# Patient Record
Sex: MALE | Race: WHITE | NOT HISPANIC OR LATINO
[De-identification: names, ages, dates, MRNs, and addresses within clinical notes are randomized per-mention and may not be internally consistent; named-entity substitution may affect disease eponyms.]

---

## 2017-01-19 ENCOUNTER — APPOINTMENT (OUTPATIENT)
Dept: UROLOGY | Facility: CLINIC | Age: 77
End: 2017-01-19

## 2017-03-30 ENCOUNTER — APPOINTMENT (OUTPATIENT)
Dept: UROLOGY | Facility: CLINIC | Age: 77
End: 2017-03-30

## 2017-03-30 LAB
ANION GAP SERPL CALC-SCNC: 19 MMOL/L
APPEARANCE: CLEAR
BACTERIA: NEGATIVE
BILIRUBIN URINE: NEGATIVE
BLOOD URINE: ABNORMAL
BUN SERPL-MCNC: 21 MG/DL
CALCIUM SERPL-MCNC: 9.3 MG/DL
CHLORIDE SERPL-SCNC: 99 MMOL/L
CO2 SERPL-SCNC: 27 MMOL/L
COLOR: YELLOW
CREAT SERPL-MCNC: 1.25 MG/DL
GLUCOSE QUALITATIVE U: NORMAL MG/DL
GLUCOSE SERPL-MCNC: 88 MG/DL
HYALINE CASTS: 1 /LPF
KETONES URINE: NEGATIVE
LEUKOCYTE ESTERASE URINE: NEGATIVE
MICROSCOPIC-UA: NORMAL
NITRITE URINE: NEGATIVE
PH URINE: 7.5
POTASSIUM SERPL-SCNC: 4.5 MMOL/L
PROTEIN URINE: NEGATIVE MG/DL
RED BLOOD CELLS URINE: 11 /HPF
SODIUM SERPL-SCNC: 145 MMOL/L
SPECIFIC GRAVITY URINE: 1.01
SQUAMOUS EPITHELIAL CELLS: 1 /HPF
UROBILINOGEN URINE: 1 MG/DL
WHITE BLOOD CELLS URINE: 0 /HPF

## 2017-03-31 LAB
PSA FREE FLD-MCNC: 10.9 %
PSA FREE SERPL-MCNC: 1.13 NG/ML
PSA SERPL-MCNC: 10.41 NG/ML

## 2017-04-01 LAB — CORE LAB FLUID CYTOLOGY: NORMAL

## 2017-09-28 ENCOUNTER — APPOINTMENT (OUTPATIENT)
Dept: UROLOGY | Facility: CLINIC | Age: 77
End: 2017-09-28
Payer: MEDICARE

## 2017-09-28 PROCEDURE — 99214 OFFICE O/P EST MOD 30 MIN: CPT

## 2017-09-29 LAB
PSA FREE FLD-MCNC: 9.5
PSA FREE SERPL-MCNC: 1.3 NG/ML
PSA SERPL-MCNC: 13.67 NG/ML

## 2018-03-28 ENCOUNTER — APPOINTMENT (OUTPATIENT)
Dept: UROLOGY | Facility: CLINIC | Age: 78
End: 2018-03-28
Payer: MEDICARE

## 2018-03-28 DIAGNOSIS — R97.20 ELEVATED PROSTATE, SPECIFIC ANTIGEN [PSA]: ICD-10-CM

## 2018-03-28 PROCEDURE — 99214 OFFICE O/P EST MOD 30 MIN: CPT

## 2018-03-29 LAB
PSA FREE FLD-MCNC: 7
PSA FREE SERPL-MCNC: 0.22 NG/ML
PSA SERPL-MCNC: 3.14 NG/ML

## 2018-04-13 LAB
APPEARANCE: ABNORMAL
BACTERIA: ABNORMAL
BILIRUBIN URINE: ABNORMAL
BLOOD URINE: ABNORMAL
COLOR: ABNORMAL
GLUCOSE QUALITATIVE U: NEGATIVE
HYALINE CASTS: 0 /LPF
KETONES URINE: NEGATIVE
LEUKOCYTE ESTERASE URINE: ABNORMAL
MICROSCOPIC-UA: NORMAL
NITRITE URINE: POSITIVE
PH URINE: 5
PROTEIN URINE: 100
RED BLOOD CELLS URINE: >720 /HPF
SPECIFIC GRAVITY URINE: 1.03
SQUAMOUS EPITHELIAL CELLS: 3 /HPF
UROBILINOGEN URINE: NEGATIVE
WHITE BLOOD CELLS URINE: 52 /HPF

## 2018-04-15 LAB
BACTERIA UR CULT: ABNORMAL
CORE LAB FLUID CYTOLOGY: NORMAL

## 2018-05-11 ENCOUNTER — APPOINTMENT (OUTPATIENT)
Dept: RADIOLOGY | Facility: HOSPITAL | Age: 78
End: 2018-05-11
Payer: MEDICARE

## 2018-05-11 ENCOUNTER — OUTPATIENT (OUTPATIENT)
Dept: OUTPATIENT SERVICES | Facility: HOSPITAL | Age: 78
LOS: 1 days | End: 2018-05-11
Payer: MEDICARE

## 2018-05-11 DIAGNOSIS — Z00.8 ENCOUNTER FOR OTHER GENERAL EXAMINATION: ICD-10-CM

## 2018-05-11 DIAGNOSIS — R05 COUGH: ICD-10-CM

## 2018-05-11 PROCEDURE — 71046 X-RAY EXAM CHEST 2 VIEWS: CPT

## 2018-05-11 PROCEDURE — 71046 X-RAY EXAM CHEST 2 VIEWS: CPT | Mod: 26

## 2018-05-18 ENCOUNTER — APPOINTMENT (OUTPATIENT)
Dept: ULTRASOUND IMAGING | Facility: CLINIC | Age: 78
End: 2018-05-18
Payer: MEDICARE

## 2018-05-18 ENCOUNTER — OUTPATIENT (OUTPATIENT)
Dept: OUTPATIENT SERVICES | Facility: HOSPITAL | Age: 78
LOS: 1 days | End: 2018-05-18
Payer: MEDICARE

## 2018-05-18 DIAGNOSIS — Z00.8 ENCOUNTER FOR OTHER GENERAL EXAMINATION: ICD-10-CM

## 2018-05-18 PROCEDURE — 76770 US EXAM ABDO BACK WALL COMP: CPT | Mod: 26

## 2018-05-18 PROCEDURE — 76770 US EXAM ABDO BACK WALL COMP: CPT

## 2018-06-07 ENCOUNTER — APPOINTMENT (OUTPATIENT)
Dept: UROLOGY | Facility: CLINIC | Age: 78
End: 2018-06-07
Payer: MEDICARE

## 2018-06-07 DIAGNOSIS — Z00.00 ENCOUNTER FOR GENERAL ADULT MEDICAL EXAMINATION W/OUT ABNORMAL FINDINGS: ICD-10-CM

## 2018-06-07 PROCEDURE — 99214 OFFICE O/P EST MOD 30 MIN: CPT

## 2018-06-08 LAB
APPEARANCE: CLEAR
BACTERIA: NEGATIVE
BILIRUBIN URINE: NEGATIVE
BLOOD URINE: NEGATIVE
COLOR: ABNORMAL
GLUCOSE QUALITATIVE U: NEGATIVE MG/DL
HYALINE CASTS: 2 /LPF
KETONES URINE: NEGATIVE
LEUKOCYTE ESTERASE URINE: NEGATIVE
MICROSCOPIC-UA: NORMAL
NITRITE URINE: NEGATIVE
PH URINE: 5
PROTEIN URINE: NEGATIVE MG/DL
RED BLOOD CELLS URINE: 4 /HPF
SPECIFIC GRAVITY URINE: 1.02
SQUAMOUS EPITHELIAL CELLS: 1 /HPF
UROBILINOGEN URINE: 1 MG/DL
WHITE BLOOD CELLS URINE: 1 /HPF

## 2018-06-11 LAB
CORE LAB FLUID CYTOLOGY: NORMAL
PSA FREE FLD-MCNC: 5.4
PSA FREE SERPL-MCNC: 0.11 NG/ML
PSA SERPL-MCNC: 2.02 NG/ML

## 2018-08-30 ENCOUNTER — APPOINTMENT (OUTPATIENT)
Dept: UROLOGY | Facility: CLINIC | Age: 78
End: 2018-08-30
Payer: MEDICARE

## 2018-08-30 DIAGNOSIS — R31.0 GROSS HEMATURIA: ICD-10-CM

## 2018-08-30 PROCEDURE — 99214 OFFICE O/P EST MOD 30 MIN: CPT

## 2018-08-31 LAB
PSA FREE FLD-MCNC: 9.3
PSA FREE SERPL-MCNC: 0.3 NG/ML
PSA SERPL-MCNC: 3.21 NG/ML

## 2018-12-05 ENCOUNTER — APPOINTMENT (OUTPATIENT)
Dept: UROLOGY | Facility: CLINIC | Age: 78
End: 2018-12-05

## 2019-02-07 ENCOUNTER — APPOINTMENT (OUTPATIENT)
Dept: UROLOGY | Facility: CLINIC | Age: 79
End: 2019-02-07

## 2019-02-14 ENCOUNTER — APPOINTMENT (OUTPATIENT)
Dept: UROLOGY | Facility: CLINIC | Age: 79
End: 2019-02-14
Payer: MEDICARE

## 2019-02-14 DIAGNOSIS — C61 MALIGNANT NEOPLASM OF PROSTATE: ICD-10-CM

## 2019-02-14 DIAGNOSIS — N13.8 BENIGN PROSTATIC HYPERPLASIA WITH LOWER URINARY TRACT SYMPMS: ICD-10-CM

## 2019-02-14 DIAGNOSIS — R35.0 FREQUENCY OF MICTURITION: ICD-10-CM

## 2019-02-14 DIAGNOSIS — N40.1 BENIGN PROSTATIC HYPERPLASIA WITH LOWER URINARY TRACT SYMPMS: ICD-10-CM

## 2019-02-14 PROCEDURE — 88112 CYTOPATH CELL ENHANCE TECH: CPT | Mod: 26

## 2019-02-14 PROCEDURE — 99214 OFFICE O/P EST MOD 30 MIN: CPT

## 2019-02-24 LAB
APPEARANCE: CLEAR
BACTERIA UR CULT: NORMAL
BACTERIA: NEGATIVE
BILIRUBIN URINE: NEGATIVE
BLOOD URINE: NEGATIVE
COLOR: YELLOW
GLUCOSE QUALITATIVE U: NEGATIVE
HYALINE CASTS: 1 /LPF
KETONES URINE: NEGATIVE
LEUKOCYTE ESTERASE URINE: NEGATIVE
MICROSCOPIC-UA: NORMAL
NITRITE URINE: NEGATIVE
PH URINE: 6
PROTEIN URINE: NORMAL
RED BLOOD CELLS URINE: 3 /HPF
SPECIFIC GRAVITY URINE: 1.02
SQUAMOUS EPITHELIAL CELLS: 1 /HPF
UROBILINOGEN URINE: NORMAL
WHITE BLOOD CELLS URINE: 1 /HPF

## 2019-09-23 ENCOUNTER — RX RENEWAL (OUTPATIENT)
Age: 79
End: 2019-09-23

## 2020-11-03 ENCOUNTER — RX RENEWAL (OUTPATIENT)
Age: 80
End: 2020-11-03

## 2022-04-28 ENCOUNTER — INPATIENT (INPATIENT)
Facility: HOSPITAL | Age: 82
LOS: 3 days | Discharge: HOME | DRG: 690 | End: 2022-05-02
Attending: FAMILY MEDICINE | Admitting: INTERNAL MEDICINE
Payer: MEDICARE

## 2022-04-28 ENCOUNTER — EMERGENCY (EMERGENCY)
Facility: HOSPITAL | Age: 82
LOS: 1 days | Discharge: ANOTHER HOSPITAL | End: 2022-04-28
Attending: EMERGENCY MEDICINE | Admitting: EMERGENCY MEDICINE
Payer: MEDICARE

## 2022-04-28 VITALS
OXYGEN SATURATION: 98 % | DIASTOLIC BLOOD PRESSURE: 85 MMHG | RESPIRATION RATE: 16 BRPM | SYSTOLIC BLOOD PRESSURE: 132 MMHG | TEMPERATURE: 98 F | HEART RATE: 81 BPM

## 2022-04-28 VITALS
HEIGHT: 72 IN | SYSTOLIC BLOOD PRESSURE: 125 MMHG | HEART RATE: 60 BPM | RESPIRATION RATE: 16 BRPM | DIASTOLIC BLOOD PRESSURE: 79 MMHG | WEIGHT: 220.02 LBS | OXYGEN SATURATION: 97 % | TEMPERATURE: 98 F

## 2022-04-28 DIAGNOSIS — R33.9 RETENTION OF URINE, UNSPECIFIED: ICD-10-CM

## 2022-04-28 LAB
ALBUMIN SERPL ELPH-MCNC: 2.7 G/DL — LOW (ref 3.3–5)
ALP SERPL-CCNC: 131 U/L — HIGH (ref 30–120)
ALT FLD-CCNC: 24 U/L DA — SIGNIFICANT CHANGE UP (ref 10–60)
ANION GAP SERPL CALC-SCNC: 7 MMOL/L — SIGNIFICANT CHANGE UP (ref 5–17)
APPEARANCE UR: CLEAR — SIGNIFICANT CHANGE UP
APTT BLD: 53.3 SEC — HIGH (ref 27.5–35.5)
AST SERPL-CCNC: 26 U/L — SIGNIFICANT CHANGE UP (ref 10–40)
BACTERIA # UR AUTO: ABNORMAL
BASOPHILS # BLD AUTO: 0 K/UL — SIGNIFICANT CHANGE UP (ref 0–0.2)
BASOPHILS NFR BLD AUTO: 0 % — SIGNIFICANT CHANGE UP (ref 0–2)
BILIRUB SERPL-MCNC: 1.8 MG/DL — HIGH (ref 0.2–1.2)
BILIRUB UR-MCNC: NEGATIVE — SIGNIFICANT CHANGE UP
BUN SERPL-MCNC: 14 MG/DL — SIGNIFICANT CHANGE UP (ref 7–23)
CALCIUM SERPL-MCNC: 8.5 MG/DL — SIGNIFICANT CHANGE UP (ref 8.4–10.5)
CHLORIDE SERPL-SCNC: 97 MMOL/L — SIGNIFICANT CHANGE UP (ref 96–108)
CO2 SERPL-SCNC: 30 MMOL/L — SIGNIFICANT CHANGE UP (ref 22–31)
COLOR SPEC: YELLOW — SIGNIFICANT CHANGE UP
CREAT SERPL-MCNC: 1.37 MG/DL — HIGH (ref 0.5–1.3)
DIFF PNL FLD: ABNORMAL
EGFR: 52 ML/MIN/1.73M2 — LOW
EOSINOPHIL # BLD AUTO: 0 K/UL — SIGNIFICANT CHANGE UP (ref 0–0.5)
EOSINOPHIL NFR BLD AUTO: 0 % — SIGNIFICANT CHANGE UP (ref 0–6)
EPI CELLS # UR: SIGNIFICANT CHANGE UP
GLUCOSE SERPL-MCNC: 111 MG/DL — HIGH (ref 70–99)
GLUCOSE UR QL: NEGATIVE MG/DL — SIGNIFICANT CHANGE UP
HCT VFR BLD CALC: 38.1 % — LOW (ref 39–50)
HGB BLD-MCNC: 12.3 G/DL — LOW (ref 13–17)
INR BLD: 4.43 RATIO — HIGH (ref 0.88–1.16)
KETONES UR-MCNC: NEGATIVE — SIGNIFICANT CHANGE UP
LACTATE SERPL-SCNC: 1 MMOL/L — SIGNIFICANT CHANGE UP (ref 0.7–2)
LACTATE SERPL-SCNC: 2.1 MMOL/L — HIGH (ref 0.7–2)
LEUKOCYTE ESTERASE UR-ACNC: ABNORMAL
LYMPHOCYTES # BLD AUTO: 1.32 K/UL — SIGNIFICANT CHANGE UP (ref 1–3.3)
LYMPHOCYTES # BLD AUTO: 21 % — SIGNIFICANT CHANGE UP (ref 13–44)
MANUAL SMEAR VERIFICATION: SIGNIFICANT CHANGE UP
MCHC RBC-ENTMCNC: 32.2 PG — SIGNIFICANT CHANGE UP (ref 27–34)
MCHC RBC-ENTMCNC: 32.3 GM/DL — SIGNIFICANT CHANGE UP (ref 32–36)
MCV RBC AUTO: 99.7 FL — SIGNIFICANT CHANGE UP (ref 80–100)
MONOCYTES # BLD AUTO: 0.5 K/UL — SIGNIFICANT CHANGE UP (ref 0–0.9)
MONOCYTES NFR BLD AUTO: 8 % — SIGNIFICANT CHANGE UP (ref 2–14)
NEUTROPHILS # BLD AUTO: 4.46 K/UL — SIGNIFICANT CHANGE UP (ref 1.8–7.4)
NEUTROPHILS NFR BLD AUTO: 69 % — SIGNIFICANT CHANGE UP (ref 43–77)
NEUTS BAND # BLD: 2 % — SIGNIFICANT CHANGE UP (ref 0–8)
NITRITE UR-MCNC: NEGATIVE — SIGNIFICANT CHANGE UP
NRBC # BLD: 0 — SIGNIFICANT CHANGE UP
NRBC # BLD: SIGNIFICANT CHANGE UP /100 WBCS (ref 0–0)
PH UR: 5 — SIGNIFICANT CHANGE UP (ref 5–8)
PLAT MORPH BLD: NORMAL — SIGNIFICANT CHANGE UP
PLATELET # BLD AUTO: 201 K/UL — SIGNIFICANT CHANGE UP (ref 150–400)
POTASSIUM SERPL-MCNC: 4 MMOL/L — SIGNIFICANT CHANGE UP (ref 3.5–5.3)
POTASSIUM SERPL-SCNC: 4 MMOL/L — SIGNIFICANT CHANGE UP (ref 3.5–5.3)
PROT SERPL-MCNC: 6.2 G/DL — SIGNIFICANT CHANGE UP (ref 6–8.3)
PROT UR-MCNC: 30 MG/DL
PROTHROM AB SERPL-ACNC: 53.1 SEC — HIGH (ref 10.5–13.4)
RBC # BLD: 3.82 M/UL — LOW (ref 4.2–5.8)
RBC # FLD: 16.6 % — HIGH (ref 10.3–14.5)
RBC BLD AUTO: NORMAL — SIGNIFICANT CHANGE UP
RBC CASTS # UR COMP ASSIST: ABNORMAL /HPF (ref 0–4)
SARS-COV-2 RNA SPEC QL NAA+PROBE: SIGNIFICANT CHANGE UP
SODIUM SERPL-SCNC: 134 MMOL/L — LOW (ref 135–145)
SP GR SPEC: 1.01 — SIGNIFICANT CHANGE UP (ref 1.01–1.02)
UROBILINOGEN FLD QL: NEGATIVE MG/DL — SIGNIFICANT CHANGE UP
WBC # BLD: 6.28 K/UL — SIGNIFICANT CHANGE UP (ref 3.8–10.5)
WBC # FLD AUTO: 6.28 K/UL — SIGNIFICANT CHANGE UP (ref 3.8–10.5)
WBC UR QL: >50

## 2022-04-28 PROCEDURE — 87086 URINE CULTURE/COLONY COUNT: CPT

## 2022-04-28 PROCEDURE — 85610 PROTHROMBIN TIME: CPT

## 2022-04-28 PROCEDURE — 85730 THROMBOPLASTIN TIME PARTIAL: CPT

## 2022-04-28 PROCEDURE — 71045 X-RAY EXAM CHEST 1 VIEW: CPT

## 2022-04-28 PROCEDURE — 81001 URINALYSIS AUTO W/SCOPE: CPT

## 2022-04-28 PROCEDURE — 99223 1ST HOSP IP/OBS HIGH 75: CPT | Mod: GC

## 2022-04-28 PROCEDURE — 36415 COLL VENOUS BLD VENIPUNCTURE: CPT

## 2022-04-28 PROCEDURE — 96361 HYDRATE IV INFUSION ADD-ON: CPT

## 2022-04-28 PROCEDURE — 87635 SARS-COV-2 COVID-19 AMP PRB: CPT

## 2022-04-28 PROCEDURE — 80053 COMPREHEN METABOLIC PANEL: CPT

## 2022-04-28 PROCEDURE — 96365 THER/PROPH/DIAG IV INF INIT: CPT

## 2022-04-28 PROCEDURE — 71045 X-RAY EXAM CHEST 1 VIEW: CPT | Mod: 26

## 2022-04-28 PROCEDURE — 83605 ASSAY OF LACTIC ACID: CPT

## 2022-04-28 PROCEDURE — 93005 ELECTROCARDIOGRAM TRACING: CPT

## 2022-04-28 PROCEDURE — 93010 ELECTROCARDIOGRAM REPORT: CPT

## 2022-04-28 PROCEDURE — 85025 COMPLETE CBC W/AUTO DIFF WBC: CPT

## 2022-04-28 PROCEDURE — 99285 EMERGENCY DEPT VISIT HI MDM: CPT | Mod: CS

## 2022-04-28 PROCEDURE — 87040 BLOOD CULTURE FOR BACTERIA: CPT

## 2022-04-28 PROCEDURE — 99285 EMERGENCY DEPT VISIT HI MDM: CPT | Mod: 25

## 2022-04-28 RX ORDER — LANOLIN ALCOHOL/MO/W.PET/CERES
3 CREAM (GRAM) TOPICAL AT BEDTIME
Refills: 0 | Status: DISCONTINUED | OUTPATIENT
Start: 2022-04-28 | End: 2022-05-02

## 2022-04-28 RX ORDER — SODIUM CHLORIDE 9 MG/ML
2100 INJECTION INTRAMUSCULAR; INTRAVENOUS; SUBCUTANEOUS ONCE
Refills: 0 | Status: COMPLETED | OUTPATIENT
Start: 2022-04-28 | End: 2022-04-28

## 2022-04-28 RX ORDER — SODIUM CHLORIDE 9 MG/ML
1000 INJECTION INTRAMUSCULAR; INTRAVENOUS; SUBCUTANEOUS ONCE
Refills: 0 | Status: COMPLETED | OUTPATIENT
Start: 2022-04-28 | End: 2022-04-28

## 2022-04-28 RX ORDER — CEFTRIAXONE 500 MG/1
1000 INJECTION, POWDER, FOR SOLUTION INTRAMUSCULAR; INTRAVENOUS ONCE
Refills: 0 | Status: COMPLETED | OUTPATIENT
Start: 2022-04-28 | End: 2022-04-28

## 2022-04-28 RX ADMIN — SODIUM CHLORIDE 2100 MILLILITER(S): 9 INJECTION INTRAMUSCULAR; INTRAVENOUS; SUBCUTANEOUS at 18:00

## 2022-04-28 RX ADMIN — SODIUM CHLORIDE 1000 MILLILITER(S): 9 INJECTION INTRAMUSCULAR; INTRAVENOUS; SUBCUTANEOUS at 16:39

## 2022-04-28 RX ADMIN — SODIUM CHLORIDE 2100 MILLILITER(S): 9 INJECTION INTRAMUSCULAR; INTRAVENOUS; SUBCUTANEOUS at 16:37

## 2022-04-28 RX ADMIN — CEFTRIAXONE 1000 MILLIGRAM(S): 500 INJECTION, POWDER, FOR SOLUTION INTRAMUSCULAR; INTRAVENOUS at 17:05

## 2022-04-28 RX ADMIN — SODIUM CHLORIDE 1000 MILLILITER(S): 9 INJECTION INTRAMUSCULAR; INTRAVENOUS; SUBCUTANEOUS at 15:34

## 2022-04-28 RX ADMIN — CEFTRIAXONE 100 MILLIGRAM(S): 500 INJECTION, POWDER, FOR SOLUTION INTRAMUSCULAR; INTRAVENOUS at 16:38

## 2022-04-28 NOTE — H&P ADULT - NSHPSOCIALHISTORY_GEN_ALL_CORE
Lives at  home with wife  Has private aids and nurses that come to [facility] to help with care  Patient is bed bound   Denies tobacco, alcohol, or recreation drug use   Vaccinated for COVID x3 pfizer   Lives at  home with wife  Has private aids and nurses that come to the house to help with care  Patient is bed bound   Denies tobacco, alcohol, or recreation drug use   Vaccinated for COVID x3 pfizer

## 2022-04-28 NOTE — ED ADULT NURSE REASSESSMENT NOTE - NS ED NURSE REASSESS COMMENT FT1
pt awaiting bed placement at [place], ED supervisor aware, pt resting comfortably in bed, turned and repositioned, no distress or sob.

## 2022-04-28 NOTE — H&P ADULT - PROBLEM/PLAN-8
Never replied, please call  Also, she was supposed to call Friday to review home BP readings, so please see if we can get those as well. Thank you very much.     DISPLAY PLAN FREE TEXT

## 2022-04-28 NOTE — ED ADULT NURSE NOTE - OBJECTIVE STATEMENT
pt from home, bedbound, has home health care, sent to ED for decreased urine output. Left medial shin healing old abrasion noted, boot to right foot s/p hx of foot drop. currently being treated for uti on Augmentin, dark and painful urine, unable to empty plenty completely. + incontinence, pt turned and cleaned, repositioned. no cp or sob no other symptoms at this time

## 2022-04-28 NOTE — ED ADULT NURSE REASSESSMENT NOTE - COMFORT CARE
darkened lights/meal provided/plan of care explained/repositioned/side rails up/wait time explained/warm blanket provided

## 2022-04-28 NOTE — H&P ADULT - ASSESSMENT
81 y/o bedbound male with a PMHx of Afib on Coumadin, heart failure, and h/o prostate CA, recurrent UTIs admitted for UTI. 83 y/o bedbound male with a PMHx of Afib on Coumadin, heart failure, and h/o prostate CA, recurrent UTIs admitted for UTI and urinary retention.

## 2022-04-28 NOTE — H&P ADULT - PROBLEM SELECTOR PLAN 2
elevated INR of 4.43  - Hold Warfarin   - F/u daily INR check Agrawal placed in [facility] ED - continue  - follow up urology recs

## 2022-04-28 NOTE — ED PROVIDER NOTE - MUSCULOSKELETAL, MLM
Pt lives alone, denies stairs. Prior to admission, pt ambulated independently with cane. Pt also has a rolling walker at home. Right lower leg in brace.

## 2022-04-28 NOTE — H&P ADULT - HISTORY OF PRESENT ILLNESS
82M with PMHx of.... **Charting in progress***  81 y/o bedbound male with a PMHx of Afib on Coumadin, heart failure, and h/o prostate CA from home brought in by EMS for evaluation of difficulty urinating, painful urination, and dark urine. Per wife, pt has had frequent UTIs over the past few months and was recently diagnosed with another UTI and put on Amoxicillin a few days ago by the visiting doctor's PA. Wife states he was admitted at [facility] a few months ago for Afib and was discharged to rehab where he started developing UTIs & the right foot drop. She also states pt was dropped during transport from rehab to [facility] when he had a UTI approx. 1.5 months ago and "broke something in his back" causing him to be bedbound since. Per wife, pt is incontinent & urinates in diapers. Denies other symptoms. No other complaints at this time.    ED course  Vitals:  Significant labs: INR 4.43, lactate 2.1 -> 1, Na 134, Cr 1.37, alk phos 131, tbili 1.8   UA: Moderate LE, Large blood, few bacteria, >50 WBC, 3-5 RBC  CXR: IMPRESSION: Cardiomegaly. No radiographic evidence of active chest disease. Lateral or bilateral decubitus radiographs recommended to exclude posterior lung base pathology. 83 y/o bedbound male with a PMHx of Afib on Coumadin, heart failure, and h/o prostate CA from home brought in by EMS for evaluation of difficulty urinating, painful urination, and dark urine. Per wife, pt has had frequent UTIs over the past few months and was recently diagnosed with another UTI and put on Amoxicillin a few days ago by the visiting doctor's PA. Wife states he was admitted at [facility] a few months ago for Afib and was discharged to rehab where he started developing UTIs & the right foot drop. She also states pt was dropped during transport from rehab to [facility] when he had a UTI approx. 1.5 months ago and "broke something in his back" causing him to be bedbound since. Per wife, pt is incontinent & urinates in diapers. Denies other symptoms. No other complaints at this time.    ED course  Vitals:  Significant labs: INR 4.43, lactate 2.1 -> 1, Na 134, Cr 1.37, alk phos 131, tbili 1.8   UA: Moderate LE, Large blood, few bacteria, >50 WBC, 3-5 RBC  CXR: IMPRESSION: Cardiomegaly. No radiographic evidence of active chest disease. Lateral or bilateral decubitus radiographs recommended to exclude posterior lung base pathology. 81 y/o bedbound male with a PMHx of Afib on Coumadin, heart failure, and h/o prostate CA from home brought in by EMS for evaluation of difficulty urinating, painful urination, and dark urine. Per wife, pt has had frequent UTIs over the past few months and was recently diagnosed with another UTI and put on Amoxicillin a few days ago by the visiting doctor's PA. Wife states he was admitted at [facility] a few months ago for Afib and was discharged to rehab where he started developing UTIs & the right foot drop. She also states pt was dropped during transport from rehab to [facility] when he had a UTI approx. 1.5 months ago and "broke something in his back" causing him to be bedbound since. Per wife, pt is incontinent & urinates in diapers. Denies other symptoms. No other complaints at this time.    ED course  Vitals: T97.8F, HR 60, /79, RR 16, SpO2 97% on RA  Significant labs: INR 4.43, lactate 2.1 -> 1, Na 134, Cr 1.37, alk phos 131, tbili 1.8   UA: Moderate LE, Large blood, few bacteria, >50 WBC, 3-5 RBC  CXR: Cardiomegaly. No radiographic evidence of active chest disease. Lateral or bilateral decubitus radiographs recommended to exclude posterior lung base pathology. 81 y/o bedbound male with a PMHx of Afib on Coumadin, heart failure, and h/o prostate CA from home brought in by EMS for evaluation of difficulty urinating, painful urination, and dark urine. Per wife, pt has had frequent UTIs over the past few months and was recently diagnosed with another UTI and put on Amoxicillin a few days ago by the visiting doctor's PA. Wife states he was admitted at [facility] a few months ago for Afib and was discharged to rehab where he started developing UTIs & the right foot drop. She also states pt was dropped during transport from rehab to [facility] when he had a UTI approx. 1.5 months ago and "broke something in his back" causing him to be bedbound since. Per wife, pt is incontinent & urinates in diapers. Denies other symptoms. No other complaints at this time.    ED course  Vitals: T97.8F, HR 60, /79, RR 16, SpO2 97% on RA  Significant labs: INR 4.43, lactate 2.1 -> 1, Na 134, Cr 1.37, alk phos 131, tbili 1.8   UA: Moderate LE, Large blood, few bacteria, >50 WBC, 3-5 RBC  CXR: Cardiomegaly. No radiographic evidence of active chest disease. Lateral or bilateral decubitus radiographs recommended to exclude posterior lung base pathology.  EKG SB with 1st deg AV block, LBBB

## 2022-04-28 NOTE — ED PROVIDER NOTE - OBJECTIVE STATEMENT
81 y/o bedbound male with a PMHx of Afib on Coumadin, heart failure, and h/o prostate CA from home brought in by EMS for evaluation of difficulty urinating, painful urination, and dark urine. Per wife, pt has had frequent UTIs over the past few months and was recently diagnosed with another UTI and put on Amoxicillin a few days ago by the visiting doctor's PA. Wife states he was admitted at [facility] a few months ago for Afib and was discharged to rehab where he started developing UTIs & the right foot drop. She also states pt was dropped during transport from rehab to [facility] when he had a UTI approx. 1.5 months ago and "broke something in his back" causing him to be bedbound since. Per wife, pt is incontinent & urinates in diapers. Denies other symptoms. No other complaints at this time.

## 2022-04-28 NOTE — H&P ADULT - PROBLEM SELECTOR PLAN 4
Continue pantoprazole On warfarin, metoprolol, digoxin and amiodarone   - continue metoprolol, digoxin and amiodarone with hold parameters  - hold warfarin in the setting of supratherapeutic INR  - F/u daily INR  - EKG with LBBB - No CP. Will check trop.  - Monitor on remote tele, unknown if new or not - f/u outpatient records

## 2022-04-28 NOTE — H&P ADULT - NSICDXPASTSURGICALHX_GEN_ALL_CORE_FT
PAST SURGICAL HISTORY:  No significant past surgical history      PAST SURGICAL HISTORY:  S/P anal fissurectomy

## 2022-04-28 NOTE — ED ADULT TRIAGE NOTE - CHIEF COMPLAINT QUOTE
" I cant urinate " Pt BIBA from home for urinary incontinence pt recently dx with UTI - on oral antibiotic Augmentin No vomiting No diarrhea No fever

## 2022-04-28 NOTE — H&P ADULT - NSHPPHYSICALEXAM_GEN_ALL_CORE
T(C): 36.7 (04-28-22 @ 22:45), Max: 37.2 (04-28-22 @ 22:26)  HR: 76 (04-28-22 @ 22:45) (60 - 79)  BP: 128/85 (04-28-22 @ 22:45) (125/79 - 131/81)  RR: 16 (04-28-22 @ 22:45) (16 - 16)  SpO2: 98% (04-28-22 @ 22:45) (97% - 98%)    General: No apparent distress  Head: normocephalic, atraumatic  Eyes: EOMI, anicteric  ENT: moist mucous membranes, no pharyngeal exudates  Heart: rrr, S1, S2, no murmurs  Chest: CTA b/l, no rales, rhonchi, or wheezes  Abd: BS+, soft, NT, ND  Back: no CVA tenderness  Extr: no edema or cyanosis  Neuro: AA&Ox3, no focal weakness, sensation to light touch intact  Psych: normal affect T(C): 36.7 (04-28-22 @ 22:45), Max: 37.2 (04-28-22 @ 22:26)  HR: 76 (04-28-22 @ 22:45) (60 - 79)  BP: 128/85 (04-28-22 @ 22:45) (125/79 - 131/81)  RR: 16 (04-28-22 @ 22:45) (16 - 16)  SpO2: 98% (04-28-22 @ 22:45) (97% - 98%)    General: No apparent distress  Head: normocephalic, atraumatic  Eyes: EOMI, anicteric  ENT: moist mucous membranes, no pharyngeal exudates  Heart: RRR, S1, S2, no murmurs  Chest: CTA b/l, no rales, rhonchi, or wheezes  Abd: BS+, soft, NT, ND  Back: no CVA tenderness  Extr: Brace on RLE, no edema or cyanosis  Neuro: AA&Ox2 (self and date), no focal weakness, sensation to light touch intact  Psych: normal affect T(C): 36.7 (04-28-22 @ 22:45), Max: 37.2 (04-28-22 @ 22:26)  HR: 76 (04-28-22 @ 22:45) (60 - 79)  BP: 128/85 (04-28-22 @ 22:45) (125/79 - 131/81)  RR: 16 (04-28-22 @ 22:45) (16 - 16)  SpO2: 98% (04-28-22 @ 22:45) (97% - 98%)    General: No apparent distress, resting comfortably  Head: normocephalic, atraumatic  Eyes: EOMI, anicteric  ENT: moist mucous membranes, no pharyngeal exudates  Heart: RRR, S1, S2, no murmurs  Chest: CTA b/l, no rales, rhonchi, or wheezes  Abd: BS+, soft, NT, ND  Back: no CVA tenderness  Extr: Brace on RLE, no edema or cyanosis  Neuro: AA&Ox2 (self and date), no focal weakness, sensation to light touch intact  Psych: normal affect

## 2022-04-28 NOTE — ED ADULT NURSE REASSESSMENT NOTE - NS ED NURSE REASSESS COMMENT FT1
pt resting comfortably, improvement noted, in no acute distress. Respirations are even and unlabored. pt voices no complaints at this time. pt and family updated and aware of plan of care. pt repositioned in bed, pt hemodynamically stable. awaiting transfer p.u to [place] will cont to monitor.

## 2022-04-28 NOTE — ED PROVIDER NOTE - CLINICAL SUMMARY MEDICAL DECISION MAKING FREE TEXT BOX
Bedbound pt with a history of prostate CA now with difficulty urinating & is being treated for a UTI. Plan: rule out urinary retention, Agrawal Catheter, UA C & S, and will need urology follow up.

## 2022-04-28 NOTE — H&P ADULT - PROBLEM SELECTOR PLAN 3
On warfarin, metoprolol, digoxin and amiodarone   - continue metoprolol, digoxin and amiodarone with hold parameters  - hold warfarin in the setting of supratherapeutic INR  - F/u daily INR  - Monitor on remote tele elevated INR of 4.43  - Hold Warfarin   - F/u daily INR check

## 2022-04-28 NOTE — ED PROVIDER NOTE - PROGRESS NOTE DETAILS
Agrawal drained 350cc dark yellow urine. Lactate mildly elevated. Plan - admit to [place] for IV fluids, antibiotics. May need urology eval.  Dr LEON Gutiérrez accepting transfer.

## 2022-04-28 NOTE — H&P ADULT - PROBLEM SELECTOR PLAN 1
Patient with dysuria, frequency and purulent discharge with recurrent UTI's this year. Started on Amoxicillin-Clav on 4/23 without improvement.   - UA: Moderate LE, Large blood, few bacteria, >50 WBC, 3-5 RBC  - Rocephin 1g IVPB q24h   - tylenol 650 mg PO q6h PRN pain and/or fever  - F/u  blood & urine cultures  - Urology consulted, f/u recs  - Will need outpatient urology to evaluated for structural abnormalities causing recurrent UTIs and pt has no follow up with urology in about 3 years Patient with dysuria, frequency and purulent discharge with recurrent UTI's this year. Started on Amoxicillin-Clav on 4/23 without improvement.   - UA: Moderate LE, Large blood, few bacteria, >50 WBC, 3-5 RBC  - Rocephin 1g IVPB q24h   - tylenol 650 mg PO q6h PRN pain and/or fever  - F/u  blood & urine cultures  - Urology consulted, f/u recs  - ID, Dr. Garcia, consulted f/u recs  - Will need outpatient urology to evaluated for structural abnormalities causing recurrent UTIs and pt has no follow up with urology in about 3 years

## 2022-04-28 NOTE — ED ADULT NURSE REASSESSMENT NOTE - NS ED NURSE REASSESS COMMENT FT1
(3) adequate AGUSTINA Romero Spoke with patients daughter Danielle. Patients daughter reports patient was admitted t o [place] 2/28 with shortness of breath, dx with A-lorraine, then transferred to acute rehab. Patient was then admitted back to [place] dx and treated for UTI with IV abx. Suffered "drop" from hospital staff resulting in right drop foot. Patient now requires right foot brace for drop foot (Brace is patients property). Patients family requesting neuro consultation to discuss status of right foot and to determine appropriate interventions. Patient also suffered sacral pressure ulcer due to bed bound status after second [facility] admission. Patients family requesting Q2hr turning and positioning to avoid pressure ulcer. Patients daughter reports enlarged scrotum on 4/11 received at home ultrasound, results determined large right hydrocele, moderate left hydrocele, left scrotal lith, and hypercytotic cyst. Patients family requesting Scrotal and bladder ultrasound to "rule out possible hernia". Hx of prostate CA in 2007 treated with radiation and oral chemo, recent elevation in PSA levels. Incontinent of bladder and bowels.

## 2022-04-28 NOTE — H&P ADULT - ATTENDING COMMENTS
81 y/o bedbound male with a PMHx of Afib on Coumadin, heart failure, and h/o prostate CA, recurrent UTIs admitted for UTI and urinary retention. Continue rodas catheter. Continue IV antibiotics. Follow up cultures. ID consult. Urology consult as patient was transferred to [facility] for urology evaluation. Hold coumadin and monitor INR. EKG with LBBB - unclear of chronicity as no prior for comparison, but no chest pain. F/U Hs[facility].    Agree with H&P as outlined above, edited where appropriate.

## 2022-04-28 NOTE — H&P ADULT - NSHPREVIEWOFSYSTEMS_GEN_ALL_CORE
CONSTITUTIONAL: +chills. denies fever, fatigue, weakness  HEENT: denies blurred vision, sore throat  SKIN: +healing sacral wound, denies new lesions, rash  CARDIOVASCULAR: denies chest pain, chest pressure, palpitations  RESPIRATORY: denies shortness of breath, sputum production  GASTROINTESTINAL: denies nausea, vomiting, diarrhea, abdominal pain  GENITOURINARY: + frequency, +dysuria, +purulent discharge  NEUROLOGICAL: denies numbness, headache, focal weakness  MUSCULOSKELETAL: +chronic back pain, +right foot drop. Denies new joint pain, muscle aches  HEMATOLOGIC: denies gross bleeding, bruising  LYMPHATICS: denies enlarged lymph nodes, extremity swelling  PSYCHIATRIC: denies recent changes in anxiety, depression  ENDOCRINOLOGIC: denies sweating, cold or heat intolerance

## 2022-04-28 NOTE — ED ADULT NURSE NOTE - NSIMPLEMENTINTERV_GEN_ALL_ED
Implemented All Fall with Harm Risk Interventions:  [place] to call system. Call bell, personal items and telephone within reach. Instruct patient to call for assistance. Room bathroom lighting operational. Non-slip footwear when patient is off stretcher. Physically safe environment: no spills, clutter or unnecessary equipment. Stretcher in lowest position, wheels locked, appropriate side rails in place. Provide visual cue, wrist band, yellow gown, etc. Monitor gait and stability. Monitor for mental status changes and reorient to person, place, and time. Review medications for side effects contributing to fall risk. Reinforce activity limits and safety measures with patient and family. Provide visual clues: red socks.

## 2022-04-28 NOTE — ED ADULT NURSE REASSESSMENT NOTE - GENERAL PATIENT STATE
comfortable appearance/cooperative/improvement verbalized/family/SO at bedside/no change observed/resting/sleeping

## 2022-04-28 NOTE — ED ADULT NURSE REASSESSMENT NOTE - NS ED NURSE REASSESS COMMENT FT1
Patient repositioned onto right side for comfort. Family at bedside, awaiting transfer to [place] for admission.

## 2022-04-29 VITALS
SYSTOLIC BLOOD PRESSURE: 130 MMHG | WEIGHT: 199.52 LBS | TEMPERATURE: 98 F | RESPIRATION RATE: 17 BRPM | DIASTOLIC BLOOD PRESSURE: 75 MMHG | HEART RATE: 71 BPM | OXYGEN SATURATION: 95 %

## 2022-04-29 DIAGNOSIS — M10.9 GOUT, UNSPECIFIED: ICD-10-CM

## 2022-04-29 DIAGNOSIS — I48.91 UNSPECIFIED ATRIAL FIBRILLATION: ICD-10-CM

## 2022-04-29 DIAGNOSIS — I50.9 HEART FAILURE, UNSPECIFIED: ICD-10-CM

## 2022-04-29 DIAGNOSIS — Z29.9 ENCOUNTER FOR PROPHYLACTIC MEASURES, UNSPECIFIED: ICD-10-CM

## 2022-04-29 DIAGNOSIS — R33.9 RETENTION OF URINE, UNSPECIFIED: ICD-10-CM

## 2022-04-29 DIAGNOSIS — H40.9 UNSPECIFIED GLAUCOMA: ICD-10-CM

## 2022-04-29 DIAGNOSIS — Z98.890 OTHER SPECIFIED POSTPROCEDURAL STATES: Chronic | ICD-10-CM

## 2022-04-29 DIAGNOSIS — K21.9 GASTRO-ESOPHAGEAL REFLUX DISEASE WITHOUT ESOPHAGITIS: ICD-10-CM

## 2022-04-29 DIAGNOSIS — N39.0 URINARY TRACT INFECTION, SITE NOT SPECIFIED: ICD-10-CM

## 2022-04-29 DIAGNOSIS — R79.1 ABNORMAL COAGULATION PROFILE: ICD-10-CM

## 2022-04-29 LAB
ALBUMIN SERPL ELPH-MCNC: 2.6 G/DL — LOW (ref 3.3–5)
ALP SERPL-CCNC: 121 U/L — HIGH (ref 40–120)
ALT FLD-CCNC: 22 U/L — SIGNIFICANT CHANGE UP (ref 12–78)
ANION GAP SERPL CALC-SCNC: 5 MMOL/L — SIGNIFICANT CHANGE UP (ref 5–17)
AST SERPL-CCNC: 20 U/L — SIGNIFICANT CHANGE UP (ref 15–37)
BASOPHILS # BLD AUTO: 0.05 K/UL — SIGNIFICANT CHANGE UP (ref 0–0.2)
BASOPHILS NFR BLD AUTO: 0.8 % — SIGNIFICANT CHANGE UP (ref 0–2)
BILIRUB SERPL-MCNC: 1.4 MG/DL — HIGH (ref 0.2–1.2)
BUN SERPL-MCNC: 11 MG/DL — SIGNIFICANT CHANGE UP (ref 7–23)
CALCIUM SERPL-MCNC: 8.5 MG/DL — SIGNIFICANT CHANGE UP (ref 8.5–10.1)
CHLORIDE SERPL-SCNC: 104 MMOL/L — SIGNIFICANT CHANGE UP (ref 96–108)
CO2 SERPL-SCNC: 30 MMOL/L — SIGNIFICANT CHANGE UP (ref 22–31)
CREAT SERPL-MCNC: 1.1 MG/DL — SIGNIFICANT CHANGE UP (ref 0.5–1.3)
CULTURE RESULTS: NO GROWTH — SIGNIFICANT CHANGE UP
EGFR: 67 ML/MIN/1.73M2 — SIGNIFICANT CHANGE UP
EOSINOPHIL # BLD AUTO: 0.07 K/UL — SIGNIFICANT CHANGE UP (ref 0–0.5)
EOSINOPHIL NFR BLD AUTO: 1.1 % — SIGNIFICANT CHANGE UP (ref 0–6)
GLUCOSE SERPL-MCNC: 89 MG/DL — SIGNIFICANT CHANGE UP (ref 70–99)
HCT VFR BLD CALC: 36.4 % — LOW (ref 39–50)
HGB BLD-MCNC: 11.8 G/DL — LOW (ref 13–17)
IMM GRANULOCYTES NFR BLD AUTO: 1.8 % — HIGH (ref 0–1.5)
INR BLD: 3.72 RATIO — HIGH (ref 0.88–1.16)
LYMPHOCYTES # BLD AUTO: 1.13 K/UL — SIGNIFICANT CHANGE UP (ref 1–3.3)
LYMPHOCYTES # BLD AUTO: 17.3 % — SIGNIFICANT CHANGE UP (ref 13–44)
MCHC RBC-ENTMCNC: 32.4 GM/DL — SIGNIFICANT CHANGE UP (ref 32–36)
MCHC RBC-ENTMCNC: 32.8 PG — SIGNIFICANT CHANGE UP (ref 27–34)
MCV RBC AUTO: 101.1 FL — HIGH (ref 80–100)
MONOCYTES # BLD AUTO: 0.72 K/UL — SIGNIFICANT CHANGE UP (ref 0–0.9)
MONOCYTES NFR BLD AUTO: 11 % — SIGNIFICANT CHANGE UP (ref 2–14)
NEUTROPHILS # BLD AUTO: 4.45 K/UL — SIGNIFICANT CHANGE UP (ref 1.8–7.4)
NEUTROPHILS NFR BLD AUTO: 68 % — SIGNIFICANT CHANGE UP (ref 43–77)
NRBC # BLD: 0 /100 WBCS — SIGNIFICANT CHANGE UP (ref 0–0)
PLATELET # BLD AUTO: 165 K/UL — SIGNIFICANT CHANGE UP (ref 150–400)
POTASSIUM SERPL-MCNC: 3.7 MMOL/L — SIGNIFICANT CHANGE UP (ref 3.5–5.3)
POTASSIUM SERPL-SCNC: 3.7 MMOL/L — SIGNIFICANT CHANGE UP (ref 3.5–5.3)
PROT SERPL-MCNC: 5.8 G/DL — LOW (ref 6–8.3)
PROTHROM AB SERPL-ACNC: 44.1 SEC — HIGH (ref 10.5–13.4)
PSA FLD-MCNC: 13.1 NG/ML — HIGH (ref 0–4)
RBC # BLD: 3.6 M/UL — LOW (ref 4.2–5.8)
RBC # FLD: 16.8 % — HIGH (ref 10.3–14.5)
SODIUM SERPL-SCNC: 139 MMOL/L — SIGNIFICANT CHANGE UP (ref 135–145)
SPECIMEN SOURCE: SIGNIFICANT CHANGE UP
TROPONIN I, HIGH SENSITIVITY RESULT: 41.6 NG/L — SIGNIFICANT CHANGE UP
WBC # BLD: 6.54 K/UL — SIGNIFICANT CHANGE UP (ref 3.8–10.5)
WBC # FLD AUTO: 6.54 K/UL — SIGNIFICANT CHANGE UP (ref 3.8–10.5)

## 2022-04-29 PROCEDURE — 99222 1ST HOSP IP/OBS MODERATE 55: CPT

## 2022-04-29 PROCEDURE — 74177 CT ABD & PELVIS W/CONTRAST: CPT | Mod: 26

## 2022-04-29 PROCEDURE — 99233 SBSQ HOSP IP/OBS HIGH 50: CPT | Mod: GC

## 2022-04-29 RX ORDER — BRIMONIDINE TARTRATE 2 MG/MG
1 SOLUTION/ DROPS OPHTHALMIC
Refills: 0 | Status: DISCONTINUED | OUTPATIENT
Start: 2022-04-29 | End: 2022-05-02

## 2022-04-29 RX ORDER — TAMSULOSIN HYDROCHLORIDE 0.4 MG/1
0.4 CAPSULE ORAL AT BEDTIME
Refills: 0 | Status: DISCONTINUED | OUTPATIENT
Start: 2022-04-29 | End: 2022-05-02

## 2022-04-29 RX ORDER — METOPROLOL TARTRATE 50 MG
25 TABLET ORAL DAILY
Refills: 0 | Status: DISCONTINUED | OUTPATIENT
Start: 2022-04-29 | End: 2022-05-02

## 2022-04-29 RX ORDER — IOHEXOL 300 MG/ML
15 INJECTION, SOLUTION INTRAVENOUS
Refills: 0 | Status: COMPLETED | OUTPATIENT
Start: 2022-04-29 | End: 2022-04-29

## 2022-04-29 RX ORDER — BICALUTAMIDE 50 MG/1
1 TABLET, FILM COATED ORAL
Qty: 0 | Refills: 0 | DISCHARGE

## 2022-04-29 RX ORDER — CEFTRIAXONE 500 MG/1
1000 INJECTION, POWDER, FOR SOLUTION INTRAMUSCULAR; INTRAVENOUS EVERY 24 HOURS
Refills: 0 | Status: DISCONTINUED | OUTPATIENT
Start: 2022-04-29 | End: 2022-04-30

## 2022-04-29 RX ORDER — AMIODARONE HYDROCHLORIDE 400 MG/1
200 TABLET ORAL
Refills: 0 | Status: DISCONTINUED | OUTPATIENT
Start: 2022-04-29 | End: 2022-05-02

## 2022-04-29 RX ORDER — DIGOXIN 250 MCG
125 TABLET ORAL DAILY
Refills: 0 | Status: DISCONTINUED | OUTPATIENT
Start: 2022-04-29 | End: 2022-05-02

## 2022-04-29 RX ORDER — PANTOPRAZOLE SODIUM 20 MG/1
40 TABLET, DELAYED RELEASE ORAL
Refills: 0 | Status: DISCONTINUED | OUTPATIENT
Start: 2022-04-29 | End: 2022-05-02

## 2022-04-29 RX ORDER — ALLOPURINOL 300 MG
300 TABLET ORAL DAILY
Refills: 0 | Status: DISCONTINUED | OUTPATIENT
Start: 2022-04-29 | End: 2022-05-02

## 2022-04-29 RX ORDER — LATANOPROST 0.05 MG/ML
1 SOLUTION/ DROPS OPHTHALMIC; TOPICAL AT BEDTIME
Refills: 0 | Status: DISCONTINUED | OUTPATIENT
Start: 2022-04-29 | End: 2022-05-02

## 2022-04-29 RX ORDER — ENOXAPARIN SODIUM 100 MG/ML
40 INJECTION SUBCUTANEOUS EVERY 24 HOURS
Refills: 0 | Status: DISCONTINUED | OUTPATIENT
Start: 2022-04-29 | End: 2022-04-29

## 2022-04-29 RX ADMIN — CEFTRIAXONE 100 MILLIGRAM(S): 500 INJECTION, POWDER, FOR SOLUTION INTRAMUSCULAR; INTRAVENOUS at 06:10

## 2022-04-29 RX ADMIN — IOHEXOL 15 MILLILITER(S): 300 INJECTION, SOLUTION INTRAVENOUS at 14:14

## 2022-04-29 RX ADMIN — PANTOPRAZOLE SODIUM 40 MILLIGRAM(S): 20 TABLET, DELAYED RELEASE ORAL at 06:09

## 2022-04-29 RX ADMIN — Medication 125 MICROGRAM(S): at 06:09

## 2022-04-29 RX ADMIN — Medication 3 MILLIGRAM(S): at 21:18

## 2022-04-29 RX ADMIN — LATANOPROST 1 DROP(S): 0.05 SOLUTION/ DROPS OPHTHALMIC; TOPICAL at 21:18

## 2022-04-29 RX ADMIN — Medication 300 MILLIGRAM(S): at 12:04

## 2022-04-29 RX ADMIN — Medication 25 MILLIGRAM(S): at 06:09

## 2022-04-29 RX ADMIN — BRIMONIDINE TARTRATE 1 DROP(S): 2 SOLUTION/ DROPS OPHTHALMIC at 06:10

## 2022-04-29 RX ADMIN — AMIODARONE HYDROCHLORIDE 200 MILLIGRAM(S): 400 TABLET ORAL at 06:09

## 2022-04-29 RX ADMIN — TAMSULOSIN HYDROCHLORIDE 0.4 MILLIGRAM(S): 0.4 CAPSULE ORAL at 21:18

## 2022-04-29 RX ADMIN — BRIMONIDINE TARTRATE 1 DROP(S): 2 SOLUTION/ DROPS OPHTHALMIC at 17:00

## 2022-04-29 RX ADMIN — IOHEXOL 15 MILLILITER(S): 300 INJECTION, SOLUTION INTRAVENOUS at 15:27

## 2022-04-29 NOTE — ADVANCED PRACTICE NURSE CONSULT - ASSESSMENT
RN assessed patient and Identified stage 1 stage 2 pressure injury at sacral region: Rn's are independent in the identification and management of stage 1 and stage 2 pressure injury

## 2022-04-29 NOTE — DIETITIAN INITIAL EVALUATION ADULT - OTHER INFO
Pt reports he lives with his wife who food shops and cooks, admits he could be more compliant with a reduced sodium diet

## 2022-04-29 NOTE — DIETITIAN INITIAL EVALUATION ADULT - PERTINENT MEDS FT
MEDICATIONS  (STANDING):  allopurinol 300 milliGRAM(s) Oral daily  aMIOdarone    Tablet 200 milliGRAM(s) Oral two times a day  brimonidine 0.2% Ophthalmic Solution 1 Drop(s) Both EYES two times a day  cefTRIAXone   IVPB 1000 milliGRAM(s) IV Intermittent every 24 hours  digoxin     Tablet 125 MICROGram(s) Oral daily  latanoprost 0.005% Ophthalmic Solution 1 Drop(s) Both EYES at bedtime  metoprolol succinate ER 25 milliGRAM(s) Oral daily  pantoprazole    Tablet 40 milliGRAM(s) Oral before breakfast    MEDICATIONS  (PRN):  melatonin 3 milliGRAM(s) Oral at bedtime PRN Insomnia

## 2022-04-29 NOTE — DIETITIAN INITIAL EVALUATION ADULT - SIGNS/SYMPTOMS
see skin section  see physical findings - mild depl of muscle mass in temp region and mild depl of fat overlying ribs

## 2022-04-29 NOTE — PATIENT PROFILE ADULT - FALL HARM RISK - HARM RISK INTERVENTIONS
Assistance with ambulation/Assistance OOB with selected safe patient handling equipment/Communicate Risk of Fall with Harm to all staff/Discuss with provider need for PT consult/Monitor gait and stability/Reinforce activity limits and safety measures with patient and family/Tailored Fall Risk Interventions/Visual Cue: Yellow wristband and red socks/Bed in lowest position, wheels locked, appropriate side rails in place/Call bell, personal items and telephone in reach/Instruct patient to call for assistance before getting out of bed or chair/Non-slip footwear when patient is out of bed/[place] to call system/Physically safe environment - no spills, clutter or unnecessary equipment/Purposeful Proactive Rounding/Room/bathroom lighting operational, light cord in reach

## 2022-04-29 NOTE — DIETITIAN INITIAL EVALUATION ADULT - PERTINENT LABORATORY DATA
04-28    134<L>  |  97  |  14  ----------------------------<  111<H>  4.0   |  30  |  1.37<H>    Ca    8.5      28 Apr 2022 15:30    TPro  6.2  /  Alb  2.7<L>  /  TBili  1.8<H>  /  DBili  x   /  AST  26  /  ALT  24  /  AlkPhos  131<H>  04-28

## 2022-04-29 NOTE — PROGRESS NOTE ADULT - ATTENDING COMMENTS
pt seen and examine today  -  83 y/o bedbound male with a PMHx of Afib on Coumadin,  chr heart failure unknown ef , and h/o prostate CA 2015 had radiation , recurrent UTIs admitted for UTI and urinary retention- new rodas placed , iv abx Rocephin 1 gm daily until cult back  , uro dr gonzalez want ct abd/pelvis iv contrast as hx prostate ca in past , fu lab in am , pt evaluation .

## 2022-04-29 NOTE — DIETITIAN NUTRITION RISK NOTIFICATION - TREATMENT: THE FOLLOWING DIET HAS BEEN RECOMMENDED
Diet, DASH/TLC:   Sodium & Cholesterol Restricted  No Carb Prosource (1pkg = 15gms Protein)     Qty per Day:  3 (04-29-22 @ 13:18) [Pending Verification By Attending]  Diet, DASH/TLC:   Sodium & Cholesterol Restricted (04-29-22 @ 03:25) [Active]

## 2022-04-29 NOTE — DIETITIAN INITIAL EVALUATION ADULT - NSFNSPHYEXAMSKINFT_GEN_A_CORE
Pressure Injury 1: left heel , Stage I  Pressure Injury 2: coccyx , Stage II  Pressure Injury 3: right heel , Stage I  Pressure Injury 4: none, none  Pressure Injury 5: none, none  Pressure Injury 6: none, none  Pressure Injury 7: none, none  Pressure Injury 8: none, none  Pressure Injury 9: none, none  Pressure Injury 10: none, none  Pressure Injury 11: none, none Pressure Injury 1: left heel , Stage I  Pressure Injury 2: coccyx , Stage II  Pressure Injury 3: right heel , Stage I

## 2022-04-29 NOTE — CONSULT NOTE ADULT - SUBJECTIVE AND OBJECTIVE BOX
[facility]  INFECTIOUS DISEASES   [address] [place] [address]  Tel: [phone]     Fax: [phone]  ======================================================  MD Brock Gomes Kaushal, MD Cho, Michelle, MD   ======================================================    [id number]  LEBRON TERRY     Reason for consult: UTI    HPI:  83 y/o bedbound man with PMH of Afib, CHF and h/o prostate CA was admitted with difficulty urinating, painful urination, and dark urine.   He has history of recurrent UTIs and recently on  had klebsiella UTI. He was on Amoxicillin a few days ago by the visiting doctor's PA. He was admitted at [place] a few months ago for Afib and was discharged to rehab where he started developing UTIs & the right foot drop after a trauma. Also he has incontinence.   Today he is not complaining of any pain or urinary symptoms but he has a rodas catheter since yesterday.   He has been started on ceftriaxone and ID was called for further recommendations.     PAST MEDICAL & SURGICAL HISTORY:  Afib  Prostate cancer  Heart failure  S/P anal fissurectomy    Social Hx: no smoking, EtOH or drugs     FAMILY HISTORY:  No pertinent family history in first degree relatives    Allergies  No Known Allergies    Antibiotics:  MEDICATIONS  (STANDING):  allopurinol 300 milliGRAM(s) Oral daily  aMIOdarone    Tablet 200 milliGRAM(s) Oral two times a day  brimonidine 0.2% Ophthalmic Solution 1 Drop(s) Both EYES two times a day  cefTRIAXone   IVPB 1000 milliGRAM(s) IV Intermittent every 24 hours  digoxin     Tablet 125 MICROGram(s) Oral daily  latanoprost 0.005% Ophthalmic Solution 1 Drop(s) Both EYES at bedtime  metoprolol succinate ER 25 milliGRAM(s) Oral daily  pantoprazole    Tablet 40 milliGRAM(s) Oral before breakfast     REVIEW OF SYSTEMS:  CONSTITUTIONAL:  No Fever or chills  HEENT:  No diplopia or blurred vision.  No sore throat or runny nose.  CARDIOVASCULAR:  No chest pain or SOB.  RESPIRATORY:  No cough, shortness of breath, PND or orthopnea.  GASTROINTESTINAL:  No nausea, vomiting or diarrhea.  GENITOURINARY:  No dysuria, frequency or urgency. No Blood in urine  MUSCULOSKELETAL:  no joint aches, no muscle pain  SKIN:  No change in skin, hair or nails.  NEUROLOGIC:  No paresthesias, fasciculations, seizures or weakness.  PSYCHIATRIC:  No disorder of thought or mood.  ENDOCRINE:  No heat or cold intolerance, polyuria or polydipsia.  HEMATOLOGICAL:  No easy bruising or bleeding.     Physical Exam:  Vital Signs Last 24 Hrs  T(C): 36.6 (2022 11:30), Max: 37.2 (2022 22:26)  T(F): 97.8 (2022 11:30), Max: 98.9 (2022 22:26)  HR: 65 (2022 11:30) (60 - 79)  BP: 115/52 (2022 11:30) (115/52 - 133/78)  RR: 17 (2022 11:30) (16 - 17)  SpO2: 97% (2022 11:30) (95% - 98%)  Height (cm): 182.9 ( @ 14:27)  Weight (kg): 90.5 ( @ 00:17), 99.8 ( @ :27)  BMI (kg/m2): 27.1 ( @ 00:17), 29.8 ( @ :27)  BSA (m2): 2.13 ( @ 00:17), 2.22 ( @ 14:27)  GEN: NAD  HEENT: normocephalic and atraumatic. EOMI. PERRL.    NECK: Supple.  No lymphadenopathy   LUNGS: Clear to auscultation.  HEART: Irregular rate and rhythm   ABDOMEN: Soft, nontender, and nondistended.  Positive bowel sounds.    : No CVA tenderness, rodas in place   EXTREMITIES: Without edema.  NEUROLOGIC: grossly intact, unable to check gait   PSYCHIATRIC: Appropriate affect .  SKIN: No ulceration or rash     Labs:      139  |  104  |  11  ----------------------------<  89  3.7   |  30  |  1.10    Ca    8.5      2022 08:31    TPro  5.8<L>  /  Alb  2.6<L>  /  TBili  1.4<H>  /  DBili  x   /  AST  20  /  ALT  22  /  AlkPhos  121<H>                          11.8   6.54  )-----------( 165      ( 2022 08:31 )             36.4     PT/INR - ( 2022 09:32 )   PT: 44.1 sec;   INR: 3.72 ratio    PTT - ( 2022 15:30 )  PTT:53.3 sec  Urinalysis Basic - ( 2022 15:58 )    Color: Yellow / Appearance: Clear / S.015 / pH: x  Gluc: x / Ketone: Negative  / Bili: Negative / Urobili: Negative mg/dL   Blood: x / Protein: 30 mg/dL / Nitrite: Negative   Leuk Esterase: Moderate / RBC: 3-5 /HPF / WBC >50   Sq Epi: x / Non Sq Epi: Few / Bacteria: Few    LIVER FUNCTIONS - ( 2022 08:31 )  Alb: 2.6 g/dL / Pro: 5.8 g/dL / ALK PHOS: 121 U/L / ALT: 22 U/L / AST: 20 U/L / GGT: x           COVID-19 PCR: NotDetec (22 @ 15:36)    All imaging and other data have been reviewed.  < from: Xray Chest 1 View- PORTABLE-Urgent (Xray Chest 1 View- PORTABLE-Urgent .) (22 @ 15:05) >  IMPRESSION: Cardiomegaly.  No radiographic evidence of active chest disease.  Lateral or bilateral decubitus radiographs recommended to exclude   posterior lung base pathology.    Assessment and Plan:   83 y/o bedbound man with PMH of Afib, CHF and h/o prostate CA was admitted with difficulty urinating, painful urination, and dark urine.   He has history of recurrent UTIs and recently on  had klebsiella UTI. Never had MDROs in out system but few of his admissions were is [place].     UTI  - UA with high WBC and negative nitrate, follow UC    -  follow up for urinary retention and history of prostate ca  - Rodas management as per   - Agree with Ceftriaxone 1gm daily    Thank you for courtesy of this consult.     Will follow.  Discussed with the primary team.     Sim Garcia MD  Division of Infectious Diseases   Please call ID service at [phone] with any question.      55 minutes spent on total encounter assessing patient, examination, chart reivew, counseling and coordinating care by the attending physician/nurse/care manager.  
Patient is a 82y old  Male who presents with a chief complaint of UTI/urinary retention (2022 11:53)      HISTORY OF PRESENT ILLNESS:  Extremely poor historian.  81 y/o male with h/o Ca Prostate, treated with radiation many years ago ( ?greater than 10 yrs), had negative metastatic w/u in 2015 (see Bones Scan and CT scan reports below), has not seen urologist in a long time.  Pt states that he has been bedbound for past several weeks because of dizziness, not LE weakness.  He was urinating fine, with good stream, until recently, when he dropped from his bed during transport in a hospital.  Pt presented to [facility] yesterday and was transferred to this Hospital for "urology workup of UTI and retention".  Rodas was placed at [place], draining 350cc urine.    PAST MEDICAL & SURGICAL HISTORY:  Afib    Prostate cancer    Heart failure    S/P anal fissurectomy        REVIEW OF SYSTEMS:    CONSTITUTIONAL: No weakness, fevers or chills  SKIN: No itching, burning, rashes, or lesions   EYES/ENT: No visual changes;  No vertigo or throat pain   NECK: No pain or stiffness  RESPIRATORY: No cough, wheezing, hemoptysis; No shortness of breath  CARDIOVASCULAR: No chest pain or palpitations  GASTROINTESTINAL: No abdominal or epigastric pain. No nausea, vomiting, diarrhea  NEUROLOGICAL: No numbness or weakness  GENITOURINARY:     No hematuria, dysuria, incontinence    All other review of systems is negative unless indicated above.    MEDICATIONS  (STANDING):  allopurinol 300 milliGRAM(s) Oral daily  aMIOdarone    Tablet 200 milliGRAM(s) Oral two times a day  brimonidine 0.2% Ophthalmic Solution 1 Drop(s) Both EYES two times a day  cefTRIAXone   IVPB 1000 milliGRAM(s) IV Intermittent every 24 hours  digoxin     Tablet 125 MICROGram(s) Oral daily  latanoprost 0.005% Ophthalmic Solution 1 Drop(s) Both EYES at bedtime  metoprolol succinate ER 25 milliGRAM(s) Oral daily  pantoprazole    Tablet 40 milliGRAM(s) Oral before breakfast    MEDICATIONS  (PRN):  melatonin 3 milliGRAM(s) Oral at bedtime PRN Insomnia      Allergies    No Known Allergies      SOCIAL HISTORY:   Smoking: never   Work: former [employer] Teacher, former printer for [employer]      FAMILY HISTORY:  No pertinent family history in first degree relatives        Vital Signs Last 24 Hrs  T(C): 36.6 (2022 11:30), Max: 37.2 (2022 22:26)  T(F): 97.8 (2022 11:30), Max: 98.9 (2022 22:26)  HR: 65 (2022 11:30) (60 - 79)  BP: 115/52 (2022 11:30) (115/52 - 133/78)  BP(mean): --  RR: 17 (2022 11:30) (16 - 17)  SpO2: 97% (2022 11:30) (95% - 98%)    PHYSICAL EXAM:    Constitutional: NAD, well-developed  HEENT: PERRLA, EOMI  Neck: Supple, full ROM  Back: No CVA tenderness  Respiratory: Normal repiratory effort  Cardiovascular: RRR  Abd: Soft, NT/ND  : Normal phallus, 14 Fr rodas draining clear, yellow urine  EVONNE: prostate smooth, non-tender, 3+.  Large amount of soft stool in rectum      LABS:                        11.8   6.54  )-----------( 165      ( 2022 08:31 )             36.4     04-29    139  |  104  |  11  ----------------------------<  89  3.7   |  30  |  1.10    Ca    8.5      2022 08:31    TPro  5.8<L>  /  Alb  2.6<L>  /  TBili  1.4<H>  /  DBili  x   /  AST  20  /  ALT  22  /  AlkPhos  121<H>  04-29    PT/INR - ( 2022 09:32 )   PT: 44.1 sec;   INR: 3.72 ratio         PTT - ( 2022 15:30 )  PTT:53.3 sec  Urinalysis Basic - ( 2022 15:58 )    Color: Yellow / Appearance: Clear / S.015 / pH: x  Gluc: x / Ketone: Negative  / Bili: Negative / Urobili: Negative mg/dL   Blood: x / Protein: 30 mg/dL / Nitrite: Negative   Leuk Esterase: Moderate / RBC: 3-5 /HPF / WBC >50   Sq Epi: x / Non Sq Epi: Few / Bacteria: Few      Urine Culture:       RADIOLOGY & ADDITIONAL STUDIES:  < from: US Kidney and Bladder (18 @ 12:04) >  EXAM:  US KIDNEYS AND BLADDER        PROCEDURE DATE:  2018           INTERPRETATION:  CLINICAL INFORMATION: Renal cysts. Prostate carcinoma.    COMPARISON: CT scan of the abdomen pelvis dated 3/23/2015.    TECHNIQUE: Sonography of the kidneysand bladder.     FINDINGS:    Right kidney:  10.7 cm. No solid renal mass, hydronephrosis or calculi.   Lower pole simple cortical cyst measuring 2.2 cm.    Left kidney:  10.2 cm. No solid renal mass, hydronephrosis or calculi.   Lower pole parapelvic cyst measuring 2.6 cm. Mid pole cortical cyst   measuring 2 cm.    Urinary bladder: No pan calculi. The bladder is incompletely distended   limiting its evaluation. The prevoid volume was 126 cc with no   significant post void residual.  Prostate gland: The prostate gland is mildly enlarged corresponding to   volume of 32 cc.    IMPRESSION:     Mild prostatic enlargement.  No post void residual of bladder.  No evidence of renal calculus nor hydronephrosis.  Bilateral renal cysts.    < from: NM SPECT/CT Bone/Joint (03.23.15 @ 15:50) >  EXAM:  NUCLEAR WHOLE BODY BONE SCAN        PROCEDURE DATE:  Mar 23 2015       INTERPRETATION:  CLINICAL INFORMATION: 75-year-old male with prostate   carcinoma and rising prostate-specific antigen, referred to evaluate for   osseous metastasis    RADIOPHARMACEUTICAL: 27.5 mCi Tc-99m HDP, I.V.     TECHNIQUE:  Whole-body planar images were obtained in the anterior and   posterior projections approximately 2-3 hours after the administration of   radiotracer. SPECT/CT scan of the pelvis and proximal femurs was also   performed.  The CT component of the study was used for attenuation   correction and anatomic localization.    COMPARISON: No previous bone scan for comparison    FINDINGS: There is degenerative disease in the spine and major joints.   There is physiologic distribution of radiotracer in the remainder of the   visualized osseous structures. No foci of abnormally increased or   decreased activity are identified to suggest the presence of osseous   metastasis.    Both kidneys are visualized and appear symmetric.    IMPRESSION: Essentially normal bone scan.    No scan evidence of osseous metastasis.    Degenerative disease in the spine and major joints.      < from: CT Abdomen and Pelvis w/Cont (03.23.15 @ 13:25) >  EXAM:  CT ABD AND PELV W CON        PROCEDURE DATE:  Mar 23 2015       INTERPRETATION:  CLINICAL INFORMATION: Prostate cancer with rising PSA    COMPARISON: CT 2008    PROCEDURE:   CT of the Abdomen and Pelvis was performed with intravenous contrast.   Intravenous contrast: 90 ml Omnipaque 350. 10 ml discarded.  Oral contrast: positive contrast was administered.  Sagittal and coronal reformats were performed.    FINDINGS:    LOWER CHEST: Small right pleural effusion. Status post post mitral valve   replacement.    LIVER: Within normal limits.  BILE DUCTS: Normal caliber.  GALLBLADDER: Cholelithiasis.  SPLEEN: Within normal limits.  PANCREAS: Within normal limits.  ADRENALS: Within normal limits.  KIDNEYS/URETERS: Bilateral renal cysts.    BLADDER: Within normal limits.  REPRODUCTIVE ORGANS: The prostate is normal in size. Seminal vesicles are   symmetric.    BOWEL: No bowel obstruction. Appendix is normal.  PERITONEUM: No ascites.  VESSELS:  Mild atherosclerotic change.  RETROPERITONEUM: A subcentimeter aortocaval node is stable from prior   imaging in . No enlarged retroperitoneal or pelvic nodes.    ABDOMINAL WALL: Fat-containing umbilical and bilateral inguinal hernias.  BONES: Degenerative changes of the spine.    IMPRESSION: No evidence of metastatic disease.

## 2022-04-30 LAB
ANION GAP SERPL CALC-SCNC: 8 MMOL/L — SIGNIFICANT CHANGE UP (ref 5–17)
BASOPHILS # BLD AUTO: 0.02 K/UL — SIGNIFICANT CHANGE UP (ref 0–0.2)
BASOPHILS NFR BLD AUTO: 0.5 % — SIGNIFICANT CHANGE UP (ref 0–2)
BUN SERPL-MCNC: 11 MG/DL — SIGNIFICANT CHANGE UP (ref 7–23)
CALCIUM SERPL-MCNC: 8.5 MG/DL — SIGNIFICANT CHANGE UP (ref 8.5–10.1)
CHLORIDE SERPL-SCNC: 103 MMOL/L — SIGNIFICANT CHANGE UP (ref 96–108)
CO2 SERPL-SCNC: 28 MMOL/L — SIGNIFICANT CHANGE UP (ref 22–31)
CREAT SERPL-MCNC: 1 MG/DL — SIGNIFICANT CHANGE UP (ref 0.5–1.3)
EGFR: 76 ML/MIN/1.73M2 — SIGNIFICANT CHANGE UP
EOSINOPHIL # BLD AUTO: 0.06 K/UL — SIGNIFICANT CHANGE UP (ref 0–0.5)
EOSINOPHIL NFR BLD AUTO: 1.4 % — SIGNIFICANT CHANGE UP (ref 0–6)
GLUCOSE SERPL-MCNC: 91 MG/DL — SIGNIFICANT CHANGE UP (ref 70–99)
HCT VFR BLD CALC: 33.4 % — LOW (ref 39–50)
HGB BLD-MCNC: 10.8 G/DL — LOW (ref 13–17)
IMM GRANULOCYTES NFR BLD AUTO: 1.8 % — HIGH (ref 0–1.5)
INR BLD: 3.55 RATIO — HIGH (ref 0.88–1.16)
LYMPHOCYTES # BLD AUTO: 0.86 K/UL — LOW (ref 1–3.3)
LYMPHOCYTES # BLD AUTO: 19.6 % — SIGNIFICANT CHANGE UP (ref 13–44)
MCHC RBC-ENTMCNC: 32.2 PG — SIGNIFICANT CHANGE UP (ref 27–34)
MCHC RBC-ENTMCNC: 32.3 GM/DL — SIGNIFICANT CHANGE UP (ref 32–36)
MCV RBC AUTO: 99.7 FL — SIGNIFICANT CHANGE UP (ref 80–100)
MONOCYTES # BLD AUTO: 0.57 K/UL — SIGNIFICANT CHANGE UP (ref 0–0.9)
MONOCYTES NFR BLD AUTO: 13 % — SIGNIFICANT CHANGE UP (ref 2–14)
NEUTROPHILS # BLD AUTO: 2.8 K/UL — SIGNIFICANT CHANGE UP (ref 1.8–7.4)
NEUTROPHILS NFR BLD AUTO: 63.7 % — SIGNIFICANT CHANGE UP (ref 43–77)
NRBC # BLD: 0 /100 WBCS — SIGNIFICANT CHANGE UP (ref 0–0)
PLATELET # BLD AUTO: 143 K/UL — LOW (ref 150–400)
POTASSIUM SERPL-MCNC: 3.5 MMOL/L — SIGNIFICANT CHANGE UP (ref 3.5–5.3)
POTASSIUM SERPL-SCNC: 3.5 MMOL/L — SIGNIFICANT CHANGE UP (ref 3.5–5.3)
PROTHROM AB SERPL-ACNC: 42.1 SEC — HIGH (ref 10.5–13.4)
RBC # BLD: 3.35 M/UL — LOW (ref 4.2–5.8)
RBC # FLD: 16.9 % — HIGH (ref 10.3–14.5)
SODIUM SERPL-SCNC: 139 MMOL/L — SIGNIFICANT CHANGE UP (ref 135–145)
WBC # BLD: 4.39 K/UL — SIGNIFICANT CHANGE UP (ref 3.8–10.5)
WBC # FLD AUTO: 4.39 K/UL — SIGNIFICANT CHANGE UP (ref 3.8–10.5)

## 2022-04-30 PROCEDURE — 99232 SBSQ HOSP IP/OBS MODERATE 35: CPT

## 2022-04-30 PROCEDURE — 99233 SBSQ HOSP IP/OBS HIGH 50: CPT | Mod: GC

## 2022-04-30 RX ORDER — CEFUROXIME AXETIL 250 MG
250 TABLET ORAL EVERY 12 HOURS
Refills: 0 | Status: DISCONTINUED | OUTPATIENT
Start: 2022-04-30 | End: 2022-05-02

## 2022-04-30 RX ADMIN — Medication 125 MICROGRAM(S): at 05:41

## 2022-04-30 RX ADMIN — BRIMONIDINE TARTRATE 1 DROP(S): 2 SOLUTION/ DROPS OPHTHALMIC at 18:19

## 2022-04-30 RX ADMIN — Medication 25 MILLIGRAM(S): at 05:41

## 2022-04-30 RX ADMIN — BRIMONIDINE TARTRATE 1 DROP(S): 2 SOLUTION/ DROPS OPHTHALMIC at 05:42

## 2022-04-30 RX ADMIN — PANTOPRAZOLE SODIUM 40 MILLIGRAM(S): 20 TABLET, DELAYED RELEASE ORAL at 05:41

## 2022-04-30 RX ADMIN — LATANOPROST 1 DROP(S): 0.05 SOLUTION/ DROPS OPHTHALMIC; TOPICAL at 22:17

## 2022-04-30 RX ADMIN — Medication 250 MILLIGRAM(S): at 18:19

## 2022-04-30 RX ADMIN — TAMSULOSIN HYDROCHLORIDE 0.4 MILLIGRAM(S): 0.4 CAPSULE ORAL at 22:17

## 2022-04-30 RX ADMIN — Medication 300 MILLIGRAM(S): at 11:50

## 2022-04-30 RX ADMIN — AMIODARONE HYDROCHLORIDE 200 MILLIGRAM(S): 400 TABLET ORAL at 05:41

## 2022-04-30 RX ADMIN — AMIODARONE HYDROCHLORIDE 200 MILLIGRAM(S): 400 TABLET ORAL at 18:19

## 2022-04-30 RX ADMIN — CEFTRIAXONE 100 MILLIGRAM(S): 500 INJECTION, POWDER, FOR SOLUTION INTRAMUSCULAR; INTRAVENOUS at 05:41

## 2022-04-30 NOTE — PHYSICAL THERAPY INITIAL EVALUATION ADULT - GENERAL OBSERVATIONS, REHAB EVAL
Patient was received and left supine with right Lower Extremity brace & rodas catheter in place and call bell in reach.

## 2022-04-30 NOTE — PHYSICAL THERAPY INITIAL EVALUATION ADULT - ADDITIONAL COMMENTS
As per H&P, wife states patient was dropped during transport from rehab to [facility] approximately 1.5 months ago and "broke something in his back" causing him to be bedbound since.

## 2022-04-30 NOTE — PHYSICAL THERAPY INITIAL EVALUATION ADULT - MANUAL MUSCLE TESTING RESULTS, REHAB EVAL
Except bilateral Lower Extremity grossly less than or equal to 2-/5/no strength deficits were identified

## 2022-04-30 NOTE — PHYSICAL THERAPY INITIAL EVALUATION ADULT - PLANNED THERAPY INTERVENTIONS, PT EVAL
No functional goals apparent; However patient unable to respond to all questions appropriately. Will keep on for 1 more trial session.

## 2022-04-30 NOTE — PROGRESS NOTE ADULT - ATTENDING COMMENTS
pt seen and examine today  -  83 y/o bedbound male with a PMHx of Afib on Coumadin,  chr heart failure unknown ef , and h/o prostate CA 2015 had radiation , recurrent UTIs admitted for UTI and urinary retention- new rodas placed , s/p abx Rocephin 1 gm daily  switch po abx  Ceftin   urine cult neg  and blood cult neg   , uro dr gonzalez want ct abd/pelvis iv contrast as hx prostate ca in past   high psa  family aware to fu up out pt urologist , dc rodas  voiding trial  once stool softner bm +     pt also known hx l1 compression fracture as per family  , fu lab in am , pt evaluation / keith evaluation  . pt daughter on phn aware with plan . pt seen and examine today  -  83 y/o bedbound male with a PMHx of Afib on Coumadin,  chr heart failure unknown ef , and h/o prostate CA 2015 had radiation , recurrent UTIs admitted for UTI and urinary retention- new rodas placed , s/p abx Rocephin 1 gm daily  switch po abx  Ceftin   urine cult neg  and blood cult neg   , uro dr gonzalez want ct abd/pelvis iv contrast as hx prostate ca in past   high psa  family aware to fu up out pt urologist , dc rodas  voiding trial  once stool softner bm +     pt also known hx l1 compression fracture as per family  can have out pt mri spine , fu lab in am , pt evaluation / keith evaluation  . pt daughter on phn aware with  tt  plan .

## 2022-05-01 LAB
ANION GAP SERPL CALC-SCNC: 6 MMOL/L — SIGNIFICANT CHANGE UP (ref 5–17)
BASOPHILS # BLD AUTO: 0.02 K/UL — SIGNIFICANT CHANGE UP (ref 0–0.2)
BASOPHILS NFR BLD AUTO: 0.4 % — SIGNIFICANT CHANGE UP (ref 0–2)
BUN SERPL-MCNC: 10 MG/DL — SIGNIFICANT CHANGE UP (ref 7–23)
CALCIUM SERPL-MCNC: 8.6 MG/DL — SIGNIFICANT CHANGE UP (ref 8.5–10.1)
CHLORIDE SERPL-SCNC: 105 MMOL/L — SIGNIFICANT CHANGE UP (ref 96–108)
CO2 SERPL-SCNC: 30 MMOL/L — SIGNIFICANT CHANGE UP (ref 22–31)
CREAT SERPL-MCNC: 1 MG/DL — SIGNIFICANT CHANGE UP (ref 0.5–1.3)
EGFR: 76 ML/MIN/1.73M2 — SIGNIFICANT CHANGE UP
EOSINOPHIL # BLD AUTO: 0.09 K/UL — SIGNIFICANT CHANGE UP (ref 0–0.5)
EOSINOPHIL NFR BLD AUTO: 1.9 % — SIGNIFICANT CHANGE UP (ref 0–6)
GLUCOSE SERPL-MCNC: 92 MG/DL — SIGNIFICANT CHANGE UP (ref 70–99)
HCT VFR BLD CALC: 33.9 % — LOW (ref 39–50)
HGB BLD-MCNC: 11.1 G/DL — LOW (ref 13–17)
IMM GRANULOCYTES NFR BLD AUTO: 1.7 % — HIGH (ref 0–1.5)
INR BLD: 3.02 RATIO — HIGH (ref 0.88–1.16)
LYMPHOCYTES # BLD AUTO: 0.67 K/UL — LOW (ref 1–3.3)
LYMPHOCYTES # BLD AUTO: 14.4 % — SIGNIFICANT CHANGE UP (ref 13–44)
MCHC RBC-ENTMCNC: 32.6 PG — SIGNIFICANT CHANGE UP (ref 27–34)
MCHC RBC-ENTMCNC: 32.7 GM/DL — SIGNIFICANT CHANGE UP (ref 32–36)
MCV RBC AUTO: 99.7 FL — SIGNIFICANT CHANGE UP (ref 80–100)
MONOCYTES # BLD AUTO: 0.49 K/UL — SIGNIFICANT CHANGE UP (ref 0–0.9)
MONOCYTES NFR BLD AUTO: 10.5 % — SIGNIFICANT CHANGE UP (ref 2–14)
NEUTROPHILS # BLD AUTO: 3.31 K/UL — SIGNIFICANT CHANGE UP (ref 1.8–7.4)
NEUTROPHILS NFR BLD AUTO: 71.1 % — SIGNIFICANT CHANGE UP (ref 43–77)
NRBC # BLD: 0 /100 WBCS — SIGNIFICANT CHANGE UP (ref 0–0)
PLATELET # BLD AUTO: 149 K/UL — LOW (ref 150–400)
POTASSIUM SERPL-MCNC: 3.7 MMOL/L — SIGNIFICANT CHANGE UP (ref 3.5–5.3)
POTASSIUM SERPL-SCNC: 3.7 MMOL/L — SIGNIFICANT CHANGE UP (ref 3.5–5.3)
PROTHROM AB SERPL-ACNC: 35.7 SEC — HIGH (ref 10.5–13.4)
RBC # BLD: 3.4 M/UL — LOW (ref 4.2–5.8)
RBC # FLD: 16.9 % — HIGH (ref 10.3–14.5)
SODIUM SERPL-SCNC: 141 MMOL/L — SIGNIFICANT CHANGE UP (ref 135–145)
WBC # BLD: 4.66 K/UL — SIGNIFICANT CHANGE UP (ref 3.8–10.5)
WBC # FLD AUTO: 4.66 K/UL — SIGNIFICANT CHANGE UP (ref 3.8–10.5)

## 2022-05-01 PROCEDURE — 99233 SBSQ HOSP IP/OBS HIGH 50: CPT | Mod: GC

## 2022-05-01 RX ADMIN — Medication 300 MILLIGRAM(S): at 11:06

## 2022-05-01 RX ADMIN — AMIODARONE HYDROCHLORIDE 200 MILLIGRAM(S): 400 TABLET ORAL at 17:04

## 2022-05-01 RX ADMIN — BRIMONIDINE TARTRATE 1 DROP(S): 2 SOLUTION/ DROPS OPHTHALMIC at 17:05

## 2022-05-01 RX ADMIN — Medication 250 MILLIGRAM(S): at 05:48

## 2022-05-01 RX ADMIN — LATANOPROST 1 DROP(S): 0.05 SOLUTION/ DROPS OPHTHALMIC; TOPICAL at 22:23

## 2022-05-01 RX ADMIN — AMIODARONE HYDROCHLORIDE 200 MILLIGRAM(S): 400 TABLET ORAL at 05:48

## 2022-05-01 RX ADMIN — Medication 125 MICROGRAM(S): at 05:48

## 2022-05-01 RX ADMIN — PANTOPRAZOLE SODIUM 40 MILLIGRAM(S): 20 TABLET, DELAYED RELEASE ORAL at 05:48

## 2022-05-01 RX ADMIN — BRIMONIDINE TARTRATE 1 DROP(S): 2 SOLUTION/ DROPS OPHTHALMIC at 05:49

## 2022-05-01 RX ADMIN — Medication 250 MILLIGRAM(S): at 17:04

## 2022-05-01 RX ADMIN — TAMSULOSIN HYDROCHLORIDE 0.4 MILLIGRAM(S): 0.4 CAPSULE ORAL at 22:23

## 2022-05-01 NOTE — PROGRESS NOTE ADULT - PROBLEM SELECTOR PLAN 8
SCDs in setting of supratherapeutic INR- inr today 3.72
SCDs in setting of supratherapeutic INR- inr  3
SCDs in setting of supratherapeutic INR- inr today 3.55

## 2022-05-01 NOTE — PROGRESS NOTE ADULT - PROBLEM SELECTOR PLAN 3
elevated INR of 4.43  - Hold Warfarin   - F/u daily INR check once below 3 inr will resume full ac- goal 2 to3 inr
elevated INR of 4.43  - Hold Warfarin  ,  inr 3 .02   - goal 2 to3 inr
elevated INR of 4.43  - Hold Warfarin   - F/u daily INR check once below 3 inr will resume full ac- goal 2 to3 inr

## 2022-05-01 NOTE — PROGRESS NOTE ADULT - PROBLEM SELECTOR PLAN 1
Patient with dysuria, frequency and purulent discharge with recurrent UTI's this year. Started on Amoxicillin-Clav on 4/23 without improvement  and UA: Moderate LE, Large blood, few bacteria, >50 WBC, 3-5 RBC  - Rocephin 1g IVPB q24h   - tylenol 650 mg PO q6h PRN pain and/or fever  - F/u  blood & urine cultures  - Urology consulted, dr gonzalez   - ID, Dr. Garcia, consulted  - Will need outpatient urology to evaluated for structural abnormalities causing recurrent UTIs and pt has no follow up with urology in about 3 years
Patient with dysuria, frequency and purulent discharge with recurrent UTI's this year. Started on Amoxicillin-Clav on 4/23 without improvement  and UA: Moderate LE, Large blood, few bacteria, >50 WBC, 3-5 RBC  - Rocephin 1g IVPB q24h   - tylenol 650 mg PO q6h PRN pain and/or fever  -   blood  neg todate & urine culture neg to date   , Urology consulted, dr gonzalez   - ID, Dr. Garcia, consulted  - Will need outpatient urology to evaluated for structural abnormalities causing recurrent UTIs and pt has no follow up with urology in about 3 years
Patient with dysuria, frequency and purulent discharge with recurrent UTI's this year. Started on Amoxicillin-Clav on 4/23 without improvement  and UA: Moderate LE, Large blood, few bacteria, >50 WBC, 3-5 RBC  - Rocephin 1g IVPB q24h  switch to  ceftin po abx   - tylenol 650 mg PO q6h PRN pain and/or fever  -   blood  neg todate & urine culture neg to date   , Urology consulted, dr gonzalez   - ID, Dr. Garcia, consulted  - Will need outpatient urology to evaluated for structural abnormalities causing recurrent UTIs and pt has no follow up with urology in about 3 years

## 2022-05-01 NOTE — PROGRESS NOTE ADULT - ATTENDING COMMENTS
pt seen and examine today  -  81 y/o bedbound male with a PMHx of Afib on Coumadin,  chr heart failure unknown ef , and h/o prostate CA 2015 had radiation , recurrent UTIs admitted for UTI and urinary retention-  on  rodas   , s/p abx Rocephin 1 gm daily  switch po abx  Ceftin   urine cult neg  and blood cult neg   , uro dr gonzalez want ct abd/pelvis iv contrast as hx prostate ca in past   high psa  family aware to fu up out pt urologist ,   dc rodas  voiding trial  today   bm +     , pt evaluation    family want out  home physical therapy , sacral decubitus satge2 small poa - local wound care. pt also known hx l1 compression fracture as per family  can have out pt mri spine ,

## 2022-05-01 NOTE — PROGRESS NOTE ADULT - ASSESSMENT
CaP treated with XRT in distant past    Elevated PSA could be from cystitis and enlarged prostate vs CaP recurrence    Will have Dr. Dial order MRI to further evaluate sclerosis/ fx of L1    Urine retention    Bowel management and Flomax 
h/o CaP  PSA is elevated.  will need outpatient f/u with his urologist    AUR  undergoing TOV
83 y/o bedbound male with a PMHx of Afib on Coumadin, heart failure, and h/o prostate CA, recurrent UTIs admitted for UTI and urinary retention.
81 y/o bedbound male with a PMHx of Afib on Coumadin, heart failure, and h/o prostate CA, recurrent UTIs admitted for UTI and urinary retention.
81 y/o bedbound male with a PMHx of Afib on Coumadin, heart failure, and h/o prostate CA, recurrent UTIs admitted for UTI and urinary retention.

## 2022-05-01 NOTE — PROGRESS NOTE ADULT - PROBLEM SELECTOR PLAN 4
chronic   - On warfarin, metoprolol, digoxin and amiodarone   - continue metoprolol, digoxin and amiodarone with hold parameters- rate controlled  - hold warfarin in the setting of supratherapeutic INR  -  daily INR -  inr 3. will resume coumadin obnce inr less than 3   - EKG with LBBB old  - No CP ,  check trop neg .
chronic   - On warfarin, metoprolol, digoxin and amiodarone   - continue metoprolol, digoxin and amiodarone with hold parameters- rate controlled  - hold warfarin in the setting of supratherapeutic INR  -  daily INR -  today inr 3.72   - EKG with LBBB old  - No CP ,  check trop neg .
chronic   - On warfarin, metoprolol, digoxin and amiodarone   - continue metoprolol, digoxin and amiodarone with hold parameters- rate controlled  - hold warfarin in the setting of supratherapeutic INR  -  daily INR -  inr 3.55   - EKG with LBBB old  - No CP ,  check trop neg .

## 2022-05-01 NOTE — PROGRESS NOTE ADULT - PROBLEM SELECTOR PLAN 2
new Agrawal placed in [facility] - continue  - lisa urologist pt have hx prostate ca / 2015 had radiation -
new Agrawal placed in [facility] - continue  - lisa urologist pt have hx prostate ca / 2015 had radiation - will do ct abd/pelvis iv contrast
new Agrawal placed in [facility] - continue  - lisa urologist pt have hx prostate ca / 2015 had radiation - will do ct abd/pelvis iv contrast- psa 13

## 2022-05-01 NOTE — PROGRESS NOTE ADULT - NUTRITIONAL ASSESSMENT
This patient has been assessed with a concern for Malnutrition and has been determined to have a diagnosis/diagnoses of Moderate protein-calorie malnutrition.    This patient is being managed with:   Diet DASH/TLC-  Sodium & Cholesterol Restricted  High Fiber  Entered: Apr 30 2022 11:56AM    
This patient has been assessed with a concern for Malnutrition and has been determined to have a diagnosis/diagnoses of Moderate protein-calorie malnutrition.    This patient is being managed with:   Diet DASH/TLC-  Sodium & Cholesterol Restricted  High Fiber  Entered: Apr 30 2022 11:56AM    
This patient has been assessed with a concern for Malnutrition and has been determined to have a diagnosis/diagnoses of Moderate protein-calorie malnutrition.    This patient is being managed with:   Diet DASH/TLC-  Sodium & Cholesterol Restricted  No Carb Prosource (1pkg = 15gms Protein)     Qty per Day:  3  Entered: Apr 29 2022  1:18PM

## 2022-05-02 ENCOUNTER — TRANSCRIPTION ENCOUNTER (OUTPATIENT)
Age: 82
End: 2022-05-02

## 2022-05-02 VITALS
DIASTOLIC BLOOD PRESSURE: 67 MMHG | HEART RATE: 64 BPM | OXYGEN SATURATION: 94 % | TEMPERATURE: 98 F | SYSTOLIC BLOOD PRESSURE: 111 MMHG | RESPIRATION RATE: 18 BRPM

## 2022-05-02 LAB
ANION GAP SERPL CALC-SCNC: 5 MMOL/L — SIGNIFICANT CHANGE UP (ref 5–17)
BUN SERPL-MCNC: 9 MG/DL — SIGNIFICANT CHANGE UP (ref 7–23)
CALCIUM SERPL-MCNC: 8.5 MG/DL — SIGNIFICANT CHANGE UP (ref 8.5–10.1)
CHLORIDE SERPL-SCNC: 106 MMOL/L — SIGNIFICANT CHANGE UP (ref 96–108)
CO2 SERPL-SCNC: 29 MMOL/L — SIGNIFICANT CHANGE UP (ref 22–31)
CREAT SERPL-MCNC: 0.98 MG/DL — SIGNIFICANT CHANGE UP (ref 0.5–1.3)
EGFR: 77 ML/MIN/1.73M2 — SIGNIFICANT CHANGE UP
GLUCOSE SERPL-MCNC: 88 MG/DL — SIGNIFICANT CHANGE UP (ref 70–99)
INR BLD: 2.51 RATIO — HIGH (ref 0.88–1.16)
POTASSIUM SERPL-MCNC: 3.7 MMOL/L — SIGNIFICANT CHANGE UP (ref 3.5–5.3)
POTASSIUM SERPL-SCNC: 3.7 MMOL/L — SIGNIFICANT CHANGE UP (ref 3.5–5.3)
PROTHROM AB SERPL-ACNC: 29.6 SEC — HIGH (ref 10.5–13.4)
SODIUM SERPL-SCNC: 140 MMOL/L — SIGNIFICANT CHANGE UP (ref 135–145)

## 2022-05-02 PROCEDURE — 97161 PT EVAL LOW COMPLEX 20 MIN: CPT

## 2022-05-02 PROCEDURE — 99232 SBSQ HOSP IP/OBS MODERATE 35: CPT

## 2022-05-02 PROCEDURE — 99239 HOSP IP/OBS DSCHRG MGMT >30: CPT

## 2022-05-02 PROCEDURE — 85610 PROTHROMBIN TIME: CPT

## 2022-05-02 PROCEDURE — 84484 ASSAY OF TROPONIN QUANT: CPT

## 2022-05-02 PROCEDURE — 80053 COMPREHEN METABOLIC PANEL: CPT

## 2022-05-02 PROCEDURE — G0103: CPT

## 2022-05-02 PROCEDURE — 85025 COMPLETE CBC W/AUTO DIFF WBC: CPT

## 2022-05-02 PROCEDURE — 36415 COLL VENOUS BLD VENIPUNCTURE: CPT

## 2022-05-02 PROCEDURE — 97530 THERAPEUTIC ACTIVITIES: CPT

## 2022-05-02 PROCEDURE — 80048 BASIC METABOLIC PNL TOTAL CA: CPT

## 2022-05-02 PROCEDURE — 74177 CT ABD & PELVIS W/CONTRAST: CPT

## 2022-05-02 RX ORDER — ASCORBIC ACID 60 MG
1 TABLET,CHEWABLE ORAL
Qty: 0 | Refills: 0 | DISCHARGE
Start: 2022-05-02

## 2022-05-02 RX ORDER — CEFUROXIME AXETIL 250 MG
1 TABLET ORAL
Qty: 10 | Refills: 0
Start: 2022-05-02 | End: 2022-05-06

## 2022-05-02 RX ORDER — WARFARIN SODIUM 2.5 MG/1
0 TABLET ORAL
Qty: 0 | Refills: 0 | DISCHARGE

## 2022-05-02 RX ORDER — DOCUSATE SODIUM 100 MG
1 CAPSULE ORAL
Qty: 30 | Refills: 0
Start: 2022-05-02 | End: 2022-05-31

## 2022-05-02 RX ORDER — ASCORBIC ACID 60 MG
500 TABLET,CHEWABLE ORAL DAILY
Refills: 0 | Status: CANCELLED | OUTPATIENT
Start: 2022-05-02 | End: 2022-05-02

## 2022-05-02 RX ORDER — BRIMONIDINE TARTRATE 2 MG/MG
1 SOLUTION/ DROPS OPHTHALMIC
Qty: 0 | Refills: 0 | DISCHARGE
Start: 2022-05-02

## 2022-05-02 RX ORDER — TAMSULOSIN HYDROCHLORIDE 0.4 MG/1
1 CAPSULE ORAL
Qty: 30 | Refills: 0
Start: 2022-05-02 | End: 2022-05-31

## 2022-05-02 RX ORDER — MULTIVIT-MIN/FERROUS GLUCONATE 9 MG/15 ML
1 LIQUID (ML) ORAL DAILY
Refills: 0 | Status: CANCELLED | OUTPATIENT
Start: 2022-05-02 | End: 2022-05-02

## 2022-05-02 RX ORDER — BRIMONIDINE TARTRATE 2 MG/MG
0 SOLUTION/ DROPS OPHTHALMIC
Qty: 0 | Refills: 0 | DISCHARGE

## 2022-05-02 RX ORDER — TAMSULOSIN HYDROCHLORIDE 0.4 MG/1
1 CAPSULE ORAL
Qty: 0 | Refills: 0 | DISCHARGE
Start: 2022-05-02

## 2022-05-02 RX ORDER — SENNA PLUS 8.6 MG/1
1 TABLET ORAL
Qty: 30 | Refills: 0
Start: 2022-05-02 | End: 2022-05-31

## 2022-05-02 RX ADMIN — BRIMONIDINE TARTRATE 1 DROP(S): 2 SOLUTION/ DROPS OPHTHALMIC at 05:41

## 2022-05-02 RX ADMIN — Medication 125 MICROGRAM(S): at 05:40

## 2022-05-02 RX ADMIN — Medication 300 MILLIGRAM(S): at 11:54

## 2022-05-02 RX ADMIN — Medication 250 MILLIGRAM(S): at 05:41

## 2022-05-02 RX ADMIN — PANTOPRAZOLE SODIUM 40 MILLIGRAM(S): 20 TABLET, DELAYED RELEASE ORAL at 05:42

## 2022-05-02 RX ADMIN — AMIODARONE HYDROCHLORIDE 200 MILLIGRAM(S): 400 TABLET ORAL at 05:40

## 2022-05-02 RX ADMIN — Medication 25 MILLIGRAM(S): at 05:39

## 2022-05-02 NOTE — PROVIDER CONTACT NOTE (OTHER) - SITUATION
Pt's rodas was discontinued approx 1430 on 5/1/22 for TOV. Bladder scan done approx 2230 and showed 127mL's of urine. Pt has condom cath on and has only output 5cc's of urine. Fluids encouraged. Pt's rodas was discontinued approx 1430 on 5/1/22 for TOV. Bladder scan done approx 2230 and showed 127mL's of urine. Pt has condom cath on and has only 5cc's output of urine. Fluids encouraged.

## 2022-05-02 NOTE — CHART NOTE - NSCHARTNOTEFT_GEN_A_CORE
Nutr.re-assessment:     Factors impacting intake: [ ] none [ ] nausea  [ ] vomiting [ ] diarrhea [ ] constipation  [ ]chewing problems [ ] swallowing issues  [ ] other:     Diet Presciption: Diet, DASH/TLC:   Sodium & Cholesterol Restricted  High Fiber (04-30-22 @ 11:56)    Intake:     Current Weight: Weight (kg): 90.5 (04-29 @ 00:17), 99.8 (04-28 @ 14:27)  % Weight Change    Pertinent Medications: MEDICATIONS  (STANDING):  allopurinol 300 milliGRAM(s) Oral daily  aMIOdarone    Tablet 200 milliGRAM(s) Oral two times a day  brimonidine 0.2% Ophthalmic Solution 1 Drop(s) Both EYES two times a day  cefuroxime   Tablet 250 milliGRAM(s) Oral every 12 hours  digoxin     Tablet 125 MICROGram(s) Oral daily  latanoprost 0.005% Ophthalmic Solution 1 Drop(s) Both EYES at bedtime  metoprolol succinate ER 25 milliGRAM(s) Oral daily  pantoprazole    Tablet 40 milliGRAM(s) Oral before breakfast  tamsulosin 0.4 milliGRAM(s) Oral at bedtime    MEDICATIONS  (PRN):  melatonin 3 milliGRAM(s) Oral at bedtime PRN Insomnia    Pertinent Labs: 05-01 Na141 mmol/L Glu 92 mg/dL K+ 3.7 mmol/L Cr  1.00 mg/dL BUN 10 mg/dL 04-29 Alb 2.6 g/dL<L>     CAPILLARY BLOOD GLUCOSE        Skin:     Estimated Needs:   [ ] no change since previous assessment  [ ] recalculated:     Previous Nutrition Diagnosis:   [ ] Inadequate Energy Intake [ ]Inadequate Oral Intake [ ] Excessive Energy Intake   [ ] Underweight [ ] Increased Nutrient Needs [ ] Overweight/Obesity   [ ] Altered GI Function [ ] Unintended Weight Loss [ ] Food & Nutrition Related Knowledge Deficit [ ] Malnutrition     Nutrition Diagnosis is [ ] ongoing  [ ] resolved [ ] not applicable     New Nutrition Diagnosis: [ ] not applicable       Interventions:   Recommend  [ ] Change Diet To:  [ ] Nutrition Supplement  [ ] Nutrition Support  [ ] Other:     Monitoring and Evaluation:   [ ] PO intake [ x ] Tolerance to diet prescription [ x ] weights [ x ] labs[ x ] follow up per protocol  [ ] other: Nutr.re-assessment: 83 y/o bedbound male with a PMHx of Afib on Coumadin, heart failure, and h/o prostate CA, recurrent UTIs admitted for UTI and urinary retention.    Factors impacting intake: [ ] none [ ] nausea  [ ] vomiting [ ] diarrhea [ ] constipation  [ ]chewing problems [ ] swallowing issues  [ ] other:     Diet Prescription: Diet, DASH/TLC:   Sodium & Cholesterol Restricted  High Fiber (04-30-22 @ 11:56)    Intake:     Current Weight: Weight (kg): 90.5 (04-29 @ 00:17), 99.8 (04-28 @ 14:27)  % Weight Change    Pertinent Medications: MEDICATIONS  (STANDING):  allopurinol 300 milliGRAM(s) Oral daily  aMIOdarone    Tablet 200 milliGRAM(s) Oral two times a day  brimonidine 0.2% Ophthalmic Solution 1 Drop(s) Both EYES two times a day  cefuroxime   Tablet 250 milliGRAM(s) Oral every 12 hours  digoxin     Tablet 125 MICROGram(s) Oral daily  latanoprost 0.005% Ophthalmic Solution 1 Drop(s) Both EYES at bedtime  metoprolol succinate ER 25 milliGRAM(s) Oral daily  pantoprazole    Tablet 40 milliGRAM(s) Oral before breakfast  tamsulosin 0.4 milliGRAM(s) Oral at bedtime    MEDICATIONS  (PRN):  melatonin 3 milliGRAM(s) Oral at bedtime PRN Insomnia    Pertinent Labs: 05-01 Na141 mmol/L Glu 92 mg/dL K+ 3.7 mmol/L Cr  1.00 mg/dL BUN 10 mg/dL 04-29 Alb 2.6 g/dL<L>     CAPILLARY BLOOD GLUCOSE        Skin:     Estimated Needs:   [ ] no change since previous assessment  [ ] recalculated:     Previous Nutrition Diagnosis:   [ ] Inadequate Energy Intake [ ]Inadequate Oral Intake [ ] Excessive Energy Intake   [ ] Underweight [ ] Increased Nutrient Needs [ ] Overweight/Obesity   [ ] Altered GI Function [ ] Unintended Weight Loss [ ] Food & Nutrition Related Knowledge Deficit [ ] Malnutrition     Nutrition Diagnosis is [ ] ongoing  [ ] resolved [ ] not applicable     New Nutrition Diagnosis: [ ] not applicable       Interventions:   Recommend  [ ] Change Diet To:  [ ] Nutrition Supplement  [ ] Nutrition Support  [ ] Other:     Monitoring and Evaluation:   [ ] PO intake [ x ] Tolerance to diet prescription [ x ] weights [ x ] labs[ x ] follow up per protocol  [ ] other: Nutr.re-assessment: 83 y/o bedbound male with a PMHx of Afib on Coumadin, heart failure, and h/o prostate CA, recurrent UTIs admitted for UTI and urinary retention. Pt reports his appetite is fair to good; improved from previous visit .  He denies constipation ( RD noted diet was changed to high fiber and [place] Prosource was discontinued)     Factors impacting intake: [ X] none [ ] nausea  [ ] vomiting [ ] diarrhea [ ] constipation  [ ]chewing problems [ ] swallowing issues  [ ] other:     Diet Prescription: Diet, DASH/TLC:   Sodium & Cholesterol Restricted  High Fiber (04-30-22 @ 11:56)    Intake: 51- > 75%    Current Weight: Weight (kg): 90.5 (04-29 @ 00:17), 99.8 (04-28 @ 14:27)  % Weight Change : wt fluctuations likely r/t to fluids shifts and/or accuracy in weighing     Pertinent Medications: MEDICATIONS  (STANDING):  allopurinol 300 milliGRAM(s) Oral daily  aMIOdarone    Tablet 200 milliGRAM(s) Oral two times a day  brimonidine 0.2% Ophthalmic Solution 1 Drop(s) Both EYES two times a day  cefuroxime   Tablet 250 milliGRAM(s) Oral every 12 hours  digoxin     Tablet 125 MICROGram(s) Oral daily  latanoprost 0.005% Ophthalmic Solution 1 Drop(s) Both EYES at bedtime  metoprolol succinate ER 25 milliGRAM(s) Oral daily  pantoprazole    Tablet 40 milliGRAM(s) Oral before breakfast  tamsulosin 0.4 milliGRAM(s) Oral at bedtime    MEDICATIONS  (PRN):  melatonin 3 milliGRAM(s) Oral at bedtime PRN Insomnia    Pertinent Labs: 05-01 Na141 mmol/L Glu 92 mg/dL K+ 3.7 mmol/L Cr  1.00 mg/dL BUN 10 mg/dL 04-29 Alb 2.6 g/dL<L>     CAPILLARY BLOOD GLUCOSE    Skin: stg II PI to coccyx , stg I PI to bilat heels    Estimated Needs:   [X ] no change since previous assessment  [ ] recalculated:     Previous Nutrition Diagnosis:   [ ] Inadequate Energy Intake [ ]Inadequate Oral Intake [ ] Excessive Energy Intake   [ ] Underweight [X ] Increased Nutrient Needs [ ] Overweight/Obesity   [ ] Altered GI Function [ ] Unintended Weight Loss [ ] Food & Nutrition Related Knowledge Deficit [X ] Malnutrition     Nutrition Diagnosis is [ X] ongoing  [ ] resolved [ ] not applicable     New Nutrition Diagnosis: [ ] not applicable       Interventions:   Recommend  [ ] Change Diet To:  [X ] Nutrition Supplement; Multivitamin with minerals 1 tab po daily, vit C 500 mg po daily   [ ] Nutrition Support  [X ] Other: cont DASH/ TLC diet     Monitoring and Evaluation:   [ ] PO intake [ x ] Tolerance to diet prescription [ x ] weights [ x ] labs[ x ] follow up per protocol  [ ] other:

## 2022-05-02 NOTE — DISCHARGE NOTE PROVIDER - NSDCFUADDAPPT_GEN_ALL_CORE_FT
fu up out pt your pcp in 1wk . your psa level high 13  with hx prostate ca in past fu up urologist / oncologists in 1 to 2 wk  for further metastatic work up .  your lumber spine  showed - as per ct abd/ pelvis  -  Ill-defined sclerosis of L1 vertebral body with transversely oriented   fracture, indeterminate age. Pathologic etiology cannot be excluded.   Correlate with clinical symptoms and dedicated lumbar spine spinal MRI   for further characterization    out patient .

## 2022-05-02 NOTE — DISCHARGE NOTE NURSING/CASE MANAGEMENT/SOCIAL WORK - NSDCPEFALRISK_GEN_ALL_CORE
For information on Fall & Injury Prevention, visit: [email or web address] OR  [email or web address] OR  https://www.cdc.gov/steadi/patient.html

## 2022-05-02 NOTE — PROGRESS NOTE ADULT - SUBJECTIVE AND OBJECTIVE BOX
INTERVAL Hx:  Pt had BM's yesterday.  Agrawal was removed earlier today for TOV.  According to family, pt known to have h/o compression Fx.  Urine culture neg.    MEDICATIONS  (STANDING):  allopurinol 300 milliGRAM(s) Oral daily  aMIOdarone    Tablet 200 milliGRAM(s) Oral two times a day  brimonidine 0.2% Ophthalmic Solution 1 Drop(s) Both EYES two times a day  cefuroxime   Tablet 250 milliGRAM(s) Oral every 12 hours  digoxin     Tablet 125 MICROGram(s) Oral daily  latanoprost 0.005% Ophthalmic Solution 1 Drop(s) Both EYES at bedtime  metoprolol succinate ER 25 milliGRAM(s) Oral daily  pantoprazole    Tablet 40 milliGRAM(s) Oral before breakfast  tamsulosin 0.4 milliGRAM(s) Oral at bedtime    MEDICATIONS  (PRN):  melatonin 3 milliGRAM(s) Oral at bedtime PRN Insomnia        Vital Signs Last 24 Hrs  T(C): 36.6 (01 May 2022 12:45), Max: 36.6 (01 May 2022 12:45)  T(F): 97.9 (01 May 2022 12:45), Max: 97.9 (01 May 2022 12:45)  HR: 62 (01 May 2022 12:45) (59 - 76)  BP: 117/58 (01 May 2022 12:45) (100/61 - 117/58)  BP(mean): --  RR: 18 (01 May 2022 12:45) (18 - 18)  SpO2: 96% (01 May 2022 12:45) (93% - 96%)    PHYSICAL EXAM:    ABDOMEN: soft, NT    Agrawal: removed an hour ago    LABS:                        11.1   4.66  )-----------( 149      ( 01 May 2022 07:31 )             33.9     05-01    141  |  105  |  10  ----------------------------<  92  3.7   |  30  |  1.00    Ca    8.6      01 May 2022 07:31      Urine culture:  04-28 @ 22:12 --   No growth  Urine culture:  04-28 @ 22:00 --   No growth to date.    
[facility]  INFECTIOUS DISEASES   [address] [place] [address]  Tel: [phone]     Fax: [phone]  ======================================================  MD Brock Gomes Kaushal, MD Cho, Michelle, MD   ======================================================    [id number]  LEBRON TERRY     Follow up: UTI ?    No symptoms but has rodas, no fever. No new overnight event.     PAST MEDICAL & SURGICAL HISTORY:  Afib  Prostate cancer  Heart failure  S/P anal fissurectomy    Social Hx: no smoking, EtOH or drugs     FAMILY HISTORY:  No pertinent family history in first degree relatives    Allergies  No Known Allergies    Antibiotics:  MEDICATIONS  (STANDING):  allopurinol 300 milliGRAM(s) Oral daily  aMIOdarone    Tablet 200 milliGRAM(s) Oral two times a day  brimonidine 0.2% Ophthalmic Solution 1 Drop(s) Both EYES two times a day  cefTRIAXone   IVPB 1000 milliGRAM(s) IV Intermittent every 24 hours  digoxin     Tablet 125 MICROGram(s) Oral daily  latanoprost 0.005% Ophthalmic Solution 1 Drop(s) Both EYES at bedtime  metoprolol succinate ER 25 milliGRAM(s) Oral daily  pantoprazole    Tablet 40 milliGRAM(s) Oral before breakfast     REVIEW OF SYSTEMS:  CONSTITUTIONAL:  No Fever or chills  HEENT:  No diplopia or blurred vision.  No sore throat or runny nose.  CARDIOVASCULAR:  No chest pain or SOB.  RESPIRATORY:  No cough, shortness of breath, PND or orthopnea.  GASTROINTESTINAL:  No nausea, vomiting or diarrhea.  GENITOURINARY:  No dysuria, frequency or urgency. No Blood in urine  MUSCULOSKELETAL:  no joint aches, no muscle pain  SKIN:  No change in skin, hair or nails.  NEUROLOGIC:  No paresthesias, fasciculations, seizures or weakness.  PSYCHIATRIC:  No disorder of thought or mood.  ENDOCRINE:  No heat or cold intolerance, polyuria or polydipsia.  HEMATOLOGICAL:  No easy bruising or bleeding.     Physical Exam:  Vital Signs Last 24 Hrs  T(C): 36.6 (30 Apr 2022 05:12), Max: 36.6 (29 Apr 2022 20:29)  T(F): 97.9 (30 Apr 2022 05:12), Max: 97.9 (30 Apr 2022 05:12)  HR: 70 (30 Apr 2022 05:12) (57 - 70)  BP: 123/70 (30 Apr 2022 05:12) (117/63 - 123/70)  BP(mean): --  RR: 17 (30 Apr 2022 05:12) (17 - 17)  SpO2: 95% (30 Apr 2022 05:12) (95% - 96%)  GEN: NAD  HEENT: normocephalic and atraumatic. EOMI. PERRL.    NECK: Supple.  No lymphadenopathy   LUNGS: Clear to auscultation.  HEART: Irregular rate and rhythm   ABDOMEN: Soft, nontender, and nondistended.  Positive bowel sounds.    : No CVA tenderness, rodas in place   EXTREMITIES: Without edema.  NEUROLOGIC: grossly intact, unable to check gait   PSYCHIATRIC: Appropriate affect .  SKIN: No ulceration or rash     Labs:                        10.8   4.39  )-----------( 143      ( 30 Apr 2022 08:19 )             33.4     04-30    139  |  103  |  11  ----------------------------<  91  3.5   |  28  |  1.00    Ca    8.5      30 Apr 2022 08:19    TPro  5.8<L>  /  Alb  2.6<L>  /  TBili  1.4<H>  /  DBili  x   /  AST  20  /  ALT  22  /  AlkPhos  121<H>  04-29    Culture - Urine (collected 04-28-22 @ 22:12)  Source: Catheterized Catheterized  Final Report (04-29-22 @ 18:22):    No growth    Culture - Blood (collected 04-28-22 @ 22:00)  Source: .Blood Blood    Culture - Blood (collected 04-28-22 @ 22:00)  Source: .Blood Blood    WBC Count: 4.39 K/uL (04-30-22 @ 08:19)  WBC Count: 6.54 K/uL (04-29-22 @ 08:31)  WBC Count: 6.28 K/uL (04-28-22 @ 15:30)    Creatinine, Serum: 1.00 mg/dL (04-30-22 @ 08:19)  Creatinine, Serum: 1.10 mg/dL (04-29-22 @ 08:31)  Creatinine, Serum: 1.37 mg/dL (04-28-22 @ 15:30)     COVID-19 PCR: NotDetec (04-28-22 @ 15:36)    All imaging and other data have been reviewed.  < from: Xray Chest 1 View- PORTABLE-Urgent (Xray Chest 1 View- PORTABLE-Urgent .) (04.28.22 @ 15:05) >  IMPRESSION: Cardiomegaly.  No radiographic evidence of active chest disease.  Lateral or bilateral decubitus radiographs recommended to exclude   posterior lung base pathology.    Assessment and Plan:   83 y/o bedbound man with PMH of Afib, CHF and h/o prostate CA was admitted with difficulty urinating, painful urination, and dark urine.   He has history of recurrent UTIs and recently on 4/20 had klebsiella UTI. Never had MDROs in out system but few of his admissions were is [place].     UTI  - UA with high WBC and negative nitrate  - UC negative but had ABx use recently   -  follow up noted, L spine MRI to evaluate for possible mets due to history of prostate ca  - Rodas for urinary retention, management as per   - Can switch ceftriaxone to oral cefetin to complete 7days total    Will follow PRN.    Sim Garcia MD  Division of Infectious Diseases   Please call ID service at [phone] with any question.      35 minutes spent on total encounter assessing patient, examination, chart reivew, counseling and coordinating care by the attending physician/nurse/care manager.    
[facility]  INFECTIOUS DISEASES   [address] [place] [address]  Tel: [phone]     Fax: [phone]  ======================================================  MD Brock Gomes Kaushal, MD Cho, Michelle, MD   ======================================================    [id number]  LEBRON TERRY     Follow up: UTI ?    No symptoms but has rodas, no fever. No new overnight event.     PAST MEDICAL & SURGICAL HISTORY:  Afib  Prostate cancer  Heart failure  S/P anal fissurectomy    Social Hx: no smoking, EtOH or drugs     FAMILY HISTORY:  No pertinent family history in first degree relatives    Allergies  No Known Allergies    Antibiotics:  MEDICATIONS  (STANDING):  allopurinol 300 milliGRAM(s) Oral daily  aMIOdarone    Tablet 200 milliGRAM(s) Oral two times a day  brimonidine 0.2% Ophthalmic Solution 1 Drop(s) Both EYES two times a day  cefTRIAXone   IVPB 1000 milliGRAM(s) IV Intermittent every 24 hours  digoxin     Tablet 125 MICROGram(s) Oral daily  latanoprost 0.005% Ophthalmic Solution 1 Drop(s) Both EYES at bedtime  metoprolol succinate ER 25 milliGRAM(s) Oral daily  pantoprazole    Tablet 40 milliGRAM(s) Oral before breakfast     REVIEW OF SYSTEMS:  CONSTITUTIONAL:  No Fever or chills  HEENT:  No diplopia or blurred vision.  No sore throat or runny nose.  CARDIOVASCULAR:  No chest pain or SOB.  RESPIRATORY:  No cough, shortness of breath, PND or orthopnea.  GASTROINTESTINAL:  No nausea, vomiting or diarrhea.  GENITOURINARY:  No dysuria, frequency or urgency. No Blood in urine  MUSCULOSKELETAL:  no joint aches, no muscle pain  SKIN:  No change in skin, hair or nails.  NEUROLOGIC:  No paresthesias, fasciculations, seizures or weakness.  PSYCHIATRIC:  No disorder of thought or mood.  ENDOCRINE:  No heat or cold intolerance, polyuria or polydipsia.  HEMATOLOGICAL:  No easy bruising or bleeding.     Physical Exam:  Vital Signs Last 24 Hrs  T(C): 36.4 (02 May 2022 05:32), Max: 36.8 (01 May 2022 20:59)  T(F): 97.6 (02 May 2022 05:32), Max: 98.3 (01 May 2022 20:59)  HR: 66 (02 May 2022 07:45) (62 - 81)  BP: 164/94 (02 May 2022 05:32) (101/61 - 164/94)  BP(mean): --  RR: 18 (02 May 2022 05:32) (18 - 18)  SpO2: 95% (02 May 2022 05:32) (94% - 96%)  GEN: NAD  HEENT: normocephalic and atraumatic. EOMI. PERRL.    NECK: Supple.  No lymphadenopathy   LUNGS: Clear to auscultation.  HEART: Irregular rate and rhythm   ABDOMEN: Soft, nontender, and nondistended.  Positive bowel sounds.    : No CVA tenderness, rodas in place   EXTREMITIES: Without edema.  NEUROLOGIC: grossly intact, unable to check gait   PSYCHIATRIC: Appropriate affect .  SKIN: No ulceration or rash     Labs:                        11.1   4.66  )-----------( 149      ( 01 May 2022 07:31 )             33.9     05-02    140  |  106  |  9   ----------------------------<  88  3.7   |  29  |  0.98    Ca    8.5      02 May 2022 07:43    Culture - Urine (collected 04-28-22 @ 22:12)  Source: Catheterized Catheterized  Final Report (04-29-22 @ 18:22):    No growth    Culture - Blood (collected 04-28-22 @ 22:00)  Source: .Blood Blood    Culture - Blood (collected 04-28-22 @ 22:00)  Source: .Blood Blood    WBC Count: 4.66 K/uL (05-01-22 @ 07:31)  WBC Count: 4.39 K/uL (04-30-22 @ 08:19)  WBC Count: 6.54 K/uL (04-29-22 @ 08:31)  WBC Count: 6.28 K/uL (04-28-22 @ 15:30)    Creatinine, Serum: 0.98 mg/dL (05-02-22 @ 07:43)  Creatinine, Serum: 1.00 mg/dL (05-01-22 @ 07:31)  Creatinine, Serum: 1.00 mg/dL (04-30-22 @ 08:19)  Creatinine, Serum: 1.10 mg/dL (04-29-22 @ 08:31)  Creatinine, Serum: 1.37 mg/dL (04-28-22 @ 15:30)     COVID-19 PCR: NotDetec (04-28-22 @ 15:36)    All imaging and other data have been reviewed.  < from: Xray Chest 1 View- PORTABLE-Urgent (Xray Chest 1 View- PORTABLE-Urgent .) (04.28.22 @ 15:05) >  IMPRESSION: Cardiomegaly.  No radiographic evidence of active chest disease.  Lateral or bilateral decubitus radiographs recommended to exclude   posterior lung base pathology.    Assessment and Plan:   83 y/o bedbound man with PMH of Afib, CHF and h/o prostate CA was admitted with difficulty urinating, painful urination, and dark urine.   He has history of recurrent UTIs and recently on 4/20 had klebsiella UTI. Never had MDROs in out system but few of his admissions were is [place].     UTI  - UA with high WBC and negative nitrate  - UC negative but had ABx use recently   -  follow up noted, L spine MRI to evaluate for possible mets due to history of prostate ca  - Rodas for urinary retention, management as per   - Continue oral cefetin to complete 7days total (5/5 would be the last day)     Will sign off please call with any question.     Sim Garcia MD  Division of Infectious Diseases   Please call ID service at [phone] with any question.      35 minutes spent on total encounter assessing patient, examination, chart reivew, counseling and coordinating care by the attending physician/nurse/care manager.      
INTERVAL Hx:  PSA=13.1.  CT (see below): shows no obvious mets, but with sclerosis/Fx of L1, and findings c/w cystitis and mild proctitis.  Fx likely sustained when pt was dropped during transport at another hospital rather than pathologic origin.  Urine and blood cultures thus far.    MEDICATIONS  (STANDING):  allopurinol 300 milliGRAM(s) Oral daily  aMIOdarone    Tablet 200 milliGRAM(s) Oral two times a day  brimonidine 0.2% Ophthalmic Solution 1 Drop(s) Both EYES two times a day  cefTRIAXone   IVPB 1000 milliGRAM(s) IV Intermittent every 24 hours  digoxin     Tablet 125 MICROGram(s) Oral daily  latanoprost 0.005% Ophthalmic Solution 1 Drop(s) Both EYES at bedtime  metoprolol succinate ER 25 milliGRAM(s) Oral daily  pantoprazole    Tablet 40 milliGRAM(s) Oral before breakfast  tamsulosin 0.4 milliGRAM(s) Oral at bedtime    MEDICATIONS  (PRN):  melatonin 3 milliGRAM(s) Oral at bedtime PRN Insomnia        Vital Signs Last 24 Hrs  T(C): 36.6 (30 Apr 2022 05:12), Max: 36.6 (29 Apr 2022 11:30)  T(F): 97.9 (30 Apr 2022 05:12), Max: 97.9 (30 Apr 2022 05:12)  HR: 70 (30 Apr 2022 05:12) (57 - 70)  BP: 123/70 (30 Apr 2022 05:12) (115/52 - 123/70)  BP(mean): --  RR: 17 (30 Apr 2022 05:12) (17 - 17)  SpO2: 95% (30 Apr 2022 05:12) (95% - 97%)    PHYSICAL EXAM:    Agrawal: Draining clear, yellow urine    LABS:                        10.8   4.39  )-----------( 143      ( 30 Apr 2022 08:19 )             33.4     04-30    139  |  103  |  11  ----------------------------<  91  3.5   |  28  |  1.00    Ca    8.5      30 Apr 2022 08:19    TPro  5.8<L>  /  Alb  2.6<L>  /  TBili  1.4<H>  /  DBili  x   /  AST  20  /  ALT  22  /  AlkPhos  121<H>  04-29    Urine culture:  04-28 @ 22:12 --   No growth  Urine culture:  04-28 @ 22:00 --   No growth to date.      RADIOLOGY & ADDITIONAL TESTS:  < from: CT Abdomen and Pelvis w/ Oral Cont and w/ IV Cont (04.29.22 @ 16:43) >  PROCEDURE DATE:  04/29/2022          INTERPRETATION:  CLINICAL INFORMATION: Prostate cancer with prior   radiation treatment. Urinary retention. Reported history of recent   traumatic fall at outside hospital by Dr. Sims.    COMPARISON: CT abdomen pelvis 3/23/2015    CONTRAST/COMPLICATIONS:  IV Contrast: Omnipaque 350  90 cc administered   10 cc discarded  Oral Contrast: Omnipaque 300  Complications: None reported at time of study completion    PROCEDURE:  CT of the Abdomen and Pelvis was performed.  Sagittal and coronal reformats were performed.    FINDINGS:    LOWER CHEST: Trace right pleural fluid with associated pleural   thickening, similar to prior study from 2015. Dependent atelectasis.   Partially imaged heart is enlarged with coronary artery calcification and   mitral valve annulus/prosthesis. Gynecomastia.    LIVER: Slightly lobulated liver contour. Main portal vein is patent.  BILE DUCTS: No biliary distention. Pneumobilia.  GALLBLADDER: Cholecystectomy.  SPLEEN: Normal size  PANCREAS: No main ductal dilatation  ADRENALS: Unremarkable  KIDNEYS/URETERS: No hydronephrosis. Renal cysts. Additional hypodensities   of the kidneys are too small to characterize.    BLADDER: Urinary bladder wall thickening and surrounding inflammation   despite underdistention from Agrawal catheter.  REPRODUCTIVE ORGANS: Prostate is enlarged.    BOWEL: Stomach is underdistended. No small bowel distention. Appendix is   mildly thickened up to 7 mm, however noninflamed and without   appendicolith. Mild stool burden of the colon and rectum limits   evaluation of the colonic mucosa. Mild perirectal inflammation could   reflect stercoral proctitis. Colonic diverticulosis,without   diverticulitis.  PERITONEUM: No ascites.  VESSELS: No aneurysm of the abdominal aorta. Atheromatous changes.  RETROPERITONEUM/LYMPH NODES: Small volume nodes. No enlarged lymph nodes   by CT size criteria.  ABDOMINAL WALL: Tiny fat-containing umbilical and small fat-containing   inguinal hernias.  BONES: Osseous demineralization and degenerative changes of the bones.   Ill-defined sclerosis of L1 vertebral body with transversely oriented   fracture, indeterminate age. Pathologic etiology cannot be excluded.   Correlate with clinical symptoms and dedicated spinal MRI for further   characterization. T11 and T12 mild height loss unchanged since 2015.    IMPRESSION:    Probable urinary bladder cystitis. Question stercoral proctitis.    Prostate is enlarged. No enlarged lymph nodes of the abdomen/pelvis by CT   size criteria.    Ill-defined sclerosis of L1 vertebral body with transversely oriented   fracture, indeterminate age. Pathologic etiology cannot be excluded.   Correlate withclinical symptoms and dedicated lumbar spine spinal MRI   for further characterization.    
Patient is a 82y old  Male who presents with a chief complaint of UTI/urinary retention (2022 12:24)    pt seen and examine today awake     denies  any discomfort , alert , no fever , tolerating po  voiding with Rodas  .  INTERVAL HPI/OVERNIGHT EVENTS:     T(C): 36.6 (22 @ 11:30), Max: 37.2 (22 @ 22:26)  HR: 65 (22 @ 11:30) (60 - 79)  BP: 115/52 (22 @ 11:30) (115/52 - 133/78)  RR: 17 (22 @ 11:30) (16 - 17)  SpO2: 97% (22 @ 11:30) (95% - 98%)  Wt(kg): --  I&O's Summary    2022 07:01  -  2022 07:00  --------------------------------------------------------  IN: 0 mL / OUT: 300 mL / NET: -300 mL        REVIEW OF SYSTEMS:  CONSTITUTIONAL: No fever, weight loss, or fatigue  EYES: No eye pain, visual disturbances, or discharge  ENMT: ; No sinus or throat pain  NECK: No pain or stiffness  RESPIRATORY: No cough, wheezing, chills  No shortness of breath  CARDIOVASCULAR: No chest pain, palpitations, dizziness, or leg swelling  GASTROINTESTINAL: No abdominal or epigastric pain. No nausea, vomiting,  No diarrhea or constipation. No melena or hematochezia.  GENITOURINARY: No dysuria, frequency, hematuria, or incontinence  NEUROLOGICAL: No headaches, memory loss, loss of strength, numbness, or tremors  SKIN: No itching, burning, rashes, or lesions   MUSCULOSKELETAL: No joint pain or swelling; No muscle, back, or extremity pain    PHYSICAL EXAM:  GENERAL: NAD, well-groomed, well-developed  HEAD:  Atraumatic, Normocephalic  EYES: EOMI, PERRLA, conjunctiva and sclera clear  ENMT:  t; Moist mucous membranes  NECK: Supple, No JVD  NERVOUS SYSTEM:  Alert & Oriented X3; Motor Strength 5/5 B/L upper and lower extremities; DTRs 2+ intact and symmetric  CHEST/LUNG:  percussion bilaterally; No rales, rhonchi, wheezing,   HEART: Regular rate and rhythm; No murmurs,   ABDOMEN: Soft, Nontender, Nondistended; Bowel sounds present  EXTREMITIES:  2+ Peripheral Pulses, No clubbing, cyanosis, or edema  SKIN: No rashes or lesions  gu rodas / clean urine   MEDICATIONS  (STANDING):  allopurinol 300 milliGRAM(s) Oral daily  aMIOdarone    Tablet 200 milliGRAM(s) Oral two times a day  brimonidine 0.2% Ophthalmic Solution 1 Drop(s) Both EYES two times a day  cefTRIAXone   IVPB 1000 milliGRAM(s) IV Intermittent every 24 hours  digoxin     Tablet 125 MICROGram(s) Oral daily  iohexol 300 mG (iodine)/mL Oral Solution 15 milliLiter(s) Oral every 1 hour  latanoprost 0.005% Ophthalmic Solution 1 Drop(s) Both EYES at bedtime  metoprolol succinate ER 25 milliGRAM(s) Oral daily  pantoprazole    Tablet 40 milliGRAM(s) Oral before breakfast  tamsulosin 0.4 milliGRAM(s) Oral at bedtime    MEDICATIONS  (PRN):  melatonin 3 milliGRAM(s) Oral at bedtime PRN Insomnia      LABS:                        11.8   6.54  )-----------( 165      ( 2022 08:31 )             36.4         139  |  104  |  11  ----------------------------<  89  3.7   |  30  |  1.10    Ca    8.5      2022 08:31    TPro  5.8<L>  /  Alb  2.6<L>  /  TBili  1.4<H>  /  DBili  x   /  AST  20  /  ALT  22  /  AlkPhos  121<H>      PT/INR - ( 2022 09:32 )   PT: 44.1 sec;   INR: 3.72 ratio         PTT - ( 2022 15:30 )  PTT:53.3 sec  Urinalysis Basic - ( 2022 15:58 )    Color: Yellow / Appearance: Clear / S.015 / pH: x  Gluc: x / Ketone: Negative  / Bili: Negative / Urobili: Negative mg/dL   Blood: x / Protein: 30 mg/dL / Nitrite: Negative   Leuk Esterase: Moderate / RBC: 3-5 /HPF / WBC >50   Sq Epi: x / Non Sq Epi: Few / Bacteria: Few                        RADIOLOGY & ADDITIONAL TESTS:    Imaging Personally Reviewed:     ct abd /pelvis   Advance Directives:  full code     Palliative Care:  Appropriate    
Patient is a 82y old  Male who presents with a chief complaint of UTI/urinary retention (2022 11:47)   admitted for UTI and urinary retention.  pt seen and examine today see above - alert awake , tolerating po , no fever , no distress  rodas   voiding  .  INTERVAL HPI/OVERNIGHT EVENTS:     T(C): 36.6 (22 @ 05:12), Max: 36.6 (22 @ 20:29)  HR: 70 (22 @ 05:12) (57 - 70)  BP: 123/70 (22 @ 05:12) (117/63 - 123/70)  RR: 17 (22 @ 05:12) (17 - 17)  SpO2: 95% (22 @ 05:12) (95% - 96%)  Wt(kg): --  I&O's Summary    2022 07:01  -  2022 07:00  --------------------------------------------------------  IN: 0 mL / OUT: 650 mL / NET: -650 mL        REVIEW OF SYSTEMS:  CONSTITUTIONAL: No fever, weight loss,  + fatigue  EYES: No eye pain, visual disturbances, or discharge  ENMT:  No difficulty hearing, tinnitus No sinus or throat pain  NECK: No pain or stiffness  BREASTS: No pain, no masses  RESPIRATORY: No cough, wheezing, chills ,  No shortness of breath  CARDIOVASCULAR: No chest pain, palpitations, dizziness, or leg swelling  GASTROINTESTINAL: No abdominal or epigastric pain. No nausea, vomiting, ; No diarrhea  , + constipation. No melena   GENITOURINARY: No dysuria, frequency, hematuria, or incontinence  NEUROLOGICAL: No headaches, memory loss, loss of strength, numbness, or tremors  SKIN: No itching, burning, rashes, or lesions   MUSCULOSKELETAL: No joint pain or swelling; No muscle, back, or extremity pain    PHYSICAL EXAM:  GENERAL: NAD,   HEAD:  Atraumatic, Normocephalic  EYES: EOMI, PERRLA, conjunctiva and sclera clear  ENMT: Moist mucous membranes  NECK: Supple, No JVD  NERVOUS SYSTEM:  Alert & Oriented X3; Motor Strength 5/5 B/L upper and lower extremities; DTRs 2+ intact and symmetric  CHEST/LUNG: percussion bilaterally; No rales, rhonchi, wheezing,   HEART: Regular rate and rhythm; No murmurs,  no tachy   ABDOMEN: Soft, Nontender, Nondistended; Bowel sounds present  EXTREMITIES:  2+ Peripheral Pulses, No clubbing, cyanosis, or edema/ foot drop support   SKIN: No rashes or lesions  gu rodas   MEDICATIONS  (STANDING):  allopurinol 300 milliGRAM(s) Oral daily  aMIOdarone    Tablet 200 milliGRAM(s) Oral two times a day  brimonidine 0.2% Ophthalmic Solution 1 Drop(s) Both EYES two times a day  cefuroxime   Tablet 250 milliGRAM(s) Oral every 12 hours  digoxin     Tablet 125 MICROGram(s) Oral daily  latanoprost 0.005% Ophthalmic Solution 1 Drop(s) Both EYES at bedtime  metoprolol succinate ER 25 milliGRAM(s) Oral daily  pantoprazole    Tablet 40 milliGRAM(s) Oral before breakfast  tamsulosin 0.4 milliGRAM(s) Oral at bedtime    MEDICATIONS  (PRN):  melatonin 3 milliGRAM(s) Oral at bedtime PRN Insomnia      LABS:                        10.8   4.39  )-----------( 143      ( 2022 08:19 )             33.4     04-30    139  |  103  |  11  ----------------------------<  91  3.5   |  28  |  1.00    Ca    8.5      2022 08:19    TPro  5.8<L>  /  Alb  2.6<L>  /  TBili  1.4<H>  /  DBili  x   /  AST  20  /  ALT  22  /  AlkPhos  121<H>  04-29    PT/INR - ( 2022 08:19 )   PT: 42.1 sec;   INR: 3.55 ratio         PTT - ( 2022 15:30 )  PTT:53.3 sec  Urinalysis Basic - ( 2022 15:58 )    Color: Yellow / Appearance: Clear / S.015 / pH: x  Gluc: x / Ketone: Negative  / Bili: Negative / Urobili: Negative mg/dL   Blood: x / Protein: 30 mg/dL / Nitrite: Negative   Leuk Esterase: Moderate / RBC: 3-5 /HPF / WBC >50   Sq Epi: x / Non Sq Epi: Few / Bacteria: Few           @ 22:12   No growth  --  --   @ 22:00   No growth to date.  --  --          RADIOLOGY & ADDITIONAL TESTS:    Imaging Personally Reviewed:   no new test     Advance Directives:  full code     Palliative Care:  Appropriate    
Patient is a 82y old  Male who presents with a chief complaint of UTI/urinary retention (30 Apr 2022 11:58)    pt seen and examine today  pt refuse to come out from bed ,   voiding trial today , no fever , no resp distress .   INTERVAL HPI/OVERNIGHT EVENTS:     T(C): 36.6 (05-01-22 @ 12:45), Max: 36.6 (05-01-22 @ 12:45)  HR: 62 (05-01-22 @ 12:45) (59 - 76)  BP: 117/58 (05-01-22 @ 12:45) (100/61 - 117/58)  RR: 18 (05-01-22 @ 12:45) (18 - 18)  SpO2: 96% (05-01-22 @ 12:45) (93% - 96%)  Wt(kg): --  I&O's Summary    30 Apr 2022 07:01  -  01 May 2022 07:00  --------------------------------------------------------  IN: 0 mL / OUT: 600 mL / NET: -600 mL        REVIEW OF SYSTEMS:  CONSTITUTIONAL: No fever, weight loss, or fatigue  EYES: No eye pain, visual disturbances, or discharge  ENMT:   No sinus or throat pain  NECK: No pain or stiffness  BREASTS: No pain, no masses  RESPIRATORY: No cough, wheezing, chills No shortness of breath  CARDIOVASCULAR: No chest pain, palpitations, dizziness, or leg swelling  GASTROINTESTINAL: No abdominal or epigastric pain. No nausea, vomiting,    No diarrhea or constipation. No melena   GENITOURINARY: No dysuria, frequency, hematuria, or incontinence  NEUROLOGICAL: No headaches, memory loss, loss of strength, numbness, or tremors  SKIN: No itching, burning, rashes, or lesions   MUSCULOSKELETAL: No joint pain or swelling; No muscle, back, or extremity pain    PHYSICAL EXAM:  GENERAL: NAD,   HEAD:  Atraumatic, Normocephalic  EYES: EOMI, PERRLA, conjunctiva and sclera clear  ENMT:  Moist mucous membranes  NECK: Supple, No JVD  NERVOUS SYSTEM:  Alert & Oriented X3; Motor Strength 5/5 B/L upper and lower extremities; DTRs 2+ intact and symmetric  CHEST/LUNG:  percussion bilaterally; No rales, rhonchi, wheezing,   HEART: Regular rate and rhythm; No murmurs,    ABDOMEN: Soft, Nontender, Nondistended; Bowel sounds present  EXTREMITIES:  2+ Peripheral Pulses, No clubbing, cyanosis, or edema  SKIN: No rashes or lesions , sacral decubitus satge2 small   gu rodas   MEDICATIONS  (STANDING):  allopurinol 300 milliGRAM(s) Oral daily  aMIOdarone    Tablet 200 milliGRAM(s) Oral two times a day  brimonidine 0.2% Ophthalmic Solution 1 Drop(s) Both EYES two times a day  cefuroxime   Tablet 250 milliGRAM(s) Oral every 12 hours  digoxin     Tablet 125 MICROGram(s) Oral daily  latanoprost 0.005% Ophthalmic Solution 1 Drop(s) Both EYES at bedtime  metoprolol succinate ER 25 milliGRAM(s) Oral daily  pantoprazole    Tablet 40 milliGRAM(s) Oral before breakfast  tamsulosin 0.4 milliGRAM(s) Oral at bedtime    MEDICATIONS  (PRN):  melatonin 3 milliGRAM(s) Oral at bedtime PRN Insomnia      LABS:                        11.1   4.66  )-----------( 149      ( 01 May 2022 07:31 )             33.9     05-01    141  |  105  |  10  ----------------------------<  92  3.7   |  30  |  1.00    Ca    8.6      01 May 2022 07:31      PT/INR - ( 01 May 2022 07:31 )   PT: 35.7 sec;   INR: 3.02 ratio                     04-28 @ 22:12   No growth  --  --  04-28 @ 22:00   No growth to date.  --  --          RADIOLOGY & ADDITIONAL TESTS:    Imaging Personally Reviewed:     no new test   Advance Directives:  full code    Palliative Care:  Appropriate

## 2022-05-02 NOTE — PROVIDER CONTACT NOTE (OTHER) - BACKGROUND
Agrawal removed at
Admitting dx: retention of urine  PMH: heart failure, prostate cancer, a-fib, gout, glaucoma

## 2022-05-02 NOTE — DISCHARGE NOTE PROVIDER - NSDCCPCAREPLAN_GEN_ALL_CORE_FT
PRINCIPAL DISCHARGE DIAGNOSIS  Diagnosis: Acute UTI  Assessment and Plan of Treatment: with urinary reatation sec to constipation  - was with  rodas  but you passed voiding trial - continue po abx ceftin 5 day. repeat your ua and ucult in 1wk . contiue stool softner.      SECONDARY DISCHARGE DIAGNOSES  Diagnosis: Glaucoma  Assessment and Plan of Treatment: continue home med eye drops    Diagnosis: Afib  Assessment and Plan of Treatment: chronic - continnue home rate control med , your inr at discharge 2.5 you can start home dose of coumadin 2 mg po daily , need to keep in 2 to 3 , fu up your  blood work / inr check wednesday   ad adjust dose as per  inr level .    Diagnosis: Elevated PSA  Assessment and Plan of Treatment: you have known  hx prstate ca - your psa at discharge -  13 [  normal range 1 to 4] , you need to see  your urologist onchologist   / or fu up dr gonzalez urologist office in 1 to 2 wk .

## 2022-05-02 NOTE — DISCHARGE NOTE PROVIDER - NSDCHHBASESERVICE_GEN_ALL_CORE
5/31/2017              Faustino Lebron [address]         Dear Sav Darden,      It was a pleasure to see you at our [address] [facility] office. Your Pap and HPV are negative.  Repeat Physical therapy

## 2022-05-02 NOTE — PROGRESS NOTE ADULT - REASON FOR ADMISSION
UTI/urinary retention

## 2022-05-02 NOTE — DISCHARGE NOTE PROVIDER - PROVIDER TOKENS
PROVIDER:[TOKEN:[[id number]]],FREE:[LAST:[Outpatient Providers	PMD Dr. Kurzweil],PHONE:[(   )    -],FAX:[(   )    -],ADDRESS:[pcp]]

## 2022-05-02 NOTE — DISCHARGE NOTE PROVIDER - NSDCMRMEDTOKEN_GEN_ALL_CORE_FT
allopurinol 300 mg oral tablet: 1 tab(s) orally once a day  amiodarone 200 mg oral tablet: 1 tab(s) orally 2 times a day  ascorbic acid 500 mg oral tablet: 1 tab(s) orally once a day  cefuroxime 250 mg oral tablet: 1 tab(s) orally every 12 hours  Coumadin 2 mg oral tablet: 1 tab(s) orally once a day  digoxin 125 mcg (0.125 mg) oral tablet: 1 tab(s) orally once a day  docusate sodium 100 mg oral capsule: 1 cap(s) orally once a day, As Needed   latanoprost 0.005% ophthalmic solution:   metoprolol succinate 25 mg oral tablet, extended release: 1 tab(s) orally once a day  pantoprazole 40 mg oral delayed release tablet: 1 tab(s) orally once a day  senna 8.6 mg oral tablet: 1 tab(s) orally once a day (at bedtime), As Needed   tamsulosin 0.4 mg oral capsule: 1 cap(s) orally once a day (at bedtime)

## 2022-05-02 NOTE — DISCHARGE NOTE PROVIDER - CARE PROVIDERS DIRECT ADDRESSES
[email or web address]

## 2022-05-02 NOTE — DISCHARGE NOTE PROVIDER - DETAILS OF MALNUTRITION DIAGNOSIS/DIAGNOSES
This patient has been assessed with a concern for Malnutrition and was treated during this hospitalization for the following Nutrition diagnosis/diagnoses:     -  04/29/2022: Moderate protein-calorie malnutrition

## 2022-05-02 NOTE — DISCHARGE NOTE PROVIDER - CARE PROVIDER_API CALL
Jeffrey Sims)  Urology  [address] [place] [address], [address]  [place] [address]  Phone: [phone]  Fax: [phone]  Follow Up Time:     Outpatient Providers	PMD Dr. Kurzweil,   pcp  Phone: (   )    -  Fax: (   )    -  Follow Up Time:

## 2022-05-02 NOTE — DISCHARGE NOTE PROVIDER - HOSPITAL COURSE
83 y/o bedbound man with PMH of  hx drop foot  Afib, CHF and h/o prostate CA was admitted with difficulty urinating, painful urination, and dark urine.   He has history of recurrent UTIs and recently on 4/20 had klebsiella UTI. Never had MDROs in out system but few of his admissions were is [place].   UTI UA with high WBC and negative nitrate UC negative but had ABx use recently     h/o prostate CA 2015 had radiation , recurrent UTIs admitted for UTI and urinary retention-  new   rodas placed   seen by uro dr gonzalez    started on iv  abx Rocephin 1 gm daily  per id dr millard   urine cult neg  and blood cult neg    later switch to ceftin po   , uro dr gonzalez ct abd/pelvis iv contrast   ordered as hx prostate ca in past   high psa  family aware to fu up out pt urologist .  per ct scan abd/pelvis - lOsseous demineralization and degenerative changes of the bones. Ill-defined sclerosis of L1 vertebral body with transversely oriented   fracture, indeterminate age. Pathologic etiology cannot be excluded.   Correlate with clinical symptoms and dedicated spinal MRI for further   characterization. T11 and T12 mild height loss unchanged since 2015. but  pt also known hx l1 compression fracture as per family  can have out pt mri spine ,  Rodas for urinary retention sec to constipation this time  management as per  dr garrison   after stool softner dc rodas  pt  passed voiding trial .   - Continue oral cefetin to complete 7days total for uti .  Afib chronic   - On warfarin, metoprolol, digoxin and amiodarone   - continue metoprolol, digoxin and amiodarone with hold parameters- rate controlled  -was  hold warfarin in the setting of supra therapeutic INR later resolved   daily INR -  inr 2.5 resumed  warfarin at dc   2 mg daily / inr fu out pt closely keep 2 to 3  . ,sacral decubitus satge2 small poa - local wound care.  - EKG with LBBB old  - No CP ,  check trop neg . pt seen by physical therapy but  family refused rehab .   pt clear   to be dc home  by id dr millard and uro dr gonzalez to fu up out pt.   physical examine medically stable 5/2/22  Vital Signs Last 24 Hrs  T(C): 36.4 (02 May 2022 13:10), Max: 36.8 (01 May 2022 20:59)  T(F): 97.6 (02 May 2022 13:10), Max: 98.3 (01 May 2022 20:59)  HR: 64 (02 May 2022 13:10) (64 - 81)  BP: 111/67 (02 May 2022 13:10) (101/61 - 164/94)  BP(mean): --  RR: 18 (02 May 2022 13:10) (18 - 18)  SpO2: 94% (02 May 2022 13:10) (94% - 95%)  PHYSICAL EXAM:  GENERAL: NAD,   HEAD:  Atraumatic, Normocephalic  EYES: EOMI, PERRLA, conjunctiva and sclera clear  ENMT:  Moist mucous membranes  NECK: Supple, No JVD  NERVOUS SYSTEM:  Alert & Oriented X3; Motor Strength 5/5 B/L upper and lower extremities; DTRs 2+ intact and symmetric  CHEST/LUNG:  percussion bilaterally; No rales, rhonchi, wheezing,   HEART: Regular rate and rhythm; No murmurs,    ABDOMEN: Soft, Nontender, Nondistended; Bowel sounds present  EXTREMITIES:  2+ Peripheral Pulses, No clubbing, cyanosis, or edema  SKIN: No rashes or lesions , sacral decubitus satge2 small   gu intact    dc plan wife and daughter aware on phn . fu up your pcp in 1 wk , fu out pt urologist / onchology for hcx prostate ca further metastatic work up .

## 2022-05-02 NOTE — DISCHARGE NOTE NURSING/CASE MANAGEMENT/SOCIAL WORK - PATIENT PORTAL LINK FT
You can access the FollowMyHealth Patient Portal offered by [facility] by registering at the following website: [email or web address]. By joining [facility] FollowMyHealth portal, you will also be able to view your health information using other applications (apps) compatible with our system.

## 2022-05-03 LAB
CULTURE RESULTS: SIGNIFICANT CHANGE UP
CULTURE RESULTS: SIGNIFICANT CHANGE UP
SPECIMEN SOURCE: SIGNIFICANT CHANGE UP
SPECIMEN SOURCE: SIGNIFICANT CHANGE UP

## 2022-05-03 NOTE — ED ADULT NURSE NOTE - DRUG PRE-SCREENING (DAST -1)
Aileen Eldridge   1971, 49 y.o. female   [id number]       Referring Provider: Ara Silva MD  Referral Type: In an order in [address] [place] [address]    Reason for Visit: Evaluation of suspected change in hearing, tinnitus, or balance. ADULT AUDIOLOGIC EVALUATION      Aileen Eldridge is a 49 y.o. female seen today, 5/4/2022 , for an initial audiologic evaluation. Patient was seen by Ara Silva MD following today's evaluation. AUDIOLOGIC AND OTHER PERTINENT MEDICAL HISTORY:      Aileen Eldridge noted otalgia and family history of hearing loss. Patient reports intermittent, right, otalgia. She notes decreased hearing bilaterally but feels this may have improved since her ear cleaning today. She notes her paternal grandfather has a history of hearing loss. Aileen Eldridge denied aural fullness, otorrhea, tinnitus, dizziness, imbalance, history of falls, history of significant noise exposure, history of head trauma and history of ear surgery. Date: 5/4/2022     IMPRESSIONS:      AU: Hearing WNL, Excellent WRS, Type A tymps    Test results consistent with hearing within normal limits. Discussed test results with patient. Patient to follow medical recommendations per Ara Silva MD .    ASSESSMENT AND FINDINGS:     Otoscopy revealed: Clear ear canals bilaterally    RIGHT EAR:  Hearing Sensitivity: Normal hearing sensitivity   Speech Recognition Threshold: 10 dB HL  Word Recognition:Excellent (100%), based on NU-6 25-word list at 55 dBHL using recorded speech stimuli. Tympanometry: Normal peak pressure and compliance, Type A tympanogram, consistent with normal middle ear function. Acoustic Reflexes: Ipsilateral: Did not test. Contralateral: Did not test.    LEFT EAR:  Hearing Sensitivity: Normal hearing sensitivity   Speech Recognition Threshold: 10 dB HL  Word Recognition:Excellent (100%), based on NU-6 25-word list at 55 dBHL using recorded speech stimuli.     Tympanometry: Normal peak pressure and compliance, Type A tympanogram, consistent with normal middle ear function. Acoustic Reflexes: Ipsilateral: Did not test. Contralateral: Did not test.    Reliability: Good  Transducer: Inserts    See scanned audiogram dated 5/4/2022  for results. PATIENT EDUCATION:     The following items were discussed with the patient:   - Good Communication Strategies    Educational information was shared in the After Visit Summary. RECOMMENDATIONS:                                                                                                                                                                                                                                                          The following items are recommended based on patient report and results from today's appointment:   - Continue medical follow-up with Anca Caraballo MD.   - Retest hearing as medically indicated and/or sooner if a change in hearing is noted. - Utilize \"Good Communication Strategies\" as discussed to assist in speech understanding with communication partners.        Cayden Lorenzo  Audiologist    Chart CC'd to: Anca Caraballo MD      Degree of   Hearing Sensitivity dB Range   Within Normal Limits (WNL) 0 - 20   Mild 20 - 40   Moderate 40 - 55   Moderately-Severe 55 - 70   Severe 70 - 90   Profound 90 + Statement Selected

## 2022-07-08 PROBLEM — I50.9 HEART FAILURE, UNSPECIFIED: Chronic | Status: ACTIVE | Noted: 2022-04-28

## 2022-07-08 PROBLEM — C61 MALIGNANT NEOPLASM OF PROSTATE: Chronic | Status: ACTIVE | Noted: 2022-04-28

## 2022-07-08 PROBLEM — I48.91 UNSPECIFIED ATRIAL FIBRILLATION: Chronic | Status: ACTIVE | Noted: 2022-04-28

## 2022-07-22 ENCOUNTER — APPOINTMENT (OUTPATIENT)
Dept: MRI IMAGING | Facility: CLINIC | Age: 82
End: 2022-07-22

## 2022-08-17 ENCOUNTER — OUTPATIENT (OUTPATIENT)
Dept: OUTPATIENT SERVICES | Facility: HOSPITAL | Age: 82
LOS: 1 days | End: 2022-08-17
Payer: MEDICARE

## 2022-08-17 ENCOUNTER — APPOINTMENT (OUTPATIENT)
Dept: MRI IMAGING | Facility: CLINIC | Age: 82
End: 2022-08-17

## 2022-08-17 DIAGNOSIS — Z00.00 ENCOUNTER FOR GENERAL ADULT MEDICAL EXAMINATION WITHOUT ABNORMAL FINDINGS: ICD-10-CM

## 2022-08-17 DIAGNOSIS — Z98.890 OTHER SPECIFIED POSTPROCEDURAL STATES: Chronic | ICD-10-CM

## 2022-08-17 PROCEDURE — 72148 MRI LUMBAR SPINE W/O DYE: CPT | Mod: 26,MH

## 2022-08-17 PROCEDURE — 72148 MRI LUMBAR SPINE W/O DYE: CPT | Mod: MH

## 2022-09-21 ENCOUNTER — APPOINTMENT (OUTPATIENT)
Dept: MRI IMAGING | Facility: CLINIC | Age: 82
End: 2022-09-21

## 2022-09-21 ENCOUNTER — OUTPATIENT (OUTPATIENT)
Dept: OUTPATIENT SERVICES | Facility: HOSPITAL | Age: 82
LOS: 1 days | End: 2022-09-21
Payer: MEDICARE

## 2022-09-21 DIAGNOSIS — Z00.8 ENCOUNTER FOR OTHER GENERAL EXAMINATION: ICD-10-CM

## 2022-09-21 DIAGNOSIS — Z98.890 OTHER SPECIFIED POSTPROCEDURAL STATES: Chronic | ICD-10-CM

## 2022-09-21 PROCEDURE — 72146 MRI CHEST SPINE W/O DYE: CPT | Mod: MH

## 2022-09-21 PROCEDURE — 72146 MRI CHEST SPINE W/O DYE: CPT | Mod: 26,MH

## 2022-12-26 ENCOUNTER — INPATIENT (INPATIENT)
Facility: HOSPITAL | Age: 82
LOS: 0 days | Discharge: ANOTHER HOSPITAL | DRG: 177 | End: 2022-12-27
Attending: INTERNAL MEDICINE | Admitting: INTERNAL MEDICINE
Payer: MEDICARE

## 2022-12-26 VITALS
HEART RATE: 78 BPM | SYSTOLIC BLOOD PRESSURE: 160 MMHG | TEMPERATURE: 99 F | HEIGHT: 73 IN | OXYGEN SATURATION: 93 % | WEIGHT: 220.02 LBS | DIASTOLIC BLOOD PRESSURE: 90 MMHG | RESPIRATION RATE: 24 BRPM

## 2022-12-26 DIAGNOSIS — I48.20 CHRONIC ATRIAL FIBRILLATION, UNSPECIFIED: ICD-10-CM

## 2022-12-26 DIAGNOSIS — U07.1 COVID-19: ICD-10-CM

## 2022-12-26 DIAGNOSIS — I10 ESSENTIAL (PRIMARY) HYPERTENSION: ICD-10-CM

## 2022-12-26 DIAGNOSIS — Z98.890 OTHER SPECIFIED POSTPROCEDURAL STATES: Chronic | ICD-10-CM

## 2022-12-26 LAB
ALBUMIN SERPL ELPH-MCNC: 3.2 G/DL — LOW (ref 3.3–5)
ALBUMIN SERPL ELPH-MCNC: 3.3 G/DL — SIGNIFICANT CHANGE UP (ref 3.3–5)
ALP SERPL-CCNC: 67 U/L — SIGNIFICANT CHANGE UP (ref 30–120)
ALP SERPL-CCNC: 69 U/L — SIGNIFICANT CHANGE UP (ref 30–120)
ALT FLD-CCNC: 412 U/L DA — HIGH (ref 10–60)
ALT FLD-CCNC: 417 U/L DA — HIGH (ref 10–60)
ANION GAP SERPL CALC-SCNC: 7 MMOL/L — SIGNIFICANT CHANGE UP (ref 5–17)
ANISOCYTOSIS BLD QL: SLIGHT — SIGNIFICANT CHANGE UP
APPEARANCE UR: CLEAR — SIGNIFICANT CHANGE UP
APTT BLD: 40.5 SEC — HIGH (ref 27.5–35.5)
AST SERPL-CCNC: 206 U/L — HIGH (ref 10–40)
AST SERPL-CCNC: 239 U/L — HIGH (ref 10–40)
BACTERIA # UR AUTO: ABNORMAL
BASOPHILS # BLD AUTO: 0.01 K/UL — SIGNIFICANT CHANGE UP (ref 0–0.2)
BASOPHILS NFR BLD AUTO: 0.2 % — SIGNIFICANT CHANGE UP (ref 0–2)
BILIRUB DIRECT SERPL-MCNC: 0.4 MG/DL — HIGH (ref 0–0.3)
BILIRUB INDIRECT FLD-MCNC: 0.8 MG/DL — SIGNIFICANT CHANGE UP (ref 0.2–1)
BILIRUB SERPL-MCNC: 1.2 MG/DL — SIGNIFICANT CHANGE UP (ref 0.2–1.2)
BILIRUB SERPL-MCNC: 1.5 MG/DL — HIGH (ref 0.2–1.2)
BILIRUB UR-MCNC: NEGATIVE — SIGNIFICANT CHANGE UP
BUN SERPL-MCNC: 22 MG/DL — SIGNIFICANT CHANGE UP (ref 7–23)
CALCIUM SERPL-MCNC: 8 MG/DL — LOW (ref 8.4–10.5)
CHLORIDE SERPL-SCNC: 103 MMOL/L — SIGNIFICANT CHANGE UP (ref 96–108)
CO2 SERPL-SCNC: 28 MMOL/L — SIGNIFICANT CHANGE UP (ref 22–31)
COLOR SPEC: YELLOW — SIGNIFICANT CHANGE UP
CREAT SERPL-MCNC: 0.4 MG/DL — LOW (ref 0.5–1.3)
CREAT SERPL-MCNC: 1.38 MG/DL — HIGH (ref 0.5–1.3)
DIFF PNL FLD: ABNORMAL
EGFR: 110 ML/MIN/1.73M2 — SIGNIFICANT CHANGE UP
EGFR: 51 ML/MIN/1.73M2 — LOW
EOSINOPHIL # BLD AUTO: 0 K/UL — SIGNIFICANT CHANGE UP (ref 0–0.5)
EOSINOPHIL NFR BLD AUTO: 0 % — SIGNIFICANT CHANGE UP (ref 0–6)
EPI CELLS # UR: NEGATIVE — SIGNIFICANT CHANGE UP
FLUAV AG NPH QL: SIGNIFICANT CHANGE UP
FLUBV AG NPH QL: SIGNIFICANT CHANGE UP
GLUCOSE SERPL-MCNC: 172 MG/DL — HIGH (ref 70–99)
GLUCOSE UR QL: NEGATIVE MG/DL — SIGNIFICANT CHANGE UP
HCT VFR BLD CALC: 40 % — SIGNIFICANT CHANGE UP (ref 39–50)
HGB BLD-MCNC: 13.2 G/DL — SIGNIFICANT CHANGE UP (ref 13–17)
IMM GRANULOCYTES NFR BLD AUTO: 0.4 % — SIGNIFICANT CHANGE UP (ref 0–0.9)
INR BLD: 1.97 RATIO — HIGH (ref 0.88–1.16)
KETONES UR-MCNC: ABNORMAL
LACTATE SERPL-SCNC: 0.9 MMOL/L — SIGNIFICANT CHANGE UP (ref 0.7–2)
LACTATE SERPL-SCNC: 2.1 MMOL/L — HIGH (ref 0.7–2)
LEUKOCYTE ESTERASE UR-ACNC: ABNORMAL
LYMPHOCYTES # BLD AUTO: 0.24 K/UL — LOW (ref 1–3.3)
LYMPHOCYTES # BLD AUTO: 4.5 % — LOW (ref 13–44)
MANUAL SMEAR VERIFICATION: SIGNIFICANT CHANGE UP
MCHC RBC-ENTMCNC: 33 GM/DL — SIGNIFICANT CHANGE UP (ref 32–36)
MCHC RBC-ENTMCNC: 33 PG — SIGNIFICANT CHANGE UP (ref 27–34)
MCV RBC AUTO: 100 FL — SIGNIFICANT CHANGE UP (ref 80–100)
MONOCYTES # BLD AUTO: 0.49 K/UL — SIGNIFICANT CHANGE UP (ref 0–0.9)
MONOCYTES NFR BLD AUTO: 9.2 % — SIGNIFICANT CHANGE UP (ref 2–14)
NEUTROPHILS # BLD AUTO: 4.58 K/UL — SIGNIFICANT CHANGE UP (ref 1.8–7.4)
NEUTROPHILS NFR BLD AUTO: 85.7 % — HIGH (ref 43–77)
NITRITE UR-MCNC: NEGATIVE — SIGNIFICANT CHANGE UP
NRBC # BLD: 0 /100 WBCS — SIGNIFICANT CHANGE UP (ref 0–0)
PH UR: 5 — SIGNIFICANT CHANGE UP (ref 5–8)
PLAT MORPH BLD: NORMAL — SIGNIFICANT CHANGE UP
PLATELET # BLD AUTO: 106 K/UL — LOW (ref 150–400)
POTASSIUM SERPL-MCNC: 3.6 MMOL/L — SIGNIFICANT CHANGE UP (ref 3.5–5.3)
POTASSIUM SERPL-SCNC: 3.6 MMOL/L — SIGNIFICANT CHANGE UP (ref 3.5–5.3)
PROT SERPL-MCNC: 6.5 G/DL — SIGNIFICANT CHANGE UP (ref 6–8.3)
PROT SERPL-MCNC: 6.5 G/DL — SIGNIFICANT CHANGE UP (ref 6–8.3)
PROT UR-MCNC: 100 MG/DL
PROTHROM AB SERPL-ACNC: 23.4 SEC — HIGH (ref 10.5–13.4)
RBC # BLD: 4 M/UL — LOW (ref 4.2–5.8)
RBC # FLD: 15.2 % — HIGH (ref 10.3–14.5)
RBC BLD AUTO: ABNORMAL
RBC CASTS # UR COMP ASSIST: SIGNIFICANT CHANGE UP /HPF (ref 0–4)
RSV RNA NPH QL NAA+NON-PROBE: SIGNIFICANT CHANGE UP
SARS-COV-2 RNA SPEC QL NAA+PROBE: DETECTED
SODIUM SERPL-SCNC: 138 MMOL/L — SIGNIFICANT CHANGE UP (ref 135–145)
SP GR SPEC: 1.02 — SIGNIFICANT CHANGE UP (ref 1.01–1.02)
UROBILINOGEN FLD QL: 4 MG/DL
WBC # BLD: 5.34 K/UL — SIGNIFICANT CHANGE UP (ref 3.8–10.5)
WBC # FLD AUTO: 5.34 K/UL — SIGNIFICANT CHANGE UP (ref 3.8–10.5)
WBC UR QL: SIGNIFICANT CHANGE UP

## 2022-12-26 PROCEDURE — 93010 ELECTROCARDIOGRAM REPORT: CPT

## 2022-12-26 PROCEDURE — 71045 X-RAY EXAM CHEST 1 VIEW: CPT | Mod: 26

## 2022-12-26 PROCEDURE — 99285 EMERGENCY DEPT VISIT HI MDM: CPT | Mod: CS

## 2022-12-26 RX ORDER — SENNA PLUS 8.6 MG/1
2 TABLET ORAL AT BEDTIME
Refills: 0 | Status: DISCONTINUED | OUTPATIENT
Start: 2022-12-26 | End: 2022-12-27

## 2022-12-26 RX ORDER — LATANOPROST 0.05 MG/ML
1 SOLUTION/ DROPS OPHTHALMIC; TOPICAL AT BEDTIME
Refills: 0 | Status: DISCONTINUED | OUTPATIENT
Start: 2022-12-26 | End: 2022-12-27

## 2022-12-26 RX ORDER — AMIODARONE HYDROCHLORIDE 400 MG/1
200 TABLET ORAL
Refills: 0 | Status: DISCONTINUED | OUTPATIENT
Start: 2022-12-26 | End: 2022-12-26

## 2022-12-26 RX ORDER — CEFTRIAXONE 500 MG/1
1000 INJECTION, POWDER, FOR SOLUTION INTRAMUSCULAR; INTRAVENOUS ONCE
Refills: 0 | Status: COMPLETED | OUTPATIENT
Start: 2022-12-26 | End: 2022-12-26

## 2022-12-26 RX ORDER — IPRATROPIUM/ALBUTEROL SULFATE 18-103MCG
3 AEROSOL WITH ADAPTER (GRAM) INHALATION ONCE
Refills: 0 | Status: COMPLETED | OUTPATIENT
Start: 2022-12-26 | End: 2022-12-26

## 2022-12-26 RX ORDER — SODIUM CHLORIDE 9 MG/ML
1000 INJECTION INTRAMUSCULAR; INTRAVENOUS; SUBCUTANEOUS ONCE
Refills: 0 | Status: COMPLETED | OUTPATIENT
Start: 2022-12-26 | End: 2022-12-26

## 2022-12-26 RX ORDER — CEFTRIAXONE 500 MG/1
INJECTION, POWDER, FOR SOLUTION INTRAMUSCULAR; INTRAVENOUS
Refills: 0 | Status: DISCONTINUED | OUTPATIENT
Start: 2022-12-26 | End: 2022-12-26

## 2022-12-26 RX ORDER — PANTOPRAZOLE SODIUM 20 MG/1
40 TABLET, DELAYED RELEASE ORAL
Refills: 0 | Status: DISCONTINUED | OUTPATIENT
Start: 2022-12-26 | End: 2022-12-27

## 2022-12-26 RX ORDER — INFLUENZA VIRUS VACCINE 15; 15; 15; 15 UG/.5ML; UG/.5ML; UG/.5ML; UG/.5ML
0.7 SUSPENSION INTRAMUSCULAR ONCE
Refills: 0 | Status: DISCONTINUED | OUTPATIENT
Start: 2022-12-26 | End: 2022-12-27

## 2022-12-26 RX ORDER — ASCORBIC ACID 60 MG
500 TABLET,CHEWABLE ORAL DAILY
Refills: 0 | Status: DISCONTINUED | OUTPATIENT
Start: 2022-12-26 | End: 2022-12-27

## 2022-12-26 RX ORDER — DEXAMETHASONE 0.5 MG/5ML
6 ELIXIR ORAL DAILY
Refills: 0 | Status: DISCONTINUED | OUTPATIENT
Start: 2022-12-26 | End: 2022-12-27

## 2022-12-26 RX ORDER — REMDESIVIR 5 MG/ML
200 INJECTION INTRAVENOUS EVERY 24 HOURS
Refills: 0 | Status: COMPLETED | OUTPATIENT
Start: 2022-12-26 | End: 2022-12-26

## 2022-12-26 RX ORDER — DEXAMETHASONE 0.5 MG/5ML
6 ELIXIR ORAL ONCE
Refills: 0 | Status: COMPLETED | OUTPATIENT
Start: 2022-12-26 | End: 2022-12-26

## 2022-12-26 RX ORDER — ALLOPURINOL 300 MG
300 TABLET ORAL DAILY
Refills: 0 | Status: DISCONTINUED | OUTPATIENT
Start: 2022-12-26 | End: 2022-12-27

## 2022-12-26 RX ORDER — REMDESIVIR 5 MG/ML
100 INJECTION INTRAVENOUS EVERY 24 HOURS
Refills: 0 | Status: DISCONTINUED | OUTPATIENT
Start: 2022-12-27 | End: 2022-12-27

## 2022-12-26 RX ORDER — TAMSULOSIN HYDROCHLORIDE 0.4 MG/1
0.4 CAPSULE ORAL AT BEDTIME
Refills: 0 | Status: DISCONTINUED | OUTPATIENT
Start: 2022-12-26 | End: 2022-12-27

## 2022-12-26 RX ORDER — AZITHROMYCIN 500 MG/1
500 TABLET, FILM COATED ORAL ONCE
Refills: 0 | Status: COMPLETED | OUTPATIENT
Start: 2022-12-26 | End: 2022-12-26

## 2022-12-26 RX ORDER — DIGOXIN 250 MCG
125 TABLET ORAL DAILY
Refills: 0 | Status: DISCONTINUED | OUTPATIENT
Start: 2022-12-26 | End: 2022-12-27

## 2022-12-26 RX ORDER — SODIUM CHLORIDE 9 MG/ML
3100 INJECTION INTRAMUSCULAR; INTRAVENOUS; SUBCUTANEOUS ONCE
Refills: 0 | Status: DISCONTINUED | OUTPATIENT
Start: 2022-12-26 | End: 2022-12-26

## 2022-12-26 RX ORDER — CEFTRIAXONE 500 MG/1
1000 INJECTION, POWDER, FOR SOLUTION INTRAMUSCULAR; INTRAVENOUS EVERY 24 HOURS
Refills: 0 | Status: DISCONTINUED | OUTPATIENT
Start: 2022-12-27 | End: 2022-12-27

## 2022-12-26 RX ORDER — METOPROLOL TARTRATE 50 MG
25 TABLET ORAL DAILY
Refills: 0 | Status: DISCONTINUED | OUTPATIENT
Start: 2022-12-26 | End: 2022-12-27

## 2022-12-26 RX ORDER — WARFARIN SODIUM 2.5 MG/1
2 TABLET ORAL ONCE
Refills: 0 | Status: COMPLETED | OUTPATIENT
Start: 2022-12-26 | End: 2022-12-26

## 2022-12-26 RX ORDER — REMDESIVIR 5 MG/ML
INJECTION INTRAVENOUS
Refills: 0 | Status: DISCONTINUED | OUTPATIENT
Start: 2022-12-26 | End: 2022-12-27

## 2022-12-26 RX ORDER — ACETAMINOPHEN 500 MG
650 TABLET ORAL EVERY 6 HOURS
Refills: 0 | Status: DISCONTINUED | OUTPATIENT
Start: 2022-12-26 | End: 2022-12-27

## 2022-12-26 RX ORDER — ACETAMINOPHEN 500 MG
650 TABLET ORAL ONCE
Refills: 0 | Status: COMPLETED | OUTPATIENT
Start: 2022-12-26 | End: 2022-12-26

## 2022-12-26 RX ADMIN — CEFTRIAXONE 1000 MILLIGRAM(S): 500 INJECTION, POWDER, FOR SOLUTION INTRAMUSCULAR; INTRAVENOUS at 12:48

## 2022-12-26 RX ADMIN — LATANOPROST 1 DROP(S): 0.05 SOLUTION/ DROPS OPHTHALMIC; TOPICAL at 21:28

## 2022-12-26 RX ADMIN — REMDESIVIR 200 MILLIGRAM(S): 5 INJECTION INTRAVENOUS at 17:25

## 2022-12-26 RX ADMIN — SODIUM CHLORIDE 1000 MILLILITER(S): 9 INJECTION INTRAMUSCULAR; INTRAVENOUS; SUBCUTANEOUS at 12:57

## 2022-12-26 RX ADMIN — Medication 6 MILLIGRAM(S): at 13:46

## 2022-12-26 RX ADMIN — Medication 3 MILLILITER(S): at 12:19

## 2022-12-26 RX ADMIN — AZITHROMYCIN 500 MILLIGRAM(S): 500 TABLET, FILM COATED ORAL at 13:51

## 2022-12-26 RX ADMIN — Medication 650 MILLIGRAM(S): at 12:18

## 2022-12-26 RX ADMIN — TAMSULOSIN HYDROCHLORIDE 0.4 MILLIGRAM(S): 0.4 CAPSULE ORAL at 21:29

## 2022-12-26 RX ADMIN — AZITHROMYCIN 255 MILLIGRAM(S): 500 TABLET, FILM COATED ORAL at 12:51

## 2022-12-26 RX ADMIN — WARFARIN SODIUM 2 MILLIGRAM(S): 2.5 TABLET ORAL at 21:28

## 2022-12-26 RX ADMIN — SENNA PLUS 2 TABLET(S): 8.6 TABLET ORAL at 21:28

## 2022-12-26 RX ADMIN — SODIUM CHLORIDE 1000 MILLILITER(S): 9 INJECTION INTRAMUSCULAR; INTRAVENOUS; SUBCUTANEOUS at 12:10

## 2022-12-26 RX ADMIN — CEFTRIAXONE 100 MILLIGRAM(S): 500 INJECTION, POWDER, FOR SOLUTION INTRAMUSCULAR; INTRAVENOUS at 12:18

## 2022-12-26 RX ADMIN — Medication 650 MILLIGRAM(S): at 12:48

## 2022-12-26 NOTE — ED ADULT NURSE NOTE - NSIMPLEMENTINTERV_GEN_ALL_ED
Implemented All Fall Risk Interventions:  [place] to call system. Call bell, personal items and telephone within reach. Instruct patient to call for assistance. Room bathroom lighting operational. Non-slip footwear when patient is off stretcher. Physically safe environment: no spills, clutter or unnecessary equipment. Stretcher in lowest position, wheels locked, appropriate side rails in place. Provide visual cue, wrist band, yellow gown, etc. Monitor gait and stability. Monitor for mental status changes and reorient to person, place, and time. Review medications for side effects contributing to fall risk. Reinforce activity limits and safety measures with patient and family.

## 2022-12-26 NOTE — PATIENT PROFILE ADULT - NSTOBACCONEVERSMOKERY/N_GEN_A
5 days  
Pt informed. No questions.  
Received referral from Negrito Medina MD for patient to schedule for colonoscopy   Hx of polyps.   Referral to Dr. Justino Gomez       Left message for patient to call back.    
Second attempt to contact. Scheduled for 3/2/2018 [address]. Justino Gomez MD    Can pt hold Plavix prior to procedure? How long?    
No

## 2022-12-26 NOTE — PHARMACY COMMUNICATION NOTE - COMMENTS
MD aware of elevated LFTs and will be continue to monitor throughout treatment.  No baseline known.  Advised if results >10ULN to d/c tx

## 2022-12-26 NOTE — H&P ADULT - ASSESSMENT
81 y/o bedbound man with PMH of  hx drop foot  Afib, CHF and h/o prostate CA brought in by EMS for fever x3 days.  Per EMS report patient's aide had been sick with a fever a few days prior to patient getting sick.  EMS relates patient was hypoxic when they first arrived at his home but he improved with nasal cannula.  Patient confused very poor historian unable to provide reliable history no further history available.   In ED afebrile, had hypoxia, mild resp distress , and was covid positive with rt lung consolidation on x ray   received steroids, had cardio and pulmonary consult and admitted for further management for   weakness fever and ac resp distress with hypoxia due to Covid !9 pneumonia- started on remdesvir and steroids, pulmonary and ID consult  Ch afib- rate controlled and on coumadin - cardio consult noted  Ch HF  h/o prostate ca     wife also covid positive

## 2022-12-26 NOTE — ED PROVIDER NOTE - OBJECTIVE STATEMENT
Patient brought in by EMS for fever x3 days.  Per EMS report patient's aide had been sick with a fever a few days prior to patient getting sick.  EMS relates patient was hypoxic when they first arrived at his home but he improved with nasal cannula.  Patient confused very poor historian unable to provide reliable history no further history available.

## 2022-12-26 NOTE — ED ADULT NURSE REASSESSMENT NOTE - NS ED NURSE REASSESS COMMENT FT1
pt resting comfortably, improvement noted, in no acute distress. Respirations are even and unlabored. pt voices no complaints at this time. pt and family updated and aware of plan of care. pt turned and repositioned in bed, linens / gown changed, pt hemodynamically stable. no acute changes noted, needs attended to, safety maintained. will cont to closely monitor.

## 2022-12-26 NOTE — H&P ADULT - NEUROLOGICAL
Waiting on supervisor to give bed information.       Bradley Jolley  07/18/21 3311 normal/cranial nerves II-XII intact negative

## 2022-12-26 NOTE — PATIENT PROFILE ADULT - MEDICATIONS/VISITS
After Visit Summary   8/4/2017    Kenya Jones    MRN: [id number]           Patient Information     Date Of Birth          1956        Visit Information        Provider Department      8/4/2017 9:00 AM Nahed Rodas MD [facility]        Today's Diagnoses     Other specified hypothyroidism    -  1    S/P spinal fusion        Arthralgia, unspecified joint        Decreased hearing, unspecified laterality        Visit for screening mammogram           Follow-ups after your visit        Additional Services     OTOLARYNGOLOGY REFERRAL       Your provider has referred you to: N: [facility] [phone]   [email or web address]    Please be aware that coverage of these services is subject to the terms and limitations of your health insurance plan.  Call member services at your health plan with any benefit or coverage questions.      Please bring the following with you to your appointment:    (1) Any X-Rays, CTs or MRIs which have been performed.  Contact the facility where they were done to arrange for  prior to your scheduled appointment.   (2) List of current medications  (3) This referral request   (4) Any documents/labs given to you for this referral                  Your next 10 appointments already scheduled     Nov 30, 2017 12:45 PM CST   XR CERVICAL SPINE FLEX/EXT 2/3 VIEWS with UCXR1   [facility] ([facility])    [address] [place] [id number] [phone]           Please bring a list of your current medicines to your exam. (Include vitamins, minerals and over-thecounter medicines.) Leave your valuables at home.  Tell your doctor if there is a chance you may be pregnant.  You do not need to do anything special for this exam.            Nov 30, 2017  1:00 PM CST   (Arrive by 12:45 PM)   Return Visit with Ligia  "Zainab Winchester MD   [facility] ([facility])    [address]  3rd Floor  [facility] [address] [phone]              Future tests that were ordered for you today     Open Future Orders        Priority Expected Expires Ordered    *MA Screening Digital Bilateral Routine  8/4/2018 8/4/2017    CBC with platelets differential Routine 8/4/2017 3/6/2018 8/4/2017            Who to contact     If you have questions or need follow up information about today's clinic visit or your schedule please contact [facility] directly at [phone].  Normal or non-critical lab and imaging results will be communicated to you by [employer], letter or phone within 4 business days after the clinic has received the results. If you do not hear from us within 7 days, please contact the clinic through [employer] or phone. If you have a critical or abnormal lab result, we will notify you by phone as soon as possible.  Submit refill requests through [employer] or call your pharmacy and they will forward the refill request to us. Please allow 3 business days for your refill to be completed.          Additional Information About Your Visit        [employer] Information     [employer] gives you secure access to your electronic health record. If you see a primary care provider, you can also send messages to your care team and make appointments. If you have questions, please call your primary care clinic.  If you do not have a primary care provider, please call [phone] and they will assist you.        Care EveryWhere ID     This is your Care EveryWhere ID. This could be used by other organizations to access your [facility] medical records  [id number]        Your Vitals Were     Pulse Temperature Height Last Period Pulse Oximetry Breastfeeding?    91 98.4  F (36.9  C) (Oral) 5' 6.5\" (1.689 m) 06/19/2006 98% No    BMI (Body Mass Index)                   23.56 kg/m2            Blood Pressure " from Last 3 Encounters:   08/04/17 110/62   02/23/17 143/90   01/04/17 (!) 143/91    Weight from Last 3 Encounters:   08/04/17 148 lb 3.2 oz (67.2 kg)   01/04/17 150 lb (68 kg)   12/19/16 146 lb 6.4 oz (66.4 kg)              We Performed the Following     CRP, inflammation     Cyclic Citrullinated Peptide Antibody IgG     Erythrocyte sedimentation rate auto     OTOLARYNGOLOGY REFERRAL     Rheumatoid factor     TSH with free T4 reflex          Today's Medication Changes          These changes are accurate as of: 8/4/17  9:55 AM.  If you have any questions, ask your nurse or doctor.               Stop taking these medicines if you haven't already. Please contact your care team if you have questions.     benzocaine-menthol 6-10 MG lozenge   Commonly known as:  CHLORASEPTIC   Stopped by:  Nahed Rodas MD           doxycycline monohydrate 25 MG/5ML Susr   Commonly known as:  VIBRAMYCIN   Stopped by:  Nahed Rodas MD           HYDROcodone-acetaminophen 5-325 MG per tablet   Commonly known as:  NORCO   Stopped by:  Nahed Rodas MD           rizatriptan 10 MG ODT tab   Commonly known as:  MAXALT-MLT   Stopped by:  Nahed Rodas MD           senna-docusate 8.6-50 MG per tablet   Commonly known as:  SENOKOT-S;PERICOLACE   Stopped by:  Nahed Rodas MD                    Primary Care Provider Office Phone # Fax #    Nahed Rodas [phone]       [place] [address] [place] [address]        Equal Access to Services     [facility] AH: Hadii aad ku hadasho Soomaali, waaxda luqadaha, qaybta kaalmada adeegyada, rita swenson. So [facility] [phone].    ATENCIÓN: Si habla español, tiene a mcucllough disposición servicios gratuitos de asistencia lingüística. Llame al [phone].    We comply with applicable federal civil rights laws and [place] laws. We do not discriminate on the basis of race, color, national origin, age,  disability sex, sexual orientation or gender identity.            Thank you!     Thank you for choosing [facility]  for your care. Our goal is always to provide you with excellent care. Hearing back from our patients is one way we can continue to improve our services. Please take a few minutes to complete the written survey that you may receive in the mail after your visit with us. Thank you!             Your Updated Medication List - Protect others around you: Learn how to safely use, store and throw away your medicines at www.disposemymeds.org.          This list is accurate as of: 8/4/17  9:55 AM.  Always use your most recent med list.                   Brand Name Dispense Instructions for use Diagnosis    AMITIZA PO      Take by mouth every 48 hours        calcium-vitamin D-vitamin k 500-100-40 chewable tablet    VIACTIV    100 tablet    Take 1 chew tab by mouth 2 times daily.        cyclobenzaprine 10 MG tablet    FLEXERIL    90 tablet    Take 1 tablet (10 mg) by mouth 3 times daily as needed for muscle spasms    Acute bilateral low back pain with left-sided sciatica       fluticasone 50 MCG/ACT spray    FLONASE    3 Bottle    Spray 1-2 sprays into both nostrils daily    Seasonal allergic rhinitis due to pollen       hydrocortisone 25 MG Suppository    ANUSOL-HC    28 suppository    Place 1 suppository (25 mg) rectally 2 times daily as needed every morning and bedtime.    External hemorrhoids       levothyroxine 75 MCG tablet    SYNTHROID/LEVOTHROID    90 tablet    Take 1 tablet (75 mcg) by mouth daily    Hypothyroidism, unspecified type       LINZESS PO      Take by mouth every morning (before breakfast)        vitamin B complex with vitamin C Tabs tablet      Take 1 tablet by mouth daily        vitamin D 2000 UNITS tablet      Take 2,000 Units by mouth daily        zolpidem 10 MG tablet    AMBIEN    39 tablet    Patient has tried 5 mg dose and failed    Insomnia, unspecified type          no

## 2022-12-26 NOTE — ED PROVIDER NOTE - CLINICAL SUMMARY MEDICAL DECISION MAKING FREE TEXT BOX
Patient brought in by EMS for fever x3 days.  Per EMS report patient's aide had been sick with a fever a few days prior to patient getting sick.  EMS relates patient was hypoxic when they first arrived at his home but he improved with nasal cannula.  Patient confused very poor historian unable to provide reliable history no further history available.    Plan sepsis work-up including EKG chest x-ray labs flu COVID CBC CMP urine lactate cultures IV fluid Tylenol antiobiotics duoneb    Differential including but not limited to flu COVID RSV pneumonia sepsis Patient brought in by EMS for fever x3 days.  Per EMS report patient's aide had been sick with a fever a few days prior to patient getting sick.  EMS relates patient was hypoxic when they first arrived at his home but he improved with nasal cannula.  Patient confused very poor historian unable to provide reliable history no further history available.    Plan sepsis work-up including EKG chest x-ray labs flu COVID CBC CMP urine lactate cultures IV fluid Tylenol antibiotics duoneb    Differential including but not limited to flu COVID RSV pneumonia sepsis

## 2022-12-26 NOTE — ED PROVIDER NOTE - CARE PLAN
1 Principal Discharge DX:	2019 novel coronavirus disease (COVID-19)   Principal Discharge DX:	2019 novel coronavirus disease (COVID-19)  Secondary Diagnosis:	Hypoxia

## 2022-12-26 NOTE — ED PROVIDER NOTE - LAB INTERPRETATION
Flu With COVID-19 By ALDA (12.26.22 @ 12:23)   SARS-CoV-2 Result: Detected: This Respiratory Panel uses polymerase chain reaction (PCR) to detect for   influenza A; influenza B; respiratory syncytial virus; and SARS-CoV-2.    Influenza A Result: NotDetec   Influenza B Result: NotDetec   Resp Syn Virus Result: NotDetec

## 2022-12-26 NOTE — ED ADULT NURSE NOTE - OBJECTIVE STATEMENT
pt awake and alert, oriented x2, bed bound from home, c/o unproductive cough for couple days. denies known fever. pt with recent back fracture, arrives with brace. pt c/o 4/10 back pain.

## 2022-12-26 NOTE — H&P ADULT - HISTORY OF PRESENT ILLNESS
81 y/o bedbound man with PMH of  hx drop foot  Afib, CHF and h/o prostate CA brought in by EMS for fever x3 days.  Per EMS report patient's aide had been sick with a fever a few days prior to patient getting sick.  EMS relates patient was hypoxic when they first arrived at his home but he improved with nasal cannula.  Patient confused very poor historian unable to provide reliable history no further history available.   In ED afebrile, had hypoxia, mild resp distress , and was covid positive with rt lung consolidation on x ray   received steroids, had cardio and pulmonary consult and admitted for further management

## 2022-12-27 ENCOUNTER — INPATIENT (INPATIENT)
Facility: HOSPITAL | Age: 82
LOS: 5 days | Discharge: HOME | DRG: 177 | End: 2023-01-02
Attending: INTERNAL MEDICINE | Admitting: INTERNAL MEDICINE
Payer: MEDICARE

## 2022-12-27 ENCOUNTER — TRANSCRIPTION ENCOUNTER (OUTPATIENT)
Age: 82
End: 2022-12-27

## 2022-12-27 VITALS — HEIGHT: 72 IN | WEIGHT: 212.53 LBS

## 2022-12-27 VITALS
OXYGEN SATURATION: 98 % | DIASTOLIC BLOOD PRESSURE: 79 MMHG | TEMPERATURE: 98 F | SYSTOLIC BLOOD PRESSURE: 159 MMHG | RESPIRATION RATE: 18 BRPM | HEART RATE: 60 BPM

## 2022-12-27 DIAGNOSIS — Z98.890 OTHER SPECIFIED POSTPROCEDURAL STATES: Chronic | ICD-10-CM

## 2022-12-27 DIAGNOSIS — U07.1 COVID-19: ICD-10-CM

## 2022-12-27 LAB
ALBUMIN SERPL ELPH-MCNC: 2.8 G/DL — LOW (ref 3.3–5)
ALBUMIN SERPL ELPH-MCNC: 3.2 G/DL — LOW (ref 3.3–5)
ALBUMIN SERPL ELPH-MCNC: 3.3 G/DL — SIGNIFICANT CHANGE UP (ref 3.3–5)
ALP SERPL-CCNC: 64 U/L — SIGNIFICANT CHANGE UP (ref 40–120)
ALP SERPL-CCNC: 72 U/L — SIGNIFICANT CHANGE UP (ref 30–120)
ALP SERPL-CCNC: 77 U/L — SIGNIFICANT CHANGE UP (ref 30–120)
ALT FLD-CCNC: 280 U/L — HIGH (ref 12–78)
ALT FLD-CCNC: 370 U/L DA — HIGH (ref 10–60)
ALT FLD-CCNC: 390 U/L DA — HIGH (ref 10–60)
ANION GAP SERPL CALC-SCNC: 6 MMOL/L — SIGNIFICANT CHANGE UP (ref 5–17)
AST SERPL-CCNC: 112 U/L — HIGH (ref 15–37)
AST SERPL-CCNC: 156 U/L — HIGH (ref 10–40)
AST SERPL-CCNC: 164 U/L — HIGH (ref 10–40)
BASOPHILS # BLD AUTO: 0.01 K/UL — SIGNIFICANT CHANGE UP (ref 0–0.2)
BASOPHILS NFR BLD AUTO: 0.3 % — SIGNIFICANT CHANGE UP (ref 0–2)
BILIRUB DIRECT SERPL-MCNC: 0.3 MG/DL — SIGNIFICANT CHANGE UP (ref 0–0.3)
BILIRUB DIRECT SERPL-MCNC: 0.4 MG/DL — HIGH (ref 0–0.3)
BILIRUB INDIRECT FLD-MCNC: 0.4 MG/DL — SIGNIFICANT CHANGE UP (ref 0.2–1)
BILIRUB INDIRECT FLD-MCNC: 0.5 MG/DL — SIGNIFICANT CHANGE UP (ref 0.2–1)
BILIRUB SERPL-MCNC: 0.7 MG/DL — SIGNIFICANT CHANGE UP (ref 0.2–1.2)
BILIRUB SERPL-MCNC: 0.9 MG/DL — SIGNIFICANT CHANGE UP (ref 0.2–1.2)
BILIRUB SERPL-MCNC: 0.9 MG/DL — SIGNIFICANT CHANGE UP (ref 0.2–1.2)
BUN SERPL-MCNC: 22 MG/DL — SIGNIFICANT CHANGE UP (ref 7–23)
CALCIUM SERPL-MCNC: 8.5 MG/DL — SIGNIFICANT CHANGE UP (ref 8.4–10.5)
CHLORIDE SERPL-SCNC: 105 MMOL/L — SIGNIFICANT CHANGE UP (ref 96–108)
CO2 SERPL-SCNC: 29 MMOL/L — SIGNIFICANT CHANGE UP (ref 22–31)
CREAT SERPL-MCNC: 1.39 MG/DL — HIGH (ref 0.5–1.3)
CREAT SERPL-MCNC: 1.4 MG/DL — HIGH (ref 0.5–1.3)
CULTURE RESULTS: SIGNIFICANT CHANGE UP
EGFR: 50 ML/MIN/1.73M2 — LOW
EGFR: 51 ML/MIN/1.73M2 — LOW
EOSINOPHIL # BLD AUTO: 0 K/UL — SIGNIFICANT CHANGE UP (ref 0–0.5)
EOSINOPHIL NFR BLD AUTO: 0 % — SIGNIFICANT CHANGE UP (ref 0–6)
GLUCOSE SERPL-MCNC: 133 MG/DL — HIGH (ref 70–99)
HCT VFR BLD CALC: 41.2 % — SIGNIFICANT CHANGE UP (ref 39–50)
HGB BLD-MCNC: 13.3 G/DL — SIGNIFICANT CHANGE UP (ref 13–17)
IMM GRANULOCYTES NFR BLD AUTO: 0.6 % — SIGNIFICANT CHANGE UP (ref 0–0.9)
INR BLD: 2.09 RATIO — HIGH (ref 0.88–1.16)
INR BLD: 2.36 RATIO — HIGH (ref 0.88–1.16)
LYMPHOCYTES # BLD AUTO: 0.35 K/UL — LOW (ref 1–3.3)
LYMPHOCYTES # BLD AUTO: 10.5 % — LOW (ref 13–44)
MCHC RBC-ENTMCNC: 32 PG — SIGNIFICANT CHANGE UP (ref 27–34)
MCHC RBC-ENTMCNC: 32.3 GM/DL — SIGNIFICANT CHANGE UP (ref 32–36)
MCV RBC AUTO: 99.3 FL — SIGNIFICANT CHANGE UP (ref 80–100)
MONOCYTES # BLD AUTO: 0.45 K/UL — SIGNIFICANT CHANGE UP (ref 0–0.9)
MONOCYTES NFR BLD AUTO: 13.6 % — SIGNIFICANT CHANGE UP (ref 2–14)
NEUTROPHILS # BLD AUTO: 2.49 K/UL — SIGNIFICANT CHANGE UP (ref 1.8–7.4)
NEUTROPHILS NFR BLD AUTO: 75 % — SIGNIFICANT CHANGE UP (ref 43–77)
NRBC # BLD: 0 /100 WBCS — SIGNIFICANT CHANGE UP (ref 0–0)
PLATELET # BLD AUTO: 105 K/UL — LOW (ref 150–400)
POTASSIUM SERPL-MCNC: 4.3 MMOL/L — SIGNIFICANT CHANGE UP (ref 3.5–5.3)
POTASSIUM SERPL-SCNC: 4.3 MMOL/L — SIGNIFICANT CHANGE UP (ref 3.5–5.3)
PROT SERPL-MCNC: 6.1 G/DL — SIGNIFICANT CHANGE UP (ref 6–8.3)
PROT SERPL-MCNC: 6.4 G/DL — SIGNIFICANT CHANGE UP (ref 6–8.3)
PROT SERPL-MCNC: 6.8 G/DL — SIGNIFICANT CHANGE UP (ref 6–8.3)
PROTHROM AB SERPL-ACNC: 24.9 SEC — HIGH (ref 10.5–13.4)
PROTHROM AB SERPL-ACNC: 27.8 SEC — HIGH (ref 10.5–13.4)
RBC # BLD: 4.15 M/UL — LOW (ref 4.2–5.8)
RBC # FLD: 14.9 % — HIGH (ref 10.3–14.5)
SODIUM SERPL-SCNC: 140 MMOL/L — SIGNIFICANT CHANGE UP (ref 135–145)
SPECIMEN SOURCE: SIGNIFICANT CHANGE UP
WBC # BLD: 3.32 K/UL — LOW (ref 3.8–10.5)
WBC # FLD AUTO: 3.32 K/UL — LOW (ref 3.8–10.5)

## 2022-12-27 PROCEDURE — 82565 ASSAY OF CREATININE: CPT

## 2022-12-27 PROCEDURE — 85610 PROTHROMBIN TIME: CPT

## 2022-12-27 PROCEDURE — 83605 ASSAY OF LACTIC ACID: CPT

## 2022-12-27 PROCEDURE — 99285 EMERGENCY DEPT VISIT HI MDM: CPT

## 2022-12-27 PROCEDURE — 81001 URINALYSIS AUTO W/SCOPE: CPT

## 2022-12-27 PROCEDURE — 96367 TX/PROPH/DG ADDL SEQ IV INF: CPT

## 2022-12-27 PROCEDURE — 80076 HEPATIC FUNCTION PANEL: CPT

## 2022-12-27 PROCEDURE — 71045 X-RAY EXAM CHEST 1 VIEW: CPT

## 2022-12-27 PROCEDURE — 93005 ELECTROCARDIOGRAM TRACING: CPT

## 2022-12-27 PROCEDURE — 87086 URINE CULTURE/COLONY COUNT: CPT

## 2022-12-27 PROCEDURE — 96365 THER/PROPH/DIAG IV INF INIT: CPT

## 2022-12-27 PROCEDURE — 85730 THROMBOPLASTIN TIME PARTIAL: CPT

## 2022-12-27 PROCEDURE — 94640 AIRWAY INHALATION TREATMENT: CPT

## 2022-12-27 PROCEDURE — 97161 PT EVAL LOW COMPLEX 20 MIN: CPT

## 2022-12-27 PROCEDURE — 87637 SARSCOV2&INF A&B&RSV AMP PRB: CPT

## 2022-12-27 PROCEDURE — 36415 COLL VENOUS BLD VENIPUNCTURE: CPT

## 2022-12-27 PROCEDURE — 87040 BLOOD CULTURE FOR BACTERIA: CPT

## 2022-12-27 PROCEDURE — 80053 COMPREHEN METABOLIC PANEL: CPT

## 2022-12-27 PROCEDURE — 85025 COMPLETE CBC W/AUTO DIFF WBC: CPT

## 2022-12-27 RX ORDER — METOPROLOL TARTRATE 50 MG
25 TABLET ORAL DAILY
Refills: 0 | Status: DISCONTINUED | OUTPATIENT
Start: 2022-12-27 | End: 2023-01-02

## 2022-12-27 RX ORDER — SENNA PLUS 8.6 MG/1
2 TABLET ORAL AT BEDTIME
Refills: 0 | Status: DISCONTINUED | OUTPATIENT
Start: 2022-12-27 | End: 2023-01-02

## 2022-12-27 RX ORDER — REMDESIVIR 5 MG/ML
1 INJECTION INTRAVENOUS
Qty: 0 | Refills: 0 | DISCHARGE
Start: 2022-12-27

## 2022-12-27 RX ORDER — WARFARIN SODIUM 2.5 MG/1
1 TABLET ORAL
Qty: 0 | Refills: 0 | DISCHARGE

## 2022-12-27 RX ORDER — DEXAMETHASONE 0.5 MG/5ML
6 ELIXIR ORAL DAILY
Refills: 0 | Status: DISCONTINUED | OUTPATIENT
Start: 2022-12-27 | End: 2023-01-02

## 2022-12-27 RX ORDER — LATANOPROST 0.05 MG/ML
1 SOLUTION/ DROPS OPHTHALMIC; TOPICAL AT BEDTIME
Refills: 0 | Status: DISCONTINUED | OUTPATIENT
Start: 2022-12-27 | End: 2023-01-02

## 2022-12-27 RX ORDER — ALLOPURINOL 300 MG
1 TABLET ORAL
Qty: 0 | Refills: 0 | DISCHARGE
Start: 2022-12-27

## 2022-12-27 RX ORDER — PANTOPRAZOLE SODIUM 20 MG/1
1 TABLET, DELAYED RELEASE ORAL
Qty: 0 | Refills: 0 | DISCHARGE
Start: 2022-12-27

## 2022-12-27 RX ORDER — DIGOXIN 250 MCG
1 TABLET ORAL
Qty: 0 | Refills: 0 | DISCHARGE
Start: 2022-12-27

## 2022-12-27 RX ORDER — DIGOXIN 250 MCG
125 TABLET ORAL DAILY
Refills: 0 | Status: DISCONTINUED | OUTPATIENT
Start: 2022-12-27 | End: 2022-12-30

## 2022-12-27 RX ORDER — ALLOPURINOL 300 MG
1 TABLET ORAL
Qty: 0 | Refills: 0 | DISCHARGE

## 2022-12-27 RX ORDER — TAMSULOSIN HYDROCHLORIDE 0.4 MG/1
1 CAPSULE ORAL
Qty: 0 | Refills: 0 | DISCHARGE
Start: 2022-12-27

## 2022-12-27 RX ORDER — WARFARIN SODIUM 2.5 MG/1
1 TABLET ORAL
Refills: 0 | DISCHARGE
Start: 2022-12-27

## 2022-12-27 RX ORDER — LATANOPROST 0.05 MG/ML
1 SOLUTION/ DROPS OPHTHALMIC; TOPICAL
Qty: 0 | Refills: 0 | DISCHARGE
Start: 2022-12-27

## 2022-12-27 RX ORDER — PANTOPRAZOLE SODIUM 20 MG/1
1 TABLET, DELAYED RELEASE ORAL
Qty: 0 | Refills: 0 | DISCHARGE

## 2022-12-27 RX ORDER — ASCORBIC ACID 60 MG
1 TABLET,CHEWABLE ORAL
Qty: 0 | Refills: 0 | DISCHARGE
Start: 2022-12-27

## 2022-12-27 RX ORDER — AMIODARONE HYDROCHLORIDE 400 MG/1
200 TABLET ORAL
Refills: 0 | Status: DISCONTINUED | OUTPATIENT
Start: 2022-12-27 | End: 2022-12-27

## 2022-12-27 RX ORDER — ACETAMINOPHEN 500 MG
2 TABLET ORAL
Qty: 0 | Refills: 0 | DISCHARGE
Start: 2022-12-27

## 2022-12-27 RX ORDER — LACTOBACILLUS ACIDOPHILUS 100MM CELL
1 CAPSULE ORAL
Refills: 0 | Status: DISCONTINUED | OUTPATIENT
Start: 2022-12-27 | End: 2023-01-02

## 2022-12-27 RX ORDER — CEFTRIAXONE 500 MG/1
1000 INJECTION, POWDER, FOR SOLUTION INTRAMUSCULAR; INTRAVENOUS EVERY 24 HOURS
Refills: 0 | Status: COMPLETED | OUTPATIENT
Start: 2022-12-27 | End: 2022-12-30

## 2022-12-27 RX ORDER — ACETAMINOPHEN 500 MG
650 TABLET ORAL EVERY 8 HOURS
Refills: 0 | Status: DISCONTINUED | OUTPATIENT
Start: 2022-12-27 | End: 2023-01-02

## 2022-12-27 RX ORDER — DIGOXIN 250 MCG
1 TABLET ORAL
Qty: 0 | Refills: 0 | DISCHARGE

## 2022-12-27 RX ORDER — REMDESIVIR 5 MG/ML
100 INJECTION INTRAVENOUS EVERY 24 HOURS
Refills: 0 | Status: COMPLETED | OUTPATIENT
Start: 2022-12-27 | End: 2022-12-29

## 2022-12-27 RX ORDER — WARFARIN SODIUM 2.5 MG/1
2 TABLET ORAL ONCE
Refills: 0 | Status: DISCONTINUED | OUTPATIENT
Start: 2022-12-27 | End: 2022-12-27

## 2022-12-27 RX ORDER — DEXAMETHASONE 0.5 MG/5ML
1 ELIXIR ORAL
Qty: 0 | Refills: 0 | DISCHARGE
Start: 2022-12-27

## 2022-12-27 RX ORDER — HEPARIN SODIUM 5000 [USP'U]/ML
5000 INJECTION INTRAVENOUS; SUBCUTANEOUS EVERY 12 HOURS
Refills: 0 | Status: DISCONTINUED | OUTPATIENT
Start: 2022-12-27 | End: 2022-12-29

## 2022-12-27 RX ORDER — METOPROLOL TARTRATE 50 MG
1 TABLET ORAL
Qty: 0 | Refills: 0 | DISCHARGE
Start: 2022-12-27

## 2022-12-27 RX ORDER — ALLOPURINOL 300 MG
300 TABLET ORAL DAILY
Refills: 0 | Status: DISCONTINUED | OUTPATIENT
Start: 2022-12-27 | End: 2023-01-02

## 2022-12-27 RX ORDER — PANTOPRAZOLE SODIUM 20 MG/1
40 TABLET, DELAYED RELEASE ORAL
Refills: 0 | Status: DISCONTINUED | OUTPATIENT
Start: 2022-12-27 | End: 2023-01-02

## 2022-12-27 RX ORDER — TAMSULOSIN HYDROCHLORIDE 0.4 MG/1
0.4 CAPSULE ORAL AT BEDTIME
Refills: 0 | Status: DISCONTINUED | OUTPATIENT
Start: 2022-12-27 | End: 2023-01-02

## 2022-12-27 RX ORDER — METOPROLOL TARTRATE 50 MG
1 TABLET ORAL
Qty: 0 | Refills: 0 | DISCHARGE

## 2022-12-27 RX ORDER — AMIODARONE HYDROCHLORIDE 400 MG/1
1 TABLET ORAL
Qty: 0 | Refills: 0 | DISCHARGE

## 2022-12-27 RX ORDER — LATANOPROST 0.05 MG/ML
0 SOLUTION/ DROPS OPHTHALMIC; TOPICAL
Qty: 0 | Refills: 0 | DISCHARGE

## 2022-12-27 RX ADMIN — Medication 25 MILLIGRAM(S): at 05:42

## 2022-12-27 RX ADMIN — Medication 650 MILLIGRAM(S): at 12:32

## 2022-12-27 RX ADMIN — CEFTRIAXONE 100 MILLIGRAM(S): 500 INJECTION, POWDER, FOR SOLUTION INTRAMUSCULAR; INTRAVENOUS at 12:33

## 2022-12-27 RX ADMIN — PANTOPRAZOLE SODIUM 40 MILLIGRAM(S): 20 TABLET, DELAYED RELEASE ORAL at 05:41

## 2022-12-27 RX ADMIN — Medication 500 MILLIGRAM(S): at 12:32

## 2022-12-27 RX ADMIN — Medication 6 MILLIGRAM(S): at 05:42

## 2022-12-27 RX ADMIN — Medication 125 MICROGRAM(S): at 05:41

## 2022-12-27 RX ADMIN — REMDESIVIR 200 MILLIGRAM(S): 5 INJECTION INTRAVENOUS at 22:54

## 2022-12-27 RX ADMIN — TAMSULOSIN HYDROCHLORIDE 0.4 MILLIGRAM(S): 0.4 CAPSULE ORAL at 22:54

## 2022-12-27 RX ADMIN — Medication 650 MILLIGRAM(S): at 13:30

## 2022-12-27 RX ADMIN — REMDESIVIR 200 MILLIGRAM(S): 5 INJECTION INTRAVENOUS at 18:12

## 2022-12-27 RX ADMIN — Medication 300 MILLIGRAM(S): at 12:33

## 2022-12-27 RX ADMIN — LATANOPROST 1 DROP(S): 0.05 SOLUTION/ DROPS OPHTHALMIC; TOPICAL at 22:54

## 2022-12-27 NOTE — PROGRESS NOTE ADULT - PROBLEM SELECTOR PLAN 3
cont cardiac meds
Implemented All Universal Safety Interventions:  [place] to call system. Call bell, personal items and telephone within reach. Instruct patient to call for assistance. Room bathroom lighting operational. Non-slip footwear when patient is off stretcher. Physically safe environment: no spills, clutter or unnecessary equipment. Stretcher in lowest position, wheels locked, appropriate side rails in place.

## 2022-12-27 NOTE — PHYSICAL THERAPY INITIAL EVALUATION ADULT - ADDITIONAL COMMENTS
Pt is poor historian and unable to collect social history information. Pt is A+Ox2 during evaluation.

## 2022-12-27 NOTE — ED ADULT NURSE REASSESSMENT NOTE - NS ED NURSE REASSESS COMMENT FT1
Son Mark William called concerned with bed availability and the care of his father. confirmed with Mark that there is an issue at this time for bed availability and we are working of it. Also discussed his fathers care and that the aid Delmy was with his father presently and she is to notify RN for all issues with the patients care.

## 2022-12-27 NOTE — ED CLERICAL - CLERICAL COMMENTS
called [facility] ems for transport to [place] at 1811. they could not provide eta at this time.

## 2022-12-27 NOTE — DISCHARGE NOTE PROVIDER - CARE PROVIDER_API CALL
Jennifer Cobos)  Internal Medicine  [address] [place] [address]  Phone: [phone]  Fax: [phone]  Follow Up Time:

## 2022-12-27 NOTE — DISCHARGE NOTE PROVIDER - NSDCCPCAREPLAN_GEN_ALL_CORE_FT
PRINCIPAL DISCHARGE DIAGNOSIS  Diagnosis: 2019 novel coronavirus disease (COVID-19)  Assessment and Plan of Treatment: cont tt and transfer to pl[facility] for bed availabilty      SECONDARY DISCHARGE DIAGNOSES  Diagnosis: Hypoxia  Assessment and Plan of Treatment:

## 2022-12-27 NOTE — H&P ADULT - HISTORY OF PRESENT ILLNESS
81 y/o bedbound man with PMH of  hx drop foot  Afib, CHF and h/o prostate CA brought in by EMS for fever x3 days.  Per EMS report patient's aide had been sick with a fever a few days prior to patient getting sick.  EMS relates patient was hypoxic when they first arrived at his home but he improved with nasal cannula.  Patient confused very poor historian unable to provide reliable history no further history available.   In ED afebrile, had hypoxia, mild resp distress , and was covid positive with rt lung consolidation on x ray   received steroids, had cardio and pulmonary consult and admitted for further management for   weakness fever and ac resp distress with hypoxia due to Covid !9 pneumonia- started on remdesvir and steroids, pulmonary and ID consult  Ch afib- rate controlled and on coumadin - cardio consult noted  Ch HF  h/o prostate ca  elevated lft - transaminitis with viral infection   ID pulmonary consult obtained   pt transferred to [facility] on 12/27/22, for bed availability     wife also covid positive

## 2022-12-27 NOTE — H&P ADULT - ASSESSMENT
83 y/o bedbound man with PMH of  hx drop foot  Afib, CHF and h/o prostate CA brought in by EMS for fever x3 days.  Per EMS report patient's aide had been sick with a fever a few days prior to patient getting sick.  EMS relates patient was hypoxic when they first arrived at his home but he improved with nasal cannula.  Patient confused very poor historian unable to provide reliable history no further history available.   In ED afebrile, had hypoxia, mild resp distress , and was covid positive with rt lung consolidation on x ray   received steroids, had cardio and pulmonary consult and admitted for further management for   weakness fever and ac resp distress with hypoxia due to Covid !9 pneumonia- started on remdesvir and steroids, pulmonary and ID consult  Ch afib- rate controlled and on coumadin - cardio consult noted  Ch HF  h/o prostate ca  elevated lft - transaminitis with viral infection   ID pulmonary consult noted  [place]     wife also covid positive

## 2022-12-27 NOTE — PROGRESS NOTE ADULT - SUBJECTIVE AND OBJECTIVE BOX
[facility] DISEASES  BYRON Baker Y. Patel, S. Shah, G. Casimir  and providing coverage with Tip Kennedy MD  Providing Infectious Disease Consultations at [facility], [facility]      Office# [phone] to schedule follow up appointments  Answering Service for urgent calls or New Consults [phone]    Infectious Diseases Progress Note:  LEBRON TERRY is a 82y year old Male patient    COVID-19 Patient  Resting comfortably on NC  Notes reviewed  No concerning events overnight.   Allergies: No Known Allergies    ANTIBIOTICS/RELEVANT:  Current Antimicrobials:  cefTRIAXone   IVPB 1000 milliGRAM(s) IV Intermittent every 24 hours  remdesivir  IVPB 100 milliGRAM(s) IV Intermittent every 24 hours  remdesivir  IVPB   IV Intermittent     Prior/Completed Antimicrobials:  azithromycin  IVPB  cefTRIAXone   IVPB  remdesivir  IVPB    Other Meds: acetaminophen     Tablet .. 650 milliGRAM(s) Oral every 6 hours PRN  allopurinol 300 milliGRAM(s) Oral daily  ascorbic acid 500 milliGRAM(s) Oral daily  benzonatate 100 milliGRAM(s) Oral every 8 hours PRN  dexAMETHasone     Tablet 6 milliGRAM(s) Oral daily  digoxin     Tablet 125 MICROGram(s) Oral daily  guaiFENesin Oral Liquid (Sugar-Free) 200 milliGRAM(s) Oral every 6 hours PRN  latanoprost 0.005% Ophthalmic Solution 1 Drop(s) Both EYES at bedtime  metoprolol succinate ER 25 milliGRAM(s) Oral daily  pantoprazole    Tablet 40 milliGRAM(s) Oral before breakfast  senna 2 Tablet(s) Oral at bedtime  tamsulosin 0.4 milliGRAM(s) Oral at bedtime  warfarin 2 milliGRAM(s) Oral once    Immunologic:   Objective:  Vital Signs Last 24 Hrs  T(F): 98.2 (27 Dec 2022 10:00), Max: 99.4 (26 Dec 2022 22:00)  HR: 62 (27 Dec 2022 10:00) (60 - 71)  BP: 138/65 (27 Dec 2022 10:00) (123/58 - 144/65)  RR: 20 (27 Dec 2022 10:00) (20 - 25)  SpO2: 100% (27 Dec 2022 10:00) (90% - 100%)  PHYSICAL EXAM:  General: no acute distress  HEENT: NC/AT, anicteric, supple  Respiratory: no acc muscle use, breathing comfortably  Cardiovascular: S1 S2 present, normal rate  Gastrointestinal: normal appearing, nondistended  Extremities: no edema, no cyanosis    LABS:                        13.3   3.32  )-----------( 105      ( 27 Dec 2022 06:11 )             41.2     WBC trend:  3.32  @ 06:11  5.34  @ 12:23        140  |  105  |  22  ----------------------------<  133<H>  4.3   |  29  |  1.39<H>    Ca    8.5      27 Dec 2022 06:11    TPro  6.4  /  Alb  3.3  /  TBili  0.9  /  DBili  0.4<H>  /  AST  164<H>  /  ALT  390<H>  /  AlkPhos  77      Creatinine, Serum: 1.39 mg/dL (22 @ 06:11)  Creatinine, Serum: 1.38 mg/dL (22 @ 16:33)  Creatinine, Serum: 0.40 mg/dL (22 @ 12:23)    Urinalysis Basic - ( 26 Dec 2022 12:23 )  Color: Yellow / Appearance: Clear / S.020 / pH: x  Gluc: x / Ketone: Trace  / Bili: Negative / Urobili: 4 mg/dL   Blood: x / Protein: 100 mg/dL / Nitrite: Negative   Leuk Esterase: Trace / RBC: 0-2 /HPF / WBC 3-5   Sq Epi: x / Non Sq Epi: Negative / Bacteria: Occasional    Auto Neutrophil #: 2.49 K/uL (22 @ 06:11)  Auto Neutrophil #: 4.58 K/uL (22 @ 12:23)    Auto Lymphocyte #: 0.35 K/uL (22 @ 06:11)  Auto Lymphocyte #: 0.24 K/uL (22 @ 12:23)    Lactate, Blood: 0.9 mmol/L (22 @ 13:41)  Lactate, Blood: 2.1 mmol/L (22 @ 12:23)    Prothrombin Time, Plasma: 24.9 sec (22 @ 06:11)  Prothrombin Time, Plasma: 23.4 sec (22 @ 12:23)    Activated Partial Thromboplastin Time: 40.5 sec (22 @ 12:23)    MICROBIOLOGY: reviewed  SARS-CoV-2 Result: Detected (26 Dec 2022 12:23)    RADIOLOGY & ADDITIONAL STUDIES: reviewed
Patient is a 82y old  Male who presents with a chief complaint of fever and cough (27 Dec 2022 14:06)      INTERVAL HPI/OVERNIGHT EVENTS:overnight events noted    Home Medications:  allopurinol 300 mg oral tablet: 1 tab(s) orally once a day (2022 00:51)  amiodarone 200 mg oral tablet: 1 tab(s) orally 2 times a day (2022 00:51)  ascorbic acid 500 mg oral tablet: 1 tab(s) orally once a day (02 May 2022 12:51)  Coumadin 2 mg oral tablet: 1 tab(s) orally once a day (02 May 2022 13:44)  digoxin 125 mcg (0.125 mg) oral tablet: 1 tab(s) orally once a day (2022 00:51)  latanoprost 0.005% ophthalmic solution:  (2022 00:51)  metoprolol succinate 25 mg oral tablet, extended release: 1 tab(s) orally once a day (2022 00:51)  pantoprazole 40 mg oral delayed release tablet: 1 tab(s) orally once a day (2022 00:51)      MEDICATIONS  (STANDING):  allopurinol 300 milliGRAM(s) Oral daily  ascorbic acid 500 milliGRAM(s) Oral daily  cefTRIAXone   IVPB 1000 milliGRAM(s) IV Intermittent every 24 hours  dexAMETHasone     Tablet 6 milliGRAM(s) Oral daily  digoxin     Tablet 125 MICROGram(s) Oral daily  influenza  Vaccine (HIGH DOSE) 0.7 milliLiter(s) IntraMuscular once  latanoprost 0.005% Ophthalmic Solution 1 Drop(s) Both EYES at bedtime  metoprolol succinate ER 25 milliGRAM(s) Oral daily  pantoprazole    Tablet 40 milliGRAM(s) Oral before breakfast  remdesivir  IVPB 100 milliGRAM(s) IV Intermittent every 24 hours  remdesivir  IVPB   IV Intermittent   senna 2 Tablet(s) Oral at bedtime  tamsulosin 0.4 milliGRAM(s) Oral at bedtime  warfarin 2 milliGRAM(s) Oral once    MEDICATIONS  (PRN):  acetaminophen     Tablet .. 650 milliGRAM(s) Oral every 6 hours PRN Temp greater or equal to 38C (100.4F), Mild Pain (1 - 3)  benzonatate 100 milliGRAM(s) Oral every 8 hours PRN Cough  guaiFENesin Oral Liquid (Sugar-Free) 200 milliGRAM(s) Oral every 6 hours PRN Cough      Allergies    No Known Allergies    Intolerances        REVIEW OF SYSTEMS:  CONSTITUTIONAL: No fever, weight loss, has fatigue  EYES: No eye pain, visual disturbances, or discharge  ENMT:  No difficulty hearing, tinnitus, vertigo; No sinus or throat pain  NECK: No pain or stiffness  BREASTS: No pain, masses, or nipple discharge  RESPIRATORY: has cough, no wheezing, chills or hemoptysis; No shortness of breath  CARDIOVASCULAR: No chest pain, palpitations, dizziness, or leg swelling  GASTROINTESTINAL: No abdominal or epigastric pain. No nausea, vomiting, or hematemesis; No diarrhea or constipation. No melena or hematochezia.  GENITOURINARY: No dysuria, frequency, hematuria, or incontinence  NEUROLOGICAL: No headaches, memory loss, loss of strength, numbness, or tremors  SKIN: No itching, burning, rashes, or lesions     Vital Signs Last 24 Hrs  T(C): 36.8 (27 Dec 2022 10:00), Max: 37.4 (26 Dec 2022 22:00)  T(F): 98.2 (27 Dec 2022 10:00), Max: 99.4 (26 Dec 2022 22:00)  HR: 62 (27 Dec 2022 10:00) (61 - 71)  BP: 138/65 (27 Dec 2022 10:00) (128/60 - 144/65)  BP(mean): --  RR: 20 (27 Dec 2022 10:00) (20 - 22)  SpO2: 100% (27 Dec 2022 10:00) (94% - 100%)    Parameters below as of 27 Dec 2022 10:00  Patient On (Oxygen Delivery Method): nasal cannula  O2 Flow (L/min): 2      PHYSICAL EXAM:  GENERAL: NAD, well-groomed, well-developed  HEAD:  Atraumatic, Normocephalic  EYES: EOMI, PERRLA, conjunctiva and sclera clear  ENMT: Moist mucous membranes,   NECK: Supple, No JVD, Normal thyroid  NERVOUS SYSTEM:  Alert & Oriented X3, non focal  CHEST/LUNG: Clear to percussion bilaterally; No rales, rhonchi, wheezing, or rubs  HEART: Regular rate and rhythm; No murmurs, rubs, or gallops  ABDOMEN: Soft, Nontender, Nondistended; Bowel sounds present  EXTREMITIES:  2+ Peripheral Pulses, No clubbing, cyanosis, or edema  LABS:                        13.3   3.32  )-----------( 105      ( 27 Dec 2022 06:11 )             41.2         140  |  105  |  22  ----------------------------<  133<H>  4.3   |  29  |  1.39<H>    Ca    8.5      27 Dec 2022 06:11    TPro  6.4  /  Alb  3.3  /  TBili  0.9  /  DBili  0.4<H>  /  AST  164<H>  /  ALT  390<H>  /  AlkPhos  77      PT/INR - ( 27 Dec 2022 06:11 )   PT: 24.9 sec;   INR: 2.09 ratio         PTT - ( 26 Dec 2022 12:23 )  PTT:40.5 sec  Urinalysis Basic - ( 26 Dec 2022 12:23 )    Color: Yellow / Appearance: Clear / S.020 / pH: x  Gluc: x / Ketone: Trace  / Bili: Negative / Urobili: 4 mg/dL   Blood: x / Protein: 100 mg/dL / Nitrite: Negative   Leuk Esterase: Trace / RBC: 0-2 /HPF / WBC 3-5   Sq Epi: x / Non Sq Epi: Negative / Bacteria: Occasional      CAPILLARY BLOOD GLUCOSE              I&O's Summary    26 Dec 2022 07:01  -  27 Dec 2022 07:00  --------------------------------------------------------  IN: 0 mL / OUT: 550 mL / NET: -550 mL        RADIOLOGY & ADDITIONAL TESTS:    Imaging Personally Reviewed:  [x ] YES  [ ] NO    Consultant(s) Notes Reviewed:  [x ] YES  [ ] NO    Care Discussed with Consultants/Other Providers [ x] YES  [ ] NO
Date/Time Patient Seen:  		  Referring MD:   Data Reviewed	       Patient is a 82y old  Male who presents with a chief complaint of fever and cough (26 Dec 2022 15:57)      Subjective/HPI     PAST MEDICAL & SURGICAL HISTORY:  Afib    Prostate cancer    Heart failure    Heart failure    History of scoliosis    No significant past surgical history    No significant past surgical history    S/P anal fissurectomy          Medication list         MEDICATIONS  (STANDING):  allopurinol 300 milliGRAM(s) Oral daily  ascorbic acid 500 milliGRAM(s) Oral daily  cefTRIAXone   IVPB 1000 milliGRAM(s) IV Intermittent every 24 hours  dexAMETHasone     Tablet 6 milliGRAM(s) Oral daily  digoxin     Tablet 125 MICROGram(s) Oral daily  influenza  Vaccine (HIGH DOSE) 0.7 milliLiter(s) IntraMuscular once  latanoprost 0.005% Ophthalmic Solution 1 Drop(s) Both EYES at bedtime  metoprolol succinate ER 25 milliGRAM(s) Oral daily  pantoprazole    Tablet 40 milliGRAM(s) Oral before breakfast  remdesivir  IVPB 100 milliGRAM(s) IV Intermittent every 24 hours  remdesivir  IVPB   IV Intermittent   senna 2 Tablet(s) Oral at bedtime  tamsulosin 0.4 milliGRAM(s) Oral at bedtime    MEDICATIONS  (PRN):  acetaminophen     Tablet .. 650 milliGRAM(s) Oral every 6 hours PRN Temp greater or equal to 38C (100.4F), Mild Pain (1 - 3)  benzonatate 100 milliGRAM(s) Oral every 8 hours PRN Cough  guaiFENesin Oral Liquid (Sugar-Free) 200 milliGRAM(s) Oral every 6 hours PRN Cough         Vitals log        ICU Vital Signs Last 24 Hrs  T(C): 36.6 (27 Dec 2022 05:38), Max: 38.3 (26 Dec 2022 12:10)  T(F): 97.9 (27 Dec 2022 05:38), Max: 100.9 (26 Dec 2022 12:10)  HR: 61 (27 Dec 2022 05:38) (60 - 78)  BP: 144/65 (27 Dec 2022 05:38) (123/58 - 160/90)  BP(mean): --  ABP: --  ABP(mean): --  RR: 22 (27 Dec 2022 05:38) (20 - 25)  SpO2: 98% (27 Dec 2022 05:38) (90% - 98%)    O2 Parameters below as of 27 Dec 2022 05:38  Patient On (Oxygen Delivery Method): nasal cannula                 Input and Output:  I&O's Detail      Lab Data                        13.2   5.34  )-----------( 106      ( 26 Dec 2022 12:23 )             40.0     12-26    x   |  x   |  x   ----------------------------<  x   x    |  x   |  1.38<H>    Ca    8.0<L>      26 Dec 2022 12:23    TPro  6.5  /  Alb  3.2<L>  /  TBili  1.2  /  DBili  0.4<H>  /  AST  206<H>  /  ALT  412<H>  /  AlkPhos  69  12-26            Review of Systems	      Objective     Physical Examination    heart s1s2  lung dec BS  head nc      Pertinent Lab findings & Imaging      Nghia:  NO   Adequate UO     I&O's Detail           Discussed with:     Cultures:	        Radiology                            
Patient is a 82y Male with a known history of :  Pneumonia due to COVID-19 virus [U07.1]    Chronic atrial fibrillation [I48.20]    Benign essential HTN [I10]      HPI:  83 y/o bedbound man with PMH of  hx drop foot  Afib, CHF and h/o prostate CA brought in by EMS for fever x3 days.  Per EMS report patient's aide had been sick with a fever a few days prior to patient getting sick.  EMS relates patient was hypoxic when they first arrived at his home but he improved with nasal cannula.  Patient confused very poor historian unable to provide reliable history no further history available.   In ED afebrile, had hypoxia, mild resp distress , and was covid positive with rt lung consolidation on x ray   received steroids, had cardio and pulmonary consult and admitted for further management (26 Dec 2022 14:38)      REVIEW OF SYSTEMS:    CONSTITUTIONAL: No fever, weight loss, or fatigue  EYES: No eye pain, visual disturbances, or discharge  ENMT:  No difficulty hearing, tinnitus, vertigo; No sinus or throat pain  NECK: No pain or stiffness  BREASTS: No pain, masses, or nipple discharge  RESPIRATORY: No cough, wheezing, chills or hemoptysis; No shortness of breath  CARDIOVASCULAR: No chest pain, palpitations, dizziness, or leg swelling  GASTROINTESTINAL: No abdominal or epigastric pain. No nausea, vomiting, or hematemesis; No diarrhea or constipation. No melena or hematochezia.  GENITOURINARY: No dysuria, frequency, hematuria, or incontinence  NEUROLOGICAL: No headaches, memory loss, loss of strength, numbness, or tremors  SKIN: No itching, burning, rashes, or lesions   LYMPH NODES: No enlarged glands  ENDOCRINE: No heat or cold intolerance; No hair loss  MUSCULOSKELETAL: No joint pain or swelling; No muscle, back, or extremity pain  PSYCHIATRIC: No depression, anxiety, mood swings, or difficulty sleeping  HEME/LYMPH: No easy bruising, or bleeding gums  ALLERGY AND IMMUNOLOGIC: No hives or eczema    MEDICATIONS  (STANDING):  allopurinol 300 milliGRAM(s) Oral daily  ascorbic acid 500 milliGRAM(s) Oral daily  cefTRIAXone   IVPB 1000 milliGRAM(s) IV Intermittent every 24 hours  dexAMETHasone     Tablet 6 milliGRAM(s) Oral daily  digoxin     Tablet 125 MICROGram(s) Oral daily  influenza  Vaccine (HIGH DOSE) 0.7 milliLiter(s) IntraMuscular once  latanoprost 0.005% Ophthalmic Solution 1 Drop(s) Both EYES at bedtime  metoprolol succinate ER 25 milliGRAM(s) Oral daily  pantoprazole    Tablet 40 milliGRAM(s) Oral before breakfast  remdesivir  IVPB 100 milliGRAM(s) IV Intermittent every 24 hours  remdesivir  IVPB   IV Intermittent   senna 2 Tablet(s) Oral at bedtime  tamsulosin 0.4 milliGRAM(s) Oral at bedtime  warfarin 2 milliGRAM(s) Oral once    MEDICATIONS  (PRN):  acetaminophen     Tablet .. 650 milliGRAM(s) Oral every 6 hours PRN Temp greater or equal to 38C (100.4F), Mild Pain (1 - 3)  benzonatate 100 milliGRAM(s) Oral every 8 hours PRN Cough  guaiFENesin Oral Liquid (Sugar-Free) 200 milliGRAM(s) Oral every 6 hours PRN Cough      ALLERGIES: No Known Allergies      FAMILY HISTORY:      Social history:  Alochol:   Smoking:   Drug Use:   Marital Status:     PHYSICAL EXAMINATION:  -----------------------------  T(C): 36.8 (12-27-22 @ 10:00), Max: 38.3 (12-26-22 @ 12:10)  HR: 62 (12-27-22 @ 10:00) (60 - 75)  BP: 138/65 (12-27-22 @ 10:00) (123/58 - 144/65)  RR: 20 (12-27-22 @ 10:00) (20 - 25)  SpO2: 100% (12-27-22 @ 10:00) (90% - 100%)  Wt(kg): --    12-26 @ 07:01  -  12-27 @ 07:00  --------------------------------------------------------  IN:  Total IN: 0 mL    OUT:    Voided (mL): 550 mL  Total OUT: 550 mL    Total NET: -550 mL      LABS:   --------  12-27    140  |  105  |  22  ----------------------------<  133<H>  4.3   |  29  |  1.39<H>    Ca    8.5      27 Dec 2022 06:11    TPro  6.4  /  Alb  3.3  /  TBili  0.9  /  DBili  0.4<H>  /  AST  164<H>  /  ALT  390<H>  /  AlkPhos  77  12-27                         13.3   3.32  )-----------( 105      ( 27 Dec 2022 06:11 )             41.2     PT/INR - ( 27 Dec 2022 06:11 )   PT: 24.9 sec;   INR: 2.09 ratio         PTT - ( 26 Dec 2022 12:23 )  PTT:40.5 sec              Radiology:

## 2022-12-27 NOTE — DISCHARGE NOTE PROVIDER - NSDCCAREPROVSEEN_GEN_ALL_CORE_FT
Chandrika, Jennifer Yarbrough, Jean Claude Zaragoza, Naveed Zaragoza, Olga Monsalve, Jeferson Castro, Buster

## 2022-12-27 NOTE — CONSULT NOTE ADULT - ASSESSMENT
83y/o seen at [facility] ER  History from chart  History dementia, PAF, heart failure, prostate cancer    Admitted for fever and hypoxia  Found to be COVID-19 positive  CXR-with right lower lobe consolidation    Impression  COVID-19  Right lower lobe pneumonia  History PAF    Plan:  - Continue Amiodarone-200mg OD                  Dig-0.125mg OD                  Metoprolol succinate-25mg OD  - Continue coumadin with INR goal 2-3  - Lanoxin level  - Pan culture  - Given Zithromax, Rocephin  - ID, pulmonary consults
Pt is a 82W w/ PMHx of Afib, CHF and h/o prostate CA BIBEMS for fever x3 days  COVID+    COVID+/- superimposed bacterial PNA  AHRF on NC  Afib  - imaging w/ RLL consolidation  - COVID+/RVP negative  - cx pending  Plan:   -C/w remdesivir x5 day course  -Steroids per pulm  -Would add Abx coverage w/ ceftriaxone 1gm q24h for possible superimposed bacterial PNA  -Trend temps/WBC  -Trend LFTs while on remdesivir  -Trend inflammatory biomarkers  -Continue with supportive care and supplemental O2 as needed  --wean oxygen as tolerated   --consider proning and tolerating lower oxygen saturation to avoid intubation  -Maintain aspiration precautions  -Maintain isolation per infection control policy    Additional management per primary team    Covering weekend/holiday service through 12/26  Infectious Diseases will continue to follow. Please call with any questions.   Naa Gutiérrez M.D.  [facility] of Infectious [facility] [phone]  
covid  elevated lfts    cont reg diet  suspect that elevated lfts may be related to covid  cont to trend   monitor lfts while on remdesivir and rocephin  will follow 
pt with fever - weakness - cough - viral syndrome -     covid  obesity  viral syndrome  weakness  AF  scoliosis  OP  OA      on AC for AF  AF - rate and rhythm control  remdesivir - decadron - for covid with hypoxemia  covid isolation  cough rx regimen PRN  tylenol rx regimen PRN  monitor VS and Sat  serial labs  serial clinical exam  assist with needs - ADL

## 2022-12-27 NOTE — DISCHARGE NOTE PROVIDER - HOSPITAL COURSE
83 y/o bedbound man with PMH of  hx drop foot  Afib, CHF and h/o prostate CA brought in by EMS for fever x3 days.  Per EMS report patient's aide had been sick with a fever a few days prior to patient getting sick.  EMS relates patient was hypoxic when they first arrived at his home but he improved with nasal cannula.  Patient confused very poor historian unable to provide reliable history no further history available.   In ED afebrile, had hypoxia, mild resp distress , and was covid positive with rt lung consolidation on x ray   received steroids, had cardio and pulmonary consult and admitted for further management for   weakness fever and ac resp distress with hypoxia due to Covid !9 pneumonia- started on remdesvir and steroids, pulmonary and ID consult  Ch afib- rate controlled and on coumadin - cardio consult noted  Ch HF  h/o prostate ca  elevated lft - transaminitis with viral infection   ID pulmonary consult noted     wife also covid positive

## 2022-12-27 NOTE — DISCHARGE NOTE PROVIDER - NSDCMRMEDTOKEN_GEN_ALL_CORE_FT
acetaminophen 325 mg oral tablet: 2 tab(s) orally every 6 hours, As needed, Temp greater or equal to 38C (100.4F), Mild Pain (1 - 3)  allopurinol 300 mg oral tablet: 1 tab(s) orally once a day  ascorbic acid 500 mg oral tablet: 1 tab(s) orally once a day  benzonatate 100 mg oral capsule: 1 cap(s) orally every 8 hours, As needed, Cough  dexamethasone 6 mg oral tablet: 1 tab(s) orally once a day  digoxin 125 mcg (0.125 mg) oral tablet: 1 tab(s) orally once a day  latanoprost 0.005% ophthalmic solution: 1 drop(s) to each affected eye once a day (at bedtime)  metoprolol succinate 25 mg oral tablet, extended release: 1 tab(s) orally once a day  pantoprazole 40 mg oral delayed release tablet: 1 tab(s) orally once a day (before a meal)  remdesivir 5 mg/mL intravenous solution: 1 dose(s) intravenous once a day  tamsulosin 0.4 mg oral capsule: 1 cap(s) orally once a day (at bedtime)  warfarin 2 mg oral tablet: 1 tab(s) orally once

## 2022-12-27 NOTE — ED ADULT NURSE REASSESSMENT NOTE - NS ED NURSE REASSESS COMMENT FT1
Family supplied a private duty CNA to take care of patient for 4 hours. MIKA Block was aware patient has COVID, CNA made aware that the patient is in a room with other COVID + patients and explained the increased risks from exposure. CNA provided PPE and assisted with putting on PPE. CNA introduced to the primary RN and informed she must ring the bell for ALL care and assistance with this patient. CNA understood.

## 2022-12-27 NOTE — PROGRESS NOTE ADULT - ASSESSMENT
81y/o seen at [facility] ER  History from chart  History dementia, PAF, heart failure, prostate cancer    Admitted for fever and hypoxia  Found to be COVID-19 positive  CXR-with right lower lobe consolidation    12/27/22  Seen at [facility] ER    Impression  COVID-19  Right lower lobe pneumonia  History PAF    Plan:  - Continue Amiodarone-200mg OD                  Dig-0.125mg OD                  Metoprolol succinate-25mg OD  - Continue coumadin with INR goal 2-3  - Lanoxin level-pending  - Pan culture  - On Remdesivir  - On Ceftriaxone  - On Decadron  - ID, pulmonary follow-up
pt with fever - weakness - cough - viral syndrome -     covid  obesity  viral syndrome  weakness  AF  scoliosis  OP  OA    ID eval noted - on Remdesivir - Decadron - covid with hypoxemia  am Cr pending  VS noted    on AC for AF  AF - rate and rhythm control  remdesivir - decadron - for covid with hypoxemia  covid isolation  cough rx regimen PRN  tylenol rx regimen PRN  monitor VS and Sat  serial labs  serial clinical exam  assist with needs - ADL    
Pt is a 82W w/ PMHx of Afib, CHF and h/o prostate CA BIBEMS for fever x3 days  COVID+    COVID+/- superimposed bacterial PNA  AHRF on NC  Afib  - imaging w/ RLL consolidation  - COVID+/RVP negative  Plan:  -cx pending  -C/w remdesivir x5 day course - end 12/30  -Steroids per pulm  -continue ceftriaxone 1g Q24h - plan to complete total 5d course   -Trend temps/WBC  -Trend LFTs while on remdesivir  -Trend inflammatory biomarkers  -Continue with supportive care   -Supplemental O2 as needed, wean oxygen as tolerated   --consider proning and tolerating lower oxygen saturation to avoid intubation  -Maintain aspiration precautions  -Maintain isolation per infection control policy    Additional management per primary team    Jean Claude Yarbrough M.D.  [facility], Division of Infectious Diseases  [phone]  After 5pm on weekdays and all day on weekends - please call [phone]  
81 y/o bedbound man with PMH of  hx drop foot  Afib, CHF and h/o prostate CA brought in by EMS for fever x3 days.  Per EMS report patient's aide had been sick with a fever a few days prior to patient getting sick.  EMS relates patient was hypoxic when they first arrived at his home but he improved with nasal cannula.  Patient confused very poor historian unable to provide reliable history no further history available.   In ED afebrile, had hypoxia, mild resp distress , and was covid positive with rt lung consolidation on x ray   received steroids, had cardio and pulmonary consult and admitted for further management for   weakness fever and ac resp distress with hypoxia due to Covid !9 pneumonia- started on remdesvir and steroids, pulmonary and ID consult  Ch afib- rate controlled and on coumadin - cardio consult noted  Ch HF  h/o prostate ca  elevated lft - transaminitis with viral infection   ID pulmonary consult noted     wife also covid positive

## 2022-12-27 NOTE — CONSULT NOTE ADULT - SUBJECTIVE AND OBJECTIVE BOX
[facility], Division of Infectious Diseases  BYRON Baker S. Shah, Y. Patel, [facility]  [phone]    LEBRON TERRY  82y, Male  [id number]    HPI--  HPI:  83 y/o bedbound man with PMH of  hx drop foot  Afib, CHF and h/o prostate CA brought in by EMS for fever x3 days.  Per EMS report patient's aide had been sick with a fever a few days prior to patient getting sick.  EMS relates patient was hypoxic when they first arrived at his home but he improved with nasal cannula.  Patient confused very poor historian unable to provide reliable history no further history available.   In ED afebrile, had hypoxia, mild resp distress , and was covid positive with rt lung consolidation on x ray   received steroids, had cardio and pulmonary consult and admitted for further management (26 Dec 2022 14:38)    Pt seen at bedside in the ED  Wife also in ED, next bed over, providing hx  Hx as above      Active Medications--  acetaminophen     Tablet .. 650 milliGRAM(s) Oral every 6 hours PRN  allopurinol 300 milliGRAM(s) Oral daily  ascorbic acid 500 milliGRAM(s) Oral daily  benzonatate 100 milliGRAM(s) Oral every 8 hours PRN  dexAMETHasone     Tablet 6 milliGRAM(s) Oral daily  digoxin     Tablet 125 MICROGram(s) Oral daily  guaiFENesin Oral Liquid (Sugar-Free) 200 milliGRAM(s) Oral every 6 hours PRN  influenza  Vaccine (HIGH DOSE) 0.7 milliLiter(s) IntraMuscular once  latanoprost 0.005% Ophthalmic Solution 1 Drop(s) Both EYES at bedtime  metoprolol succinate ER 25 milliGRAM(s) Oral daily  pantoprazole    Tablet 40 milliGRAM(s) Oral before breakfast  remdesivir  IVPB 200 milliGRAM(s) IV Intermittent every 24 hours  remdesivir  IVPB   IV Intermittent   senna 2 Tablet(s) Oral at bedtime  tamsulosin 0.4 milliGRAM(s) Oral at bedtime  warfarin 2 milliGRAM(s) Oral once    Antimicrobials:   remdesivir  IVPB 200 milliGRAM(s) IV Intermittent every 24 hours  remdesivir  IVPB   IV Intermittent     Immunologic: influenza  Vaccine (HIGH DOSE) 0.7 milliLiter(s) IntraMuscular once      ROS:  unable to obtain    Allergies: No Known Allergies    PMH -- Afib    Prostate cancer    Heart failure    Heart failure    History of scoliosis      PSH -- No significant past surgical history    No significant past surgical history    S/P anal fissurectomy      FH -- No pertinent family history in first degree relatives      Social History --  EtOH: denies   Tobacco: denies   Drug Use: denies     Travel/Environmental/Occupational History:    Physical Exam--  Vital Signs Last 24 Hrs  T(F): 99.1 (26 Dec 2022 13:48), Max: 100.9 (26 Dec 2022 12:10)  HR: 60 (26 Dec 2022 15:00) (60 - 78)  BP: 123/58 (26 Dec 2022 15:00) (123/58 - 160/90)  RR: 22 (26 Dec 2022 15:00) (22 - 25)  SpO2: 96% (26 Dec 2022 15:00) (90% - 98%)  General: elderly M, NAD  HEENT: NC/AT, EOMI,  Lungs: decreased b/l breath sounds on NC  Heart: Regular rate and rhythm. No murmur, rub or gallop.  Abdomen: Soft. Nondistended. Nontender.   Extremities: No cyanosis or clubbing. No edema.   Skin: Warm. Dry. Good turgor.     Laboratory & Imaging Data:  CBC:                       13.2   5.34  )-----------( 106      ( 26 Dec 2022 12:23 )             40.0     CMP:     138  |  103  |  22  ----------------------------<  172<H>  3.6   |  28  |  0.40<L>    Ca    8.0<L>      26 Dec 2022 12:23    TPro  6.5  /  Alb  3.3  /  TBili  1.5<H>  /  DBili  x   /  AST  239<H>  /  ALT  417<H>  /  AlkPhos  67      LIVER FUNCTIONS - ( 26 Dec 2022 12:23 )  Alb: 3.3 g/dL / Pro: 6.5 g/dL / ALK PHOS: 67 U/L / ALT: 417 U/L DA / AST: 239 U/L / GGT: x           Urinalysis Basic - ( 26 Dec 2022 12:23 )    Color: Yellow / Appearance: Clear / S.020 / pH: x  Gluc: x / Ketone: Trace  / Bili: Negative / Urobili: 4 mg/dL   Blood: x / Protein: 100 mg/dL / Nitrite: Negative   Leuk Esterase: Trace / RBC: 0-2 /HPF / WBC 3-5   Sq Epi: x / Non Sq Epi: Negative / Bacteria: Occasional        Microbiology: reviewed        Radiology: reviewed    < from: Xray Chest 1 View-PORTABLE IMMEDIATE (22 @ 11:58) >    ACC: [id number] EXAM:  XR CHEST PORTABLE IMMED 1V                          PROCEDURE DATE:  2022          INTERPRETATION:  XR CHEST IMMEDIATE DATED 2022 11:58 AM    INDICATION: 82 years-old Male with Sepsis.    PRIOR STUDIES: 2022 radiograph    Findings/  Impression:    Right lower lobe consolidative atelectasis with elevation of the right   diaphragm. The left lung is clear.      Enlarged cardiac silhouette similar to prior.    No acute osseous process.    --- End of Report ---            MOHAMMAD HALAIBEH MD; Attending Radiologist  This document has been electronically signed. Dec 26 2022 12:13PM    < end of copied text >      
CARDIOLOGY CONSULT NOTE    Patient is a 82y Male with a known history of :    HPI:      REVIEW OF SYSTEMS:    CONSTITUTIONAL: No fever, weight loss, or fatigue  EYES: No eye pain, visual disturbances, or discharge  ENMT:  No difficulty hearing, tinnitus, vertigo; No sinus or throat pain  NECK: No pain or stiffness  BREASTS: No pain, masses, or nipple discharge  RESPIRATORY: No cough, wheezing, chills or hemoptysis; No shortness of breath  CARDIOVASCULAR: No chest pain, palpitations, dizziness, or leg swelling  GASTROINTESTINAL: No abdominal or epigastric pain. No nausea, vomiting, or hematemesis; No diarrhea or constipation. No melena or hematochezia.  GENITOURINARY: No dysuria, frequency, hematuria, or incontinence  NEUROLOGICAL: No headaches, memory loss, loss of strength, numbness, or tremors  SKIN: No itching, burning, rashes, or lesions   LYMPH NODES: No enlarged glands  ENDOCRINE: No heat or cold intolerance; No hair loss  MUSCULOSKELETAL: No joint pain or swelling; No muscle, back, or extremity pain  PSYCHIATRIC: No depression, anxiety, mood swings, or difficulty sleeping  HEME/LYMPH: No easy bruising, or bleeding gums  ALLERGY AND IMMUNOLOGIC: No hives or eczema    MEDICATIONS  (STANDING):    MEDICATIONS  (PRN):      ALLERGIES: No Known Allergies      FAMILY HISTORY:      Social History:  Alochol:   Smoking:   Drug Use:   Marital Status:     I&O's Detail      PHYSICAL EXAMINATION:  -----------------------------  T(C): 38.3 (12-26-22 @ 12:10), Max: 38.3 (12-26-22 @ 12:10)  HR: 65 (12-26-22 @ 14:00) (65 - 78)  BP: 131/64 (12-26-22 @ 14:00) (131/64 - 160/90)  RR: 25 (12-26-22 @ 14:00) (24 - 25)  SpO2: 98% (12-26-22 @ 14:00) (90% - 98%)  Wt(kg): --    Height (cm): 185.4 (12-26 @ 11:35)  Weight (kg): 99.8 (12-26 @ 11:35)  BMI (kg/m2): 29 (12-26 @ 11:35)  BSA (m2): 2.24 (12-26 @ 11:35)    Constitutional: well developed, normal appearance, well groomed, well nourished, no deformities and no acute distress.   Eyes: the conjunctiva exhibited no abnormalities and the eyelids demonstrated no xanthelasmas.   HEENT: normal oral mucosa, no oral pallor and no oral cyanosis.   Neck: normal jugular venous A waves present, normal jugular venous V waves present and no jugular venous martinez A waves.   Pulmonary: no respiratory distress, normal respiratory rhythm and effort, no accessory muscle use and lungs were clear to auscultation bilaterally.   Cardiovascular: heart rate and rhythm were normal, normal S1 and S2 and no murmur, gallop, rub, heave or thrill are present.   Abdomen: soft, non-tender, no hepato-splenomegaly and no abdominal mass palpated.   Musculoskeletal: the gait could not be assessed..   Extremities: no clubbing of the fingernails, no localized cyanosis, no petechial hemorrhages and no ischemic changes.   Skin: normal skin color and pigmentation, no rash, no venous stasis, no skin lesions, no skin ulcer and no xanthoma was observed.   Psychiatric: oriented to person, place, and time, the affect was normal, the mood was normal and not feeling anxious.     LABS:   --------  12-26    138  |  103  |  22  ----------------------------<  172<H>  3.6   |  28  |  0.40<L>    Ca    8.0<L>      26 Dec 2022 12:23    TPro  6.5  /  Alb  3.3  /  TBili  1.5<H>  /  DBili  x   /  AST  239<H>  /  ALT  417<H>  /  AlkPhos  67  12-26                         13.2   5.34  )-----------( 106      ( 26 Dec 2022 12:23 )             40.0     PT/INR - ( 26 Dec 2022 12:23 )   PT: 23.4 sec;   INR: 1.97 ratio         PTT - ( 26 Dec 2022 12:23 )  PTT:40.5 sec              RADIOLOGY:  -----------------    < from: Xray Chest 1 View-PORTABLE IMMEDIATE (12.26.22 @ 11:58) >    ACC: [id number] EXAM:  XR CHEST PORTABLE IMMED 1V                          PROCEDURE DATE:  12/26/2022          INTERPRETATION:  XR CHEST IMMEDIATE DATED 12/26/2022 11:58 AM    INDICATION: 82 years-old Male with Sepsis.    PRIOR STUDIES: 4/28/2022 radiograph    Findings/  Impression:    Right lower lobe consolidative atelectasis with elevation of the right   diaphragm. The left lung is clear.      Enlarged cardiac silhouette similar to prior.    No acute osseous process.    --- End of Report ---            MOHAMMAD HALAIBEH MD; Attending Radiologist  This document has been electronically signed. Dec 26 2022 12:13PM    < end of copied text >        ECG: NSR, APC, LBBB
[facility]  Garcia Salinas PA-C  [address] [place] [address] [id number] [phone]      Chief Complaint:  Patient is a 82y old  Male who presents with a chief complaint of fever and cough (27 Dec 2022 12:48)      HPI:81 y/o bedbound man with PMH of  hx drop foot  Afib, CHF and h/o prostate CA brought in by EMS for fever x3 days.  Per EMS report patient's aide had been sick with a fever a few days prior to patient getting sick.  EMS relates patient was hypoxic when they first arrived at his home but he improved with nasal cannula.  Patient confused very poor historian unable to provide reliable history no further history available.   In ED afebrile, had hypoxia, mild resp distress , and was covid positive with rt lung consolidation on x ray   received steroids, had cardio and pulmonary consult and admitted for further management    Allergies:  No Known Allergies      Medications:  acetaminophen     Tablet .. 650 milliGRAM(s) Oral every 6 hours PRN  allopurinol 300 milliGRAM(s) Oral daily  ascorbic acid 500 milliGRAM(s) Oral daily  benzonatate 100 milliGRAM(s) Oral every 8 hours PRN  cefTRIAXone   IVPB 1000 milliGRAM(s) IV Intermittent every 24 hours  dexAMETHasone     Tablet 6 milliGRAM(s) Oral daily  digoxin     Tablet 125 MICROGram(s) Oral daily  guaiFENesin Oral Liquid (Sugar-Free) 200 milliGRAM(s) Oral every 6 hours PRN  influenza  Vaccine (HIGH DOSE) 0.7 milliLiter(s) IntraMuscular once  latanoprost 0.005% Ophthalmic Solution 1 Drop(s) Both EYES at bedtime  metoprolol succinate ER 25 milliGRAM(s) Oral daily  pantoprazole    Tablet 40 milliGRAM(s) Oral before breakfast  remdesivir  IVPB 100 milliGRAM(s) IV Intermittent every 24 hours  remdesivir  IVPB   IV Intermittent   senna 2 Tablet(s) Oral at bedtime  tamsulosin 0.4 milliGRAM(s) Oral at bedtime  warfarin 2 milliGRAM(s) Oral once      PMHX/PSHX:  Afib    Prostate cancer    Heart failure    Heart failure    History of scoliosis    No significant past surgical history    No significant past surgical history    S/P anal fissurectomy        Family history:  No pertinent family history in first degree relatives        Social History:     ROS:     unable to obtain        PHYSICAL EXAM:   Vital Signs:  Vital Signs Last 24 Hrs  T(C): 36.8 (27 Dec 2022 10:00), Max: 37.4 (26 Dec 2022 22:00)  T(F): 98.2 (27 Dec 2022 10:00), Max: 99.4 (26 Dec 2022 22:00)  HR: 62 (27 Dec 2022 10:00) (60 - 71)  BP: 138/65 (27 Dec 2022 10:00) (123/58 - 144/65)  BP(mean): --  RR: 20 (27 Dec 2022 10:00) (20 - 22)  SpO2: 100% (27 Dec 2022 10:00) (94% - 100%)    Parameters below as of 27 Dec 2022 10:00  Patient On (Oxygen Delivery Method): nasal cannula  O2 Flow (L/min): 2    Daily     Daily     nad  confused  frail  non toxic  soft, nt  no edema    LABS:                        13.3   3.32  )-----------( 105      ( 27 Dec 2022 06:11 )             41.2     12    140  |  105  |  22  ----------------------------<  133<H>  4.3   |  29  |  1.39<H>    Ca    8.5      27 Dec 2022 06:11    TPro  6.4  /  Alb  3.3  /  TBili  0.9  /  DBili  0.4<H>  /  AST  164<H>  /  ALT  390<H>  /  AlkPhos  77  1227    LIVER FUNCTIONS - ( 27 Dec 2022 06:20 )  Alb: 3.3 g/dL / Pro: 6.4 g/dL / ALK PHOS: 77 U/L / ALT: 390 U/L DA / AST: 164 U/L / GGT: x           PT/INR - ( 27 Dec 2022 06:11 )   PT: 24.9 sec;   INR: 2.09 ratio         PTT - ( 26 Dec 2022 12:23 )  PTT:40.5 sec  Urinalysis Basic - ( 26 Dec 2022 12:23 )    Color: Yellow / Appearance: Clear / S.020 / pH: x  Gluc: x / Ketone: Trace  / Bili: Negative / Urobili: 4 mg/dL   Blood: x / Protein: 100 mg/dL / Nitrite: Negative   Leuk Esterase: Trace / RBC: 0-2 /HPF / WBC 3-5   Sq Epi: x / Non Sq Epi: Negative / Bacteria: Occasional          Imaging:          
Date/Time Patient Seen:  		  Referring MD:   Data Reviewed	       Patient is a 82y old  Male who presents with a chief complaint of     Subjective/HPI   vs noted  labs reviewed  imaging reviewed  er provider note reviewed  · Unable to Obtain: Dementia  · HPI Objective Statement: Patient brought in by EMS for fever x3 days.  Per EMS report patient's aide had been sick with a fever a few days prior to patient getting sick.  EMS relates patient was hypoxic when they first arrived at his home but he improved with nasal cannula.  Patient confused very poor historian unable to provide reliable history no further history available.    PAST MEDICAL & SURGICAL HISTORY:  Afib    Prostate cancer    Heart failure    Heart failure    History of scoliosis    No significant past surgical history    No significant past surgical history    S/P anal fissurectomy    PAST MEDICAL/SURGICAL/FAMILY/SOCIAL HISTORY:    Past Medical, Past Surgical, and Family History:  PAST MEDICAL HISTORY:  Afib     Heart failure     Prostate cancer.     PAST SURGICAL HISTORY:  S/P anal fissurectomy.     Tobacco Usage:  · Tobacco Usage	Never smoker    ALLERGIES AND HOME MEDICATIONS:   Allergies:        Allergies:  	No Known Allergies:     Home Medications:   * Patient Currently Takes Medications as of 02-May-2022 13:20 documented in Structured Notes  · 	senna 8.6 mg oral tablet: 1 tab(s) orally once a day (at bedtime), As Needed   · 	docusate sodium 100 mg oral capsule: 1 cap(s) orally once a day, As Needed   · 	cefuroxime 250 mg oral tablet: 1 tab(s) orally every 12 hours  · 	tamsulosin 0.4 mg oral capsule: 1 cap(s) orally once a day (at bedtime)  · 	ascorbic acid 500 mg oral tablet: 1 tab(s) orally once a day  · 	Coumadin 2 mg oral tablet: 1 tab(s) orally once a day  · 	allopurinol 300 mg oral tablet: 1 tab(s) orally once a day  · 	amiodarone 200 mg oral tablet: 1 tab(s) orally 2 times a day  · 	digoxin 125 mcg (0.125 mg) oral tablet: 1 tab(s) orally once a day  · 	latanoprost 0.005% ophthalmic solution:   · 	metoprolol succinate 25 mg oral tablet, extended release: 1 tab(s) orally once a day  · 	pantoprazole 40 mg oral delayed release tablet: 1 tab(s) orally once a day    REVIEW OF SYSTEMS:    Review of Systems:  · UNABLE TO OBTAIN: Dementia    VITAL SIGNS ([place]):    ,,ED ADULT Flow Sheet:    26-Dec-2022 11:35  · Temp (F): 98.8  · Temp (C) Temp (C): 37.1  · Temp site Temp Site: oral  · Heart Rate Heart Rate (beats/min): 78  · BP Systolic Systolic: 160  · BP Diastolic Diastolic (mm Hg): 90  · Respiration Rate (breaths/min) Respiration Rate (breaths/min): 24  · SpO2 (%) SpO2 (%): 93  · O2 Delivery/Oxygen Delivery Method Patient On (Oxygen Delivery Method): room air  · How was the weight captured? Weight Type/Method: stated  · Dosing Weight (KILOGRAMS) Dosing Weight (KILOGRAMS): 99.8  · Dosing Weight  (POUNDS) Dosing Weight (POUNDS): 220  · Height type Height Type: stated  · Height (FEET) Height (FEET): 6  · Height (INCHES) Height (INCHES): 1  · Height (CENTIMETERS) Height (CENTIMETERS): 185.42  · BSA (m2): 2.24  · BMI (kG/m2) BMI (kG/m2): 29  · Ideal Body Weight(kg) Ideal Body Weight(kg): 80  · Presence of Pain: denies pain/discomfort  · SpO2 (%) SpO2 (%): 93  · Preferred Language to Address Healthcare Preferred Language to Address Healthcare: English        Medication list         MEDICATIONS  (STANDING):    MEDICATIONS  (PRN):         Vitals log        ICU Vital Signs Last 24 Hrs  T(C): 38.3 (26 Dec 2022 12:10), Max: 38.3 (26 Dec 2022 12:10)  T(F): 100.9 (26 Dec 2022 12:10), Max: 100.9 (26 Dec 2022 12:10)  HR: 65 (26 Dec 2022 14:00) (65 - 78)  BP: 131/64 (26 Dec 2022 14:00) (131/64 - 160/90)  BP(mean): --  ABP: --  ABP(mean): --  RR: 25 (26 Dec 2022 14:00) (24 - 25)  SpO2: 98% (26 Dec 2022 14:00) (90% - 98%)    O2 Parameters below as of 26 Dec 2022 14:00  Patient On (Oxygen Delivery Method): nasal cannula  O2 Flow (L/min): 2               Input and Output:  I&O's Detail      Lab Data                        13.2   5.34  )-----------( 106      ( 26 Dec 2022 12:23 )             40.0     12-26    138  |  103  |  22  ----------------------------<  172<H>  3.6   |  28  |  0.40<L>    Ca    8.0<L>      26 Dec 2022 12:23    TPro  6.5  /  Alb  3.3  /  TBili  1.5<H>  /  DBili  x   /  AST  239<H>  /  ALT  417<H>  /  AlkPhos  67  12-26            Review of Systems	    weakness    Objective     Physical Examination    heart s1s2  lung dec BS  head nc      Pertinent Lab findings & Imaging      Nghia:  NO   Adequate UO     I&O's Detail           Discussed with:     Cultures:	        Radiology    ACC: [id number] EXAM:  XR CHEST PORTABLE IMMED 1V                          PROCEDURE DATE:  12/26/2022          INTERPRETATION:  XR CHEST IMMEDIATE DATED 12/26/2022 11:58 AM    INDICATION: 82 years-old Male with Sepsis.    PRIOR STUDIES: 4/28/2022 radiograph    Findings/  Impression:    Right lower lobe consolidative atelectasis with elevation of the right   diaphragm. The left lung is clear.      Enlarged cardiac silhouette similar to prior.    No acute osseous process.    --- End of Report ---            MOHAMMAD HALAIBEH MD; Attending Radiologist  This document has been electronically signed. Dec 26 2022 12:13PM

## 2022-12-28 PROBLEM — Z87.39 PERSONAL HISTORY OF OTHER DISEASES OF THE MUSCULOSKELETAL SYSTEM AND CONNECTIVE TISSUE: Chronic | Status: ACTIVE | Noted: 2022-12-26

## 2022-12-28 LAB
ALBUMIN SERPL ELPH-MCNC: 2.9 G/DL — LOW (ref 3.3–5)
ALP SERPL-CCNC: 60 U/L — SIGNIFICANT CHANGE UP (ref 40–120)
ALT FLD-CCNC: 259 U/L — HIGH (ref 12–78)
ANION GAP SERPL CALC-SCNC: 8 MMOL/L — SIGNIFICANT CHANGE UP (ref 5–17)
ANISOCYTOSIS BLD QL: SLIGHT — SIGNIFICANT CHANGE UP
AST SERPL-CCNC: 97 U/L — HIGH (ref 15–37)
BASOPHILS # BLD AUTO: 0 K/UL — SIGNIFICANT CHANGE UP (ref 0–0.2)
BASOPHILS NFR BLD AUTO: 0 % — SIGNIFICANT CHANGE UP (ref 0–2)
BILIRUB SERPL-MCNC: 0.7 MG/DL — SIGNIFICANT CHANGE UP (ref 0.2–1.2)
BUN SERPL-MCNC: 34 MG/DL — HIGH (ref 7–23)
CALCIUM SERPL-MCNC: 8.6 MG/DL — SIGNIFICANT CHANGE UP (ref 8.5–10.1)
CHLORIDE SERPL-SCNC: 111 MMOL/L — HIGH (ref 96–108)
CO2 SERPL-SCNC: 26 MMOL/L — SIGNIFICANT CHANGE UP (ref 22–31)
CREAT SERPL-MCNC: 1.2 MG/DL — SIGNIFICANT CHANGE UP (ref 0.5–1.3)
EGFR: 61 ML/MIN/1.73M2 — SIGNIFICANT CHANGE UP
EOSINOPHIL # BLD AUTO: 0 K/UL — SIGNIFICANT CHANGE UP (ref 0–0.5)
EOSINOPHIL NFR BLD AUTO: 0 % — SIGNIFICANT CHANGE UP (ref 0–6)
GLUCOSE SERPL-MCNC: 104 MG/DL — HIGH (ref 70–99)
HCT VFR BLD CALC: 41.8 % — SIGNIFICANT CHANGE UP (ref 39–50)
HGB BLD-MCNC: 13.3 G/DL — SIGNIFICANT CHANGE UP (ref 13–17)
INR BLD: 2.01 RATIO — HIGH (ref 0.88–1.16)
LYMPHOCYTES # BLD AUTO: 0.39 K/UL — LOW (ref 1–3.3)
LYMPHOCYTES # BLD AUTO: 10 % — LOW (ref 13–44)
MACROCYTES BLD QL: SLIGHT — SIGNIFICANT CHANGE UP
MANUAL SMEAR VERIFICATION: SIGNIFICANT CHANGE UP
MCHC RBC-ENTMCNC: 31.8 GM/DL — LOW (ref 32–36)
MCHC RBC-ENTMCNC: 32 PG — SIGNIFICANT CHANGE UP (ref 27–34)
MCV RBC AUTO: 100.7 FL — HIGH (ref 80–100)
METAMYELOCYTES # FLD: 1 % — HIGH (ref 0–0)
MONOCYTES # BLD AUTO: 0.43 K/UL — SIGNIFICANT CHANGE UP (ref 0–0.9)
MONOCYTES NFR BLD AUTO: 11 % — SIGNIFICANT CHANGE UP (ref 2–14)
NEUTROPHILS # BLD AUTO: 2.91 K/UL — SIGNIFICANT CHANGE UP (ref 1.8–7.4)
NEUTROPHILS NFR BLD AUTO: 73 % — SIGNIFICANT CHANGE UP (ref 43–77)
NEUTS BAND # BLD: 1 % — SIGNIFICANT CHANGE UP (ref 0–8)
NRBC # BLD: 0 — SIGNIFICANT CHANGE UP
NRBC # BLD: SIGNIFICANT CHANGE UP /100 WBCS (ref 0–0)
OVALOCYTES BLD QL SMEAR: SLIGHT — SIGNIFICANT CHANGE UP
PLAT MORPH BLD: NORMAL — SIGNIFICANT CHANGE UP
PLATELET # BLD AUTO: 118 K/UL — LOW (ref 150–400)
POIKILOCYTOSIS BLD QL AUTO: SLIGHT — SIGNIFICANT CHANGE UP
POTASSIUM SERPL-MCNC: 4.2 MMOL/L — SIGNIFICANT CHANGE UP (ref 3.5–5.3)
POTASSIUM SERPL-SCNC: 4.2 MMOL/L — SIGNIFICANT CHANGE UP (ref 3.5–5.3)
PROT SERPL-MCNC: 6.4 G/DL — SIGNIFICANT CHANGE UP (ref 6–8.3)
PROTHROM AB SERPL-ACNC: 23.7 SEC — HIGH (ref 10.5–13.4)
RBC # BLD: 4.15 M/UL — LOW (ref 4.2–5.8)
RBC # FLD: 15.2 % — HIGH (ref 10.3–14.5)
RBC BLD AUTO: ABNORMAL
SODIUM SERPL-SCNC: 145 MMOL/L — SIGNIFICANT CHANGE UP (ref 135–145)
VARIANT LYMPHS # BLD: 4 % — SIGNIFICANT CHANGE UP (ref 0–6)
WBC # BLD: 3.93 K/UL — SIGNIFICANT CHANGE UP (ref 3.8–10.5)
WBC # FLD AUTO: 3.93 K/UL — SIGNIFICANT CHANGE UP (ref 3.8–10.5)

## 2022-12-28 RX ADMIN — Medication 1 TABLET(S): at 08:52

## 2022-12-28 RX ADMIN — Medication 1 TABLET(S): at 17:52

## 2022-12-28 RX ADMIN — CEFTRIAXONE 100 MILLIGRAM(S): 500 INJECTION, POWDER, FOR SOLUTION INTRAMUSCULAR; INTRAVENOUS at 06:41

## 2022-12-28 RX ADMIN — REMDESIVIR 200 MILLIGRAM(S): 5 INJECTION INTRAVENOUS at 22:17

## 2022-12-28 RX ADMIN — Medication 6 MILLIGRAM(S): at 06:41

## 2022-12-28 RX ADMIN — PANTOPRAZOLE SODIUM 40 MILLIGRAM(S): 20 TABLET, DELAYED RELEASE ORAL at 06:42

## 2022-12-28 RX ADMIN — HEPARIN SODIUM 5000 UNIT(S): 5000 INJECTION INTRAVENOUS; SUBCUTANEOUS at 17:51

## 2022-12-28 RX ADMIN — LATANOPROST 1 DROP(S): 0.05 SOLUTION/ DROPS OPHTHALMIC; TOPICAL at 22:15

## 2022-12-28 RX ADMIN — Medication 125 MICROGRAM(S): at 06:41

## 2022-12-28 RX ADMIN — HEPARIN SODIUM 5000 UNIT(S): 5000 INJECTION INTRAVENOUS; SUBCUTANEOUS at 06:42

## 2022-12-28 RX ADMIN — TAMSULOSIN HYDROCHLORIDE 0.4 MILLIGRAM(S): 0.4 CAPSULE ORAL at 22:15

## 2022-12-28 RX ADMIN — Medication 300 MILLIGRAM(S): at 12:12

## 2022-12-28 RX ADMIN — Medication 25 MILLIGRAM(S): at 06:41

## 2022-12-28 NOTE — PHYSICAL THERAPY INITIAL EVALUATION ADULT - ADDITIONAL COMMENTS
Pt is a poor historian. States he lives with his spouse in a house, no steps. pt stating he doesn't ambulate much and spouse assists with everything.

## 2022-12-28 NOTE — PATIENT PROFILE ADULT - FALL HARM RISK - HARM RISK INTERVENTIONS
Assistance with ambulation/Assistance OOB with selected safe patient handling equipment/Communicate Risk of Fall with Harm to all staff/Discuss with provider need for PT consult/Monitor gait and stability/Reinforce activity limits and safety measures with patient and family/Tailored Fall Risk Interventions/Visual Cue: Yellow wristband and red socks/Bed in lowest position, wheels locked, appropriate side rails in place/Call bell, personal items and telephone in reach/Instruct patient to call for assistance before getting out of bed or chair/Non-slip footwear when patient is out of bed/[place] to call system/Physically safe environment - no spills, clutter or unnecessary equipment/Purposeful Proactive Rounding/Room/bathroom lighting operational, light cord in reach

## 2022-12-29 LAB
ALBUMIN SERPL ELPH-MCNC: 2.7 G/DL — LOW (ref 3.3–5)
ALP SERPL-CCNC: 61 U/L — SIGNIFICANT CHANGE UP (ref 40–120)
ALT FLD-CCNC: 197 U/L — HIGH (ref 12–78)
AST SERPL-CCNC: 63 U/L — HIGH (ref 15–37)
BASOPHILS # BLD AUTO: 0 K/UL — SIGNIFICANT CHANGE UP (ref 0–0.2)
BASOPHILS NFR BLD AUTO: 0 % — SIGNIFICANT CHANGE UP (ref 0–2)
BILIRUB DIRECT SERPL-MCNC: 0.3 MG/DL — SIGNIFICANT CHANGE UP (ref 0–0.3)
BILIRUB INDIRECT FLD-MCNC: 0.3 MG/DL — SIGNIFICANT CHANGE UP (ref 0.2–1)
BILIRUB SERPL-MCNC: 0.6 MG/DL — SIGNIFICANT CHANGE UP (ref 0.2–1.2)
CREAT SERPL-MCNC: 1.2 MG/DL — SIGNIFICANT CHANGE UP (ref 0.5–1.3)
EGFR: 61 ML/MIN/1.73M2 — SIGNIFICANT CHANGE UP
EOSINOPHIL # BLD AUTO: 0 K/UL — SIGNIFICANT CHANGE UP (ref 0–0.5)
EOSINOPHIL NFR BLD AUTO: 0 % — SIGNIFICANT CHANGE UP (ref 0–6)
FERRITIN SERPL-MCNC: 364 NG/ML — SIGNIFICANT CHANGE UP (ref 30–400)
HCT VFR BLD CALC: 41.7 % — SIGNIFICANT CHANGE UP (ref 39–50)
HGB BLD-MCNC: 13.1 G/DL — SIGNIFICANT CHANGE UP (ref 13–17)
IMM GRANULOCYTES NFR BLD AUTO: 0.6 % — SIGNIFICANT CHANGE UP (ref 0–0.9)
INR BLD: 2.28 RATIO — HIGH (ref 0.88–1.16)
LYMPHOCYTES # BLD AUTO: 0.54 K/UL — LOW (ref 1–3.3)
LYMPHOCYTES # BLD AUTO: 15.3 % — SIGNIFICANT CHANGE UP (ref 13–44)
MCHC RBC-ENTMCNC: 31.4 GM/DL — LOW (ref 32–36)
MCHC RBC-ENTMCNC: 32.2 PG — SIGNIFICANT CHANGE UP (ref 27–34)
MCV RBC AUTO: 102.5 FL — HIGH (ref 80–100)
MONOCYTES # BLD AUTO: 0.4 K/UL — SIGNIFICANT CHANGE UP (ref 0–0.9)
MONOCYTES NFR BLD AUTO: 11.3 % — SIGNIFICANT CHANGE UP (ref 2–14)
NEUTROPHILS # BLD AUTO: 2.57 K/UL — SIGNIFICANT CHANGE UP (ref 1.8–7.4)
NEUTROPHILS NFR BLD AUTO: 72.8 % — SIGNIFICANT CHANGE UP (ref 43–77)
NRBC # BLD: 0 /100 WBCS — SIGNIFICANT CHANGE UP (ref 0–0)
PLATELET # BLD AUTO: 133 K/UL — LOW (ref 150–400)
PROCALCITONIN SERPL-MCNC: 0.09 NG/ML — HIGH
PROT SERPL-MCNC: 6.1 G/DL — SIGNIFICANT CHANGE UP (ref 6–8.3)
PROTHROM AB SERPL-ACNC: 26.9 SEC — HIGH (ref 10.5–13.4)
RBC # BLD: 4.07 M/UL — LOW (ref 4.2–5.8)
RBC # FLD: 15.2 % — HIGH (ref 10.3–14.5)
WBC # BLD: 3.53 K/UL — LOW (ref 3.8–10.5)
WBC # FLD AUTO: 3.53 K/UL — LOW (ref 3.8–10.5)

## 2022-12-29 RX ORDER — WARFARIN SODIUM 2.5 MG/1
2 TABLET ORAL ONCE
Refills: 0 | Status: COMPLETED | OUTPATIENT
Start: 2022-12-29 | End: 2022-12-29

## 2022-12-29 RX ORDER — AMIODARONE HYDROCHLORIDE 400 MG/1
200 TABLET ORAL DAILY
Refills: 0 | Status: DISCONTINUED | OUTPATIENT
Start: 2022-12-29 | End: 2023-01-02

## 2022-12-29 RX ADMIN — Medication 6 MILLIGRAM(S): at 06:43

## 2022-12-29 RX ADMIN — HEPARIN SODIUM 5000 UNIT(S): 5000 INJECTION INTRAVENOUS; SUBCUTANEOUS at 06:43

## 2022-12-29 RX ADMIN — WARFARIN SODIUM 2 MILLIGRAM(S): 2.5 TABLET ORAL at 21:49

## 2022-12-29 RX ADMIN — CEFTRIAXONE 100 MILLIGRAM(S): 500 INJECTION, POWDER, FOR SOLUTION INTRAMUSCULAR; INTRAVENOUS at 06:43

## 2022-12-29 RX ADMIN — TAMSULOSIN HYDROCHLORIDE 0.4 MILLIGRAM(S): 0.4 CAPSULE ORAL at 21:49

## 2022-12-29 RX ADMIN — Medication 300 MILLIGRAM(S): at 13:08

## 2022-12-29 RX ADMIN — Medication 25 MILLIGRAM(S): at 06:42

## 2022-12-29 RX ADMIN — LATANOPROST 1 DROP(S): 0.05 SOLUTION/ DROPS OPHTHALMIC; TOPICAL at 21:50

## 2022-12-29 RX ADMIN — AMIODARONE HYDROCHLORIDE 200 MILLIGRAM(S): 400 TABLET ORAL at 17:31

## 2022-12-29 RX ADMIN — PANTOPRAZOLE SODIUM 40 MILLIGRAM(S): 20 TABLET, DELAYED RELEASE ORAL at 06:42

## 2022-12-29 RX ADMIN — Medication 1 TABLET(S): at 17:31

## 2022-12-29 RX ADMIN — REMDESIVIR 200 MILLIGRAM(S): 5 INJECTION INTRAVENOUS at 21:49

## 2022-12-29 RX ADMIN — Medication 1 TABLET(S): at 09:42

## 2022-12-29 RX ADMIN — Medication 125 MICROGRAM(S): at 06:43

## 2022-12-30 LAB
ALBUMIN SERPL ELPH-MCNC: 2.9 G/DL — LOW (ref 3.3–5)
ALBUMIN SERPL ELPH-MCNC: 3 G/DL — LOW (ref 3.3–5)
ALP SERPL-CCNC: 60 U/L — SIGNIFICANT CHANGE UP (ref 40–120)
ALP SERPL-CCNC: 62 U/L — SIGNIFICANT CHANGE UP (ref 40–120)
ALT FLD-CCNC: 177 U/L — HIGH (ref 12–78)
ALT FLD-CCNC: 182 U/L — HIGH (ref 12–78)
ANION GAP SERPL CALC-SCNC: 5 MMOL/L — SIGNIFICANT CHANGE UP (ref 5–17)
AST SERPL-CCNC: 53 U/L — HIGH (ref 15–37)
AST SERPL-CCNC: 55 U/L — HIGH (ref 15–37)
BILIRUB DIRECT SERPL-MCNC: 0.3 MG/DL — SIGNIFICANT CHANGE UP (ref 0–0.3)
BILIRUB INDIRECT FLD-MCNC: 0.6 MG/DL — SIGNIFICANT CHANGE UP (ref 0.2–1)
BILIRUB SERPL-MCNC: 0.9 MG/DL — SIGNIFICANT CHANGE UP (ref 0.2–1.2)
BILIRUB SERPL-MCNC: 0.9 MG/DL — SIGNIFICANT CHANGE UP (ref 0.2–1.2)
BUN SERPL-MCNC: 43 MG/DL — HIGH (ref 7–23)
CALCIUM SERPL-MCNC: 8.5 MG/DL — SIGNIFICANT CHANGE UP (ref 8.5–10.1)
CHLORIDE SERPL-SCNC: 110 MMOL/L — HIGH (ref 96–108)
CO2 SERPL-SCNC: 31 MMOL/L — SIGNIFICANT CHANGE UP (ref 22–31)
CREAT SERPL-MCNC: 1.3 MG/DL — SIGNIFICANT CHANGE UP (ref 0.5–1.3)
EGFR: 55 ML/MIN/1.73M2 — LOW
GLUCOSE SERPL-MCNC: 117 MG/DL — HIGH (ref 70–99)
HCT VFR BLD CALC: 44.6 % — SIGNIFICANT CHANGE UP (ref 39–50)
HGB BLD-MCNC: 14 G/DL — SIGNIFICANT CHANGE UP (ref 13–17)
INR BLD: 2.05 RATIO — HIGH (ref 0.88–1.16)
MCHC RBC-ENTMCNC: 31.4 GM/DL — LOW (ref 32–36)
MCHC RBC-ENTMCNC: 32 PG — SIGNIFICANT CHANGE UP (ref 27–34)
MCV RBC AUTO: 101.8 FL — HIGH (ref 80–100)
NRBC # BLD: 0 /100 WBCS — SIGNIFICANT CHANGE UP (ref 0–0)
PLATELET # BLD AUTO: 132 K/UL — LOW (ref 150–400)
POTASSIUM SERPL-MCNC: 4.7 MMOL/L — SIGNIFICANT CHANGE UP (ref 3.5–5.3)
POTASSIUM SERPL-SCNC: 4.7 MMOL/L — SIGNIFICANT CHANGE UP (ref 3.5–5.3)
PROT SERPL-MCNC: 6.3 G/DL — SIGNIFICANT CHANGE UP (ref 6–8.3)
PROT SERPL-MCNC: 6.3 G/DL — SIGNIFICANT CHANGE UP (ref 6–8.3)
PROTHROM AB SERPL-ACNC: 24.2 SEC — HIGH (ref 10.5–13.4)
RBC # BLD: 4.38 M/UL — SIGNIFICANT CHANGE UP (ref 4.2–5.8)
RBC # FLD: 14.6 % — HIGH (ref 10.3–14.5)
SODIUM SERPL-SCNC: 146 MMOL/L — HIGH (ref 135–145)
WBC # BLD: 3.35 K/UL — LOW (ref 3.8–10.5)
WBC # FLD AUTO: 3.35 K/UL — LOW (ref 3.8–10.5)

## 2022-12-30 RX ORDER — WARFARIN SODIUM 2.5 MG/1
2 TABLET ORAL ONCE
Refills: 0 | Status: COMPLETED | OUTPATIENT
Start: 2022-12-30 | End: 2022-12-30

## 2022-12-30 RX ADMIN — Medication 300 MILLIGRAM(S): at 16:26

## 2022-12-30 RX ADMIN — LATANOPROST 1 DROP(S): 0.05 SOLUTION/ DROPS OPHTHALMIC; TOPICAL at 21:55

## 2022-12-30 RX ADMIN — PANTOPRAZOLE SODIUM 40 MILLIGRAM(S): 20 TABLET, DELAYED RELEASE ORAL at 05:13

## 2022-12-30 RX ADMIN — CEFTRIAXONE 100 MILLIGRAM(S): 500 INJECTION, POWDER, FOR SOLUTION INTRAMUSCULAR; INTRAVENOUS at 05:14

## 2022-12-30 RX ADMIN — TAMSULOSIN HYDROCHLORIDE 0.4 MILLIGRAM(S): 0.4 CAPSULE ORAL at 21:55

## 2022-12-30 RX ADMIN — Medication 1 TABLET(S): at 18:18

## 2022-12-30 RX ADMIN — WARFARIN SODIUM 2 MILLIGRAM(S): 2.5 TABLET ORAL at 21:55

## 2022-12-30 RX ADMIN — Medication 125 MICROGRAM(S): at 05:13

## 2022-12-30 RX ADMIN — Medication 6 MILLIGRAM(S): at 05:12

## 2022-12-30 RX ADMIN — Medication 1 TABLET(S): at 08:50

## 2022-12-30 RX ADMIN — AMIODARONE HYDROCHLORIDE 200 MILLIGRAM(S): 400 TABLET ORAL at 05:12

## 2022-12-31 LAB
ALBUMIN SERPL ELPH-MCNC: 2.8 G/DL — LOW (ref 3.3–5)
ALBUMIN SERPL ELPH-MCNC: 2.8 G/DL — LOW (ref 3.3–5)
ALP SERPL-CCNC: 58 U/L — SIGNIFICANT CHANGE UP (ref 40–120)
ALP SERPL-CCNC: 62 U/L — SIGNIFICANT CHANGE UP (ref 40–120)
ALT FLD-CCNC: 140 U/L — HIGH (ref 12–78)
ALT FLD-CCNC: 141 U/L — HIGH (ref 12–78)
ANION GAP SERPL CALC-SCNC: 8 MMOL/L — SIGNIFICANT CHANGE UP (ref 5–17)
AST SERPL-CCNC: 33 U/L — SIGNIFICANT CHANGE UP (ref 15–37)
AST SERPL-CCNC: 37 U/L — SIGNIFICANT CHANGE UP (ref 15–37)
BASOPHILS # BLD AUTO: 0.03 K/UL — SIGNIFICANT CHANGE UP (ref 0–0.2)
BASOPHILS NFR BLD AUTO: 0.6 % — SIGNIFICANT CHANGE UP (ref 0–2)
BILIRUB DIRECT SERPL-MCNC: 0.3 MG/DL — SIGNIFICANT CHANGE UP (ref 0–0.3)
BILIRUB INDIRECT FLD-MCNC: 0.9 MG/DL — SIGNIFICANT CHANGE UP (ref 0.2–1)
BILIRUB SERPL-MCNC: 1.1 MG/DL — SIGNIFICANT CHANGE UP (ref 0.2–1.2)
BILIRUB SERPL-MCNC: 1.2 MG/DL — SIGNIFICANT CHANGE UP (ref 0.2–1.2)
BUN SERPL-MCNC: 48 MG/DL — HIGH (ref 7–23)
CALCIUM SERPL-MCNC: 8.4 MG/DL — LOW (ref 8.5–10.1)
CHLORIDE SERPL-SCNC: 106 MMOL/L — SIGNIFICANT CHANGE UP (ref 96–108)
CO2 SERPL-SCNC: 28 MMOL/L — SIGNIFICANT CHANGE UP (ref 22–31)
CREAT SERPL-MCNC: 1.3 MG/DL — SIGNIFICANT CHANGE UP (ref 0.5–1.3)
CREAT SERPL-MCNC: 1.3 MG/DL — SIGNIFICANT CHANGE UP (ref 0.5–1.3)
EGFR: 55 ML/MIN/1.73M2 — LOW
EGFR: 55 ML/MIN/1.73M2 — LOW
EOSINOPHIL # BLD AUTO: 0 K/UL — SIGNIFICANT CHANGE UP (ref 0–0.5)
EOSINOPHIL NFR BLD AUTO: 0 % — SIGNIFICANT CHANGE UP (ref 0–6)
GLUCOSE SERPL-MCNC: 207 MG/DL — HIGH (ref 70–99)
HCT VFR BLD CALC: 43.3 % — SIGNIFICANT CHANGE UP (ref 39–50)
HGB BLD-MCNC: 14 G/DL — SIGNIFICANT CHANGE UP (ref 13–17)
IMM GRANULOCYTES NFR BLD AUTO: 2.6 % — HIGH (ref 0–0.9)
INR BLD: 2.38 RATIO — HIGH (ref 0.88–1.16)
LYMPHOCYTES # BLD AUTO: 0.29 K/UL — LOW (ref 1–3.3)
LYMPHOCYTES # BLD AUTO: 5.7 % — LOW (ref 13–44)
MCHC RBC-ENTMCNC: 32 PG — SIGNIFICANT CHANGE UP (ref 27–34)
MCHC RBC-ENTMCNC: 32.3 GM/DL — SIGNIFICANT CHANGE UP (ref 32–36)
MCV RBC AUTO: 98.9 FL — SIGNIFICANT CHANGE UP (ref 80–100)
MONOCYTES # BLD AUTO: 0.25 K/UL — SIGNIFICANT CHANGE UP (ref 0–0.9)
MONOCYTES NFR BLD AUTO: 5 % — SIGNIFICANT CHANGE UP (ref 2–14)
NEUTROPHILS # BLD AUTO: 4.35 K/UL — SIGNIFICANT CHANGE UP (ref 1.8–7.4)
NEUTROPHILS NFR BLD AUTO: 86.1 % — HIGH (ref 43–77)
NRBC # BLD: 0 /100 WBCS — SIGNIFICANT CHANGE UP (ref 0–0)
PLATELET # BLD AUTO: 166 K/UL — SIGNIFICANT CHANGE UP (ref 150–400)
POTASSIUM SERPL-MCNC: 4.4 MMOL/L — SIGNIFICANT CHANGE UP (ref 3.5–5.3)
POTASSIUM SERPL-SCNC: 4.4 MMOL/L — SIGNIFICANT CHANGE UP (ref 3.5–5.3)
PROT SERPL-MCNC: 5.7 G/DL — LOW (ref 6–8.3)
PROT SERPL-MCNC: 6.1 G/DL — SIGNIFICANT CHANGE UP (ref 6–8.3)
PROTHROM AB SERPL-ACNC: 28.1 SEC — HIGH (ref 10.5–13.4)
RBC # BLD: 4.38 M/UL — SIGNIFICANT CHANGE UP (ref 4.2–5.8)
RBC # FLD: 14.5 % — SIGNIFICANT CHANGE UP (ref 10.3–14.5)
SODIUM SERPL-SCNC: 142 MMOL/L — SIGNIFICANT CHANGE UP (ref 135–145)
WBC # BLD: 5.05 K/UL — SIGNIFICANT CHANGE UP (ref 3.8–10.5)
WBC # FLD AUTO: 5.05 K/UL — SIGNIFICANT CHANGE UP (ref 3.8–10.5)

## 2022-12-31 RX ORDER — WARFARIN SODIUM 2.5 MG/1
2 TABLET ORAL ONCE
Refills: 0 | Status: COMPLETED | OUTPATIENT
Start: 2022-12-31 | End: 2022-12-31

## 2022-12-31 RX ADMIN — Medication 1 TABLET(S): at 12:36

## 2022-12-31 RX ADMIN — Medication 300 MILLIGRAM(S): at 12:36

## 2022-12-31 RX ADMIN — Medication 25 MILLIGRAM(S): at 05:37

## 2022-12-31 RX ADMIN — Medication 1 TABLET(S): at 17:59

## 2022-12-31 RX ADMIN — Medication 6 MILLIGRAM(S): at 05:36

## 2022-12-31 RX ADMIN — AMIODARONE HYDROCHLORIDE 200 MILLIGRAM(S): 400 TABLET ORAL at 05:36

## 2022-12-31 RX ADMIN — TAMSULOSIN HYDROCHLORIDE 0.4 MILLIGRAM(S): 0.4 CAPSULE ORAL at 22:09

## 2022-12-31 RX ADMIN — SENNA PLUS 2 TABLET(S): 8.6 TABLET ORAL at 12:36

## 2022-12-31 RX ADMIN — PANTOPRAZOLE SODIUM 40 MILLIGRAM(S): 20 TABLET, DELAYED RELEASE ORAL at 05:36

## 2022-12-31 RX ADMIN — WARFARIN SODIUM 2 MILLIGRAM(S): 2.5 TABLET ORAL at 22:09

## 2022-12-31 RX ADMIN — LATANOPROST 1 DROP(S): 0.05 SOLUTION/ DROPS OPHTHALMIC; TOPICAL at 22:10

## 2022-12-31 NOTE — CONSULT NOTE ADULT - SUBJECTIVE AND OBJECTIVE BOX
Chief Complaint:  Patient is a 82y old  Male who presents with a chief complaint of sob called to see patient for abd pain and gerd he has covid is a poor historian  81 y/o bedbound man with PMH of  hx drop foot  Afib, CHF and h/o prostate CA brought in by EMS for fever x3 days.  Per EMS report patient's aide had been sick with a fever a few days prior to patient getting sick.  EMS relates patient was hypoxic when they first arrived at his home but he improved with nasal cannula.  Patient confused very poor historian unable to provide reliable history no further history available.   In ED afebrile, had hypoxia, mild resp distress , and was covid positive with rt lung consolidation on x ray   received steroids, had cardio and pulmonary consult and admitted for further management for   weakness fever and ac resp distress with hypoxia due to Covid !9 pneumonia- started on remdesvir and steroids, pulmonary and ID consult  Ch afib- rate controlled and on coumadin - cardio consult noted  Ch HF  h/o prostate ca  elevated lft - transaminitis with viral infection   ID pulmonary consult obtained   pt transferred to [facility] on 12/27/22, for bed availability     wife also covid positive         Review of Systems:  · General Symptoms	fever; chills  · Skin/Breast	negative  · Ophthalmologic	negative  · ENMT	negative  · Respiratory and Thorax Symptoms	dyspnea  cough  · Negative Cardiovascular Symptoms	no chest pain; no palpitations  · Gastrointestinal	negative  · Genitourinary	negative  · Musculoskeletal	negative  · Neurological	negative  · Psychiatric	negative  · Hematology/Lymphatics	negative      Allergies:  No Known Allergies      Medications:  acetaminophen     Tablet .. 650 milliGRAM(s) Oral every 8 hours PRN  allopurinol 300 milliGRAM(s) Oral daily  aMIOdarone    Tablet 200 milliGRAM(s) Oral daily  dexAMETHasone     Tablet 6 milliGRAM(s) Oral daily  lactobacillus acidophilus 1 Tablet(s) Oral two times a day with meals  latanoprost 0.005% Ophthalmic Solution 1 Drop(s) Both EYES at bedtime  metoprolol succinate ER 25 milliGRAM(s) Oral daily  pantoprazole    Tablet 40 milliGRAM(s) Oral before breakfast  senna 2 Tablet(s) Oral at bedtime PRN  Simbrinza: Brinzolamide 1% and brimonidine tartrate 0.2% 1 Drop(s) 1 Drop(s) Both EYES two times a day  tamsulosin 0.4 milliGRAM(s) Oral at bedtime  warfarin 2 milliGRAM(s) Oral once      PMHX/PSHX:  Afib    Prostate cancer    Heart failure    Heart failure    History of scoliosis    No significant past surgical history    No significant past surgical history    S/P anal fissurectomy        Family history:  No pertinent family history in first degree relatives        Social History:   no etoh no cigs no ivda    ROS:     General:  No wt loss, fevers, chills, night sweats, fatigue,   Eyes:  Good vision, no reported pain  ENT:  No sore throat, pain, runny nose, dysphagia  CV:  No pain, palpitations, hypo/hypertension  Resp:  No dyspnea, cough, tachypnea, wheezing  GI:  No pain, No nausea, No vomiting, No diarrhea, No constipation, No weight loss, No fever, No pruritis, No rectal bleeding, No tarry stools, No dysphagia,  :  No pain, bleeding, incontinence, nocturia  Muscle:  No pain, weakness  Neuro:  No weakness, tingling, memory problems  Psych:  No fatigue, insomnia, mood problems, depression  Endocrine:  No polyuria, polydipsia, cold/heat intolerance  Heme:  No petechiae, ecchymosis, easy bruisability  Skin:  No rash, tattoos, scars, edema      PHYSICAL EXAM:   Vital Signs:  Vital Signs Last 24 Hrs  T(C): 36.8 (31 Dec 2022 12:10), Max: 36.9 (31 Dec 2022 04:50)  T(F): 98.2 (31 Dec 2022 12:10), Max: 98.5 (31 Dec 2022 04:50)  HR: 56 (31 Dec 2022 12:10) (56 - 61)  BP: 157/74 (31 Dec 2022 12:10) (150/70 - 158/83)  BP(mean): --  RR: 18 (31 Dec 2022 12:10) (18 - 18)  SpO2: 92% (31 Dec 2022 12:10) (92% - 96%)    Parameters below as of 31 Dec 2022 12:10  Patient On (Oxygen Delivery Method): room air      Daily     Daily     GENERAL:  Appears stated age, well-groomed, well-nourished, no distress  HEENT:  NC/AT,  conjunctivae clear and pink, no thyromegaly, nodules, adenopathy, no JVD, sclera -anicteric  CHEST:  Full & symmetric excursion, no increased effort, breath sounds clear  HEART:  Regular rhythm, S1, S2, no murmur/rub/S3/S4, no abdominal bruit, no edema  ABDOMEN:  Soft, non-tender, non-distended, normoactive bowel sounds,  no masses ,no hepato-splenomegaly, no signs of chronic liver disease  EXTEREMITIES:  no cyanosis,clubbing or edema  SKIN:  No rash/erythema/ecchymoses/petechiae/wounds/abscess/warm/dry  NEURO:  Alert, oriented, no asterixis, no tremor, no encephalopathy    LABS:                        14.0   5.05  )-----------( 166      ( 31 Dec 2022 12:15 )             43.3     12-31    142  |  106  |  48<H>  ----------------------------<  207<H>  4.4   |  28  |  1.30    Ca    8.4<L>      31 Dec 2022 12:15    TPro  6.1  /  Alb  2.8<L>  /  TBili  1.1  /  DBili  x   /  AST  33  /  ALT  140<H>  /  AlkPhos  62  12-31    LIVER FUNCTIONS - ( 31 Dec 2022 12:15 )  Alb: 2.8 g/dL / Pro: 6.1 g/dL / ALK PHOS: 62 U/L / ALT: 140 U/L / AST: 33 U/L / GGT: x           PT/INR - ( 31 Dec 2022 07:10 )   PT: 28.1 sec;   INR: 2.38 ratio                 Imaging:

## 2022-12-31 NOTE — CONSULT NOTE ADULT - ASSESSMENT
abd pain   gerd  covid positive    plan  gerd precautions w/PO intake  ppi once a day, may increase to twice a day   maalox as needed   will monitor clinically and if no improvement may need EGD if worse  diet as tolerated  check amylase/lipase    Advanced care planning was discussed with patient and family.  Advanced care planning forms were reviewed and discussed.  Risks, benefits and alternatives of gastroenterologic procedures were discussed in detail and all questions were answered.    30 minutes spent.

## 2023-01-01 ENCOUNTER — TRANSCRIPTION ENCOUNTER (OUTPATIENT)
Age: 83
End: 2023-01-01

## 2023-01-01 LAB
ALBUMIN SERPL ELPH-MCNC: 2.8 G/DL — LOW (ref 3.3–5)
ALP SERPL-CCNC: 54 U/L — SIGNIFICANT CHANGE UP (ref 40–120)
ALT FLD-CCNC: 111 U/L — HIGH (ref 12–78)
AMYLASE P1 CFR SERPL: 111 U/L — SIGNIFICANT CHANGE UP (ref 25–125)
ANION GAP SERPL CALC-SCNC: 6 MMOL/L — SIGNIFICANT CHANGE UP (ref 5–17)
AST SERPL-CCNC: 27 U/L — SIGNIFICANT CHANGE UP (ref 15–37)
BASOPHILS # BLD AUTO: 0.03 K/UL — SIGNIFICANT CHANGE UP (ref 0–0.2)
BASOPHILS NFR BLD AUTO: 0.6 % — SIGNIFICANT CHANGE UP (ref 0–2)
BILIRUB DIRECT SERPL-MCNC: 0.4 MG/DL — HIGH (ref 0–0.3)
BILIRUB INDIRECT FLD-MCNC: 1.2 MG/DL — HIGH (ref 0.2–1)
BILIRUB SERPL-MCNC: 1.6 MG/DL — HIGH (ref 0.2–1.2)
BUN SERPL-MCNC: 46 MG/DL — HIGH (ref 7–23)
CALCIUM SERPL-MCNC: 8.5 MG/DL — SIGNIFICANT CHANGE UP (ref 8.5–10.1)
CHLORIDE SERPL-SCNC: 105 MMOL/L — SIGNIFICANT CHANGE UP (ref 96–108)
CO2 SERPL-SCNC: 31 MMOL/L — SIGNIFICANT CHANGE UP (ref 22–31)
CREAT SERPL-MCNC: 1.2 MG/DL — SIGNIFICANT CHANGE UP (ref 0.5–1.3)
CULTURE RESULTS: SIGNIFICANT CHANGE UP
CULTURE RESULTS: SIGNIFICANT CHANGE UP
EGFR: 61 ML/MIN/1.73M2 — SIGNIFICANT CHANGE UP
EOSINOPHIL # BLD AUTO: 0.01 K/UL — SIGNIFICANT CHANGE UP (ref 0–0.5)
EOSINOPHIL NFR BLD AUTO: 0.2 % — SIGNIFICANT CHANGE UP (ref 0–6)
GLUCOSE SERPL-MCNC: 104 MG/DL — HIGH (ref 70–99)
HCT VFR BLD CALC: 42.9 % — SIGNIFICANT CHANGE UP (ref 39–50)
HGB BLD-MCNC: 14 G/DL — SIGNIFICANT CHANGE UP (ref 13–17)
IMM GRANULOCYTES NFR BLD AUTO: 5.3 % — HIGH (ref 0–0.9)
INR BLD: 2.45 RATIO — HIGH (ref 0.88–1.16)
LIDOCAIN IGE QN: 511 U/L — HIGH (ref 73–393)
LYMPHOCYTES # BLD AUTO: 0.47 K/UL — LOW (ref 1–3.3)
LYMPHOCYTES # BLD AUTO: 8.6 % — LOW (ref 13–44)
MCHC RBC-ENTMCNC: 32 PG — SIGNIFICANT CHANGE UP (ref 27–34)
MCHC RBC-ENTMCNC: 32.6 GM/DL — SIGNIFICANT CHANGE UP (ref 32–36)
MCV RBC AUTO: 97.9 FL — SIGNIFICANT CHANGE UP (ref 80–100)
MONOCYTES # BLD AUTO: 0.38 K/UL — SIGNIFICANT CHANGE UP (ref 0–0.9)
MONOCYTES NFR BLD AUTO: 7 % — SIGNIFICANT CHANGE UP (ref 2–14)
NEUTROPHILS # BLD AUTO: 4.26 K/UL — SIGNIFICANT CHANGE UP (ref 1.8–7.4)
NEUTROPHILS NFR BLD AUTO: 78.3 % — HIGH (ref 43–77)
NRBC # BLD: 0 /100 WBCS — SIGNIFICANT CHANGE UP (ref 0–0)
PLATELET # BLD AUTO: 154 K/UL — SIGNIFICANT CHANGE UP (ref 150–400)
POTASSIUM SERPL-MCNC: 4.4 MMOL/L — SIGNIFICANT CHANGE UP (ref 3.5–5.3)
POTASSIUM SERPL-SCNC: 4.4 MMOL/L — SIGNIFICANT CHANGE UP (ref 3.5–5.3)
PROT SERPL-MCNC: 6 G/DL — SIGNIFICANT CHANGE UP (ref 6–8.3)
PROTHROM AB SERPL-ACNC: 28.9 SEC — HIGH (ref 10.5–13.4)
RBC # BLD: 4.38 M/UL — SIGNIFICANT CHANGE UP (ref 4.2–5.8)
RBC # FLD: 14.6 % — HIGH (ref 10.3–14.5)
SODIUM SERPL-SCNC: 142 MMOL/L — SIGNIFICANT CHANGE UP (ref 135–145)
SPECIMEN SOURCE: SIGNIFICANT CHANGE UP
SPECIMEN SOURCE: SIGNIFICANT CHANGE UP
WBC # BLD: 5.44 K/UL — SIGNIFICANT CHANGE UP (ref 3.8–10.5)
WBC # FLD AUTO: 5.44 K/UL — SIGNIFICANT CHANGE UP (ref 3.8–10.5)

## 2023-01-01 PROCEDURE — 74177 CT ABD & PELVIS W/CONTRAST: CPT | Mod: 26

## 2023-01-01 PROCEDURE — 71260 CT THORAX DX C+: CPT | Mod: 26

## 2023-01-01 RX ORDER — RADIOPAQUE PVC MARKERS/BARIUM 24MARKERS
450 CAPSULE ORAL
Refills: 0 | Status: DISCONTINUED | OUTPATIENT
Start: 2023-01-01 | End: 2023-01-02

## 2023-01-01 RX ORDER — WARFARIN SODIUM 2.5 MG/1
2 TABLET ORAL ONCE
Refills: 0 | Status: COMPLETED | OUTPATIENT
Start: 2023-01-01 | End: 2023-01-01

## 2023-01-01 RX ORDER — DEXAMETHASONE 0.5 MG/5ML
1 ELIXIR ORAL
Qty: 3 | Refills: 0
Start: 2023-01-01 | End: 2023-01-03

## 2023-01-01 RX ADMIN — AMIODARONE HYDROCHLORIDE 200 MILLIGRAM(S): 400 TABLET ORAL at 06:32

## 2023-01-01 RX ADMIN — WARFARIN SODIUM 2 MILLIGRAM(S): 2.5 TABLET ORAL at 22:30

## 2023-01-01 RX ADMIN — TAMSULOSIN HYDROCHLORIDE 0.4 MILLIGRAM(S): 0.4 CAPSULE ORAL at 22:29

## 2023-01-01 RX ADMIN — PANTOPRAZOLE SODIUM 40 MILLIGRAM(S): 20 TABLET, DELAYED RELEASE ORAL at 06:32

## 2023-01-01 RX ADMIN — Medication 1 TABLET(S): at 09:13

## 2023-01-01 RX ADMIN — LATANOPROST 1 DROP(S): 0.05 SOLUTION/ DROPS OPHTHALMIC; TOPICAL at 22:30

## 2023-01-01 RX ADMIN — Medication 450 MILLILITER(S): at 18:41

## 2023-01-01 RX ADMIN — Medication 300 MILLIGRAM(S): at 12:15

## 2023-01-01 RX ADMIN — Medication 6 MILLIGRAM(S): at 06:32

## 2023-01-01 RX ADMIN — Medication 25 MILLIGRAM(S): at 06:32

## 2023-01-01 RX ADMIN — Medication 1 TABLET(S): at 18:40

## 2023-01-01 NOTE — DISCHARGE NOTE PROVIDER - NSDCCAREPROVSEEN_GEN_ALL_CORE_FT
Chandrika, Jennifer Monsalve, Jeferson Whyte, Frantz Zaragoza, Olga Zaragoza, Naveed Jain, Case VELEZ

## 2023-01-01 NOTE — DISCHARGE NOTE PROVIDER - NSDCCPCAREPLAN_GEN_ALL_CORE_FT
PRINCIPAL DISCHARGE DIAGNOSIS  Diagnosis: 2019 novel coronavirus disease (COVID-19)  Assessment and Plan of Treatment: completed remdesvir, decadron till 1/4

## 2023-01-01 NOTE — DISCHARGE NOTE PROVIDER - HOSPITAL COURSE
83 y/o bedbound man with PMH of  hx drop foot  Afib, CHF and h/o prostate CA brought in by EMS for fever x3 days.  Per EMS report patient's aide had been sick with a fever a few days prior to patient getting sick.  EMS relates patient was hypoxic when they first arrived at his home but he improved with nasal cannula.  Patient confused very poor historian unable to provide reliable history no further history available.   In ED afebrile, had hypoxia, mild resp distress , and was covid positive with rt lung consolidation on x ray   received steroids, had cardio and pulmonary consult and admitted for further management for   weakness fever and ac resp distress with hypoxia due to Covid !9 pneumonia- started on remdesvir and steroids, pulmonary and ID consult  Ch afib- rate controlled and on coumadin - cardio consult noted  Ch HF  h/o prostate ca  elevated lft - transaminitis with viral infection improved   ID pulmonary consult noted  remdesivir x5 day course - end 12/30, steroids decadron till 1/4  Abx coverage w/ ceftriaxone 1gm q24h (started 12/27) for possible superimposed bacterial PNA,  completed  weaned oxygen  off  abd pain with constipation - senna , resolved     wife also covid positive   DC plan  d/w wife tory and daughter Nadine . 81 y/o bedbound man with PMH of  hx drop foot  Afib, CHF and h/o prostate CA brought in by EMS for fever x3 days.  Per EMS report patient's aide had been sick with a fever a few days prior to patient getting sick.  EMS relates patient was hypoxic when they first arrived at his home but he improved with nasal cannula.  Patient confused very poor historian unable to provide reliable history no further history available.   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 83 y/o bedbound man with PMH of  hx drop foot  Afib, CHF and h/o prostate CA brought in by EMS for fever x3 days.  Per EMS report patient's aide had been sick with a fever a few days prior to patient getting sick.  EMS relates patient was hypoxic when they first arrived at his home but he improved with nasal cannula.  Patient confused very poor historian unable to provide reliable history no further history available.   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  GERD   obesity   ch spinal compression fractures with ambulatory dysfunction chronic- bed bound  stable at discharge  		     wife also covid positive   DC plan  d/w wife tory and daughter Nadine .

## 2023-01-01 NOTE — DISCHARGE NOTE PROVIDER - NSDCMRMEDTOKEN_GEN_ALL_CORE_FT
allopurinol 300 mg oral tablet: 1 tab(s) orally once a day  amiodarone 200 mg oral tablet: 1 tab(s) orally once a day  dexamethasone 6 mg oral tablet: 1 tab(s) orally once a day  latanoprost 0.005% ophthalmic solution: 1 drop(s) to each affected eye once a day (at bedtime)  metoprolol succinate 25 mg oral tablet, extended release: 1 tab(s) orally once a day  multivitamin: 1 tab(s) orally once a day  pantoprazole 40 mg oral delayed release tablet: 1 tab(s) orally once a day (before a meal)  Simbrinza 1%- 0.2% ophthalmic suspension: 1 drop(s) to each affected eye 2 times a day  tamsulosin 0.4 mg oral capsule: 1 cap(s) orally once a day (at bedtime)  warfarin 2 mg oral tablet: 1 tab(s) orally once   allopurinol 300 mg oral tablet: 1 tab(s) orally once a day  amiodarone 200 mg oral tablet: 1 tab(s) orally once a day  dexamethasone 6 mg oral tablet: 1 tab(s) orally once a day  latanoprost 0.005% ophthalmic solution: 1 drop(s) to each affected eye once a day (at bedtime)  metoprolol succinate 25 mg oral tablet, extended release: 1 tab(s) orally once a day  multivitamin: 1 tab(s) orally once a day  pantoprazole 40 mg oral delayed release tablet: 1 tab(s) orally once a day (before a meal)  PT:   Simbrinza 1%- 0.2% ophthalmic suspension: 1 drop(s) to each affected eye 2 times a day  tamsulosin 0.4 mg oral capsule: 1 cap(s) orally once a day (at bedtime)  warfarin 2 mg oral tablet: 1 tab(s) orally once

## 2023-01-02 ENCOUNTER — TRANSCRIPTION ENCOUNTER (OUTPATIENT)
Age: 83
End: 2023-01-02

## 2023-01-02 VITALS
HEART RATE: 57 BPM | TEMPERATURE: 98 F | DIASTOLIC BLOOD PRESSURE: 68 MMHG | SYSTOLIC BLOOD PRESSURE: 125 MMHG | OXYGEN SATURATION: 95 % | RESPIRATION RATE: 18 BRPM

## 2023-01-02 LAB
ALBUMIN SERPL ELPH-MCNC: 2.9 G/DL — LOW (ref 3.3–5)
ALP SERPL-CCNC: 56 U/L — SIGNIFICANT CHANGE UP (ref 40–120)
ALT FLD-CCNC: 99 U/L — HIGH (ref 12–78)
AMYLASE P1 CFR SERPL: 120 U/L — SIGNIFICANT CHANGE UP (ref 25–125)
ANION GAP SERPL CALC-SCNC: 6 MMOL/L — SIGNIFICANT CHANGE UP (ref 5–17)
AST SERPL-CCNC: 17 U/L — SIGNIFICANT CHANGE UP (ref 15–37)
BASOPHILS # BLD AUTO: 0.04 K/UL — SIGNIFICANT CHANGE UP (ref 0–0.2)
BASOPHILS NFR BLD AUTO: 0.6 % — SIGNIFICANT CHANGE UP (ref 0–2)
BILIRUB DIRECT SERPL-MCNC: 0.5 MG/DL — HIGH (ref 0–0.3)
BILIRUB INDIRECT FLD-MCNC: 1.1 MG/DL — HIGH (ref 0.2–1)
BILIRUB SERPL-MCNC: 1.6 MG/DL — HIGH (ref 0.2–1.2)
BUN SERPL-MCNC: 37 MG/DL — HIGH (ref 7–23)
CALCIUM SERPL-MCNC: 8.4 MG/DL — LOW (ref 8.5–10.1)
CHLORIDE SERPL-SCNC: 105 MMOL/L — SIGNIFICANT CHANGE UP (ref 96–108)
CO2 SERPL-SCNC: 30 MMOL/L — SIGNIFICANT CHANGE UP (ref 22–31)
CREAT SERPL-MCNC: 1.2 MG/DL — SIGNIFICANT CHANGE UP (ref 0.5–1.3)
EGFR: 61 ML/MIN/1.73M2 — SIGNIFICANT CHANGE UP
EOSINOPHIL # BLD AUTO: 0 K/UL — SIGNIFICANT CHANGE UP (ref 0–0.5)
EOSINOPHIL NFR BLD AUTO: 0 % — SIGNIFICANT CHANGE UP (ref 0–6)
GLUCOSE SERPL-MCNC: 122 MG/DL — HIGH (ref 70–99)
HCT VFR BLD CALC: 44.7 % — SIGNIFICANT CHANGE UP (ref 39–50)
HGB BLD-MCNC: 14.6 G/DL — SIGNIFICANT CHANGE UP (ref 13–17)
IMM GRANULOCYTES NFR BLD AUTO: 5.1 % — HIGH (ref 0–0.9)
INR BLD: 2.93 RATIO — HIGH (ref 0.88–1.16)
LIDOCAIN IGE QN: 505 U/L — HIGH (ref 73–393)
LYMPHOCYTES # BLD AUTO: 0.33 K/UL — LOW (ref 1–3.3)
LYMPHOCYTES # BLD AUTO: 5.2 % — LOW (ref 13–44)
MCHC RBC-ENTMCNC: 32.1 PG — SIGNIFICANT CHANGE UP (ref 27–34)
MCHC RBC-ENTMCNC: 32.7 GM/DL — SIGNIFICANT CHANGE UP (ref 32–36)
MCV RBC AUTO: 98.2 FL — SIGNIFICANT CHANGE UP (ref 80–100)
MONOCYTES # BLD AUTO: 0.27 K/UL — SIGNIFICANT CHANGE UP (ref 0–0.9)
MONOCYTES NFR BLD AUTO: 4.3 % — SIGNIFICANT CHANGE UP (ref 2–14)
NEUTROPHILS # BLD AUTO: 5.35 K/UL — SIGNIFICANT CHANGE UP (ref 1.8–7.4)
NEUTROPHILS NFR BLD AUTO: 84.8 % — HIGH (ref 43–77)
NRBC # BLD: 0 /100 WBCS — SIGNIFICANT CHANGE UP (ref 0–0)
PLATELET # BLD AUTO: 176 K/UL — SIGNIFICANT CHANGE UP (ref 150–400)
POTASSIUM SERPL-MCNC: 4.3 MMOL/L — SIGNIFICANT CHANGE UP (ref 3.5–5.3)
POTASSIUM SERPL-SCNC: 4.3 MMOL/L — SIGNIFICANT CHANGE UP (ref 3.5–5.3)
PROT SERPL-MCNC: 6.1 G/DL — SIGNIFICANT CHANGE UP (ref 6–8.3)
PROTHROM AB SERPL-ACNC: 34.7 SEC — HIGH (ref 10.5–13.4)
RBC # BLD: 4.55 M/UL — SIGNIFICANT CHANGE UP (ref 4.2–5.8)
RBC # FLD: 14.5 % — SIGNIFICANT CHANGE UP (ref 10.3–14.5)
SODIUM SERPL-SCNC: 141 MMOL/L — SIGNIFICANT CHANGE UP (ref 135–145)
TSH SERPL-MCNC: 0.22 UIU/ML — LOW (ref 0.36–3.74)
WBC # BLD: 6.31 K/UL — SIGNIFICANT CHANGE UP (ref 3.8–10.5)
WBC # FLD AUTO: 6.31 K/UL — SIGNIFICANT CHANGE UP (ref 3.8–10.5)

## 2023-01-02 PROCEDURE — 80076 HEPATIC FUNCTION PANEL: CPT

## 2023-01-02 PROCEDURE — 82962 GLUCOSE BLOOD TEST: CPT

## 2023-01-02 PROCEDURE — 85025 COMPLETE CBC W/AUTO DIFF WBC: CPT

## 2023-01-02 PROCEDURE — 85027 COMPLETE CBC AUTOMATED: CPT

## 2023-01-02 PROCEDURE — 86038 ANTINUCLEAR ANTIBODIES: CPT

## 2023-01-02 PROCEDURE — 83690 ASSAY OF LIPASE: CPT

## 2023-01-02 PROCEDURE — 74177 CT ABD & PELVIS W/CONTRAST: CPT

## 2023-01-02 PROCEDURE — 82150 ASSAY OF AMYLASE: CPT

## 2023-01-02 PROCEDURE — 82565 ASSAY OF CREATININE: CPT

## 2023-01-02 PROCEDURE — 85610 PROTHROMBIN TIME: CPT

## 2023-01-02 PROCEDURE — 97161 PT EVAL LOW COMPLEX 20 MIN: CPT

## 2023-01-02 PROCEDURE — 82248 BILIRUBIN DIRECT: CPT

## 2023-01-02 PROCEDURE — 36415 COLL VENOUS BLD VENIPUNCTURE: CPT

## 2023-01-02 PROCEDURE — 84145 PROCALCITONIN (PCT): CPT

## 2023-01-02 PROCEDURE — 71260 CT THORAX DX C+: CPT

## 2023-01-02 PROCEDURE — 80053 COMPREHEN METABOLIC PANEL: CPT

## 2023-01-02 PROCEDURE — 84443 ASSAY THYROID STIM HORMONE: CPT

## 2023-01-02 PROCEDURE — 82728 ASSAY OF FERRITIN: CPT

## 2023-01-02 RX ADMIN — AMIODARONE HYDROCHLORIDE 200 MILLIGRAM(S): 400 TABLET ORAL at 05:45

## 2023-01-02 RX ADMIN — PANTOPRAZOLE SODIUM 40 MILLIGRAM(S): 20 TABLET, DELAYED RELEASE ORAL at 05:45

## 2023-01-02 RX ADMIN — Medication 1 TABLET(S): at 08:39

## 2023-01-02 RX ADMIN — Medication 6 MILLIGRAM(S): at 05:45

## 2023-01-02 NOTE — PROGRESS NOTE ADULT - PROBLEM SELECTOR PROBLEM 1
Pneumonia due to COVID-19 virus

## 2023-01-02 NOTE — PROGRESS NOTE ADULT - PROBLEM SELECTOR PLAN 1
remdesvir, o2, nebs prn, pulmonary and ID f up
remdesvir, steroids o2, nebs prn, pulmonary and ID f up
remdesvir, o2, nebs prn, pulmonary and ID f up
remdesvir, steroids o2, nebs prn, pulmonary and ID f up

## 2023-01-02 NOTE — PROGRESS NOTE ADULT - ASSESSMENT
81 y/o bedbound man with PMH of  hx drop foot  Afib, CHF and h/o prostate CA brought in by EMS for fever x3 days.  Per EMS report patient's aide had been sick with a fever a few days prior to patient getting sick.  EMS relates patient was hypoxic when they first arrived at his home but he improved with nasal cannula.  Patient confused very poor historian unable to provide reliable history no further history available.   In ED afebrile, had hypoxia, mild resp distress , and was covid positive with rt lung consolidation on x ray   received steroids, had cardio and pulmonary consult and admitted for further management for   weakness fever and ac resp distress with hypoxia due to Covid !9 pneumonia- started on remdesvir and steroids, pulmonary and ID consult  Ch afib- rate controlled and on coumadin - cardio consult noted  Ch HF  h/o prostate ca  elevated lft - transaminitis with viral infection   ID pulmonary consult noted  remdesivir x5 day course - end 12/30  Abx coverage w/ ceftriaxone 1gm q24h (started 12/27) for possible superimposed bacterial PNA,  stopped - ID f up noted  weaned oxygen       wife also covid positive   DC plan      
83 y/o bedbound man with PMH of  hx drop foot  Afib, CHF and h/o prostate CA brought in by EMS for fever x3 days.  Per EMS report patient's aide had been sick with a fever a few days prior to patient getting sick.  EMS relates patient was hypoxic when they first arrived at his home but he improved with nasal cannula.  Patient confused very poor historian unable to provide reliable history no further history available.   In ED afebrile, had hypoxia, mild resp distress , and was covid positive with rt lung consolidation on x ray   received steroids, had cardio and pulmonary consult and admitted for further management for   weakness fever and ac resp distress with hypoxia due to Covid !9 pneumonia- started on remdesvir and steroids, pulmonary and ID consult  Ch afib- rate controlled and on coumadin - cardio consult noted  Ch HF  h/o prostate ca  elevated lft - transaminitis with viral infection   ID pulmonary consult noted  continue ceftriaxone 1g Q24h  remdesivir x5 day course - end 12/30     wife also covid positive      
Pt is a 82W w/ PMHx of Afib, CHF and h/o prostate CA BIBEMS for fever x3 days prior to adm and now COVID+    COVID+/- superimposed bacterial PNA  AHRF on NC  Afib  - imaging w/ RLL consolidation  - COVID+/RVP negative  Plan:   -C/w remdesivir x5 day course 12/29 as last day  -Steroids per pulm  -Abx coverage w/ ceftriaxone 1gm q24h (started 12/27) for possible superimposed bacterial PNA,   --wean oxygen as tolerated   obs off abx and look at dc planning    Thank you for consulting us and involving us in the management of this most interesting and challenging case.  We will follow along in the care of this patient. Please call us at [phone] or text me directly on my cell# at [phone] with any concerns.    
The patient is an 82 year old male with a history of chronic diastolic heart failure, paroxysmal atrial fibrillation, prostate cancer, dementia who is admitted with fevers and hypoxia in the setting of COVID.    Plan:  - Continue amiodarone 200 mg daily  - Continue digoxin 0.125 mg daily  - Continue metoprolol succinate 25 mg daily  - Continue warfarin with INR goal 2-3  - Completed remdesivir and ceftriaxone  - ID and pulmonary follow-up
The patient is an 82 year old male with a history of chronic diastolic heart failure, paroxysmal atrial fibrillation, prostate cancer, dementia who is admitted with fevers and hypoxia in the setting of COVID.    Plan:  - Continue amiodarone 200 mg daily  - Continue digoxin 0.125 mg daily  - Continue metoprolol succinate 25 mg daily  - Continue warfarin with INR goal 2-3  - On remdesivir and ceftriaxone  - ID and pulmonary follow-up
The patient is an 82 year old male with a history of chronic diastolic heart failure, paroxysmal atrial fibrillation, prostate cancer, dementia who is admitted with fevers and hypoxia in the setting of COVID.    Plan:  - Continue amiodarone 200 mg daily  - Continue digoxin 0.125 mg daily  - Continue metoprolol succinate 25 mg daily  - Continue warfarin with INR goal 2-3  - On remdesivir and ceftriaxone  - ID and pulmonary follow-up
covid positive  acute pancreatitis  abd pain  gerd    plan  gerd precautions w/PO intake  ppi once a day, may increase to twice a day   maalox as needed   will monitor clinically and if no improvement may need EGD  diet as tolerated  covid isolation  check imaging us/ct scan ordered  ivf aggressively  ppi once a day  serial lft and amylase and lipase and r/o other causes of pancreatitis autoimmune    Advanced care planning was discussed with patient and family.  Advanced care planning forms were reviewed and discussed.  Risks, benefits and alternatives of gastroenterologic procedures were discussed in detail and all questions were answered.    30 minutes spent.    
pt with fever - weakness - cough - viral syndrome -     covid  obesity  viral syndrome  weakness  AF  scoliosis  OP  OA      s/p remdesivir  on decadron  covid isolation  cough rx regimen prn  acap prn  wean fio2 -   keep sat > 88 pct  cardio follow up noted, [facility] rx regimen, on AMIO for AF  on AC - VKA - for AF - INR serially  nutrition  assist with needs - ADL  oral hygiene - skin care  s/p ABX for PNA - superimposed - ID eval and follow up reviewed  
pt with fever - weakness - cough - viral syndrome -     covid  obesity  viral syndrome  weakness  AF  scoliosis  OP  OA    cm follow up  dc planning    s/p remdesivir  on decadron  covid isolation  cough rx regimen prn  acap prn  wean fio2 -   keep sat > 88 pct  cardio follow up noted, [facility] rx regimen, on AMIO for AF  on AC - VKA - for AF - INR serially  nutrition  assist with needs - ADL  oral hygiene - skin care  s/p ABX for PNA - superimposed - ID eval and follow up reviewed
Pt is a 82W w/ PMHx of Afib, CHF and h/o prostate CA BIBEMS for fever x3 days prior to adm and now COVID+    COVID+/- superimposed bacterial PNA  AHRF on NC  Afib  - imaging w/ RLL consolidation  - COVID+/RVP negative  Plan:   -C/w remdesivir x5 day course  -Steroids per pulm  -Abx coverage w/ ceftriaxone 1gm q24h for possible superimposed bacterial PNA  --wean oxygen as tolerated   will check procal/ferritin and cbc w diff    Thank you for consulting us and involving us in the management of this most interesting and challenging case.  We will follow along in the care of this patient. Please call us at [phone] or text me directly on my cell# at [phone] with any concerns.    
The patient is an 82 year old male with a history of chronic diastolic heart failure, paroxysmal atrial fibrillation, prostate cancer, dementia who is admitted with fevers and hypoxia in the setting of COVID.    Plan:  - Continue amiodarone 200 mg daily  - Continue digoxin 0.125 mg daily  - Continue metoprolol succinate 25 mg daily  - Continue warfarin with INR goal 2-3  - Completed remdesivir and ceftriaxone  - ID and pulmonary follow-up
The patient is an 82 year old male with a history of chronic diastolic heart failure, paroxysmal atrial fibrillation, prostate cancer, dementia who is admitted with fevers and hypoxia in the setting of COVID.    Plan:  - Continue amiodarone 200 mg daily  - Continue digoxin 0.125 mg daily  - Continue metoprolol succinate 25 mg daily  - Continue warfarin with INR goal 2-3  - Completed remdesivir and ceftriaxone  - ID and pulmonary follow-up
covid positive  acute pancreatitis  abd pain  gerd    Suspected acute pancreatitis due to elevated lipase  CT noted; no pancreatitis seen and lipase now trending down  Clinically improved with no abdominal pain  Diet as tolerated  PPI/maalox  No GI objection to d/c plan  D/w aid at bedside  D/w primary team    I reviewed the overnight course of events on the unit, re-confirming the patient history. I discussed the care with the patient and their family  Differential diagnosis and plan of care discussed with patient after the evaluation  35 minutes spent on total encounter of which more than fifty percent of the encounter was spent counseling and/or coordinating care by the attending physician.  Advanced care planning was discussed with patient and family.  Advanced care planning forms were reviewed and discussed.  Risks, benefits and alternatives of gastroenterologic procedures were discussed in detail and all questions were answered.
pt with fever - weakness - cough - viral syndrome -     covid  obesity  viral syndrome  weakness  AF  scoliosis  OP  OA    cm follow up  dc planning  VS noted    s/p remdesivir  on decadron  covid isolation  cough rx regimen prn  acap prn  wean fio2 -   keep sat > 88 pct  cardio follow up noted, [facility] rx regimen, on AMIO for AF  on AC - VKA - for AF - INR serially  nutrition  assist with needs - ADL  oral hygiene - skin care  s/p ABX for PNA - superimposed - ID eval and follow up reviewed
83y/o seen at [facility]  History from chart  History dementia, PAF, heart failure, prostate cancer    Admitted for fever and hypoxia  Found to be COVID-19 positive  CXR-with right lower lobe consolidation    12/28/22  Seen at [facility] telemetry    Impression  COVID-19  Right lower lobe pneumonia  History PAF    Plan:  - Continue Amiodarone-200mg OD                  Dig-0.125mg OD                  Metoprolol succinate-25mg OD  - Continue coumadin with INR goal 2-3  - Lanoxin level-pending  - Pan culture  - On Remdesivir  - On Ceftriaxone  - On Decadron  - ID, pulmonary follow-up
Pt is a 82W w/ PMHx of Afib, CHF and h/o prostate CA BIBEMS for fever x3 days prior to adm and now COVID+    COVID+/- superimposed bacterial PNA  AHRF on NC  Afib  - imaging w/ RLL consolidation  - COVID+/RVP negative  Plan:   -C/w remdesivir x5 day course 12/29 TODAY as last day  -Steroids per pulm  -Abx coverage w/ ceftriaxone 1gm q24h (started 12/27) for possible superimposed bacterial PNA, low suspicion so may able to limit course of IV  --wean oxygen as tolerated   will follow results of procal/ferritin ratio    Thank you for consulting us and involving us in the management of this most interesting and challenging case.  We will follow along in the care of this patient. Please call us at [phone] or text me directly on my cell# at [phone] with any concerns.    
pt with fever - weakness - cough - viral syndrome -     covid  obesity  viral syndrome  weakness  AF  scoliosis  OP  OA    on RA  vs noted  labs reviewed  serial INR  Cardio follow up noted this am    s/p remdesivir  on decadron  covid isolation  cough rx regimen prn  acap prn  wean fio2 -   keep sat > 88 pct  cardio follow up noted, [facility] rx regimen, on AMIO for AF  on AC - VKA - for AF - INR serially  nutrition  assist with needs - ADL  oral hygiene - skin care  s/p ABX for PNA - superimposed - ID eval and follow up reviewed
83 y/o bedbound man with PMH of  hx drop foot  Afib, CHF and h/o prostate CA brought in by EMS for fever x3 days.  Per EMS report patient's aide had been sick with a fever a few days prior to patient getting sick.  EMS relates patient was hypoxic when they first arrived at his home but he improved with nasal cannula.  Patient confused very poor historian unable to provide reliable history no further history available.   In ED afebrile, had hypoxia, mild resp distress , and was covid positive with rt lung consolidation on x ray   received steroids, had cardio and pulmonary consult and admitted for further management for   weakness fever and ac resp distress with hypoxia due to Covid !9 pneumonia- started on remdesvir and steroids, pulmonary and ID consult  Ch afib- rate controlled and on coumadin - cardio consult noted  Ch HF  h/o prostate ca  elevated lft - transaminitis with viral infection   ID pulmonary consult noted  remdesivir x5 day course - end 12/30  Abx coverage w/ ceftriaxone 1gm q24h (started 12/27) for possible superimposed bacterial PNA,  stopped - ID f up noted  weaned oxygen    abd pain with constipation - senna , miralax , Gi consult  pain resolved  mildly elevated lipase - CT abd done no pathology- pancreatitis ruled out  d/w GI - stable for discharge     wife also covid positive   DC plan  d/w wife tory and daughter Nadine .    
83 y/o bedbound man with PMH of  hx drop foot  Afib, CHF and h/o prostate CA brought in by EMS for fever x3 days.  Per EMS report patient's aide had been sick with a fever a few days prior to patient getting sick.  EMS relates patient was hypoxic when they first arrived at his home but he improved with nasal cannula.  Patient confused very poor historian unable to provide reliable history no further history available.   In ED afebrile, had hypoxia, mild resp distress , and was covid positive with rt lung consolidation on x ray   received steroids, had cardio and pulmonary consult and admitted for further management for   weakness fever and ac resp distress with hypoxia due to Covid !9 pneumonia- started on remdesvir and steroids, pulmonary and ID consult  Ch afib- rate controlled and on coumadin - cardio consult noted  Ch HF  h/o prostate ca  elevated lft - transaminitis with viral infection   ID pulmonary consult noted  remdesivir x5 day course - end 12/30  Abx coverage w/ ceftriaxone 1gm q24h (started 12/27) for possible superimposed bacterial PNA,  stopped - ID f up noted  weaned oxygen    abd pain with constipation - senna , miralax , observe     wife also covid positive   DC plan  d/w wife tory and daughter Nadine .    
81 y/o bedbound man with PMH of  hx drop foot  Afib, CHF and h/o prostate CA brought in by EMS for fever x3 days.  Per EMS report patient's aide had been sick with a fever a few days prior to patient getting sick.  EMS relates patient was hypoxic when they first arrived at his home but he improved with nasal cannula.  Patient confused very poor historian unable to provide reliable history no further history available.   In ED afebrile, had hypoxia, mild resp distress , and was covid positive with rt lung consolidation on x ray   received steroids, had cardio and pulmonary consult and admitted for further management for   weakness fever and ac resp distress with hypoxia due to Covid !9 pneumonia- started on remdesvir and steroids, pulmonary and ID consult  Ch afib- rate controlled and on coumadin - cardio consult noted  Ch HF  h/o prostate ca  elevated lft - transaminitis with viral infection   ID pulmonary consult noted  continue ceftriaxone 1g Q24h  remdesivir x5 day course - end 12/30  Steroids per pulm  Abx coverage w/ ceftriaxone 1gm q24h (started 12/27) for possible superimposed bacterial PNA  wean oxygen as tolerated      wife also covid positive      
81 y/o bedbound man with PMH of  hx drop foot  Afib, CHF and h/o prostate CA brought in by EMS for fever x3 days.  Per EMS report patient's aide had been sick with a fever a few days prior to patient getting sick.  EMS relates patient was hypoxic when they first arrived at his home but he improved with nasal cannula.  Patient confused very poor historian unable to provide reliable history no further history available.   In ED afebrile, had hypoxia, mild resp distress , and was covid positive with rt lung consolidation on x ray   received steroids, had cardio and pulmonary consult and admitted for further management for   weakness fever and ac resp distress with hypoxia due to Covid !9 pneumonia- started on remdesvir and steroids, pulmonary and ID consult  Ch afib- rate controlled and on coumadin - cardio consult noted  Ch HF  h/o prostate ca  elevated lft - transaminitis with viral infection   ID pulmonary consult noted  remdesivir x5 day course - end 12/30  Abx coverage w/ ceftriaxone 1gm q24h (started 12/27) for possible superimposed bacterial PNA,  stopped - ID f up noted  weaned oxygen    abd pain with constipation - senna , miralax , observe     wife also covid positive   DC plan

## 2023-01-02 NOTE — PROGRESS NOTE ADULT - PROBLEM SELECTOR PLAN 2
rate control and AC

## 2023-01-02 NOTE — DISCHARGE NOTE NURSING/CASE MANAGEMENT/SOCIAL WORK - PATIENT PORTAL LINK FT
You can access the FollowMyHealth Patient Portal offered by [facility] by registering at the following website: [email or web address]. By joining [facility]’s FollowMyHealth portal, you will also be able to view your health information using other applications (apps) compatible with our system.

## 2023-01-02 NOTE — PROGRESS NOTE ADULT - PROBLEM SELECTOR PLAN 3
cont cardiac meds

## 2023-01-02 NOTE — PROGRESS NOTE ADULT - SUBJECTIVE AND OBJECTIVE BOX
[facility]  Garcia Salinas PA-C  [address] [place] [address]  [phone]      INTERVAL HPI/OVERNIGHT EVENTS:  Pt was seen with aid at bedside prior to discharge  He reported feeling well with no abdominal pain  Tolerated diet  Otherwise denied GI complaints    MEDICATIONS  (STANDING):  allopurinol 300 milliGRAM(s) Oral daily  aMIOdarone    Tablet 200 milliGRAM(s) Oral daily  barium sulfate 2% Suspension 450 milliLiter(s) Oral <User Schedule>  dexAMETHasone     Tablet 6 milliGRAM(s) Oral daily  lactobacillus acidophilus 1 Tablet(s) Oral two times a day with meals  latanoprost 0.005% Ophthalmic Solution 1 Drop(s) Both EYES at bedtime  metoprolol succinate ER 25 milliGRAM(s) Oral daily  pantoprazole    Tablet 40 milliGRAM(s) Oral before breakfast  Simbrinza: Brinzolamide 1% and brimonidine tartrate 0.2% 1 Drop(s) 1 Drop(s) Both EYES two times a day  tamsulosin 0.4 milliGRAM(s) Oral at bedtime    MEDICATIONS  (PRN):  acetaminophen     Tablet .. 650 milliGRAM(s) Oral every 8 hours PRN Temp greater or equal to 38C (100.4F), Moderate Pain (4 - 6)  senna 2 Tablet(s) Oral at bedtime PRN Constipation      Allergies    No Known Allergies      PHYSICAL EXAM:   Vital Signs:  Vital Signs Last 24 Hrs  T(C): 36.9 (02 Jan 2023 10:15), Max: 36.9 (02 Jan 2023 10:15)  T(F): 98.4 (02 Jan 2023 10:15), Max: 98.4 (02 Jan 2023 10:15)  HR: 57 (02 Jan 2023 10:15) (50 - 68)  BP: 125/68 (02 Jan 2023 10:15) (124/68 - 149/73)  BP(mean): --  RR: 18 (02 Jan 2023 10:15) (18 - 21)  SpO2: 95% (02 Jan 2023 10:15) (94% - 96%)    Parameters below as of 02 Jan 2023 10:15  Patient On (Oxygen Delivery Method): room air    GENERAL:  Appears stated age  ABDOMEN:  Soft, non-tender, non-distended  NEURO:  Alert      LABS:                        14.6   6.31  )-----------( 176      ( 02 Jan 2023 09:10 )             44.7     01-02    141  |  105  |  37<H>  ----------------------------<  122<H>  4.3   |  30  |  1.20    Ca    8.4<L>      02 Jan 2023 09:10    TPro  6.1  /  Alb  2.9<L>  /  TBili  1.6<H>  /  DBili  0.5<H>  /  AST  17  /  ALT  99<H>  /  AlkPhos  56  01-02    PT/INR - ( 02 Jan 2023 09:10 )   PT: 34.7 sec;   INR: 2.93 ratio       IMAGING:  < from: CT Abdomen and Pelvis w/ Oral Cont and w/ IV Cont (01.01.23 @ 22:00) >    ACC: [id number] EXAM:  CT ABDOMEN AND PELVIS OC IC                        ACC: [id number] EXAM:  CT CHEST OC IC                          PROCEDURE DATE:  01/01/2023          INTERPRETATION:  CLINICAL INFORMATION: abd pain Admitting Dxs: U07.1   COVID-19 PCT    COMPARISON: 4.29.22.    CONTRAST/COMPLICATIONS:  IV Contrast: Omnipaque 350 (accession [id number]), IV contrast documented   in unlinked concurrent exam (accession [id number])  90 cc administered   (accession [id number]), 0 cc administered (accession [id number])   10 cc   discarded (accession [id number]), 0 cc discarded (accession [id number])  Oral Contrast: Smoothie Readi-Cat 2  Complications: None reported at time of study completion    PROCEDURE:  CT of the Chest, Abdomen and Pelvis was performed.  Sagittal and coronal reformats were performed.    FINDINGS:  CHEST:  LUNGS AND LARGE AIRWAYS: Patent central airways. No pulmonary nodules.    Scattered subsegmental atelectasis.  PLEURA: Small pleural effusions.  VESSELS: Within normal limits.  HEART: Cardiomegaly. No pericardial effusion. Mitral annuloplasty.  MEDIASTINUM AND SAVANAH: No lymphadenopathy.  CHEST WALL AND LOWER NECK: Multinodular thyroid. Gynecomastia. Right rib   fixation plate is noted.    ABDOMEN AND PELVIS:  LIVER: Within normal limits.  BILE DUCTS: Normal caliber.  GALLBLADDER: Status post cholecystectomy.  SPLEEN: Within normal limits.  PANCREAS: Within normal limits.  ADRENALS: Within normal limits.  KIDNEYS/URETERS: Cysts.    BLADDER: Trabeculated bladder contour with diverticula.  REPRODUCTIVE ORGANS: The prostate is enlarged.    BOWEL: No bowel obstruction. Unchanged appearance of prominent appendix.  PERITONEUM: No ascites.  VESSELS:  Within normal limits.  RETROPERITONEUM/LYMPH NODES: No lymphadenopathy.  ABDOMINAL WALL: Small inguinal hernias.  BONES: Chronic L1 fracture again noted.    IMPRESSION: No acute abnormality.        --- End of Report ---            NIXON PENNINGTON MD; Attending Radiologist  This document has been electronically signed. Jan 2 2023  8:55AM    < end of copied text >  
Chief Complaint: Fever, hypoxia    Interval Events: No events overnight.    Review of Systems:  Unable to obtain    Physical Exam:  Vital Signs Last 24 Hrs  T(C): 36.9 (31 Dec 2022 04:50), Max: 36.9 (31 Dec 2022 04:50)  T(F): 98.5 (31 Dec 2022 04:50), Max: 98.5 (31 Dec 2022 04:50)  HR: 60 (31 Dec 2022 04:50) (53 - 61)  BP: 158/83 (31 Dec 2022 04:50) (140/72 - 158/83)  BP(mean): --  RR: 18 (31 Dec 2022 04:50) (18 - 18)  SpO2: 93% (31 Dec 2022 04:50) (91% - 96%)  Parameters below as of 31 Dec 2022 04:50  Patient On (Oxygen Delivery Method): room air  General: NAD  HEENT: MMM  Neck: No JVD, no carotid bruit  Lungs: CTAB  CV: RRR, nl S1/S2, no M/R/G  Abdomen: S/NT/ND, +BS  Extremities: No LE edema, no cyanosis  Neuro: AAOx2  Skin: No rash    Labs:             12-30    146<H>  |  110<H>  |  43<H>  ----------------------------<  117<H>  4.7   |  31  |  1.30    Ca    8.5      30 Dec 2022 09:10    TPro  6.3  /  Alb  2.9<L>  /  TBili  0.9  /  DBili  0.3  /  AST  53<H>  /  ALT  177<H>  /  AlkPhos  62  12-30                        14.0   3.35  )-----------( 132      ( 30 Dec 2022 09:10 )             44.6       ECG/Telemetry: Sinus rhythm
Chief Complaint: Fever, hypoxia    Interval Events: No events overnight.    Review of Systems:  Unable to obtain    Physical Exam:  Vital Signs Last 24 Hrs  T(C): 37.1 (01 Jan 2023 05:08), Max: 37.1 (01 Jan 2023 05:08)  T(F): 98.7 (01 Jan 2023 05:08), Max: 98.7 (01 Jan 2023 05:08)  HR: 57 (01 Jan 2023 05:08) (51 - 57)  BP: 135/63 (01 Jan 2023 05:08) (135/63 - 158/78)  BP(mean): --  RR: 18 (01 Jan 2023 05:08) (18 - 18)  SpO2: 94% (01 Jan 2023 05:08) (92% - 98%)  Parameters below as of 01 Jan 2023 05:08  Patient On (Oxygen Delivery Method): room air  General: NAD  HEENT: MMM  Neck: No JVD, no carotid bruit  Lungs: CTAB  CV: RRR, nl S1/S2, no M/R/G  Abdomen: S/NT/ND, +BS  Extremities: No LE edema, no cyanosis  Neuro: AAOx2  Skin: No rash    Labs:             12-31    142  |  106  |  48<H>  ----------------------------<  207<H>  4.4   |  28  |  1.30    Ca    8.4<L>      31 Dec 2022 12:15    TPro  6.1  /  Alb  2.8<L>  /  TBili  1.1  /  DBili  x   /  AST  33  /  ALT  140<H>  /  AlkPhos  62  12-31                        14.0   5.05  )-----------( 166      ( 31 Dec 2022 12:15 )             43.3       ECG/Telemetry: Sinus rhythm
Date/Time Patient Seen:  		  Referring MD:   Data Reviewed	       Patient is a 82y old  Male who presents with a chief complaint of sob (30 Dec 2022 15:21)      Subjective/HPI     PAST MEDICAL & SURGICAL HISTORY:  Afib    Prostate cancer    Heart failure    Heart failure    History of scoliosis    No significant past surgical history    No significant past surgical history    S/P anal fissurectomy          Medication list         MEDICATIONS  (STANDING):  allopurinol 300 milliGRAM(s) Oral daily  aMIOdarone    Tablet 200 milliGRAM(s) Oral daily  dexAMETHasone     Tablet 6 milliGRAM(s) Oral daily  lactobacillus acidophilus 1 Tablet(s) Oral two times a day with meals  latanoprost 0.005% Ophthalmic Solution 1 Drop(s) Both EYES at bedtime  metoprolol succinate ER 25 milliGRAM(s) Oral daily  pantoprazole    Tablet 40 milliGRAM(s) Oral before breakfast  Simbrinza: Brinzolamide 1% and brimonidine tartrate 0.2% 1 Drop(s) 1 Drop(s) Both EYES two times a day  tamsulosin 0.4 milliGRAM(s) Oral at bedtime  warfarin 2 milliGRAM(s) Oral once    MEDICATIONS  (PRN):  acetaminophen     Tablet .. 650 milliGRAM(s) Oral every 8 hours PRN Temp greater or equal to 38C (100.4F), Moderate Pain (4 - 6)  senna 2 Tablet(s) Oral at bedtime PRN Constipation         Vitals log        ICU Vital Signs Last 24 Hrs  T(C): 36.9 (31 Dec 2022 04:50), Max: 36.9 (31 Dec 2022 04:50)  T(F): 98.5 (31 Dec 2022 04:50), Max: 98.5 (31 Dec 2022 04:50)  HR: 60 (31 Dec 2022 04:50) (53 - 61)  BP: 158/83 (31 Dec 2022 04:50) (140/72 - 158/83)  BP(mean): --  ABP: --  ABP(mean): --  RR: 18 (31 Dec 2022 04:50) (18 - 18)  SpO2: 93% (31 Dec 2022 04:50) (91% - 96%)    O2 Parameters below as of 31 Dec 2022 04:50  Patient On (Oxygen Delivery Method): room air                 Input and Output:  I&O's Detail      Lab Data                        14.0   3.35  )-----------( 132      ( 30 Dec 2022 09:10 )             44.6     12-31    x   |  x   |  x   ----------------------------<  x   x    |  x   |  1.30    Ca    8.5      30 Dec 2022 09:10    TPro  5.7<L>  /  Alb  2.8<L>  /  TBili  1.2  /  DBili  0.3  /  AST  37  /  ALT  141<H>  /  AlkPhos  58  12-31            Review of Systems	      Objective     Physical Examination    heart s1s2  lung dec BS  head nc      Pertinent Lab findings & Imaging      Nghia:  NO   Adequate UO     I&O's Detail           Discussed with:     Cultures:	        Radiology                            
Patient is a 82y old  Male who presents with a chief complaint of gerd (31 Dec 2022 14:27)      INTERVAL HPI/OVERNIGHT EVENTS:overnight events noted    Home Medications:  allopurinol 300 mg oral tablet: 1 tab(s) orally once a day (30 Dec 2022 17:17)  amiodarone 200 mg oral tablet: 1 tab(s) orally once a day (30 Dec 2022 17:17)  latanoprost 0.005% ophthalmic solution: 1 drop(s) to each affected eye once a day (at bedtime) (30 Dec 2022 17:17)  metoprolol succinate 25 mg oral tablet, extended release: 1 tab(s) orally once a day (30 Dec 2022 17:17)  multivitamin: 1 tab(s) orally once a day (30 Dec 2022 17:17)  pantoprazole 40 mg oral delayed release tablet: 1 tab(s) orally once a day (before a meal) (30 Dec 2022 17:17)  Simbrinza 1%- 0.2% ophthalmic suspension: 1 drop(s) to each affected eye 2 times a day (30 Dec 2022 17:17)  tamsulosin 0.4 mg oral capsule: 1 cap(s) orally once a day (at bedtime) (30 Dec 2022 17:17)  warfarin 2 mg oral tablet: 1 tab(s) orally once (30 Dec 2022 17:17)      MEDICATIONS  (STANDING):  allopurinol 300 milliGRAM(s) Oral daily  aMIOdarone    Tablet 200 milliGRAM(s) Oral daily  dexAMETHasone     Tablet 6 milliGRAM(s) Oral daily  lactobacillus acidophilus 1 Tablet(s) Oral two times a day with meals  latanoprost 0.005% Ophthalmic Solution 1 Drop(s) Both EYES at bedtime  metoprolol succinate ER 25 milliGRAM(s) Oral daily  pantoprazole    Tablet 40 milliGRAM(s) Oral before breakfast  Simbrinza: Brinzolamide 1% and brimonidine tartrate 0.2% 1 Drop(s) 1 Drop(s) Both EYES two times a day  tamsulosin 0.4 milliGRAM(s) Oral at bedtime  warfarin 2 milliGRAM(s) Oral once    MEDICATIONS  (PRN):  acetaminophen     Tablet .. 650 milliGRAM(s) Oral every 8 hours PRN Temp greater or equal to 38C (100.4F), Moderate Pain (4 - 6)  senna 2 Tablet(s) Oral at bedtime PRN Constipation      Allergies    No Known Allergies    Intolerances        REVIEW OF SYSTEMS:  CONSTITUTIONAL: No fever, weight loss, has fatigue  EYES: No eye pain, visual disturbances, or discharge  ENMT:  No difficulty hearing, tinnitus, vertigo; No sinus or throat pain  NECK: No pain or stiffness  BREASTS: No pain, masses, or nipple discharge  RESPIRATORY: No cough, wheezing, chills or hemoptysis; No shortness of breath  CARDIOVASCULAR: No chest pain, palpitations, dizziness, or leg swelling  GASTROINTESTINAL: No abdominal or epigastric pain. No nausea, vomiting,   GENITOURINARY: No dysuria, frequency, hematuria, or incontinence  NEUROLOGICAL: No headaches, memory loss, loss of strength, numbness, or tremors  SKIN: No itching, burning, rashes, or lesions     Vital Signs Last 24 Hrs  T(C): 37.1 (01 Jan 2023 05:08), Max: 37.1 (01 Jan 2023 05:08)  T(F): 98.7 (01 Jan 2023 05:08), Max: 98.7 (01 Jan 2023 05:08)  HR: 57 (01 Jan 2023 05:08) (51 - 57)  BP: 135/63 (01 Jan 2023 05:08) (135/63 - 158/78)  BP(mean): --  RR: 18 (01 Jan 2023 05:08) (18 - 18)  SpO2: 94% (01 Jan 2023 05:08) (94% - 98%)    Parameters below as of 01 Jan 2023 05:08  Patient On (Oxygen Delivery Method): room air        PHYSICAL EXAM:  GENERAL: NAD, well-groomed, well-developed  HEAD:  Atraumatic, Normocephalic  EYES: EOMI, PERRLA, conjunctiva and sclera clear  ENMT: Moist mucous membranes,   NECK: Supple, No JVD, Normal thyroid  NERVOUS SYSTEM:  Alert & Oriented X3, Good concentration; Motor Strength 5/5 B/L upper and lower extremities; DTRs 2+ intact and symmetric  CHEST/LUNG: Clear to percussion bilaterally; No rales, rhonchi, wheezing, or rubs  HEART: Regular rate and rhythm; No murmurs, rubs, or gallops  ABDOMEN: Soft, Nontender, Nondistended; Bowel sounds present  EXTREMITIES:  2+ Peripheral Pulses, No clubbing, cyanosis, or edema  LYMPH: No lymphadenopathy noted  SKIN: No rashes or lesions    LABS:                        14.0   5.44  )-----------( 154      ( 01 Jan 2023 08:50 )             42.9     01-01    142  |  105  |  46<H>  ----------------------------<  104<H>  4.4   |  31  |  1.20    Ca    8.5      01 Jan 2023 08:50    TPro  6.0  /  Alb  2.8<L>  /  TBili  1.6<H>  /  DBili  0.4<H>  /  AST  27  /  ALT  111<H>  /  AlkPhos  54  01-01    PT/INR - ( 01 Jan 2023 08:50 )   PT: 28.9 sec;   INR: 2.45 ratio             CAPILLARY BLOOD GLUCOSE            Culture - Urine (collected 12-26-22 @ 12:23)  Source: Clean Catch Clean Catch (Midstream)  Final Report (12-27-22 @ 20:36):    <10,000 CFU/mL Normal Urogenital Bhavna    Culture - Blood (collected 12-26-22 @ 12:23)  Source: .Blood Blood-Peripheral  Final Report (01-01-23 @ 01:00):    No Growth Final    Culture - Blood (collected 12-26-22 @ 12:23)  Source: .Blood Blood-Peripheral  Final Report (01-01-23 @ 01:00):    No Growth Final        I&O's Summary    31 Dec 2022 07:01  -  01 Jan 2023 07:00  --------------------------------------------------------  IN: 0 mL / OUT: 700 mL / NET: -700 mL    01 Jan 2023 07:01  -  01 Jan 2023 13:23  --------------------------------------------------------  IN: 0 mL / OUT: 1350 mL / NET: -1350 mL        RADIOLOGY & ADDITIONAL TESTS:    Imaging Personally Reviewed:  [x ] YES  [ ] NO    Consultant(s) Notes Reviewed:  [x ] YES  [ ] NO    Care Discussed with Consultants/Other Providers [x ] YES  [ ] NO
Patient is a 82y old  Male who presents with a chief complaint of sob (30 Dec 2022 15:21)      INTERVAL HPI/OVERNIGHT EVENTS:overnight events noted    Home Medications:  allopurinol 300 mg oral tablet: 1 tab(s) orally once a day (30 Dec 2022 17:17)  amiodarone 200 mg oral tablet: 1 tab(s) orally once a day (30 Dec 2022 17:17)  latanoprost 0.005% ophthalmic solution: 1 drop(s) to each affected eye once a day (at bedtime) (30 Dec 2022 17:17)  metoprolol succinate 25 mg oral tablet, extended release: 1 tab(s) orally once a day (30 Dec 2022 17:17)  multivitamin: 1 tab(s) orally once a day (30 Dec 2022 17:17)  pantoprazole 40 mg oral delayed release tablet: 1 tab(s) orally once a day (before a meal) (30 Dec 2022 17:17)  Simbrinza 1%- 0.2% ophthalmic suspension: 1 drop(s) to each affected eye 2 times a day (30 Dec 2022 17:17)  tamsulosin 0.4 mg oral capsule: 1 cap(s) orally once a day (at bedtime) (30 Dec 2022 17:17)  warfarin 2 mg oral tablet: 1 tab(s) orally once (30 Dec 2022 17:17)      MEDICATIONS  (STANDING):  allopurinol 300 milliGRAM(s) Oral daily  aMIOdarone    Tablet 200 milliGRAM(s) Oral daily  dexAMETHasone     Tablet 6 milliGRAM(s) Oral daily  lactobacillus acidophilus 1 Tablet(s) Oral two times a day with meals  latanoprost 0.005% Ophthalmic Solution 1 Drop(s) Both EYES at bedtime  metoprolol succinate ER 25 milliGRAM(s) Oral daily  pantoprazole    Tablet 40 milliGRAM(s) Oral before breakfast  Simbrinza: Brinzolamide 1% and brimonidine tartrate 0.2% 1 Drop(s) 1 Drop(s) Both EYES two times a day  tamsulosin 0.4 milliGRAM(s) Oral at bedtime  warfarin 2 milliGRAM(s) Oral once    MEDICATIONS  (PRN):  acetaminophen     Tablet .. 650 milliGRAM(s) Oral every 8 hours PRN Temp greater or equal to 38C (100.4F), Moderate Pain (4 - 6)  senna 2 Tablet(s) Oral at bedtime PRN Constipation      Allergies    No Known Allergies    Intolerances        REVIEW OF SYSTEMS:  CONSTITUTIONAL: No fever, weight loss, has fatigue  EYES: No eye pain, visual disturbances, or discharge  ENMT:  No difficulty hearing, tinnitus, vertigo; No sinus or throat pain  NECK: No pain or stiffness  BREASTS: No pain, masses, or nipple discharge  RESPIRATORY: No cough, wheezing, chills or hemoptysis; No shortness of breath  CARDIOVASCULAR: No chest pain, palpitations, dizziness, or leg swelling  GASTROINTESTINAL: abdominal pain. constipation  GENITOURINARY: No dysuria, frequency, hematuria, or incontinence  NEUROLOGICAL: No headaches, memory loss, loss of strength, numbness, or tremors  SKIN: No itching, burning, rashes, or lesions     Vital Signs Last 24 Hrs  T(C): 36.8 (31 Dec 2022 12:10), Max: 36.9 (31 Dec 2022 04:50)  T(F): 98.2 (31 Dec 2022 12:10), Max: 98.5 (31 Dec 2022 04:50)  HR: 56 (31 Dec 2022 12:10) (53 - 61)  BP: 157/74 (31 Dec 2022 12:10) (140/72 - 158/83)  BP(mean): --  RR: 18 (31 Dec 2022 12:10) (18 - 18)  SpO2: 92% (31 Dec 2022 12:10) (91% - 96%)    Parameters below as of 31 Dec 2022 12:10  Patient On (Oxygen Delivery Method): room air        PHYSICAL EXAM:  GENERAL: frail  HEAD:  Atraumatic, Normocephalic  EYES: EOMI, PERRLA, conjunctiva and sclera clear  ENMT: Moist mucous membranes,   NECK: Supple, No JVD, Normal thyroid  NERVOUS SYSTEM:  Alert & Oriented X3,non focal non ambulatory  CHEST/LUNG: Clear to percussion bilaterally; No rales, rhonchi, wheezing, or rubs  HEART: Regular rate and rhythm; No murmurs, rubs, or gallops  ABDOMEN: Soft, Nontender, Nondistended; Bowel sounds present    LABS:                        14.0   3.35  )-----------( 132      ( 30 Dec 2022 09:10 )             44.6     12-31    x   |  x   |  x   ----------------------------<  x   x    |  x   |  1.30    Ca    8.5      30 Dec 2022 09:10    TPro  5.7<L>  /  Alb  2.8<L>  /  TBili  1.2  /  DBili  0.3  /  AST  37  /  ALT  141<H>  /  AlkPhos  58  12-31    PT/INR - ( 31 Dec 2022 07:10 )   PT: 28.1 sec;   INR: 2.38 ratio             CAPILLARY BLOOD GLUCOSE      POCT Blood Glucose.: 97 mg/dL (31 Dec 2022 08:20)        Culture - Urine (collected 12-26-22 @ 12:23)  Source: Clean Catch Clean Catch (Midstream)  Final Report (12-27-22 @ 20:36):    <10,000 CFU/mL Normal Urogenital Bhavna    Culture - Blood (collected 12-26-22 @ 12:23)  Source: .Blood Blood-Peripheral  Preliminary Report (12-28-22 @ 01:02):    No growth to date.    Culture - Blood (collected 12-26-22 @ 12:23)  Source: .Blood Blood-Peripheral  Preliminary Report (12-28-22 @ 01:02):    No growth to date.        I&O's Summary      RADIOLOGY & ADDITIONAL TESTS:    Imaging Personally Reviewed:  [ ]x YES  [ ] NO    Consultant(s) Notes Reviewed:  [ x] YES  [ ] NO    Care Discussed with Consultants/Other Providers [ ] YES  [ ] NO
[facility]  Case Jain MD PhD, Consuelo Brower MD, Naa Gutiérrez MD, Jean Claude Yarbrough MD, Ahmet Jordan MD  and providing coverage with Tip Kennedy MD  Providing Infectious Disease Consultations at [facility], [facility], [facility], [facility]'s    Office# [phone] to schedule follow up appointments  Answering Service for urgent calls or New Consults [phone]  Cell# to text for urgent issues Case Jain [phone]     infectious diseases progress note:    LEBRON TERRY is a 82y y. o. Male patient    Overnight and events of the last 24hrs reviewed    Allergies    No Known Allergies    Intolerances        ANTIBIOTICS/RELEVANT:  antimicrobials  cefTRIAXone   IVPB 1000 milliGRAM(s) IV Intermittent every 24 hours  remdesivir  IVPB 100 milliGRAM(s) IV Intermittent every 24 hours    immunologic:    OTHER:  acetaminophen     Tablet .. 650 milliGRAM(s) Oral every 8 hours PRN  allopurinol 300 milliGRAM(s) Oral daily  dexAMETHasone     Tablet 6 milliGRAM(s) Oral daily  digoxin     Tablet 125 MICROGram(s) Oral daily  lactobacillus acidophilus 1 Tablet(s) Oral two times a day with meals  latanoprost 0.005% Ophthalmic Solution 1 Drop(s) Both EYES at bedtime  metoprolol succinate ER 25 milliGRAM(s) Oral daily  pantoprazole    Tablet 40 milliGRAM(s) Oral before breakfast  senna 2 Tablet(s) Oral at bedtime PRN  Simbrinza: Brinzolamide 1% and brimonidine tartrate 0.2% 1 Drop(s) 1 Drop(s) Both EYES two times a day  tamsulosin 0.4 milliGRAM(s) Oral at bedtime  warfarin 2 milliGRAM(s) Oral once      Objective:  Vital Signs Last 24 Hrs  T(C): 36.4 (29 Dec 2022 05:57), Max: 36.7 (28 Dec 2022 13:22)  T(F): 97.6 (29 Dec 2022 05:57), Max: 98.1 (28 Dec 2022 13:22)  HR: 55 (29 Dec 2022 05:57) (49 - 57)  BP: 153/74 (29 Dec 2022 05:57) (122/62 - 153/74)  BP(mean): --  RR: 18 (29 Dec 2022 05:57) (18 - 18)  SpO2: 98% (29 Dec 2022 05:57) (96% - 98%)    Parameters below as of 29 Dec 2022 05:57  Patient On (Oxygen Delivery Method): nasal cannula  O2 Flow (L/min): 2      T(C): 36.4 (12-29-22 @ 05:57), Max: 36.9 (12-28-22 @ 04:29)  T(C): 36.4 (12-29-22 @ 05:57), Max: 37.4 (12-26-22 @ 22:00)  T(C): 36.4 (12-29-22 @ 05:57), Max: 38.3 (12-26-22 @ 12:10)    PHYSICAL EXAM:  HEENT: NC atraumatic  Neck: supple  Respiratory: no accessory muscle use, breathing comfortably  Cardiovascular: distant  Gastrointestinal: normal appearing, nondistended  Extremities: no clubbing, no cyanosis,        LABS:                          13.1   3.53  )-----------( 133      ( 29 Dec 2022 07:20 )             41.7       WBC  3.53 12-29 @ 07:20  3.93 12-28 @ 08:05  3.32 12-27 @ 06:11  5.34 12-26 @ 12:23      12-29    x   |  x   |  x   ----------------------------<  x   x    |  x   |  1.20    Ca    8.6      28 Dec 2022 08:05    TPro  6.1  /  Alb  2.7<L>  /  TBili  0.6  /  DBili  0.3  /  AST  63<H>  /  ALT  197<H>  /  AlkPhos  61  12-29      Creatinine, Serum: 1.20 mg/dL (12-29-22 @ 07:20)  Creatinine, Serum: 1.20 mg/dL (12-28-22 @ 08:05)  Creatinine, Serum: 1.40 mg/dL (12-27-22 @ 21:36)  Creatinine, Serum: 1.39 mg/dL (12-27-22 @ 06:11)  Creatinine, Serum: 1.38 mg/dL (12-26-22 @ 16:33)  Creatinine, Serum: 0.40 mg/dL (12-26-22 @ 12:23)      PT/INR - ( 29 Dec 2022 07:20 )   PT: 26.9 sec;   INR: 2.28 ratio                   INFLAMMATORY MARKERS      MICROBIOLOGY:              RADIOLOGY & ADDITIONAL STUDIES:  
Patient is a 82y Male with a known history of :    HPI:  81 y/o bedbound man with PMH of  hx drop foot  Afib, CHF and h/o prostate CA brought in by EMS for fever x3 days.  Per EMS report patient's aide had been sick with a fever a few days prior to patient getting sick.  EMS relates patient was hypoxic when they first arrived at his home but he improved with nasal cannula.  Patient confused very poor historian unable to provide reliable history no further history available.   In ED afebrile, had hypoxia, mild resp distress , and was covid positive with rt lung consolidation on x ray   received steroids, had cardio and pulmonary consult and admitted for further management for   weakness fever and ac resp distress with hypoxia due to Covid !9 pneumonia- started on remdesvir and steroids, pulmonary and ID consult  Ch afib- rate controlled and on coumadin - cardio consult noted  Ch HF  h/o prostate ca  elevated lft - transaminitis with viral infection   ID pulmonary consult obtained   pt transferred to [facility] on 12/27/22, for bed availability     wife also covid positive     (27 Dec 2022 18:11)      REVIEW OF SYSTEMS:    CONSTITUTIONAL: No fever, weight loss, or fatigue  EYES: No eye pain, visual disturbances, or discharge  ENMT:  No difficulty hearing, tinnitus, vertigo; No sinus or throat pain  NECK: No pain or stiffness  BREASTS: No pain, masses, or nipple discharge  RESPIRATORY: No cough, wheezing, chills or hemoptysis; No shortness of breath  CARDIOVASCULAR: No chest pain, palpitations, dizziness, or leg swelling  GASTROINTESTINAL: No abdominal or epigastric pain. No nausea, vomiting, or hematemesis; No diarrhea or constipation. No melena or hematochezia.  GENITOURINARY: No dysuria, frequency, hematuria, or incontinence  NEUROLOGICAL: No headaches, memory loss, loss of strength, numbness, or tremors  SKIN: No itching, burning, rashes, or lesions   LYMPH NODES: No enlarged glands  ENDOCRINE: No heat or cold intolerance; No hair loss  MUSCULOSKELETAL: No joint pain or swelling; No muscle, back, or extremity pain  PSYCHIATRIC: No depression, anxiety, mood swings, or difficulty sleeping  HEME/LYMPH: No easy bruising, or bleeding gums  ALLERGY AND IMMUNOLOGIC: No hives or eczema    MEDICATIONS  (STANDING):  allopurinol 300 milliGRAM(s) Oral daily  cefTRIAXone   IVPB 1000 milliGRAM(s) IV Intermittent every 24 hours  dexAMETHasone     Tablet 6 milliGRAM(s) Oral daily  digoxin     Tablet 125 MICROGram(s) Oral daily  heparin   Injectable 5000 Unit(s) SubCutaneous every 12 hours  lactobacillus acidophilus 1 Tablet(s) Oral two times a day with meals  latanoprost 0.005% Ophthalmic Solution 1 Drop(s) Both EYES at bedtime  metoprolol succinate ER 25 milliGRAM(s) Oral daily  pantoprazole    Tablet 40 milliGRAM(s) Oral before breakfast  remdesivir  IVPB 100 milliGRAM(s) IV Intermittent every 24 hours  Simbrinza: Brinzolamide 1% and brimonidine tartrate 0.2% 1 Drop(s) 1 Drop(s) Both EYES two times a day  tamsulosin 0.4 milliGRAM(s) Oral at bedtime    MEDICATIONS  (PRN):  acetaminophen     Tablet .. 650 milliGRAM(s) Oral every 8 hours PRN Temp greater or equal to 38C (100.4F), Moderate Pain (4 - 6)  senna 2 Tablet(s) Oral at bedtime PRN Constipation      ALLERGIES: No Known Allergies      FAMILY HISTORY:      Social history:  Alochol:   Smoking:   Drug Use:   Marital Status:     PHYSICAL EXAMINATION:  -----------------------------  T(C): 36.7 (12-28-22 @ 13:22), Max: 36.9 (12-28-22 @ 04:29)  HR: 49 (12-28-22 @ 13:22) (49 - 64)  BP: 122/62 (12-28-22 @ 13:22) (122/62 - 149/75)  RR: 18 (12-28-22 @ 13:22) (17 - 18)  SpO2: 98% (12-28-22 @ 13:22) (97% - 98%)  Wt(kg): --    12-27 @ 07:01  -  12-28 @ 07:00  --------------------------------------------------------  IN:  Total IN: 0 mL    OUT:    Voided (mL): 500 mL  Total OUT: 500 mL    Total NET: -500 mL        Height (cm): 182.9 (12-27 @ 21:12)  Weight (kg): 96.4 (12-27 @ 21:12)  BMI (kg/m2): 28.8 (12-27 @ 21:12)  BSA (m2): 2.19 (12-27 @ 21:12)      LABS:   --------  12-28    145  |  111<H>  |  34<H>  ----------------------------<  104<H>  4.2   |  26  |  1.20    Ca    8.6      28 Dec 2022 08:05    TPro  6.4  /  Alb  2.9<L>  /  TBili  0.7  /  DBili  x   /  AST  97<H>  /  ALT  259<H>  /  AlkPhos  60  12-28                         13.3   3.93  )-----------( 118      ( 28 Dec 2022 08:05 )             41.8     PT/INR - ( 28 Dec 2022 08:05 )   PT: 23.7 sec;   INR: 2.01 ratio                       Radiology:    
Chief Complaint: Fever, hypoxia    Interval Events: No events overnight.    Review of Systems:  Unable to obtain    Physical Exam:  Vital Signs Last 24 Hrs  T(C): 36.4 (30 Dec 2022 04:14), Max: 36.6 (29 Dec 2022 20:00)  T(F): 97.5 (30 Dec 2022 04:14), Max: 97.9 (29 Dec 2022 20:00)  HR: 54 (30 Dec 2022 04:14) (48 - 54)  BP: 134/69 (30 Dec 2022 04:14) (134/69 - 159/74)  BP(mean): --  RR: 18 (30 Dec 2022 04:14) (18 - 19)  SpO2: 97% (30 Dec 2022 04:14) (94% - 97%)  Parameters below as of 30 Dec 2022 04:14  Patient On (Oxygen Delivery Method): nasal cannula  O2 Flow (L/min): 2  General: NAD  HEENT: MMM  Neck: No JVD, no carotid bruit  Lungs: CTAB  CV: RRR, nl S1/S2, no M/R/G  Abdomen: S/NT/ND, +BS  Extremities: No LE edema, no cyanosis  Neuro: AAOx2  Skin: No rash    Labs:             12-29    x   |  x   |  x   ----------------------------<  x   x    |  x   |  1.20    Ca    8.6      28 Dec 2022 08:05    TPro  6.1  /  Alb  2.7<L>  /  TBili  0.6  /  DBili  0.3  /  AST  63<H>  /  ALT  197<H>  /  AlkPhos  61  12-29                        13.1   3.53  )-----------( 133      ( 29 Dec 2022 07:20 )             41.7         ECG/Telemetry: Sinus rhythm
Chief Complaint: Fever, hypoxia    Interval Events: No events overnight.    Review of Systems:  Unable to obtain    Physical Exam:  Vital Signs Last 24 Hrs  T(C): 36.8 (02 Jan 2023 05:12), Max: 36.8 (02 Jan 2023 05:12)  T(F): 98.3 (02 Jan 2023 05:12), Max: 98.3 (02 Jan 2023 05:12)  HR: 50 (02 Jan 2023 05:12) (50 - 68)  BP: 124/68 (02 Jan 2023 05:12) (124/68 - 149/73)  BP(mean): --  RR: 20 (02 Jan 2023 05:12) (20 - 21)  SpO2: 95% (02 Jan 2023 05:12) (94% - 96%)  Parameters below as of 02 Jan 2023 05:12  Patient On (Oxygen Delivery Method): room air  General: NAD  HEENT: MMM  Neck: No JVD, no carotid bruit  Lungs: CTAB  CV: RRR, nl S1/S2, no M/R/G  Abdomen: S/NT/ND, +BS  Extremities: No LE edema, no cyanosis  Neuro: AAOx2  Skin: No rash    Labs:             01-01    142  |  105  |  46<H>  ----------------------------<  104<H>  4.4   |  31  |  1.20    Ca    8.5      01 Jan 2023 08:50    TPro  6.0  /  Alb  2.8<L>  /  TBili  1.6<H>  /  DBili  0.4<H>  /  AST  27  /  ALT  111<H>  /  AlkPhos  54  01-01                        14.0   5.44  )-----------( 154      ( 01 Jan 2023 08:50 )             42.9       ECG/Telemetry: Sinus rhythm
Chief Complaint: Fever, hypoxia    Interval Events: No events overnight.    Review of Systems:  Unable to obtain    Physical Exam:  Vitals:        Vital Signs Last 24 Hrs  T(C): 36.4 (29 Dec 2022 05:57), Max: 36.7 (28 Dec 2022 13:22)  T(F): 97.6 (29 Dec 2022 05:57), Max: 98.1 (28 Dec 2022 13:22)  HR: 55 (29 Dec 2022 05:57) (49 - 57)  BP: 153/74 (29 Dec 2022 05:57) (122/62 - 153/74)  BP(mean): --  RR: 18 (29 Dec 2022 05:57) (18 - 18)  SpO2: 98% (29 Dec 2022 05:57) (96% - 98%)  Parameters below as of 29 Dec 2022 05:57  Patient On (Oxygen Delivery Method): nasal cannula  O2 Flow (L/min): 2  General: NAD  HEENT: MMM  Neck: No JVD, no carotid bruit  Lungs: CTAB  CV: RRR, nl S1/S2, no M/R/G  Abdomen: S/NT/ND, +BS  Extremities: No LE edema, no cyanosis  Neuro: AAOx2  Skin: No rash    Labs:                        13.1   3.53  )-----------( 133      ( 29 Dec 2022 07:20 )             41.7     12-28    145  |  111<H>  |  34<H>  ----------------------------<  104<H>  4.2   |  26  |  1.20    Ca    8.6      28 Dec 2022 08:05    TPro  6.4  /  Alb  2.9<L>  /  TBili  0.7  /  DBili  x   /  AST  97<H>  /  ALT  259<H>  /  AlkPhos  60  12-28        PT/INR - ( 29 Dec 2022 07:20 )   PT: 26.9 sec;   INR: 2.28 ratio             ECG/Telemetry: Sinus rhythm
Date/Time Patient Seen:  		  Referring MD:   Data Reviewed	       Patient is a 82y old  Male who presents with a chief complaint of gerd (31 Dec 2022 14:27)      Subjective/HPI     PAST MEDICAL & SURGICAL HISTORY:  Afib    Prostate cancer    Heart failure    Heart failure    History of scoliosis    No significant past surgical history    No significant past surgical history    S/P anal fissurectomy          Medication list         MEDICATIONS  (STANDING):  allopurinol 300 milliGRAM(s) Oral daily  aMIOdarone    Tablet 200 milliGRAM(s) Oral daily  dexAMETHasone     Tablet 6 milliGRAM(s) Oral daily  lactobacillus acidophilus 1 Tablet(s) Oral two times a day with meals  latanoprost 0.005% Ophthalmic Solution 1 Drop(s) Both EYES at bedtime  metoprolol succinate ER 25 milliGRAM(s) Oral daily  pantoprazole    Tablet 40 milliGRAM(s) Oral before breakfast  Simbrinza: Brinzolamide 1% and brimonidine tartrate 0.2% 1 Drop(s) 1 Drop(s) Both EYES two times a day  tamsulosin 0.4 milliGRAM(s) Oral at bedtime  warfarin 2 milliGRAM(s) Oral once    MEDICATIONS  (PRN):  acetaminophen     Tablet .. 650 milliGRAM(s) Oral every 8 hours PRN Temp greater or equal to 38C (100.4F), Moderate Pain (4 - 6)  senna 2 Tablet(s) Oral at bedtime PRN Constipation         Vitals log        ICU Vital Signs Last 24 Hrs  T(C): 37.1 (01 Jan 2023 05:08), Max: 37.1 (01 Jan 2023 05:08)  T(F): 98.7 (01 Jan 2023 05:08), Max: 98.7 (01 Jan 2023 05:08)  HR: 57 (01 Jan 2023 05:08) (51 - 57)  BP: 135/63 (01 Jan 2023 05:08) (135/63 - 158/78)  BP(mean): --  ABP: --  ABP(mean): --  RR: 18 (01 Jan 2023 05:08) (18 - 18)  SpO2: 94% (01 Jan 2023 05:08) (92% - 98%)    O2 Parameters below as of 01 Jan 2023 05:08  Patient On (Oxygen Delivery Method): room air                 Input and Output:  I&O's Detail    31 Dec 2022 07:01  -  01 Jan 2023 07:00  --------------------------------------------------------  IN:  Total IN: 0 mL    OUT:    Voided (mL): 700 mL  Total OUT: 700 mL    Total NET: -700 mL          Lab Data                        14.0   5.05  )-----------( 166      ( 31 Dec 2022 12:15 )             43.3     12-31    142  |  106  |  48<H>  ----------------------------<  207<H>  4.4   |  28  |  1.30    Ca    8.4<L>      31 Dec 2022 12:15    TPro  6.1  /  Alb  2.8<L>  /  TBili  1.1  /  DBili  x   /  AST  33  /  ALT  140<H>  /  AlkPhos  62  12-31            Review of Systems	      Objective     Physical Examination    heart s1s2  lung dec BS  head nc      Pertinent Lab findings & Imaging      Nghia:  NO   Adequate UO     I&O's Detail    31 Dec 2022 07:01  -  01 Jan 2023 07:00  --------------------------------------------------------  IN:  Total IN: 0 mL    OUT:    Voided (mL): 700 mL  Total OUT: 700 mL    Total NET: -700 mL               Discussed with:     Cultures:	        Radiology                            
Date/Time Patient Seen:  		  Referring MD:   Data Reviewed	       Patient is a 82y old  Male who presents with a chief complaint of sob (01 Jan 2023 13:34)      Subjective/HPI     PAST MEDICAL & SURGICAL HISTORY:  Afib    Prostate cancer    Heart failure    Heart failure    History of scoliosis    No significant past surgical history    No significant past surgical history    S/P anal fissurectomy          Medication list         MEDICATIONS  (STANDING):  allopurinol 300 milliGRAM(s) Oral daily  aMIOdarone    Tablet 200 milliGRAM(s) Oral daily  barium sulfate 2% Suspension 450 milliLiter(s) Oral <User Schedule>  dexAMETHasone     Tablet 6 milliGRAM(s) Oral daily  lactobacillus acidophilus 1 Tablet(s) Oral two times a day with meals  latanoprost 0.005% Ophthalmic Solution 1 Drop(s) Both EYES at bedtime  metoprolol succinate ER 25 milliGRAM(s) Oral daily  pantoprazole    Tablet 40 milliGRAM(s) Oral before breakfast  Simbrinza: Brinzolamide 1% and brimonidine tartrate 0.2% 1 Drop(s) 1 Drop(s) Both EYES two times a day  tamsulosin 0.4 milliGRAM(s) Oral at bedtime    MEDICATIONS  (PRN):  acetaminophen     Tablet .. 650 milliGRAM(s) Oral every 8 hours PRN Temp greater or equal to 38C (100.4F), Moderate Pain (4 - 6)  senna 2 Tablet(s) Oral at bedtime PRN Constipation         Vitals log        ICU Vital Signs Last 24 Hrs  T(C): 36.8 (02 Jan 2023 05:12), Max: 36.8 (02 Jan 2023 05:12)  T(F): 98.3 (02 Jan 2023 05:12), Max: 98.3 (02 Jan 2023 05:12)  HR: 50 (02 Jan 2023 05:12) (50 - 68)  BP: 124/68 (02 Jan 2023 05:12) (124/68 - 149/73)  BP(mean): --  ABP: --  ABP(mean): --  RR: 20 (02 Jan 2023 05:12) (20 - 21)  SpO2: 95% (02 Jan 2023 05:12) (94% - 96%)    O2 Parameters below as of 02 Jan 2023 05:12  Patient On (Oxygen Delivery Method): room air                 Input and Output:  I&O's Detail    31 Dec 2022 07:01  -  01 Jan 2023 07:00  --------------------------------------------------------  IN:  Total IN: 0 mL    OUT:    Voided (mL): 700 mL  Total OUT: 700 mL    Total NET: -700 mL      01 Jan 2023 07:01  -  02 Jan 2023 05:54  --------------------------------------------------------  IN:  Total IN: 0 mL    OUT:    Incontinent per Condom Catheter (mL): 1350 mL  Total OUT: 1350 mL    Total NET: -1350 mL          Lab Data                        14.0   5.44  )-----------( 154      ( 01 Jan 2023 08:50 )             42.9     01-01    142  |  105  |  46<H>  ----------------------------<  104<H>  4.4   |  31  |  1.20    Ca    8.5      01 Jan 2023 08:50    TPro  6.0  /  Alb  2.8<L>  /  TBili  1.6<H>  /  DBili  0.4<H>  /  AST  27  /  ALT  111<H>  /  AlkPhos  54  01-01            Review of Systems	      Objective     Physical Examination    heart s1s2  lung dc BS  head nc      Pertinent Lab findings & Imaging      Nghia:  NO   Adequate UO     I&O's Detail    31 Dec 2022 07:01  -  01 Jan 2023 07:00  --------------------------------------------------------  IN:  Total IN: 0 mL    OUT:    Voided (mL): 700 mL  Total OUT: 700 mL    Total NET: -700 mL      01 Jan 2023 07:01  -  02 Jan 2023 05:54  --------------------------------------------------------  IN:  Total IN: 0 mL    OUT:    Incontinent per Condom Catheter (mL): 1350 mL  Total OUT: 1350 mL    Total NET: -1350 mL               Discussed with:     Cultures:	        Radiology                            
Date/Time Patient Seen:  		  Referring MD:   Data Reviewed	       Patient is a 82y old  Male who presents with a chief complaint of sob (30 Dec 2022 15:21)      Subjective/HPI     PAST MEDICAL & SURGICAL HISTORY:  Afib    Prostate cancer    Heart failure    Heart failure    History of scoliosis    No significant past surgical history    No significant past surgical history    S/P anal fissurectomy          Medication list         MEDICATIONS  (STANDING):  allopurinol 300 milliGRAM(s) Oral daily  aMIOdarone    Tablet 200 milliGRAM(s) Oral daily  dexAMETHasone     Tablet 6 milliGRAM(s) Oral daily  digoxin     Tablet 125 MICROGram(s) Oral daily  lactobacillus acidophilus 1 Tablet(s) Oral two times a day with meals  latanoprost 0.005% Ophthalmic Solution 1 Drop(s) Both EYES at bedtime  metoprolol succinate ER 25 milliGRAM(s) Oral daily  pantoprazole    Tablet 40 milliGRAM(s) Oral before breakfast  Simbrinza: Brinzolamide 1% and brimonidine tartrate 0.2% 1 Drop(s) 1 Drop(s) Both EYES two times a day  tamsulosin 0.4 milliGRAM(s) Oral at bedtime  warfarin 2 milliGRAM(s) Oral once    MEDICATIONS  (PRN):  acetaminophen     Tablet .. 650 milliGRAM(s) Oral every 8 hours PRN Temp greater or equal to 38C (100.4F), Moderate Pain (4 - 6)  senna 2 Tablet(s) Oral at bedtime PRN Constipation         Vitals log        ICU Vital Signs Last 24 Hrs  T(C): 36.3 (30 Dec 2022 14:00), Max: 36.6 (29 Dec 2022 20:00)  T(F): 97.4 (30 Dec 2022 14:00), Max: 97.9 (29 Dec 2022 20:00)  HR: 53 (30 Dec 2022 14:00) (52 - 54)  BP: 140/72 (30 Dec 2022 14:00) (134/69 - 159/74)  BP(mean): --  ABP: --  ABP(mean): --  RR: 18 (30 Dec 2022 14:00) (18 - 19)  SpO2: 91% (30 Dec 2022 14:00) (91% - 97%)    O2 Parameters below as of 30 Dec 2022 14:00  Patient On (Oxygen Delivery Method): room air                 Input and Output:  I&O's Detail    29 Dec 2022 07:01  -  30 Dec 2022 07:00  --------------------------------------------------------  IN:    Oral Fluid: 240 mL  Total IN: 240 mL    OUT:    Voided (mL): 300 mL  Total OUT: 300 mL    Total NET: -60 mL          Lab Data                        14.0   3.35  )-----------( 132      ( 30 Dec 2022 09:10 )             44.6     12-30    146<H>  |  110<H>  |  43<H>  ----------------------------<  117<H>  4.7   |  31  |  1.30    Ca    8.5      30 Dec 2022 09:10    TPro  6.3  /  Alb  2.9<L>  /  TBili  0.9  /  DBili  0.3  /  AST  53<H>  /  ALT  177<H>  /  AlkPhos  62  12-30            Review of Systems	      Objective     Physical Examination    heart s1s2  lung dec BS  head nc  head at      Pertinent Lab findings & Imaging      Nghia:  NO   Adequate UO     I&O's Detail    29 Dec 2022 07:01  -  30 Dec 2022 07:00  --------------------------------------------------------  IN:    Oral Fluid: 240 mL  Total IN: 240 mL    OUT:    Voided (mL): 300 mL  Total OUT: 300 mL    Total NET: -60 mL               Discussed with:     Cultures:	        Radiology                            
INTERVAL HPI/OVERNIGHT EVENTS:  No new overnight event.  No N/V/D.  Tolerating diet.  some confusion  still with some poain    Allergies    No Known Allergies    Intolerances    General:  No wt loss, fevers, chills, night sweats, fatigue,   Eyes:  Good vision, no reported pain  ENT:  No sore throat, pain, runny nose, dysphagia  CV:  No pain, palpitations, hypo/hypertension  Resp:  No dyspnea, cough, tachypnea, wheezing  GI:  No pain, No nausea, No vomiting, No diarrhea, No constipation, No weight loss, No fever, No pruritis, No rectal bleeding, No tarry stools, No dysphagia,  :  No pain, bleeding, incontinence, nocturia  Muscle:  No pain, weakness  Neuro:  No weakness, tingling, memory problems  Psych:  No fatigue, insomnia, mood problems, depression  Endocrine:  No polyuria, polydipsia, cold/heat intolerance  Heme:  No petechiae, ecchymosis, easy bruisability  Skin:  No rash, tattoos, scars, edema      PHYSICAL EXAM:   Vital Signs:  Vital Signs Last 24 Hrs  T(C): 36.6 (01 Jan 2023 14:05), Max: 37.1 (01 Jan 2023 05:08)  T(F): 97.8 (01 Jan 2023 14:05), Max: 98.7 (01 Jan 2023 05:08)  HR: 50 (01 Jan 2023 14:05) (50 - 57)  BP: 130/71 (01 Jan 2023 14:05) (130/71 - 158/78)  BP(mean): --  RR: 21 (01 Jan 2023 14:05) (18 - 21)  SpO2: 94% (01 Jan 2023 14:05) (94% - 98%)    Parameters below as of 01 Jan 2023 14:05  Patient On (Oxygen Delivery Method): room air      Daily     Daily I&O's Summary    31 Dec 2022 07:01  -  01 Jan 2023 07:00  --------------------------------------------------------  IN: 0 mL / OUT: 700 mL / NET: -700 mL    01 Jan 2023 07:01  -  01 Jan 2023 16:25  --------------------------------------------------------  IN: 0 mL / OUT: 1350 mL / NET: -1350 mL        GENERAL:  Appears stated age, well-groomed, well-nourished, no distress  HEENT:  NC/AT,  conjunctivae clear and pink, no thyromegaly, nodules, adenopathy, no JVD, sclera -anicteric  CHEST:  Full & symmetric excursion, no increased effort, breath sounds clear  HEART:  Regular rhythm, S1, S2, no murmur/rub/S3/S4, no abdominal bruit, no edema  ABDOMEN:  Soft, non-tender, non-distended, normoactive bowel sounds,  no masses ,no hepato-splenomegaly, no signs of chronic liver disease  EXTEREMITIES:  no cyanosis,clubbing or edema  SKIN:  No rash/erythema/ecchymoses/petechiae/wounds/abscess/warm/dry  NEURO:  Alert, oriented, no asterixis, no tremor, no encephalopathy      LABS:                        14.0   5.44  )-----------( 154      ( 01 Jan 2023 08:50 )             42.9     01-01    142  |  105  |  46<H>  ----------------------------<  104<H>  4.4   |  31  |  1.20    Ca    8.5      01 Jan 2023 08:50    TPro  6.0  /  Alb  2.8<L>  /  TBili  1.6<H>  /  DBili  0.4<H>  /  AST  27  /  ALT  111<H>  /  AlkPhos  54  01-01    PT/INR - ( 01 Jan 2023 08:50 )   PT: 28.9 sec;   INR: 2.45 ratio             amylaseAmylase, Serum Total: 111 U/L (01-01 @ 08:50)     lipaseLipase, Serum: 511 U/L (01-01 @ 08:50)    RADIOLOGY & ADDITIONAL TESTS:  
[facility]FECTIOUS [facility]  Case Jain MD PhD, Consuelo Brower MD, Naa Gutiérrez MD, Jean Claude Yarbrough MD, Ahmet Jordan MD  and providing coverage with Tip Kennedy MD  Providing Infectious Disease Consultations at [facility], [facility], [facility]'s    Office# [phone] to schedule follow up appointments  Answering Service for urgent calls or New Consults [phone]  Cell# to text for urgent issues Case Jain [phone]     infectious diseases progress note:    LEBRON TERRY is a 82y y. o. Male patient    Overnight and events of the last 24hrs reviewed    Allergies    No Known Allergies    Intolerances        ANTIBIOTICS/RELEVANT:  antimicrobials    immunologic:    OTHER:  acetaminophen     Tablet .. 650 milliGRAM(s) Oral every 8 hours PRN  allopurinol 300 milliGRAM(s) Oral daily  aMIOdarone    Tablet 200 milliGRAM(s) Oral daily  dexAMETHasone     Tablet 6 milliGRAM(s) Oral daily  digoxin     Tablet 125 MICROGram(s) Oral daily  lactobacillus acidophilus 1 Tablet(s) Oral two times a day with meals  latanoprost 0.005% Ophthalmic Solution 1 Drop(s) Both EYES at bedtime  metoprolol succinate ER 25 milliGRAM(s) Oral daily  pantoprazole    Tablet 40 milliGRAM(s) Oral before breakfast  senna 2 Tablet(s) Oral at bedtime PRN  Simbrinza: Brinzolamide 1% and brimonidine tartrate 0.2% 1 Drop(s) 1 Drop(s) Both EYES two times a day  tamsulosin 0.4 milliGRAM(s) Oral at bedtime  warfarin 2 milliGRAM(s) Oral once      Objective:  Vital Signs Last 24 Hrs  T(C): 36.4 (30 Dec 2022 04:14), Max: 36.6 (29 Dec 2022 20:00)  T(F): 97.5 (30 Dec 2022 04:14), Max: 97.9 (29 Dec 2022 20:00)  HR: 54 (30 Dec 2022 04:14) (48 - 54)  BP: 134/69 (30 Dec 2022 04:14) (134/69 - 159/74)  BP(mean): --  RR: 18 (30 Dec 2022 04:14) (18 - 19)  SpO2: 97% (30 Dec 2022 04:14) (94% - 97%)    Parameters below as of 30 Dec 2022 04:14  Patient On (Oxygen Delivery Method): nasal cannula  O2 Flow (L/min): 2      T(C): 36.4 (12-30-22 @ 04:14), Max: 36.7 (12-28-22 @ 13:22)  T(C): 36.4 (12-30-22 @ 04:14), Max: 36.9 (12-28-22 @ 04:29)  T(C): 36.4 (12-30-22 @ 04:14), Max: 37.4 (12-26-22 @ 22:00)    PHYSICAL EXAM:  HEENT: NC atraumatic  Neck: supple  Respiratory: no accessory muscle use, breathing comfortably  Cardiovascular: distant  Gastrointestinal: normal appearing, nondistended  Extremities: no clubbing, no cyanosis,        LABS:                          14.0   3.35  )-----------( 132      ( 30 Dec 2022 09:10 )             44.6       WBC  3.35 12-30 @ 09:10  3.53 12-29 @ 07:20  3.93 12-28 @ 08:05  3.32 12-27 @ 06:11  5.34 12-26 @ 12:23      12-30    146<H>  |  110<H>  |  43<H>  ----------------------------<  117<H>  4.7   |  31  |  1.30    Ca    8.5      30 Dec 2022 09:10    TPro  6.3  /  Alb  2.9<L>  /  TBili  0.9  /  DBili  0.3  /  AST  53<H>  /  ALT  177<H>  /  AlkPhos  62  12-30      Creatinine, Serum: 1.30 mg/dL (12-30-22 @ 09:10)  Creatinine, Serum: 1.20 mg/dL (12-29-22 @ 07:20)  Creatinine, Serum: 1.20 mg/dL (12-28-22 @ 08:05)  Creatinine, Serum: 1.40 mg/dL (12-27-22 @ 21:36)  Creatinine, Serum: 1.39 mg/dL (12-27-22 @ 06:11)  Creatinine, Serum: 1.38 mg/dL (12-26-22 @ 16:33)  Creatinine, Serum: 0.40 mg/dL (12-26-22 @ 12:23)      PT/INR - ( 30 Dec 2022 09:10 )   PT: 24.2 sec;   INR: 2.05 ratio                   INFLAMMATORY MARKERS      MICROBIOLOGY:              RADIOLOGY & ADDITIONAL STUDIES:  
[facility]  Case Jain MD PhD, Consuelo Brower MD, Naa Gutiérrez MD, Jean Claude Yarbrough MD, Ahmet Jordan MD  and providing coverage with Tip Kennedy MD  Providing Infectious Disease Consultations at [facility], [facility], [facility], [facility]'s    Office# [phone] to schedule follow up appointments  Answering Service for urgent calls or New Consults [phone]  Cell# to text for urgent issues Case Jain [phone]     infectious diseases progress note:    LEBRON TERRY is a 82y y. o. Male patient    Overnight and events of the last 24hrs reviewed    Allergies    No Known Allergies    Intolerances        ANTIBIOTICS/RELEVANT:  antimicrobials  cefTRIAXone   IVPB 1000 milliGRAM(s) IV Intermittent every 24 hours  remdesivir  IVPB 100 milliGRAM(s) IV Intermittent every 24 hours    immunologic:    OTHER:  acetaminophen     Tablet .. 650 milliGRAM(s) Oral every 8 hours PRN  allopurinol 300 milliGRAM(s) Oral daily  dexAMETHasone     Tablet 6 milliGRAM(s) Oral daily  digoxin     Tablet 125 MICROGram(s) Oral daily  heparin   Injectable 5000 Unit(s) SubCutaneous every 12 hours  lactobacillus acidophilus 1 Tablet(s) Oral two times a day with meals  latanoprost 0.005% Ophthalmic Solution 1 Drop(s) Both EYES at bedtime  metoprolol succinate ER 25 milliGRAM(s) Oral daily  pantoprazole    Tablet 40 milliGRAM(s) Oral before breakfast  senna 2 Tablet(s) Oral at bedtime PRN  Simbrinza: Brinzolamide 1% and brimonidine tartrate 0.2% 1 Drop(s) 1 Drop(s) Both EYES two times a day  tamsulosin 0.4 milliGRAM(s) Oral at bedtime      Objective:  Vital Signs Last 24 Hrs  T(C): 36.7 (28 Dec 2022 13:22), Max: 36.9 (28 Dec 2022 04:29)  T(F): 98.1 (28 Dec 2022 13:22), Max: 98.4 (28 Dec 2022 04:29)  HR: 49 (28 Dec 2022 13:22) (49 - 64)  BP: 122/62 (28 Dec 2022 13:22) (118/58 - 159/79)  BP(mean): --  RR: 18 (28 Dec 2022 13:22) (17 - 18)  SpO2: 98% (28 Dec 2022 13:22) (96% - 98%)    Parameters below as of 28 Dec 2022 13:22  Patient On (Oxygen Delivery Method): nasal cannula        T(C): 36.7 (12-28-22 @ 13:22), Max: 37.4 (12-26-22 @ 22:00)  T(C): 36.7 (12-28-22 @ 13:22), Max: 38.3 (12-26-22 @ 12:10)  T(C): 36.7 (12-28-22 @ 13:22), Max: 38.3 (12-26-22 @ 12:10)    PHYSICAL EXAM:  HEENT: NC atraumatic  Neck: supple  Respiratory: no accessory muscle use, breathing comfortably  Cardiovascular: distant  Gastrointestinal: normal appearing, nondistended  Extremities: no clubbing, no cyanosis,        LABS:                          13.3   3.93  )-----------( 118      ( 28 Dec 2022 08:05 )             41.8       WBC  3.93 12-28 @ 08:05  3.32 12-27 @ 06:11  5.34 12-26 @ 12:23      12-28    145  |  111<H>  |  34<H>  ----------------------------<  104<H>  4.2   |  26  |  1.20    Ca    8.6      28 Dec 2022 08:05    TPro  6.4  /  Alb  2.9<L>  /  TBili  0.7  /  DBili  x   /  AST  97<H>  /  ALT  259<H>  /  AlkPhos  60  12-28      Creatinine, Serum: 1.20 mg/dL (12-28-22 @ 08:05)  Creatinine, Serum: 1.40 mg/dL (12-27-22 @ 21:36)  Creatinine, Serum: 1.39 mg/dL (12-27-22 @ 06:11)  Creatinine, Serum: 1.38 mg/dL (12-26-22 @ 16:33)  Creatinine, Serum: 0.40 mg/dL (12-26-22 @ 12:23)      PT/INR - ( 28 Dec 2022 08:05 )   PT: 23.7 sec;   INR: 2.01 ratio                   INFLAMMATORY MARKERS      MICROBIOLOGY:              RADIOLOGY & ADDITIONAL STUDIES:  
Patient is a 82y old  Male who presents with a chief complaint of     INTERVAL HPI/OVERNIGHT EVENTS:    Home Medications:  acetaminophen 325 mg oral tablet: 2 tab(s) orally every 6 hours, As needed, Temp greater or equal to 38C (100.4F), Mild Pain (1 - 3) (27 Dec 2022 18:30)  allopurinol 300 mg oral tablet: 1 tab(s) orally once a day (27 Dec 2022 18:30)  amiodarone 200 mg oral tablet: 1 tab(s) orally once a day (28 Dec 2022 12:10)  ascorbic acid 500 mg oral tablet: 1 tab(s) orally once a day (27 Dec 2022 18:30)  benzonatate 100 mg oral capsule: 1 cap(s) orally every 8 hours, As needed, Cough (27 Dec 2022 18:30)  dexamethasone 6 mg oral tablet: 1 tab(s) orally once a day (27 Dec 2022 18:30)  digoxin 125 mcg (0.125 mg) oral tablet: 1 tab(s) orally once a day (27 Dec 2022 18:30)  latanoprost 0.005% ophthalmic solution: 1 drop(s) to each affected eye once a day (at bedtime) (27 Dec 2022 18:30)  metoprolol succinate 25 mg oral tablet, extended release: 1 tab(s) orally once a day (27 Dec 2022 18:30)  pantoprazole 40 mg oral delayed release tablet: 1 tab(s) orally once a day (before a meal) (27 Dec 2022 18:30)  remdesivir 5 mg/mL intravenous solution: 1 dose(s) intravenous once a day (27 Dec 2022 18:30)  tamsulosin 0.4 mg oral capsule: 1 cap(s) orally once a day (at bedtime) (27 Dec 2022 18:30)  warfarin 2 mg oral tablet: 1 tab(s) orally once (27 Dec 2022 18:30)      MEDICATIONS  (STANDING):  allopurinol 300 milliGRAM(s) Oral daily  cefTRIAXone   IVPB 1000 milliGRAM(s) IV Intermittent every 24 hours  dexAMETHasone     Tablet 6 milliGRAM(s) Oral daily  digoxin     Tablet 125 MICROGram(s) Oral daily  heparin   Injectable 5000 Unit(s) SubCutaneous every 12 hours  lactobacillus acidophilus 1 Tablet(s) Oral two times a day with meals  latanoprost 0.005% Ophthalmic Solution 1 Drop(s) Both EYES at bedtime  metoprolol succinate ER 25 milliGRAM(s) Oral daily  pantoprazole    Tablet 40 milliGRAM(s) Oral before breakfast  remdesivir  IVPB 100 milliGRAM(s) IV Intermittent every 24 hours  Simbrinza: Brinzolamide 1% and brimonidine tartrate 0.2% 1 Drop(s) 1 Drop(s) Both EYES two times a day  tamsulosin 0.4 milliGRAM(s) Oral at bedtime    MEDICATIONS  (PRN):  acetaminophen     Tablet .. 650 milliGRAM(s) Oral every 8 hours PRN Temp greater or equal to 38C (100.4F), Moderate Pain (4 - 6)  senna 2 Tablet(s) Oral at bedtime PRN Constipation      Allergies    No Known Allergies    Intolerances        REVIEW OF SYSTEMS:  CONSTITUTIONAL: No fever, weight loss, has fatigue  EYES: No eye pain, visual disturbances, or discharge  ENMT:  No difficulty hearing, tinnitus, vertigo; No sinus or throat pain  NECK: No pain or stiffness  BREASTS: No pain, masses, or nipple discharge  RESPIRATORY: No cough, wheezing, chills or hemoptysis; has shortness of breath  CARDIOVASCULAR: No chest pain, palpitations, dizziness, or leg swelling  GASTROINTESTINAL: No abdominal or epigastric pain. No nausea, vomiting,   GENITOURINARY: No dysuria, frequency, hematuria, or incontinence  NEUROLOGICAL: No headaches, memory loss, loss of strength, numbness, or tremors  SKIN: No itching, burning, rashes, or lesions     Vital Signs Last 24 Hrs  T(C): 36.7 (28 Dec 2022 13:22), Max: 36.9 (28 Dec 2022 04:29)  T(F): 98.1 (28 Dec 2022 13:22), Max: 98.4 (28 Dec 2022 04:29)  HR: 49 (28 Dec 2022 13:22) (49 - 64)  BP: 122/62 (28 Dec 2022 13:22) (118/58 - 159/79)  BP(mean): --  RR: 18 (28 Dec 2022 13:22) (17 - 18)  SpO2: 98% (28 Dec 2022 13:22) (96% - 98%)    Parameters below as of 28 Dec 2022 13:22  Patient On (Oxygen Delivery Method): nasal cannula        PHYSICAL EXAM:  GENERAL: well-groomed, well-developed  HEAD:  Atraumatic, Normocephalic  EYES: EOMI, PERRLA, conjunctiva and sclera clear  ENMT: Moist mucous membranes,   NECK: Supple, No JVD, Normal thyroid  NERVOUS SYSTEM:  Alert & Oriented X3,non focal  CHEST/LUNG: Bs decreased at bases  HEART: Regular rate and rhythm; No murmurs, rubs, or gallops  ABDOMEN: Soft, Nontender, Nondistended; Bowel sounds present  EXTREMITIES:  2+ Peripheral Pulses, No clubbing, cyanosis, or edema  LYMPH: No lymphadenopathy noted  SKIN: No rashes or lesions    LABS:                        13.3   3.93  )-----------( 118      ( 28 Dec 2022 08:05 )             41.8     12-28    145  |  111<H>  |  34<H>  ----------------------------<  104<H>  4.2   |  26  |  1.20    Ca    8.6      28 Dec 2022 08:05    TPro  6.4  /  Alb  2.9<L>  /  TBili  0.7  /  DBili  x   /  AST  97<H>  /  ALT  259<H>  /  AlkPhos  60  12-28    PT/INR - ( 28 Dec 2022 08:05 )   PT: 23.7 sec;   INR: 2.01 ratio             CAPILLARY BLOOD GLUCOSE            Culture - Urine (collected 12-26-22 @ 12:23)  Source: Clean Catch Clean Catch (Midstream)  Final Report (12-27-22 @ 20:36):    <10,000 CFU/mL Normal Urogenital Bhavna    Culture - Blood (collected 12-26-22 @ 12:23)  Source: .Blood Blood-Peripheral  Preliminary Report (12-28-22 @ 01:02):    No growth to date.    Culture - Blood (collected 12-26-22 @ 12:23)  Source: .Blood Blood-Peripheral  Preliminary Report (12-28-22 @ 01:02):    No growth to date.        I&O's Summary    27 Dec 2022 07:01  -  28 Dec 2022 07:00  --------------------------------------------------------  IN: 0 mL / OUT: 500 mL / NET: -500 mL        RADIOLOGY & ADDITIONAL TESTS:    Imaging Personally Reviewed:  [ ]x YES  [ ] NO    Consultant(s) Notes Reviewed:  [ ] xYES  [ ] NO    Care Discussed with Consultants/Other Providers [x ] YES  [ ] NO
Patient is a 82y old  Male who presents with a chief complaint of weakness (29 Dec 2022 16:26)      INTERVAL HPI/OVERNIGHT EVENTS:overnight events noted    Home Medications:  acetaminophen 325 mg oral tablet: 2 tab(s) orally every 6 hours, As needed, Temp greater or equal to 38C (100.4F), Mild Pain (1 - 3) (27 Dec 2022 18:30)  allopurinol 300 mg oral tablet: 1 tab(s) orally once a day (27 Dec 2022 18:30)  amiodarone 200 mg oral tablet: 1 tab(s) orally once a day (28 Dec 2022 12:10)  ascorbic acid 500 mg oral tablet: 1 tab(s) orally once a day (27 Dec 2022 18:30)  benzonatate 100 mg oral capsule: 1 cap(s) orally every 8 hours, As needed, Cough (27 Dec 2022 18:30)  dexamethasone 6 mg oral tablet: 1 tab(s) orally once a day (27 Dec 2022 18:30)  digoxin 125 mcg (0.125 mg) oral tablet: 1 tab(s) orally once a day (27 Dec 2022 18:30)  latanoprost 0.005% ophthalmic solution: 1 drop(s) to each affected eye once a day (at bedtime) (27 Dec 2022 18:30)  metoprolol succinate 25 mg oral tablet, extended release: 1 tab(s) orally once a day (27 Dec 2022 18:30)  pantoprazole 40 mg oral delayed release tablet: 1 tab(s) orally once a day (before a meal) (27 Dec 2022 18:30)  remdesivir 5 mg/mL intravenous solution: 1 dose(s) intravenous once a day (27 Dec 2022 18:30)  tamsulosin 0.4 mg oral capsule: 1 cap(s) orally once a day (at bedtime) (27 Dec 2022 18:30)  warfarin 2 mg oral tablet: 1 tab(s) orally once (27 Dec 2022 18:30)      MEDICATIONS  (STANDING):  allopurinol 300 milliGRAM(s) Oral daily  aMIOdarone    Tablet 200 milliGRAM(s) Oral daily  dexAMETHasone     Tablet 6 milliGRAM(s) Oral daily  digoxin     Tablet 125 MICROGram(s) Oral daily  lactobacillus acidophilus 1 Tablet(s) Oral two times a day with meals  latanoprost 0.005% Ophthalmic Solution 1 Drop(s) Both EYES at bedtime  metoprolol succinate ER 25 milliGRAM(s) Oral daily  pantoprazole    Tablet 40 milliGRAM(s) Oral before breakfast  Simbrinza: Brinzolamide 1% and brimonidine tartrate 0.2% 1 Drop(s) 1 Drop(s) Both EYES two times a day  tamsulosin 0.4 milliGRAM(s) Oral at bedtime  warfarin 2 milliGRAM(s) Oral once    MEDICATIONS  (PRN):  acetaminophen     Tablet .. 650 milliGRAM(s) Oral every 8 hours PRN Temp greater or equal to 38C (100.4F), Moderate Pain (4 - 6)  senna 2 Tablet(s) Oral at bedtime PRN Constipation      Allergies    No Known Allergies    Intolerances        REVIEW OF SYSTEMS:  CONSTITUTIONAL: No fever, weight loss, has fatigue  EYES: No eye pain, visual disturbances, or discharge  ENMT:  No difficulty hearing, tinnitus, vertigo; No sinus or throat pain  NECK: No pain or stiffness  BREASTS: No pain, masses, or nipple discharge  RESPIRATORY: No cough, wheezing, chills or hemoptysis; No shortness of breath  CARDIOVASCULAR: No chest pain, palpitations, dizziness, or leg swelling  GASTROINTESTINAL: No abdominal or epigastric pain. No nausea, vomiting,   GENITOURINARY: No dysuria, frequency, hematuria, or incontinence  NEUROLOGICAL: No headaches, memory loss, loss of strength, numbness, or tremors  SKIN: No itching, burning, rashes, or lesions   LYMPH NODES: No enlarged glands  ENDOCRINE: No heat or cold intolerance; No hair loss  MUSCULOSKELETAL: No joint pain or swelling; No muscle, back, or extremity pain        Vital Signs Last 24 Hrs  T(C): 36.3 (30 Dec 2022 14:00), Max: 36.6 (29 Dec 2022 20:00)  T(F): 97.4 (30 Dec 2022 14:00), Max: 97.9 (29 Dec 2022 20:00)  HR: 53 (30 Dec 2022 14:00) (52 - 54)  BP: 140/72 (30 Dec 2022 14:00) (134/69 - 159/74)  BP(mean): --  RR: 18 (30 Dec 2022 14:00) (18 - 19)  SpO2: 91% (30 Dec 2022 14:00) (91% - 97%)    Parameters below as of 30 Dec 2022 14:00  Patient On (Oxygen Delivery Method): room air        PHYSICAL EXAM:pt is bed bound  GENERAL:  well-groomed, well-developed  HEAD:  Atraumatic, Normocephalic  EYES: EOMI, PERRLA, conjunctiva and sclera clear  ENMT: Moist mucous membranes,   NECK: Supple, No JVD, Normal thyroid  NERVOUS SYSTEM:  Alert & Oriented X3,non focal  CHEST/LUNG: Clear to percussion bilaterally; No rales, rhonchi, wheezing, or rubs  HEART: Regular rate and rhythm; No murmurs, rubs, or gallops  ABDOMEN: Soft, Nontender, Nondistended; Bowel sounds present  LABS:                        14.0   3.35  )-----------( 132      ( 30 Dec 2022 09:10 )             44.6     12-30    146<H>  |  110<H>  |  43<H>  ----------------------------<  117<H>  4.7   |  31  |  1.30    Ca    8.5      30 Dec 2022 09:10    TPro  6.3  /  Alb  2.9<L>  /  TBili  0.9  /  DBili  0.3  /  AST  53<H>  /  ALT  177<H>  /  AlkPhos  62  12-30    PT/INR - ( 30 Dec 2022 09:10 )   PT: 24.2 sec;   INR: 2.05 ratio             CAPILLARY BLOOD GLUCOSE            Culture - Urine (collected 12-26-22 @ 12:23)  Source: Clean Catch Clean Catch (Midstream)  Final Report (12-27-22 @ 20:36):    <10,000 CFU/mL Normal Urogenital Bhavna    Culture - Blood (collected 12-26-22 @ 12:23)  Source: .Blood Blood-Peripheral  Preliminary Report (12-28-22 @ 01:02):    No growth to date.    Culture - Blood (collected 12-26-22 @ 12:23)  Source: .Blood Blood-Peripheral  Preliminary Report (12-28-22 @ 01:02):    No growth to date.        I&O's Summary    29 Dec 2022 07:01  -  30 Dec 2022 07:00  --------------------------------------------------------  IN: 240 mL / OUT: 300 mL / NET: -60 mL        RADIOLOGY & ADDITIONAL TESTS:    Imaging Personally Reviewed:  [ x] YES  [ ] NO    Consultant(s) Notes Reviewed:  [ ]x YES  [ ] NO    Care Discussed with Consultants/Other Providers [ x] YES  [ ] NO
Patient is a 82y old  Male who presents with a chief complaint of COVID (28 Dec 2022 14:38)      INTERVAL HPI/OVERNIGHT EVENTS:overnight events noted    Home Medications:  acetaminophen 325 mg oral tablet: 2 tab(s) orally every 6 hours, As needed, Temp greater or equal to 38C (100.4F), Mild Pain (1 - 3) (27 Dec 2022 18:30)  allopurinol 300 mg oral tablet: 1 tab(s) orally once a day (27 Dec 2022 18:30)  amiodarone 200 mg oral tablet: 1 tab(s) orally once a day (28 Dec 2022 12:10)  ascorbic acid 500 mg oral tablet: 1 tab(s) orally once a day (27 Dec 2022 18:30)  benzonatate 100 mg oral capsule: 1 cap(s) orally every 8 hours, As needed, Cough (27 Dec 2022 18:30)  dexamethasone 6 mg oral tablet: 1 tab(s) orally once a day (27 Dec 2022 18:30)  digoxin 125 mcg (0.125 mg) oral tablet: 1 tab(s) orally once a day (27 Dec 2022 18:30)  latanoprost 0.005% ophthalmic solution: 1 drop(s) to each affected eye once a day (at bedtime) (27 Dec 2022 18:30)  metoprolol succinate 25 mg oral tablet, extended release: 1 tab(s) orally once a day (27 Dec 2022 18:30)  pantoprazole 40 mg oral delayed release tablet: 1 tab(s) orally once a day (before a meal) (27 Dec 2022 18:30)  remdesivir 5 mg/mL intravenous solution: 1 dose(s) intravenous once a day (27 Dec 2022 18:30)  tamsulosin 0.4 mg oral capsule: 1 cap(s) orally once a day (at bedtime) (27 Dec 2022 18:30)  warfarin 2 mg oral tablet: 1 tab(s) orally once (27 Dec 2022 18:30)      MEDICATIONS  (STANDING):  allopurinol 300 milliGRAM(s) Oral daily  cefTRIAXone   IVPB 1000 milliGRAM(s) IV Intermittent every 24 hours  dexAMETHasone     Tablet 6 milliGRAM(s) Oral daily  digoxin     Tablet 125 MICROGram(s) Oral daily  lactobacillus acidophilus 1 Tablet(s) Oral two times a day with meals  latanoprost 0.005% Ophthalmic Solution 1 Drop(s) Both EYES at bedtime  metoprolol succinate ER 25 milliGRAM(s) Oral daily  pantoprazole    Tablet 40 milliGRAM(s) Oral before breakfast  remdesivir  IVPB 100 milliGRAM(s) IV Intermittent every 24 hours  Simbrinza: Brinzolamide 1% and brimonidine tartrate 0.2% 1 Drop(s) 1 Drop(s) Both EYES two times a day  tamsulosin 0.4 milliGRAM(s) Oral at bedtime  warfarin 2 milliGRAM(s) Oral once    MEDICATIONS  (PRN):  acetaminophen     Tablet .. 650 milliGRAM(s) Oral every 8 hours PRN Temp greater or equal to 38C (100.4F), Moderate Pain (4 - 6)  senna 2 Tablet(s) Oral at bedtime PRN Constipation      Allergies    No Known Allergies    Intolerances        REVIEW OF SYSTEMS:  CONSTITUTIONAL: No fever, weight loss, has fatigue  EYES: No eye pain, visual disturbances, or discharge  ENMT:  No difficulty hearing, tinnitus, vertigo; No sinus or throat pain  NECK: No pain or stiffness  BREASTS: No pain, masses, or nipple discharge  RESPIRATORY: No cough, wheezing, chills or hemoptysis; has shortness of breath  CARDIOVASCULAR: No chest pain, palpitations, dizziness, or leg swelling  GASTROINTESTINAL: No abdominal or epigastric pain. No nausea, vomiting, GENITOURINARY: No dysuria, frequency, hematuria, or incontinence  NEUROLOGICAL: No headaches, SKIN: No itching, burning, rashes, or lesions   Vital Signs Last 24 Hrs  T(C): 36.5 (29 Dec 2022 14:01), Max: 36.5 (29 Dec 2022 14:01)  T(F): 97.7 (29 Dec 2022 14:01), Max: 97.7 (29 Dec 2022 14:01)  HR: 48 (29 Dec 2022 14:01) (48 - 57)  BP: 145/70 (29 Dec 2022 14:01) (145/70 - 153/74)  BP(mean): --  RR: 18 (29 Dec 2022 14:01) (18 - 18)  SpO2: 94% (29 Dec 2022 14:01) (94% - 98%)    Parameters below as of 29 Dec 2022 14:01  Patient On (Oxygen Delivery Method): nasal cannula  O2 Flow (L/min): 2      PHYSICAL EXAM:  GENERAL: frail  HEAD:  Atraumatic, Normocephalic  EYES: EOMI, PERRLA, conjunctiva and sclera clear  ENMT: Moist mucous membranes,   NECK: Supple, No JVD, Normal thyroid  NERVOUS SYSTEM:  Alert & Oriented X2, non focal  CHEST/LUNG: Clear to percussion bilaterally; No rales, rhonchi, wheezing, or rubs  HEART: Regular rate and rhythm; No murmurs, rubs, or gallops  ABDOMEN: Soft, Nontender, Nondistended; Bowel sounds present  LABS:                        13.1   3.53  )-----------( 133      ( 29 Dec 2022 07:20 )             41.7     12-29    x   |  x   |  x   ----------------------------<  x   x    |  x   |  1.20    Ca    8.6      28 Dec 2022 08:05    TPro  6.1  /  Alb  2.7<L>  /  TBili  0.6  /  DBili  0.3  /  AST  63<H>  /  ALT  197<H>  /  AlkPhos  61  12-29    PT/INR - ( 29 Dec 2022 07:20 )   PT: 26.9 sec;   INR: 2.28 ratio             CAPILLARY BLOOD GLUCOSE            Culture - Urine (collected 12-26-22 @ 12:23)  Source: Clean Catch Clean Catch (Midstream)  Final Report (12-27-22 @ 20:36):    <10,000 CFU/mL Normal Urogenital Bhavna    Culture - Blood (collected 12-26-22 @ 12:23)  Source: .Blood Blood-Peripheral  Preliminary Report (12-28-22 @ 01:02):    No growth to date.    Culture - Blood (collected 12-26-22 @ 12:23)  Source: .Blood Blood-Peripheral  Preliminary Report (12-28-22 @ 01:02):    No growth to date.        I&O's Summary    29 Dec 2022 07:01  -  29 Dec 2022 16:26  --------------------------------------------------------  IN: 240 mL / OUT: 0 mL / NET: 240 mL        RADIOLOGY & ADDITIONAL TESTS:    Imaging Personally Reviewed:  [x ] YES  [ ] NO    Consultant(s) Notes Reviewed:  [x ] YES  [ ] NO    Care Discussed with Consultants/Other Providers [ ] YES  [ ] NO
Patient is a 82y old  Male who presents with a chief complaint of sob (01 Jan 2023 13:34)      INTERVAL HPI/OVERNIGHT EVENTS:overnight events noted    Home Medications:  allopurinol 300 mg oral tablet: 1 tab(s) orally once a day (30 Dec 2022 17:17)  amiodarone 200 mg oral tablet: 1 tab(s) orally once a day (30 Dec 2022 17:17)  latanoprost 0.005% ophthalmic solution: 1 drop(s) to each affected eye once a day (at bedtime) (30 Dec 2022 17:17)  metoprolol succinate 25 mg oral tablet, extended release: 1 tab(s) orally once a day (30 Dec 2022 17:17)  multivitamin: 1 tab(s) orally once a day (30 Dec 2022 17:17)  pantoprazole 40 mg oral delayed release tablet: 1 tab(s) orally once a day (before a meal) (30 Dec 2022 17:17)  Simbrinza 1%- 0.2% ophthalmic suspension: 1 drop(s) to each affected eye 2 times a day (30 Dec 2022 17:17)  tamsulosin 0.4 mg oral capsule: 1 cap(s) orally once a day (at bedtime) (30 Dec 2022 17:17)  warfarin 2 mg oral tablet: 1 tab(s) orally once (30 Dec 2022 17:17)      MEDICATIONS  (STANDING):  allopurinol 300 milliGRAM(s) Oral daily  aMIOdarone    Tablet 200 milliGRAM(s) Oral daily  barium sulfate 2% Suspension 450 milliLiter(s) Oral <User Schedule>  dexAMETHasone     Tablet 6 milliGRAM(s) Oral daily  lactobacillus acidophilus 1 Tablet(s) Oral two times a day with meals  latanoprost 0.005% Ophthalmic Solution 1 Drop(s) Both EYES at bedtime  metoprolol succinate ER 25 milliGRAM(s) Oral daily  pantoprazole    Tablet 40 milliGRAM(s) Oral before breakfast  Simbrinza: Brinzolamide 1% and brimonidine tartrate 0.2% 1 Drop(s) 1 Drop(s) Both EYES two times a day  tamsulosin 0.4 milliGRAM(s) Oral at bedtime    MEDICATIONS  (PRN):  acetaminophen     Tablet .. 650 milliGRAM(s) Oral every 8 hours PRN Temp greater or equal to 38C (100.4F), Moderate Pain (4 - 6)  senna 2 Tablet(s) Oral at bedtime PRN Constipation      Allergies    No Known Allergies    Intolerances        REVIEW OF SYSTEMS:  CONSTITUTIONAL: No fever, weight loss, or fatigue  EYES: No eye pain, visual disturbances, or discharge  ENMT:  No difficulty hearing, tinnitus, vertigo; No sinus or throat pain  NECK: No pain or stiffness  BREASTS: No pain, masses, or nipple discharge  RESPIRATORY: No cough, wheezing, chills or hemoptysis; No shortness of breath  CARDIOVASCULAR: No chest pain, palpitations, dizziness, or leg swelling  GASTROINTESTINAL: No abdominal or epigastric pain. No nausea, vomiting, or hematemesis  GENITOURINARY: No dysuria, frequency, hematuria, or incontinence  NEUROLOGICAL: unable to ambulate  SKIN: No itching, burning, rashes, or lesions   Vital Signs Last 24 Hrs  T(C): 36.8 (02 Jan 2023 05:12), Max: 36.8 (02 Jan 2023 05:12)  T(F): 98.3 (02 Jan 2023 05:12), Max: 98.3 (02 Jan 2023 05:12)  HR: 50 (02 Jan 2023 05:12) (50 - 68)  BP: 124/68 (02 Jan 2023 05:12) (124/68 - 149/73)  BP(mean): --  RR: 20 (02 Jan 2023 05:12) (20 - 21)  SpO2: 95% (02 Jan 2023 05:12) (94% - 96%)    Parameters below as of 02 Jan 2023 05:12  Patient On (Oxygen Delivery Method): room air        PHYSICAL EXAM:  GENERAL:  well-groomed, well-developed  HEAD:  Atraumatic, Normocephalic  EYES: EOMI, PERRLA, conjunctiva and sclera clear  ENMT: Moist mucous membranes,   NECK: Supple, No JVD, Normal thyroid  NERVOUS SYSTEM:  Alert & Oriented X3, non focal , unable to ambulate  CHEST/LUNG: Clear to percussion bilaterally; No rales, rhonchi, wheezing, or rubs  HEART: Regular rate and rhythm; No murmurs, rubs, or gallops  ABDOMEN: Soft, Nontender, Nondistended; Bowel sounds present  EXTREMITIES:  2+ Peripheral Pulses, No clubbing, cyanosis, or edema      LABS:                        14.6   6.31  )-----------( 176      ( 02 Jan 2023 09:10 )             44.7     01-02    141  |  105  |  37<H>  ----------------------------<  122<H>  4.3   |  30  |  1.20    Ca    8.4<L>      02 Jan 2023 09:10    TPro  6.1  /  Alb  2.9<L>  /  TBili  1.6<H>  /  DBili  0.5<H>  /  AST  17  /  ALT  99<H>  /  AlkPhos  x   01-02    PT/INR - ( 02 Jan 2023 09:10 )   PT: 34.7 sec;   INR: 2.93 ratio             CAPILLARY BLOOD GLUCOSE            Culture - Urine (collected 12-26-22 @ 12:23)  Source: Clean Catch Clean Catch (Midstream)  Final Report (12-27-22 @ 20:36):    <10,000 CFU/mL Normal Urogenital Bhavna    Culture - Blood (collected 12-26-22 @ 12:23)  Source: .Blood Blood-Peripheral  Final Report (01-01-23 @ 01:00):    No Growth Final    Culture - Blood (collected 12-26-22 @ 12:23)  Source: .Blood Blood-Peripheral  Final Report (01-01-23 @ 01:00):    No Growth Final        I&O's Summary    01 Jan 2023 07:01  -  02 Jan 2023 07:00  --------------------------------------------------------  IN: 240 mL / OUT: 2050 mL / NET: -1810 mL        RADIOLOGY & ADDITIONAL TESTS:    Imaging Personally Reviewed:  [ x] YES  [ ] NO    Consultant(s) Notes Reviewed:  [x ] YES  [ ] NO    Care Discussed with Consultants/Other Providers [ x] YES  [ ] NO

## 2023-01-02 NOTE — PROGRESS NOTE ADULT - PROVIDER SPECIALTY LIST ADULT
Cardiology
Infectious Disease
Pulmonology
Cardiology
Gastroenterology
Infectious Disease
Pulmonology
Cardiology
Cardiology
Gastroenterology
Infectious Disease
Internal Medicine

## 2023-01-04 LAB — ANA TITR SER: NEGATIVE — SIGNIFICANT CHANGE UP

## 2023-07-11 NOTE — PROGRESS NOTE ADULT - TIME BILLING
I have discussed care plan with patient and HCP,expressed understanding of problems treatment and their effect and side effects, alternatives in detail,I have asked if they have any questions and concerns and appropriately addressed them to best of my ability  Reviewed all diagonostic tests, lab results and drug drug interactions, and medications
Skyrizi Counseling: I discussed with the patient the risks of risankizumab-rzaa including but not limited to immunosuppression, and serious infections.  The patient understands that monitoring is required including a PPD at baseline and must alert us or the primary physician if symptoms of infection or other concerning signs are noted.

## 2023-10-06 ENCOUNTER — EMERGENCY (EMERGENCY)
Facility: HOSPITAL | Age: 83
LOS: 1 days | Discharge: HOME | End: 2023-10-06
Attending: EMERGENCY MEDICINE | Admitting: EMERGENCY MEDICINE
Payer: MEDICARE

## 2023-10-06 VITALS
TEMPERATURE: 98 F | OXYGEN SATURATION: 98 % | HEART RATE: 58 BPM | SYSTOLIC BLOOD PRESSURE: 152 MMHG | DIASTOLIC BLOOD PRESSURE: 78 MMHG | RESPIRATION RATE: 16 BRPM

## 2023-10-06 VITALS
TEMPERATURE: 98 F | HEART RATE: 60 BPM | OXYGEN SATURATION: 97 % | HEIGHT: 72 IN | WEIGHT: 164.91 LBS | SYSTOLIC BLOOD PRESSURE: 126 MMHG | DIASTOLIC BLOOD PRESSURE: 74 MMHG | RESPIRATION RATE: 18 BRPM

## 2023-10-06 DIAGNOSIS — Z98.890 OTHER SPECIFIED POSTPROCEDURAL STATES: Chronic | ICD-10-CM

## 2023-10-06 DIAGNOSIS — T14.8XXA OTHER INJURY OF UNSPECIFIED BODY REGION, INITIAL ENCOUNTER: ICD-10-CM

## 2023-10-06 LAB
ANION GAP SERPL CALC-SCNC: 7 MMOL/L — SIGNIFICANT CHANGE UP (ref 5–17)
APTT BLD: 49 SEC — HIGH (ref 24.5–35.6)
BASOPHILS # BLD AUTO: 0.01 K/UL — SIGNIFICANT CHANGE UP (ref 0–0.2)
BASOPHILS NFR BLD AUTO: 0.2 % — SIGNIFICANT CHANGE UP (ref 0–2)
BUN SERPL-MCNC: 30 MG/DL — HIGH (ref 7–23)
CALCIUM SERPL-MCNC: 8.9 MG/DL — SIGNIFICANT CHANGE UP (ref 8.4–10.5)
CHLORIDE SERPL-SCNC: 104 MMOL/L — SIGNIFICANT CHANGE UP (ref 96–108)
CO2 SERPL-SCNC: 30 MMOL/L — SIGNIFICANT CHANGE UP (ref 22–31)
CREAT SERPL-MCNC: 1.5 MG/DL — HIGH (ref 0.5–1.3)
EGFR: 46 ML/MIN/1.73M2 — LOW
EOSINOPHIL # BLD AUTO: 0.1 K/UL — SIGNIFICANT CHANGE UP (ref 0–0.5)
EOSINOPHIL NFR BLD AUTO: 1.9 % — SIGNIFICANT CHANGE UP (ref 0–6)
GLUCOSE SERPL-MCNC: 115 MG/DL — HIGH (ref 70–99)
HCT VFR BLD CALC: 39 % — SIGNIFICANT CHANGE UP (ref 39–50)
HGB BLD-MCNC: 12.2 G/DL — LOW (ref 13–17)
IMM GRANULOCYTES NFR BLD AUTO: 1 % — HIGH (ref 0–0.9)
INR BLD: 2.98 RATIO — HIGH (ref 0.85–1.18)
LYMPHOCYTES # BLD AUTO: 0.71 K/UL — LOW (ref 1–3.3)
LYMPHOCYTES # BLD AUTO: 13.5 % — SIGNIFICANT CHANGE UP (ref 13–44)
MCHC RBC-ENTMCNC: 31.3 GM/DL — LOW (ref 32–36)
MCHC RBC-ENTMCNC: 31.4 PG — SIGNIFICANT CHANGE UP (ref 27–34)
MCV RBC AUTO: 100.5 FL — HIGH (ref 80–100)
MONOCYTES # BLD AUTO: 0.57 K/UL — SIGNIFICANT CHANGE UP (ref 0–0.9)
MONOCYTES NFR BLD AUTO: 10.8 % — SIGNIFICANT CHANGE UP (ref 2–14)
NEUTROPHILS # BLD AUTO: 3.82 K/UL — SIGNIFICANT CHANGE UP (ref 1.8–7.4)
NEUTROPHILS NFR BLD AUTO: 72.6 % — SIGNIFICANT CHANGE UP (ref 43–77)
NRBC # BLD: 0 /100 WBCS — SIGNIFICANT CHANGE UP (ref 0–0)
PLATELET # BLD AUTO: 142 K/UL — LOW (ref 150–400)
POTASSIUM SERPL-MCNC: 4.5 MMOL/L — SIGNIFICANT CHANGE UP (ref 3.5–5.3)
POTASSIUM SERPL-SCNC: 4.5 MMOL/L — SIGNIFICANT CHANGE UP (ref 3.5–5.3)
PROTHROM AB SERPL-ACNC: 32.3 SEC — HIGH (ref 9.5–13)
RBC # BLD: 3.88 M/UL — LOW (ref 4.2–5.8)
RBC # FLD: 14.5 % — SIGNIFICANT CHANGE UP (ref 10.3–14.5)
SODIUM SERPL-SCNC: 141 MMOL/L — SIGNIFICANT CHANGE UP (ref 135–145)
WBC # BLD: 5.26 K/UL — SIGNIFICANT CHANGE UP (ref 3.8–10.5)
WBC # FLD AUTO: 5.26 K/UL — SIGNIFICANT CHANGE UP (ref 3.8–10.5)

## 2023-10-06 PROCEDURE — 85025 COMPLETE CBC W/AUTO DIFF WBC: CPT

## 2023-10-06 PROCEDURE — 36415 COLL VENOUS BLD VENIPUNCTURE: CPT

## 2023-10-06 PROCEDURE — 73610 X-RAY EXAM OF ANKLE: CPT | Mod: 26,RT

## 2023-10-06 PROCEDURE — 99284 EMERGENCY DEPT VISIT MOD MDM: CPT | Mod: 25

## 2023-10-06 PROCEDURE — 99285 EMERGENCY DEPT VISIT HI MDM: CPT

## 2023-10-06 PROCEDURE — 73610 X-RAY EXAM OF ANKLE: CPT

## 2023-10-06 PROCEDURE — 73630 X-RAY EXAM OF FOOT: CPT | Mod: 26,RT

## 2023-10-06 PROCEDURE — 73630 X-RAY EXAM OF FOOT: CPT

## 2023-10-06 PROCEDURE — 80048 BASIC METABOLIC PNL TOTAL CA: CPT

## 2023-10-06 PROCEDURE — 85730 THROMBOPLASTIN TIME PARTIAL: CPT

## 2023-10-06 PROCEDURE — 85610 PROTHROMBIN TIME: CPT

## 2023-10-06 PROCEDURE — 96374 THER/PROPH/DIAG INJ IV PUSH: CPT

## 2023-10-06 RX ORDER — AMPICILLIN SODIUM AND SULBACTAM SODIUM 250; 125 MG/ML; MG/ML
3 INJECTION, POWDER, FOR SUSPENSION INTRAMUSCULAR; INTRAVENOUS ONCE
Refills: 0 | Status: COMPLETED | OUTPATIENT
Start: 2023-10-06 | End: 2023-10-06

## 2023-10-06 RX ADMIN — AMPICILLIN SODIUM AND SULBACTAM SODIUM 200 GRAM(S): 250; 125 INJECTION, POWDER, FOR SUSPENSION INTRAMUSCULAR; INTRAVENOUS at 15:01

## 2023-10-06 NOTE — ED PROVIDER NOTE - OBJECTIVE STATEMENT
Patient brought in by EMS for swelling and blister to right ankle status post injured earlier this week.  Patient is mostly bed and wheelchair bound but earlier this week he was standing with a walker with the help of his aide when he was injured when he started to fall onto his rita.  Patient and family insist there was no head injury declining CT.  Patient had a portable x-ray which was negative had an INR check which they were told was okay however they noticed increased blistering to the right ankle so came to ER for evaluation.

## 2023-10-06 NOTE — ED PROVIDER NOTE - CARE PROVIDER_API CALL
Ike Wolf  Podiatric Medicine and Surgery  [address] [place] [address]  Phone: [phone]  Fax: [phone]  Follow Up Time: 1-3 Days

## 2023-10-06 NOTE — CONSULT NOTE ADULT - PROBLEM SELECTOR RECOMMENDATION 9
Patient seen and evaluated.  Daughter is present with patient  All questions and concerns answered to satisfaction  Xrays right ankle reveal no breaks in cortical bone, osteopenic right foot and ankle  Soft lacey compression applied to patient' right foot and ankle at this time.  Patient advised to elevate leg, ice behind knee  Rec abx, 1 dose IV while in ER, transition to oral  Patient advised to f/u with Dr. Ike Wolf in [place] 3-5 days post discharge from hospital.  Please call [phone] to make an appointment.  Please keep dressings clean, dry, and intact until f/u in clinic.    Will discuss with all attendings  Thank you for consult

## 2023-10-06 NOTE — CONSULT NOTE ADULT - SUBJECTIVE AND OBJECTIVE BOX
Patient is a 83y old  Male who presents with a chief complaint of     HPI:      PAST MEDICAL & SURGICAL HISTORY:  Afib      Prostate cancer      Heart failure      History of scoliosis      S/P anal fissurectomy          MEDICATIONS  (STANDING):  ampicillin/sulbactam  IVPB 3 Gram(s) IV Intermittent Once    MEDICATIONS  (PRN):      Allergies    No Known Allergies    Intolerances        VITALS:    Vital Signs Last 24 Hrs  T(C): 36.6 (06 Oct 2023 12:55), Max: 36.6 (06 Oct 2023 12:55)  T(F): 97.9 (06 Oct 2023 12:55), Max: 97.9 (06 Oct 2023 12:55)  HR: 60 (06 Oct 2023 12:55) (60 - 60)  BP: 126/74 (06 Oct 2023 12:55) (126/74 - 126/74)  BP(mean): --  RR: 18 (06 Oct 2023 12:55) (18 - 18)  SpO2: 97% (06 Oct 2023 12:55) (97% - 97%)    Parameters below as of 06 Oct 2023 12:55  Patient On (Oxygen Delivery Method): room air        LABS:                          12.2   5.26  )-----------( 142      ( 06 Oct 2023 13:40 )             39.0       10-06    141  |  104  |  30<H>  ----------------------------<  115<H>  4.5   |  30  |  1.50<H>    Ca    8.9      06 Oct 2023 13:40        CAPILLARY BLOOD GLUCOSE          PT/INR - ( 06 Oct 2023 13:40 )   PT: 32.3 sec;   INR: 2.98 ratio         PTT - ( 06 Oct 2023 13:40 )  PTT:49.0 sec    LOWER EXTREMITY PHYSICAL EXAM:    Vasular: DP/PT palpable B/L, Capillary fill time <3 seconds B/L    Neuro: Protective sensation intact B/L    Wound #1:   Location:  Size:  Depth:  Wound bed:   Drainage:   Odor:   Periwound:  Etiology:     RADIOLOGY & ADDITIONAL STUDIES:    A/P: Patient is a 83y old  Male who presents with a chief complaint of            Patient is a 83y old  Male who presents with a chief complaint of right ankle pain.  Patient is accompanied by daughter, who states he isn't very mobile normally, and that they are concerned for his ankle.    HPI: Patient brought in by EMS for swelling and blister to right ankle status post injured earlier this week.  Patient is mostly bed and wheelchair bound but earlier this week he was standing with a walker with the help of his aide when he was injured when he started to fall onto his rita.  Patient and family insist there was no head injury declining CT.  Patient had a portable x-ray which was negative had an INR check which they were told was okay however they noticed increased blistering to the right ankle so came to ER for evaluation.      PAST MEDICAL & SURGICAL HISTORY:  Afib      Prostate cancer      Heart failure      History of scoliosis      S/P anal fissurectomy          MEDICATIONS  (STANDING):  ampicillin/sulbactam  IVPB 3 Gram(s) IV Intermittent Once    MEDICATIONS  (PRN):      Allergies    No Known Allergies    Intolerances        VITALS:    Vital Signs Last 24 Hrs  T(C): 36.6 (06 Oct 2023 12:55), Max: 36.6 (06 Oct 2023 12:55)  T(F): 97.9 (06 Oct 2023 12:55), Max: 97.9 (06 Oct 2023 12:55)  HR: 60 (06 Oct 2023 12:55) (60 - 60)  BP: 126/74 (06 Oct 2023 12:55) (126/74 - 126/74)  BP(mean): --  RR: 18 (06 Oct 2023 12:55) (18 - 18)  SpO2: 97% (06 Oct 2023 12:55) (97% - 97%)    Parameters below as of 06 Oct 2023 12:55  Patient On (Oxygen Delivery Method): room air        LABS:                          12.2   5.26  )-----------( 142      ( 06 Oct 2023 13:40 )             39.0       10-06    141  |  104  |  30<H>  ----------------------------<  115<H>  4.5   |  30  |  1.50<H>    Ca    8.9      06 Oct 2023 13:40        CAPILLARY BLOOD GLUCOSE          PT/INR - ( 06 Oct 2023 13:40 )   PT: 32.3 sec;   INR: 2.98 ratio         PTT - ( 06 Oct 2023 13:40 )  PTT:49.0 sec    LOWER EXTREMITY PHYSICAL EXAM:    Vasular: DP/PT palpable B/L, Capillary fill time <3 seconds B/L.  2+ pitting edema noted b/l lower extremity  Neuro: Protective sensation intact B/L  MSK: 4/5 muscle strength noted in all compartments b/l.  There is pain upon palpation of the distal tibia at the medial aspect.    Derm:  Skin is warm, dry, and supple b/l. Noted distal medial tibia hematoma, ecchymosis, no breaks in the integument.  Skylar-hematoma erythema, edema, calor noted.         Patient is a 83y old  Male who presents with a chief complaint of right ankle pain.  Patient is accompanied by daughter, who states he isn't very mobile normally, and that they are concerned for his ankle.    HPI: Patient brought in by EMS for swelling and blister to right ankle status post injured earlier this week.  Patient is mostly bed and wheelchair bound but earlier this week he was standing with a walker with the help of his aide when he was injured when he started to fall onto his rita.  Patient and family insist there was no head injury declining CT.  Patient had a portable x-ray which was negative had an INR check which they were told was okay however they noticed increased blistering to the right ankle so came to ER for evaluation.      PAST MEDICAL & SURGICAL HISTORY:  Afib      Prostate cancer      Heart failure      History of scoliosis      S/P anal fissurectomy          MEDICATIONS  (STANDING):  ampicillin/sulbactam  IVPB 3 Gram(s) IV Intermittent Once    MEDICATIONS  (PRN):      Allergies    No Known Allergies    Intolerances        VITALS:    Vital Signs Last 24 Hrs  T(C): 36.6 (06 Oct 2023 12:55), Max: 36.6 (06 Oct 2023 12:55)  T(F): 97.9 (06 Oct 2023 12:55), Max: 97.9 (06 Oct 2023 12:55)  HR: 60 (06 Oct 2023 12:55) (60 - 60)  BP: 126/74 (06 Oct 2023 12:55) (126/74 - 126/74)  BP(mean): --  RR: 18 (06 Oct 2023 12:55) (18 - 18)  SpO2: 97% (06 Oct 2023 12:55) (97% - 97%)    Parameters below as of 06 Oct 2023 12:55  Patient On (Oxygen Delivery Method): room air        LABS:                          12.2   5.26  )-----------( 142      ( 06 Oct 2023 13:40 )             39.0       10-06    141  |  104  |  30<H>  ----------------------------<  115<H>  4.5   |  30  |  1.50<H>    Ca    8.9      06 Oct 2023 13:40                PT/INR - ( 06 Oct 2023 13:40 )   PT: 32.3 sec;   INR: 2.98 ratio         PTT - ( 06 Oct 2023 13:40 )  PTT:49.0 sec    LOWER EXTREMITY PHYSICAL EXAM:    Vasular: DP/PT palpable B/L, Capillary fill time <3 seconds B/L.  2+ pitting edema noted b/l lower extremity  Neuro: Protective sensation intact B/L  MSK: 4/5 muscle strength noted in all compartments b/l.  There is pain upon palpation of the distal tibia at the medial aspect.    Derm:  Skin is warm, dry, and supple b/l. Noted distal medial tibia hematoma, ecchymosis, no breaks in the integument.  Sklyar-hematoma erythema, edema, calor noted.

## 2023-10-06 NOTE — ED PROVIDER NOTE - CLINICAL SUMMARY MEDICAL DECISION MAKING FREE TEXT BOX
Patient brought in by EMS for swelling and blister to right ankle status post injured earlier this week.  Patient is mostly bed and wheelchair bound but earlier this week he was standing with a walker with the help of his aide when he was injured when he started to fall onto his knee.  Patient and family insist there was no head injury declining CT.  Patient had a portable x-ray which was negative had an INR check which they were told was okay however they noticed increased blistering to the right ankle so came to ER for evaluation.    [facility] x-ray right foot and ankle podiatry consult    Differential including but not limited to fracture dislocation sprain hematoma

## 2023-10-06 NOTE — ED PROVIDER NOTE - NSFOLLOWUPINSTRUCTIONS_ED_ALL_ED_FT
Hematoma  A hematoma is a collection of blood under the skin, in an organ, in a body space, in a joint space, or in other tissue. The blood can thicken (clot) to form a lump that you can see and feel. The lump is often firm and may become sore and tender. Most hematomas get better in a few days to weeks. However, some hematomas may be serious and require medical care. Hematomas can range from very small to very large.    What are the causes?  This condition is caused by:  A blunt or penetrating injury.  Leakage from a blood vessel under the skin.  Some medical procedures, including surgeries, such as oral surgery, face lifts, and surgeries on the joints.  Some medical conditions that cause bleeding or bruising. There may be multiple hematomas that appear in different areas of the body.  What increases the risk?  You are more likely to develop this condition if:  You are an older adult.  You use blood thinners.  You regularly use NSAIDs, such as ibuprofen, for pain.  You play contact sports.  What are the signs or symptoms?  Comparison of a normal ankle and an ankle that is swollen and bruised.  Symptoms of this condition depend on where the hematoma is located.    Common symptoms of a hematoma that is under the skin include:  A firm lump on the body.  Pain and tenderness in the area.  Bruising. Blue, dark blue, purple-red, or yellowish skin (discoloration) may appear at the site of the hematoma if the hematoma is close to the surface of the skin.  Common symptoms of a hematoma that is deep in the tissues or body spaces may be less obvious. They include:  A collection of blood in the stomach (intra-abdominal hematoma). This may cause pain in the abdomen, weakness, fainting, and shortness of breath.  A collection of blood in the head (intracranial hematoma). This may cause a headache or symptoms such as weakness, trouble speaking or understanding, or a change in consciousness.  How is this diagnosed?  This condition is diagnosed based on:  Your medical history.  A physical exam.  Imaging tests, such as an ultrasound or CT scan. These may be needed if your health care provider suspects a hematoma in deeper tissues or body spaces.  Blood tests. These may be needed if your health care provider believes that the hematoma is caused by a medical condition.  How is this treated?  Treatment for this condition depends on the cause, size, and location of the hematoma. Treatment may include:  Doing nothing. The majority of hematomas do not need treatment as many of them go away on their own.  Surgery or close monitoring. This may be needed for large hematomas or hematomas that affect vital organs.  Medicines. Medicines may be given if there is an underlying medical cause for the hematoma.  Follow these instructions at home:  Managing pain, stiffness, and swelling    Bag of ice on a towel on the skin.  If directed, put ice on the injured area. To do this:  Put ice in a plastic bag.  Place a towel between your skin and the bag.  Leave the ice on for 20 minutes, 2–3 times a day for the first couple of days.  If your skin turns bright red, remove the ice right away to prevent skin damage. The risk of skin damage is higher if you cannot feel pain, heat, or cold.  If directed, apply heat to the affected area as often as told by your health care provider. Use the heat source that your health care provider recommends, such as a moist heat pack or a heating pad.  Place a towel between your skin and the heat source.  Leave the heat on for 20–30 minutes.  If your skin turns bright red, remove the heat right away to prevent burns. The risk of burns is higher if you cannot feel pain, heat, or cold.  Raise (elevate) the injured area above the level of your heart while you are sitting or lying down.  If directed, wrap the affected area with an elastic bandage. The bandage applies pressure (compression) to the area, which may help to reduce swelling and promote healing. Do not wrap the bandage too tightly around the affected area.  If your hematoma is on a leg or foot (lower extremity) and is painful, your health care provider may recommend crutches. Use them as told by your health care provider.  General instructions    Take over-the-counter and prescription medicines only as told by your health care provider.  Rest the injured area as directed by your health care provider.  Keep all follow-up visits. Your health care provider may want to see how your hematoma is progressing with treatment.  Contact a health care provider if:  You have a fever.  The swelling or discoloration gets worse.  You develop more hematomas.  Your pain is worse or your pain is not controlled with medicine.  Your skin over the hematoma breaks or starts bleeding.  Get help right away if:  Your hematoma is in your chest or abdomen and you have weakness, shortness of breath, or a change in consciousness.  You have a hematoma on your scalp that is caused by a fall or injury, and you also have:  A headache that gets worse.  Trouble speaking or understanding speech.  Weakness.  A change in alertness or consciousness.  These symptoms may be an emergency. Get help right away. Call 911.  Do not wait to see if the symptoms will go away.  Do not drive yourself to the hospital.  This information is not intended to replace advice given to you by your health care provider. Make sure you discuss any questions you have with your health care provider.

## 2023-10-06 NOTE — ED PROVIDER NOTE - PROGRESS NOTE DETAILS
Discussed with Dr. Wolf (podiatry) his resident will see patient podiatry seen eval pt, request 1 dose unasyn and d/c with augmentin x 5 days. Reevaluated patient at bedside.  Patient feeling well.  Discussed the results of all diagnostic testing in ED and copies of all reports given.   An opportunity to ask questions was given.  Discussed the importance of prompt, close medical follow-up.  Patient will return with any changes, concerns or persistent / worsening symptoms.  Understanding of all instructions verbalized.

## 2023-10-06 NOTE — ED PROVIDER NOTE - SKIN, MLM
Skin normal color for race, warm, dry. right ankle swelling ecchymosis and blister medial aspect with tenderness

## 2023-10-06 NOTE — ED PROVIDER NOTE - PATIENT PORTAL LINK FT
You can access the FollowMyHealth Patient Portal offered by [facility] by registering at the following website: [email or web address]. By joining [facility]’s FollowMyHealth portal, you will also be able to view your health information using other applications (apps) compatible with our system.

## 2023-10-06 NOTE — ED ADULT TRIAGE NOTE - BP NONINVASIVE DIASTOLIC (MM HG)
Clearance Letter - No Exposure & Negative Test but Symptomatic    Dear Stefany Jaylin Wu     Return to Work Clearance: 05/13/21    Your COVID-19 test results were negative. You will need to follow your department’s normal unscheduled absence process and/or seek care from your provider if you need to remain off or need further evaluation. Please call the [facility] COVID Team hotline at [phone] if you have further questions.      If you have a new unprotected exposure to someone with COVID-19, please fill out the COVID-19 Exposure Evaluation tool at the top of the COVID-19 Information Center available through your organization’s home page.      As always be vigilant with PPE.      If you are experiencing a life-threatening illness or injury, or a condition you want assessed quickly, you should go to the nearest hospital emergency department and/or dial 9-1-1 for immediate attention. Contact your personal physician by phone or seek walk-in services if you have a matter that requires medical attention and cannot wait for a routinely scheduled appointment.    Leader, please review the Pay Practice document on the COVID-19 Information Center to determine proper coding for time off. The [facility] COVID Team will no longer follow this team member.    Thank you,    [facility] COVID Team    [phone]    Hours: [id number] [phone] Saturday . Please answer your phone if an outside line is calling, regardless of hours we work 7 days a week and will follow up as appropriate.     [email or web address]    Cc: Manager   74

## 2023-10-06 NOTE — ED CLERICAL - CLERICAL COMMENTS
called [facility] ems at [phone] for transport to [facility].  [facility]

## 2023-10-06 NOTE — ED ADULT NURSE NOTE - NSFALLHARMRISKINTERV_ED_ALL_ED

## 2023-10-06 NOTE — ED ADULT NURSE NOTE - OBJECTIVE STATEMENT
patient is an 84 y/o male c/c of right ankle and foot swelling, redness, and tenderness s/p fall 2 days ago. pt denies head strike or LOC. 4x4cm abrasion noted to right arm, cleansed with saline, bacitracin placed, covered with gauze and paper tape. Swollen blister noted to right ankle, denies numbness and tingling. Sensation intact to right lower extremity. +2 post tibial and pedal pulses bilaterally upon palpation. pt denies pain at rest, worsens with touch. pt able to flex and extend right foot. pt is nonambulatory, James lift used at home. pt's daughter and spouse at bedside.

## 2023-10-12 ENCOUNTER — TRANSCRIPTION ENCOUNTER (OUTPATIENT)
Age: 83
End: 2023-10-12

## 2023-10-12 ENCOUNTER — INPATIENT (INPATIENT)
Facility: HOSPITAL | Age: 83
LOS: 12 days | Discharge: HOME HEALTH | DRG: 605 | End: 2023-10-25
Attending: SURGERY | Admitting: SURGERY
Payer: MEDICARE

## 2023-10-12 VITALS
WEIGHT: 198.42 LBS | HEIGHT: 72 IN | RESPIRATION RATE: 18 BRPM | OXYGEN SATURATION: 100 % | TEMPERATURE: 97 F | SYSTOLIC BLOOD PRESSURE: 134 MMHG | DIASTOLIC BLOOD PRESSURE: 88 MMHG | HEART RATE: 93 BPM

## 2023-10-12 DIAGNOSIS — Z98.890 OTHER SPECIFIED POSTPROCEDURAL STATES: Chronic | ICD-10-CM

## 2023-10-12 LAB
ALBUMIN SERPL ELPH-MCNC: 3.9 G/DL — SIGNIFICANT CHANGE UP (ref 3.3–5)
ALP SERPL-CCNC: 104 U/L — SIGNIFICANT CHANGE UP (ref 40–120)
ALT FLD-CCNC: 21 U/L — SIGNIFICANT CHANGE UP (ref 10–45)
ANION GAP SERPL CALC-SCNC: 11 MMOL/L — SIGNIFICANT CHANGE UP (ref 5–17)
APTT BLD: 42.1 SEC — HIGH (ref 24.5–35.6)
AST SERPL-CCNC: 24 U/L — SIGNIFICANT CHANGE UP (ref 10–40)
BASOPHILS # BLD AUTO: 0.01 K/UL — SIGNIFICANT CHANGE UP (ref 0–0.2)
BASOPHILS NFR BLD AUTO: 0.2 % — SIGNIFICANT CHANGE UP (ref 0–2)
BILIRUB SERPL-MCNC: 1.6 MG/DL — HIGH (ref 0.2–1.2)
BUN SERPL-MCNC: 29 MG/DL — HIGH (ref 7–23)
CALCIUM SERPL-MCNC: 9.4 MG/DL — SIGNIFICANT CHANGE UP (ref 8.4–10.5)
CHLORIDE SERPL-SCNC: 100 MMOL/L — SIGNIFICANT CHANGE UP (ref 96–108)
CO2 SERPL-SCNC: 29 MMOL/L — SIGNIFICANT CHANGE UP (ref 22–31)
CREAT SERPL-MCNC: 1.28 MG/DL — SIGNIFICANT CHANGE UP (ref 0.5–1.3)
CRP SERPL-MCNC: 200 MG/L — HIGH (ref 0–4)
EGFR: 56 ML/MIN/1.73M2 — LOW
EOSINOPHIL # BLD AUTO: 0.06 K/UL — SIGNIFICANT CHANGE UP (ref 0–0.5)
EOSINOPHIL NFR BLD AUTO: 0.9 % — SIGNIFICANT CHANGE UP (ref 0–6)
FLUAV AG NPH QL: SIGNIFICANT CHANGE UP
FLUBV AG NPH QL: SIGNIFICANT CHANGE UP
GLUCOSE SERPL-MCNC: 117 MG/DL — HIGH (ref 70–99)
HCT VFR BLD CALC: 37.4 % — LOW (ref 39–50)
HGB BLD-MCNC: 12 G/DL — LOW (ref 13–17)
IMM GRANULOCYTES NFR BLD AUTO: 0.9 % — SIGNIFICANT CHANGE UP (ref 0–0.9)
INR BLD: 2.1 RATIO — HIGH (ref 0.85–1.18)
LYMPHOCYTES # BLD AUTO: 0.58 K/UL — LOW (ref 1–3.3)
LYMPHOCYTES # BLD AUTO: 8.8 % — LOW (ref 13–44)
MCHC RBC-ENTMCNC: 32 PG — SIGNIFICANT CHANGE UP (ref 27–34)
MCHC RBC-ENTMCNC: 32.1 GM/DL — SIGNIFICANT CHANGE UP (ref 32–36)
MCV RBC AUTO: 99.7 FL — SIGNIFICANT CHANGE UP (ref 80–100)
MONOCYTES # BLD AUTO: 1.1 K/UL — HIGH (ref 0–0.9)
MONOCYTES NFR BLD AUTO: 16.6 % — HIGH (ref 2–14)
NEUTROPHILS # BLD AUTO: 4.8 K/UL — SIGNIFICANT CHANGE UP (ref 1.8–7.4)
NEUTROPHILS NFR BLD AUTO: 72.6 % — SIGNIFICANT CHANGE UP (ref 43–77)
NRBC # BLD: 0 /100 WBCS — SIGNIFICANT CHANGE UP (ref 0–0)
PLATELET # BLD AUTO: 206 K/UL — SIGNIFICANT CHANGE UP (ref 150–400)
POTASSIUM SERPL-MCNC: 4.6 MMOL/L — SIGNIFICANT CHANGE UP (ref 3.5–5.3)
POTASSIUM SERPL-SCNC: 4.6 MMOL/L — SIGNIFICANT CHANGE UP (ref 3.5–5.3)
PROT SERPL-MCNC: 7 G/DL — SIGNIFICANT CHANGE UP (ref 6–8.3)
PROTHROM AB SERPL-ACNC: 21.6 SEC — HIGH (ref 9.5–13)
RBC # BLD: 3.75 M/UL — LOW (ref 4.2–5.8)
RBC # FLD: 14.5 % — SIGNIFICANT CHANGE UP (ref 10.3–14.5)
RSV RNA NPH QL NAA+NON-PROBE: SIGNIFICANT CHANGE UP
SARS-COV-2 RNA SPEC QL NAA+PROBE: SIGNIFICANT CHANGE UP
SODIUM SERPL-SCNC: 140 MMOL/L — SIGNIFICANT CHANGE UP (ref 135–145)
WBC # BLD: 6.61 K/UL — SIGNIFICANT CHANGE UP (ref 3.8–10.5)
WBC # FLD AUTO: 6.61 K/UL — SIGNIFICANT CHANGE UP (ref 3.8–10.5)

## 2023-10-12 PROCEDURE — 93971 EXTREMITY STUDY: CPT | Mod: 26,RT

## 2023-10-12 PROCEDURE — 73590 X-RAY EXAM OF LOWER LEG: CPT | Mod: 26,RT

## 2023-10-12 PROCEDURE — 73610 X-RAY EXAM OF ANKLE: CPT | Mod: 26,RT

## 2023-10-12 PROCEDURE — 73701 CT LOWER EXTREMITY W/DYE: CPT | Mod: 26,RT,ME

## 2023-10-12 PROCEDURE — 99285 EMERGENCY DEPT VISIT HI MDM: CPT

## 2023-10-12 PROCEDURE — G1004: CPT

## 2023-10-12 PROCEDURE — 73630 X-RAY EXAM OF FOOT: CPT | Mod: 26,RT

## 2023-10-12 RX ORDER — PIPERACILLIN AND TAZOBACTAM 4; .5 G/20ML; G/20ML
3.38 INJECTION, POWDER, LYOPHILIZED, FOR SOLUTION INTRAVENOUS ONCE
Refills: 0 | Status: COMPLETED | OUTPATIENT
Start: 2023-10-12 | End: 2023-10-12

## 2023-10-12 RX ORDER — VANCOMYCIN HCL 1 G
1000 VIAL (EA) INTRAVENOUS ONCE
Refills: 0 | Status: COMPLETED | OUTPATIENT
Start: 2023-10-12 | End: 2023-10-12

## 2023-10-12 RX ADMIN — Medication 250 MILLIGRAM(S): at 23:38

## 2023-10-12 RX ADMIN — PIPERACILLIN AND TAZOBACTAM 200 GRAM(S): 4; .5 INJECTION, POWDER, LYOPHILIZED, FOR SOLUTION INTRAVENOUS at 21:01

## 2023-10-12 NOTE — ED PROVIDER NOTE - RAPID ASSESSMENT
83-year-old male PMH of atrial fibrillation, heart failure, prostate CA presents to ED complaining of pain and swelling to the right lower extremity predominantly the right ankle and lower leg since last week.  Approximately 1 to 2 weeks ago patient had an injury to the right ankle was seen at an outside hospital last week and also seen by podiatry while in the ED, had x-rays showing ankle edema and swelling of the distal medial tib/fib patient was referred to podiatry outpatient.  Patient.  Was seen by podiatry last week and today, family reports that podiatry directed patient today to go to ED for further evaluation and vascular consult. Podiatry Dr. Hawk.  Patient and family and aide at bedside report worsening edema to the area over the past week, denies fevers, chills.  Patient himself has no current complaints, does report pain with moving the ankle but at rest there is no pain.    Patient was seen as a rapid assessment (QPA) patient. The patient will be seen and further worked up in the main emergency department and their care will be completed by the main emergency department team along with a thorough physical exam. Receiving team will follow up on labs, analgesia, any clinical imaging, reassess and disposition as clinically indicated, all decisions regarding the progression of care will be made at their discretion. - Anuj Flores PA-C

## 2023-10-12 NOTE — ED PROVIDER NOTE - CARE PLAN
1 Principal Discharge DX:	Hematoma   Principal Discharge DX:	Cellulitis  Secondary Diagnosis:	Hematoma of right lower leg

## 2023-10-12 NOTE — ED ADULT NURSE REASSESSMENT NOTE - NS ED NURSE REASSESS COMMENT FT1
RN took report from ANN Estevez. Pt A&Ox3, breathing unlabored and spontaneous, resting in stretcher, in hallway, bed in lowest position, wife at bedside, aware of plan of care. Comfort and safety measures maintained.

## 2023-10-12 NOTE — ED ADULT NURSE NOTE - OBJECTIVE STATEMENT
Patient is alert  and oriented x4. Color is good  and skin warm to touch.  Swelling noted to his right lower leg. He denies  any pain. Patient is alert  and oriented x4. Color is good  and skin warm to touch.  Swelling, redness and  ecchymosis  noted to his right lower leg. He denies  any pain.  Patient  fell 1  week ago.

## 2023-10-12 NOTE — ED PROVIDER NOTE - PHYSICAL EXAMINATION
PHYSICAL EXAM:  Constitutional: NAD, comfortable in bed.  HEENT: NC/AT, PERRLA, EOMI, no conjunctival pallor or scleral icterus, MMM  Neck: Supple, no JVD  Respiratory: CTA B/L. No w/r/r.   Cardiovascular: RRR, normal S1 and S2, no m/r/g.   Gastrointestinal: +BS, soft NTND, no guarding or rebound tenderness, no palpable masses   Extremities: RLE, distal pulses and sensation intact, blackened blister on proximal medial ankle, diffuse erythema and edema radiating from wound, TTP, warm to touch. No crepitus.  Vascular: Pulses equal and strong throughout.   Neurological: AAOx3, no CN deficits, strength and sensation intact throughout.   Skin: No gross skin abnormalities or rashes

## 2023-10-12 NOTE — ED ADULT NURSE NOTE - CHIEF COMPLAINT QUOTE
very swollen RLE/foot s/p fall 1 week ago  was referred to hospital to see a vascular doctor  pt has no compliants

## 2023-10-12 NOTE — ED ADULT TRIAGE NOTE - CHIEF COMPLAINT QUOTE
very swollen RLE/foot s/p fall 1 week ago  was referred to hospital to see a vascular doctor very swollen RLE/foot s/p fall 1 week ago  was referred to hospital to see a vascular doctor  pt has no compliants

## 2023-10-12 NOTE — ED PROVIDER NOTE - CLINICAL SUMMARY MEDICAL DECISION MAKING FREE TEXT BOX
Patient is an 82yo man with a history of a fib (on warfarin), heart failure, and prostate cancer presenting with swelling of the RLE.    DDx includes cellulitis, nec fasc, and bacterial hematoma. CBC, CMP, Coags sent. Patient started on vancomycin and zosyn. General surgery consulted for potential drainage of hematoma. CT of RLE pending for procedure planning.

## 2023-10-12 NOTE — ED PROVIDER NOTE - OBJECTIVE STATEMENT
Patient is an 82 yo man with PMH of a fib (on warfarin), heart failure, and prostate cancer complaining of continuing pain and swelling in the RLE. The patient experienced a mechanical fall on 10/4 while transferring to his rolling walker from his bed. Portable x-ray at the time of fall did not demonstrate acute fracture at the time. Over the next two days, the patient's swelling increased and he was brought to an OSH for evaluation. At [facility], repeated x-rays of foot, ankle, and lower leg did not elucidate any acute fractures. Podiatry consulted and recommended outpatient follow-up with OTD of unasyn and 5 days of augmentin for potential hematoma.    Patient completed his course of antibiotics and swelling increased, with a large blister growing on the proximal medial ankle. The patient attended an outpatient podiatry appointment on 10/9, where the blister was drained. Since then the blister has blackened and the leg has continued to swell. The patient attended an additional outpatient podiatry appointment on 10/12 where he was advised to come to [facility] for vascular evaluation by Dr. Sterling Colon.

## 2023-10-12 NOTE — ED ADULT NURSE NOTE - NS ED NURSE DISCH DISPOSITION
Pt arrived to ED via EMS c/o upper left abdominal pain and a headache. Pt states he had chest pain earlier but that has subsided. Pt had full round of dialysis earlier today   Admitted

## 2023-10-12 NOTE — ED PROVIDER NOTE - ATTENDING CONTRIBUTION TO CARE
Patient is an 83-year-old male with a history of atrial fibrillation on Coumadin, CHF, prostate cancer, scoliosis sent in by his podiatrist, Dr. Wolf for concern of infection and vascular evaluation for concern of active bleed and RLE pain.  Patient's family members at bedside, also providing history.  Accordingly, patient had a fall last week and was seen at [place] ED.  X-rays at that time were negative for any fracture and patient was seen by podiatry and started on amoxicillin (? Augmentin).  Patient's lower right leg is extremely swollen.  They state that they followed up with her podiatrist today and were told to come to the emergency department for vascular evaluation.  Patient denies any fevers, chills.  No nausea or vomiting.     VS noted  Gen. no acute distress, Elderly  HEENT: EOMI, mmm  Lungs: CTAB/L no C/ W /R   CVS: RRR   Abd; Soft non tender, non distended   Ext:  right lower extremity: Severe slightly swollen compared to left, erythema, warmth, 3 cm each area of black eschar (patient's family states blister was there which was debrided), no crepitus, right foot is very swollen with erythema and warmth to dorsal aspect.  Skin: no rash  Neuro AAOx3 non focal clear speech  a/p:  infected hematoma in RLE–patient is on Coumadin and has severe swelling to RLE.  Compartments are soft on exam.  Foot is otherwise warm.  Concern for cellulitis/ hematoma.  Patient was sent in for vascular consult.  Plan for surgical evaluation, x-rays, labs, IV antibiotics with Vanco and Zosyn, CT of RLE. Low suspicion for nec fasc given time course.   - Kathe DEWEY

## 2023-10-12 NOTE — ED PROVIDER NOTE - PROGRESS NOTE DETAILS
Vascular consulted. X-ray of foot, ankle, tibia negative for fractures. Doppler negative for DVT. Severe edema noted in lower limb, no emphysema. Initiated vanc and zosyn. Will re-evaluate. General surgery contacted service, after consulting surgical resident discussed case with Vascular Fellow, determined that patient was more appropriate for General Surgery service. General Surgery is already aware of the case as the consulting resident reports to both services. General surgery will follow once results of RLE CT are available.

## 2023-10-12 NOTE — ED PROVIDER NOTE - WR ORDER DATE AND TIME 1
Bed: 08  Expected date:   Expected time:   Means of arrival:   Comments:  Naomi Martinez RN  12/12/20 0244
12-Oct-2023 16:12
denies

## 2023-10-12 NOTE — ED PROVIDER NOTE - WR ORDER NAME 2
[facility]   Advanced Heart Failure Follow-Up Clinic Note    Date of visit: 11/9/2021  Patient Name: Karmen Sanches  Medical Record Number: [id number]  YOB: 1964   Primary Physician: Samantha Klein MD.       SUBJECTIVE     HISTORY OF PRESENT ILLNESS:  Karmen Sanches is a 57 year old female with past medical history significant for ischemic cardiomyopathy, LVEF of 27% documented on her cardiac MRI done November 2021, CAD status post PCI using 2x BROOKS in mid LAD in February 2020, [employer] ICD implanted December 2020, LV apical thrombus in February 2020, resolved on cardiac MRI done November 2020, papillary thyroid cancer status post thyroid resection which she had done in her 20s.  She also has a history of hypertension, hyperlipidemia presents here today to establish care in the heart failure clinic.    Karmen Sanches is doing well today. has no complaints at this time.  She denies feeling short of breath.  She has not had any lower extremity edema, PND or orthopnea.  She denies having any chest discomfort, lightheadedness or dizziness.  She has not had any palpitations.  She remains compliant with medication regimen.    As for her functional status, she has difficulty walking long distances due to back pain.      She is unaccompanied        Past Medical History:     I have reviewed the past medical history, family history, social history, medications and allergies listed in the medical record as obtained by my nursing staff and support staff and agree with their documentation.      Past Medical History:   Diagnosis Date   • Coronary artery disease involving native coronary artery of native heart without angina pectoris    • Coronary artery disease involving native coronary artery of native heart without angina pectoris 8/18/2021   • Hypothyroidism 8/18/2021   • ICD (implantable cardioverter-defibrillator) in place 8/18/2021   • Ischemic cardiomyopathy    • LV  (left ventricular) mural thrombus 2020   • Mixed hyperlipidemia 2021   • Old MI (myocardial infarction)    • Papillary thyroid carcinoma (CMS/HCC)     30 years ago   • Status post coronary artery stent placement 2021       Past Surgical History:   Procedure Laterality Date   • Coronary angioplasty with stent placement  2020    mid LAD x2   • Icd implant  2020   • Thyroidectomy           Family History   Problem Relation Age of Onset   • Diabetes Mother    • Heart disease Father 70   • Patient is unaware of any medical problems Sister          Social History     Socioeconomic History   • Marital status: Single     Spouse name: Not on file   • Number of children: Not on file   • Years of education: Not on file   • Highest education level: Not on file   Occupational History   • Not on file   Tobacco Use   • Smoking status: Former Smoker     Packs/day: 0.50     Years: 10.00     Pack years: 5.00     Types: Cigarettes     Start date:      Quit date: 1995     Years since quittin.8   • Smokeless tobacco: Never Used   Vaping Use   • Vaping Use: never used   Substance and Sexual Activity   • Alcohol use: Not Currently     Alcohol/week: 2.0 standard drinks     Types: 2 Glasses of wine per week     Comment: socially.    • Drug use: Yes     Types: Marijuana     Comment: gummies, for back pain   • Sexual activity: Yes     Partners: Male   Other Topics Concern   • Not on file   Social History Narrative   • Not on file     Social Determinants of Health     Financial Resource Strain:    • Social Determinants: Financial Resource Strain: Not on file   Food Insecurity:    • Social Determinants: Food Insecurity: Not on file   Transportation Needs:    • Lack of Transportation (Medical): Not on file   • Lack of Transportation (Non-Medical): Not on file   Physical Activity:    • Days of Exercise per Week: Not on file   • Minutes of Exercise per Session: Not on file   Stress:    • Social  Determinants: Stress: Not on file   Social Connections:    • Social Determinants: Social Connections: Not on file   Intimate Partner Violence:    • Social Determinants: Intimate Partner Violence Past Fear: Not on file   • Social Determinants: Intimate Partner Violence Current Fear: Not on file       HEART FAILURE MEDICATIONS   [] ACEi [x] ARB [x] BB (beta blocker) [] CCB (calcium channel blocker)   [] Corlanor   [] DIG (digoxin) [] Diuretic  [] Entresto [] Hydralazine [] MRA [] Nitrate       DEVICES   [x] ICD (implantable cardioverter-defibrillator)  [] BIV (biventricular) - ICD [] PPM (permanent pacemaker) [] Life Vest  [] CardioMEMS    Frequency / Description   []  YES    [x]  NO Angina:   []  YES    [x]  NO Claudication:   []  YES    [x]  NO Syncope:   []  YES    [x]  NO Orthopnea:   []  YES    [x]  NO PND (paroxysmal nocturnal dyspnea):   []  YES    [x]  NO Pedal edema:   []  YES    [x]  NO Abd Swelling:   []  YES    [x]  NO Early Satiety:   []  YES    [x]  NO Hospitalization:   []  YES    [x]  NO ER Visit:   []  YES    [x]  NO Weight gain:  []  YES    [x]  NO Other:      COMPLIANCE   Description   [x]  YES    []  NO Medication:   [x]  YES    []  NO Diet:    REVIEW OF SYSTEMS:    Constitutional: No for fatigue. No unexpected weight change.   HENT: Negative for nosebleeds.    Respiratory: No shortness of breath. Negative for cough.  Cardiovascular: No for chest pain. No palpitations. No leg swelling.   Gastrointestinal: No for abdominal distention, abdominal pain and blood in stool.   Endocrine: Negative for polyuria.   Genitourinary: Negative for hematuria.   Musculoskeletal: Negative for myalgias.   Skin: Negative for pallor.   Neurological: Negative for syncope and light-headedness.   Hematological: Does not bruise/bleed easily.   Otherwise complete ROS is negative.       MEDICATIONS  Outpatient Medications Marked as Taking for the 11/9/21 encounter (Office Visit) with Goldie Donato MD   Medication Sig  Dispense Refill   • sacubitril-valsartan (Entresto) 24-26 MG per tablet Take 1 tablet by mouth 2 times daily. 60 tablet 1   • dapagliflozin (Farxiga) 10 MG tablet Take 1 tablet by mouth daily. 30 tablet 3   • clopidogrel (PLAVIX) 75 MG tablet       1 tab orally every day     • Synthroid 100 MCG tablet Take 100 mcg by mouth daily.     • aspirin (Aspirin 81) 81 MG EC tablet Take 1 tablet by mouth daily. 90 tablet 3   • rosuvastatin (CRESTOR) 5 MG tablet Take 1 tablet by mouth daily. 90 tablet 3   • metoPROLOL succinate (TOPROL-XL) 25 MG 24 hr tablet Take 0.5 tablets by mouth daily. 90 tablet 3       ALLERGIES   ALLERGIES:  No Known Allergies    PAST HISTORY:  I have reviewed the past medical history, family history, social history, medications and allergies listed in the medical records as obtained by my nursing staff and support staff and agree with their documentation    OBJECTIVE  PHYSICAL EXAMINATION:    Vitals:    Visit Vitals  /67 (BP Location: RUE - Right upper extremity, Patient Position: Sitting, Cuff Size: Regular)   Pulse 89   Wt 87.1 kg (192 lb)   SpO2 96%   BMI 30.07 kg/m²          BMI (body mass index):Body mass index is 30.07 kg/m².  Constitutional: well nourished 57 year old female in no acute distress.  Skin: Warm, dry, intact, no lesions.  HEENT: Normocephalic, atraumatic. Oral mucous membranes moist, EOMs (extraocular movements) intact.  Neck: Supple, trachea midline, JVP is normal,  negative HJR (hepatojugular reflux), No thyromegaly.  Cardiovascular: Normal S1, S2. regular rhythm. Murmur: none. Negative S3, S4  Hem/Lymph/Immunologic: No lymphadenopathy  Respiratory: Anterior/posterior lung sounds Clear  to auscultation bilaterally.   Abdomen: Soft, nontender, negative hepatomegaly and normal bowel sounds.  Musculoskeletal/Extremities: CRT (capillary refill time) <3, no clubbing, no cyanosis, no edema.  Neurological: No focal neurological deficits and speech normal. Sensation grossly  intact.  Psychiatric: Alert and oriented to person, place and time.    LABORATORY:  Lab Results   Component Value Date    POTASSIUM 3.7 09/16/2021    SODIUM 141 09/16/2021    CO2 27 09/16/2021    CHLORIDE 102 09/16/2021    BUN 11 09/16/2021    CREATININE 0.74 09/16/2021    GLUCOSE 86 09/16/2021    CALCIUM 9.1 09/16/2021    WBC 5.3 08/18/2021    RBC 4.21 08/18/2021    HCT 39.8 08/18/2021    HGB 13.0 08/18/2021     (H) 08/18/2021    CHOLESTEROL 247 (H) 08/18/2021    HDL 81 08/18/2021    CHOHDL 3.0 08/18/2021    TRIGLYCERIDE 72 08/18/2021    CALCLDL 152 (H) 08/18/2021       DIAGNOSTICS:    TTE 2/7/2020 at [place]:  Conclusions / Summary   Normal left ventricular wall thickness is present.   The left ventricle exhibits moderate dilation, moderate diffuse hypokinesis,   which is more prominent in the left ventricular apical region and moderate   depression of LV contractility.   Left ventricular ejection fraction is estimated at approximately 35 to 40%   range.     A large pedunculated mobile thrombus was seen attached to the LV apex,   measuring 2.68 X 1.75 cm diameter.   There is decreased left ventricular compliance consistent with stage 1 left   ventricular diastolic dysfunction.     Mild left atrial enlargement.   The right ventricle appears to be normal in size and contractility.     There is no suggestion of mitral stenosis or mitral valve prolapse.   Mild mitral regurgitation (1+) is present.     Mild to possibly moderate aortic regurgitation (1 to 2+) is present.   There is no evidence of aortic stenosis, by doppler.     Mild tricuspid regurgitation is noted, with a tricuspid regurgitant jet   velocity of 2.28 m/s consistent with a right ventricular systolic pressure   of 26 to 31 mmHg.   Inferior vena cava appears normal (2.0 cm diameter).     No previous studies available for comparison.   Please correlate clinically and consider follow up echocardiogram, as   clinically indicated.     [facility] February   at [facility]:  Angiographic Findings   Cardiac Arteries and Lesion Findings   LMCA: Normal.     LAD:    Lesion on Mid LAD: 100% stenosis reduced to 0%. Pre procedure STACI II     flow was noted. Post Procedure STACI III flow was present. The lesion was     diagnosed as a high risk lesion.       Devices used       - 2.0mm x 20mm Emerge OTW balloon. 3 inflation(s) to a max pressure of:     14 lino.       - 2.5mm x 28mm Xience Alpine BROOKS OTW stent. 2 inflation(s) to a max     pressure of: 14 lino.       - 2.5mm x 15mm [employer]ience [employer]pine BROOKS OTW stent. 3 inflation(s) to a max     pressure of: 18 lino.       - 3.0mm x 15mm NC Emerge OTW balloon. 3 inflation(s) to a max pressure     of: 22 lino.       Lesion on Prox LAD: 0% stenosis.     LCx: Normal.Dominant       Lesion on 1st Ob Sheela: 0% stenosis.     RCA: Normal.Non-dominant     TTE 2020 [facility]:  Conclusions / Summary   The left ventricle is not well visualized. IV Definity contrast was   administered for opacification of LV cavity.     The left ventricle exhibits normal left ventricular wall thickness moderate   dilation, moderate diffuse hypokinesis, which is more prominent in the left   ventricular apical region and moderate depression of LV contractility.   Left ventricular ejection fraction is estimated at approximately 39% (37 to   40% range).     After the administration of IV Definity contrast: on the Images from frames   # 104 to 107:   Presence of approximately 0.6 cm in diameter, rounded, fixed, filling-defect   was noted in the left ventricular medial apical region, which may be   suggestive of possible presence of a mural thrombus (please correlate   clinically).     There is decreased left ventricular compliance consistent with stage 1 left   ventricular diastolic dysfunction.   The E/e' (mean) ratio is 10.75.     Mild left atrial enlargement.   The right ventricle appears to be normal in size and contractility.     Mild to possibly moderate  aortic regurgitation (1 to possibly 2+) is   present.   There is no evidence of aortic stenosis, by doppler.     Very mild mitral regurgitation is noted.   Mild tricuspid regurgitation is noted, with a tricuspid regurgitant jet   velocity of 2.28 m/s consistent with a right ventricular systolic pressure   of 25 to 30mmHg.     Compared to previous echocardiogram of 02/07/2020, previously reported left   ventricular apical mobile, pedunculated large thrombus measuring 2.68 X 1.75   cm diameter is no longer visualized.     However, after the administration of IV Definity contrast today: on the   Images from frames # 104 to 107:   Presence of approximately 0.6 cm in diameter, rounded, fixed, filling-defect   was noted in the left ventricular medial apical region, which may be   suggestive of possible presence of a (?residual) mural thrombus (please   correlate clinically).     Please correlate clinically and consider follow up echocardiogram, if   clinically indicated.      NM MUGA scan 10/19/2020:  Findings:  The LVEF measured 36%.             Cardiac MRI with and without contrast 11/20/2020:  IMPRESSION:     1.  The left ventricle is severely dilated and has severely reduced systolic function; calculated LVEF is 27%   2.  Regional akinesia and transmural enhancement in LAD territory consistent with prior left anterior descending artery infarct, likely non viable.   3.  Moderate tricuspid regurgitation, mild aortic and mitral regurgitation.     4.  No left ventricular thrombus.   5.  Left adrenal lesion 3 cm, non enhancing, T1 hypointense. 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 Ischemic cardiomyopathy    2. Coronary artery disease involving native coronary artery of native heart without angina pectoris    3. ICD (implantable cardioverter-defibrillator) in place    4. Mixed hyperlipidemia    5. Status post coronary artery stent  placement      #.  Ischemic cardiomyopathy, LVEF of 27% documented on her cardiac MRI done November 2020  From cardiac standpoint, she is stable: ACC/ AHA Stage C: Structural heart disease with prior or current symptoms of heart failure, New York Heart Association Classification: NYHA Class II: Mild HF symptoms/activity intolerance (Able to do light yardwork, can walk normally on level ground)    Volume status is normal .   Perfusion status is normal.   Labs from September 2021 reviewed.    Based on objective data and clinical data will augment medical therapy as follows:    OMT (optimal medical therapy): He currently takes Toprol 12.5 mg oral daily. She is on Entresto 24-26 mg oral twice daily   She is not on diuretic therapy. She is on Farxiga 10 mg oral daily.  Plan to start her on spironolactone 12.5 mg oral daily.    ICD (implantable cardioverter defibrillator)/CRT-D : She is status post [employer] ICD implanted December 2020    If moderate-to-severe or severe MR, has patient been referred to valve clinic? N/A    Dietary Guidelines discussed with the patient, instructed to consume < 2.5 g Na per day.      She is euvolemic on exam today.     While she was in clinic, she had a BMP study done     #. CAD status post PCI using 2x BROOKS in mid LAD in February 2020  She currently is on aspirin 81 mg oral daily along with Plavix 75 mg oral daily.  Primary cardiologist Dr. Caro    #.S/p ICD for primary prevention sudden cardiac death.  She is status post a [employer] ICD implanted December 2020    #.  Hypertension  Her blood pressure is at goal.  She currently takes Toprol along with losartan    #.  Hyperlipidemia  She currently is on Crestor 5 mg oral daily.    #.LV apical thrombus in February 2020, resolved on cardiac MRI done November 2020    #. Papillary thyroid cancer status post thyroid resection which she had done in her 20s      She is to return back to clinic in 3 weeks.    Patient understands  he/she can return sooner if any issues such as orthopnea, dyspnea on exertion, paroxysmal nocturnal dyspnea, dizziness, lightheadedness or angina should arise.     Goldie Donato MD   Advanced Heart Failure  [facility]   Xray Ankle Complete 3 Views, Right

## 2023-10-13 DIAGNOSIS — T14.8XXA OTHER INJURY OF UNSPECIFIED BODY REGION, INITIAL ENCOUNTER: ICD-10-CM

## 2023-10-13 DIAGNOSIS — I48.0 PAROXYSMAL ATRIAL FIBRILLATION: ICD-10-CM

## 2023-10-13 DIAGNOSIS — L03.90 CELLULITIS, UNSPECIFIED: ICD-10-CM

## 2023-10-13 DIAGNOSIS — N18.30 CHRONIC KIDNEY DISEASE, STAGE 3 UNSPECIFIED: ICD-10-CM

## 2023-10-13 DIAGNOSIS — I50.32 CHRONIC DIASTOLIC (CONGESTIVE) HEART FAILURE: ICD-10-CM

## 2023-10-13 LAB
ANION GAP SERPL CALC-SCNC: 10 MMOL/L — SIGNIFICANT CHANGE UP (ref 5–17)
APTT BLD: 37.4 SEC — HIGH (ref 24.5–35.6)
BLD GP AB SCN SERPL QL: NEGATIVE — SIGNIFICANT CHANGE UP
BUN SERPL-MCNC: 26 MG/DL — HIGH (ref 7–23)
CALCIUM SERPL-MCNC: 8.6 MG/DL — SIGNIFICANT CHANGE UP (ref 8.4–10.5)
CHLORIDE SERPL-SCNC: 103 MMOL/L — SIGNIFICANT CHANGE UP (ref 96–108)
CO2 SERPL-SCNC: 24 MMOL/L — SIGNIFICANT CHANGE UP (ref 22–31)
CREAT SERPL-MCNC: 1.21 MG/DL — SIGNIFICANT CHANGE UP (ref 0.5–1.3)
EGFR: 60 ML/MIN/1.73M2 — SIGNIFICANT CHANGE UP
ERYTHROCYTE [SEDIMENTATION RATE] IN BLOOD: 71 MM/HR — HIGH (ref 0–20)
GLUCOSE SERPL-MCNC: 101 MG/DL — HIGH (ref 70–99)
HCT VFR BLD CALC: 32.6 % — LOW (ref 39–50)
HGB BLD-MCNC: 10.3 G/DL — LOW (ref 13–17)
INR BLD: 1.67 RATIO — HIGH (ref 0.85–1.18)
INR BLD: 2.14 RATIO — HIGH (ref 0.85–1.18)
MAGNESIUM SERPL-MCNC: 2 MG/DL — SIGNIFICANT CHANGE UP (ref 1.6–2.6)
MCHC RBC-ENTMCNC: 31.6 GM/DL — LOW (ref 32–36)
MCHC RBC-ENTMCNC: 32 PG — SIGNIFICANT CHANGE UP (ref 27–34)
MCV RBC AUTO: 101.2 FL — HIGH (ref 80–100)
NRBC # BLD: 0 /100 WBCS — SIGNIFICANT CHANGE UP (ref 0–0)
PHOSPHATE SERPL-MCNC: 2.9 MG/DL — SIGNIFICANT CHANGE UP (ref 2.5–4.5)
PLATELET # BLD AUTO: 148 K/UL — LOW (ref 150–400)
POTASSIUM SERPL-MCNC: 4.2 MMOL/L — SIGNIFICANT CHANGE UP (ref 3.5–5.3)
POTASSIUM SERPL-SCNC: 4.2 MMOL/L — SIGNIFICANT CHANGE UP (ref 3.5–5.3)
PROTHROM AB SERPL-ACNC: 18.1 SEC — HIGH (ref 9.5–13)
PROTHROM AB SERPL-ACNC: 22 SEC — HIGH (ref 9.5–13)
RBC # BLD: 3.22 M/UL — LOW (ref 4.2–5.8)
RBC # FLD: 14.4 % — SIGNIFICANT CHANGE UP (ref 10.3–14.5)
RH IG SCN BLD-IMP: POSITIVE — SIGNIFICANT CHANGE UP
RH IG SCN BLD-IMP: POSITIVE — SIGNIFICANT CHANGE UP
SODIUM SERPL-SCNC: 137 MMOL/L — SIGNIFICANT CHANGE UP (ref 135–145)
WBC # BLD: 4.84 K/UL — SIGNIFICANT CHANGE UP (ref 3.8–10.5)
WBC # FLD AUTO: 4.84 K/UL — SIGNIFICANT CHANGE UP (ref 3.8–10.5)

## 2023-10-13 PROCEDURE — 99223 1ST HOSP IP/OBS HIGH 75: CPT

## 2023-10-13 PROCEDURE — 27603 DRAIN LOWER LEG LESION: CPT | Mod: RT

## 2023-10-13 RX ORDER — LATANOPROST 0.05 MG/ML
1 SOLUTION/ DROPS OPHTHALMIC; TOPICAL AT BEDTIME
Refills: 0 | Status: DISCONTINUED | OUTPATIENT
Start: 2023-10-13 | End: 2023-10-25

## 2023-10-13 RX ORDER — METOPROLOL TARTRATE 50 MG
25 TABLET ORAL DAILY
Refills: 0 | Status: DISCONTINUED | OUTPATIENT
Start: 2023-10-13 | End: 2023-10-25

## 2023-10-13 RX ORDER — OXYCODONE HYDROCHLORIDE 5 MG/1
5 TABLET ORAL EVERY 6 HOURS
Refills: 0 | Status: DISCONTINUED | OUTPATIENT
Start: 2023-10-13 | End: 2023-10-16

## 2023-10-13 RX ORDER — AMPICILLIN SODIUM AND SULBACTAM SODIUM 250; 125 MG/ML; MG/ML
3 INJECTION, POWDER, FOR SUSPENSION INTRAMUSCULAR; INTRAVENOUS EVERY 6 HOURS
Refills: 0 | Status: DISCONTINUED | OUTPATIENT
Start: 2023-10-13 | End: 2023-10-16

## 2023-10-13 RX ORDER — OXYCODONE HYDROCHLORIDE 5 MG/1
2.5 TABLET ORAL EVERY 6 HOURS
Refills: 0 | Status: DISCONTINUED | OUTPATIENT
Start: 2023-10-13 | End: 2023-10-16

## 2023-10-13 RX ORDER — METOPROLOL TARTRATE 50 MG
25 TABLET ORAL DAILY
Refills: 0 | Status: DISCONTINUED | OUTPATIENT
Start: 2023-10-13 | End: 2023-10-13

## 2023-10-13 RX ORDER — AMPICILLIN SODIUM AND SULBACTAM SODIUM 250; 125 MG/ML; MG/ML
3 INJECTION, POWDER, FOR SUSPENSION INTRAMUSCULAR; INTRAVENOUS EVERY 6 HOURS
Refills: 0 | Status: DISCONTINUED | OUTPATIENT
Start: 2023-10-13 | End: 2023-10-13

## 2023-10-13 RX ORDER — ONDANSETRON 8 MG/1
4 TABLET, FILM COATED ORAL ONCE
Refills: 0 | Status: DISCONTINUED | OUTPATIENT
Start: 2023-10-13 | End: 2023-10-13

## 2023-10-13 RX ORDER — PHYTONADIONE (VIT K1) 5 MG
5 TABLET ORAL ONCE
Refills: 0 | Status: COMPLETED | OUTPATIENT
Start: 2023-10-13 | End: 2023-10-13

## 2023-10-13 RX ORDER — HYDROMORPHONE HYDROCHLORIDE 2 MG/ML
0.5 INJECTION INTRAMUSCULAR; INTRAVENOUS; SUBCUTANEOUS EVERY 4 HOURS
Refills: 0 | Status: DISCONTINUED | OUTPATIENT
Start: 2023-10-13 | End: 2023-10-13

## 2023-10-13 RX ORDER — SODIUM CHLORIDE 9 MG/ML
1000 INJECTION, SOLUTION INTRAVENOUS
Refills: 0 | Status: DISCONTINUED | OUTPATIENT
Start: 2023-10-13 | End: 2023-10-13

## 2023-10-13 RX ORDER — AMIODARONE HYDROCHLORIDE 400 MG/1
200 TABLET ORAL DAILY
Refills: 0 | Status: DISCONTINUED | OUTPATIENT
Start: 2023-10-13 | End: 2023-10-25

## 2023-10-13 RX ORDER — ACETAMINOPHEN 500 MG
1000 TABLET ORAL EVERY 6 HOURS
Refills: 0 | Status: DISCONTINUED | OUTPATIENT
Start: 2023-10-13 | End: 2023-10-13

## 2023-10-13 RX ORDER — ACETAMINOPHEN 500 MG
975 TABLET ORAL EVERY 6 HOURS
Refills: 0 | Status: DISCONTINUED | OUTPATIENT
Start: 2023-10-13 | End: 2023-10-16

## 2023-10-13 RX ORDER — FENTANYL CITRATE 50 UG/ML
25 INJECTION INTRAVENOUS
Refills: 0 | Status: DISCONTINUED | OUTPATIENT
Start: 2023-10-13 | End: 2023-10-13

## 2023-10-13 RX ORDER — CHLORHEXIDINE GLUCONATE 213 G/1000ML
1 SOLUTION TOPICAL
Refills: 0 | Status: DISCONTINUED | OUTPATIENT
Start: 2023-10-13 | End: 2023-10-25

## 2023-10-13 RX ORDER — HYDROMORPHONE HYDROCHLORIDE 2 MG/ML
0.2 INJECTION INTRAMUSCULAR; INTRAVENOUS; SUBCUTANEOUS EVERY 4 HOURS
Refills: 0 | Status: DISCONTINUED | OUTPATIENT
Start: 2023-10-13 | End: 2023-10-13

## 2023-10-13 RX ORDER — ACETAMINOPHEN 500 MG
1000 TABLET ORAL ONCE
Refills: 0 | Status: DISCONTINUED | OUTPATIENT
Start: 2023-10-13 | End: 2023-10-13

## 2023-10-13 RX ADMIN — Medication 400 MILLIGRAM(S): at 12:17

## 2023-10-13 RX ADMIN — SODIUM CHLORIDE 120 MILLILITER(S): 9 INJECTION, SOLUTION INTRAVENOUS at 05:20

## 2023-10-13 RX ADMIN — Medication 101 MILLIGRAM(S): at 03:39

## 2023-10-13 RX ADMIN — FENTANYL CITRATE 25 MICROGRAM(S): 50 INJECTION INTRAVENOUS at 16:51

## 2023-10-13 RX ADMIN — AMPICILLIN SODIUM AND SULBACTAM SODIUM 200 GRAM(S): 250; 125 INJECTION, POWDER, FOR SUSPENSION INTRAMUSCULAR; INTRAVENOUS at 05:56

## 2023-10-13 RX ADMIN — FENTANYL CITRATE 25 MICROGRAM(S): 50 INJECTION INTRAVENOUS at 17:00

## 2023-10-13 RX ADMIN — Medication 400 MILLIGRAM(S): at 05:21

## 2023-10-13 RX ADMIN — Medication 1000 MILLIGRAM(S): at 06:00

## 2023-10-13 RX ADMIN — Medication 1000 MILLIGRAM(S): at 13:17

## 2023-10-13 RX ADMIN — AMPICILLIN SODIUM AND SULBACTAM SODIUM 200 GRAM(S): 250; 125 INJECTION, POWDER, FOR SUSPENSION INTRAMUSCULAR; INTRAVENOUS at 12:15

## 2023-10-13 NOTE — H&P ADULT - NSHPLABSRESULTS_GEN_ALL_CORE
LABS:                        12.0   6.61  )-----------( 206      ( 12 Oct 2023 16:48 )             37.4     10-12    140  |  100  |  29<H>  ----------------------------<  117<H>  4.6   |  29  |  1.28    Ca    9.4      12 Oct 2023 16:48    TPro  7.0  /  Alb  3.9  /  TBili  1.6<H>  /  DBili  x   /  AST  24  /  ALT  21  /  AlkPhos  104  10-12    Lactate:  10-12 @ 16:30  1.6    PT/INR - ( 12 Oct 2023 16:48 )   PT: 21.6 sec;   INR: 2.10 ratio         PTT - ( 12 Oct 2023 16:48 )  PTT:42.1 sec          Urinalysis Basic - ( 12 Oct 2023 16:48 )    Color: x / Appearance: x / SG: x / pH: x  Gluc: 117 mg/dL / Ketone: x  / Bili: x / Urobili: x   Blood: x / Protein: x / Nitrite: x   Leuk Esterase: x / RBC: x / WBC x   Sq Epi: x / Non Sq Epi: x / Bacteria: x        IMAGING:

## 2023-10-13 NOTE — CONSULT NOTE ADULT - PROBLEM SELECTOR RECOMMENDATION 5
Appears at baseline Cr at present based on chart review  - Renally dose med's  - Trend BMP  - Monitor UOP

## 2023-10-13 NOTE — DIETITIAN INITIAL EVALUATION ADULT - ORAL NUTRITION SUPPLEMENTS
recommend Ensure plus high protein 1x daily (350kcal, 20g proteins) + Jean Pierre 2x daily to provide additional calories and nutrients to encourage PO intake and to aid wound healing.

## 2023-10-13 NOTE — ADVANCED PRACTICE NURSE CONSULT - RECOMMEDATIONS
Impression   chinedu rectal incontinence dermatitis   right heel deep tissue injury present on admission  left heel deep tissue injury present on admission     Recommendations  1. chinedu rectal incontinence dermatitis    Topical therapy- sacral/bilateral buttocks- cleanse w/incontinent cleanser, pat dry & apply ramakrishna moisture barrier cream twice daily & prn soiling .  monitor    for changes .    2 bilateral heels   cleans with normal saline pat dry  apply 3M No sting liquid barrier film wipe to heels  daily & monitor for changes  . Elevate heels; apply Complete Cair air fluidized boots; ensure that the soles of the feet are not resting on the foot board of the bed.  3.  Incontinent management - incontinent cleanser, pads,  chinedu care  BID  4. Maintain on an alternating air with low air loss surface   5. Turn & reposition every 2 hr; Use positioning pillow to turn and reposition, soft pillow between bony prominences; continue measures to decrease friction/shear/pressure.  6. Nutrition optimization.  7.  waffle cushion when out of bed to chair .  8 right lower leg hemitomia follow surgery management    plan of care reviewed with covering staff RN  Consult to assess patient skin initiated by RN skin injury Impression   chinedu rectal incontinence dermatitis   right heel deep tissue injury present on admission  left heel deep tissue injury present on admission   right lower leg hematoma present on admission     Recommendations  1. chinedu rectal incontinence dermatitis    Topical therapy- sacral/bilateral buttocks- cleanse w/incontinent cleanser, pat dry & apply ramakrishna moisture barrier cream twice daily & prn soiling .  monitor    for changes .    2 bilateral heels   cleans with normal saline pat dry  apply 3M No sting liquid barrier film wipe to heels  daily & monitor for changes  . Elevate heels; apply Complete Cair air fluidized boots; ensure that the soles of the feet are not resting on the foot board of the bed.  3.  Incontinent management - incontinent cleanser, pads,  chinedu care  BID  4. Maintain on an alternating air with low air loss surface   5. Turn & reposition every 2 hr; Use positioning pillow to turn and reposition, soft pillow between bony prominences; continue measures to decrease friction/shear/pressure.  6. Nutrition optimization.  7.  waffle cushion when out of bed to chair .  8 right lower leg hemitomia follow surgery management    plan of care reviewed with covering staff ANN Fowler

## 2023-10-13 NOTE — CONSULT NOTE ADULT - PROBLEM SELECTOR RECOMMENDATION 9
Pt with large swelling of the RLE distal shin/calf, very erythematous from below the knee to dorsal foot, tender to palpation, dark eschar overlaying the surface  - Planned for hematoma evacuation with surgery  - f/u Or cultures  - Hold AC for now

## 2023-10-13 NOTE — CONSULT NOTE ADULT - PROBLEM SELECTOR RECOMMENDATION 2
Possible superimposed cellulitis over hematoma given erythema and pain.  - On Unasyn. Abx as per surgery   - Would sent cultures from hematoma if evacuated   - f/u Bcx

## 2023-10-13 NOTE — DIETITIAN INITIAL EVALUATION ADULT - EDUCATION DIETARY MODIFICATIONS
nutrition education not appropriate at present. Will F/U upon diet initiation./(0) unable to meet; needs instruction

## 2023-10-13 NOTE — DIETITIAN INITIAL EVALUATION ADULT - ETIOLOGY
increased physiological demand for nutrients  Chonodrocutaneous Helical Advancement Flap Text: The defect edges were debeveled with a #15 scalpel blade.  Given the location of the defect and the proximity to free margins a chondrocutaneous helical advancement flap was deemed most appropriate.  Using a sterile surgical marker, the appropriate advancement flap was drawn incorporating the defect and placing the expected incisions within the relaxed skin tension lines where possible.    The area thus outlined was incised deep to adipose tissue with a #15 scalpel blade.  The skin margins were undermined to an appropriate distance in all directions utilizing iris scissors.

## 2023-10-13 NOTE — ADVANCED PRACTICE NURSE CONSULT - REASON FOR CONSULT
Consult to assess patient skin initiated by RN sacrum deep tissue injury   History of Present Illness:  History of Present Illness:   83-year-old male PMH of atrial fibrillation on coumadin, heart failure, prostate CA presents to ED complaining of pain and swelling to the right lower extremity predominantly the right ankle and lower leg since last week.  Approximately 1 to 2 weeks ago patient had an injury to the right ankle was seen at an outside hospital last week and also seen by podiatry while in the ED, had x-rays showing ankle edema and swelling of the distal medial tib/fib patient was referred to podiatry outpatient.  Patient.  Was seen by podiatry last week and today, family reports that podiatry directed patient today to go to ED for further evaluation and vascular cardiology consult. Podiatry Dr. Hawk.  Patient and family and aide at bedside report worsening edema to the area over the past week, increased redness, Denies fevers, chills, diaphoresis, SOB, chest pain.    In the ED VSS, labs revealed no WBC, INR 2.1, no lactate, CT of RLE awaiting final read but reviewed by surgery team with likely hematoma 2/2 injury. General surgery consulted for RLE hematoma concern for possible infection.

## 2023-10-13 NOTE — PATIENT PROFILE ADULT - LEGAL HELP
Operative Note      Patient: Alfreda Atkins  YOB: 1946  MRN: [id number]    Date of Procedure: 10/26/2020    Pre-Op Diagnosis: Bleeding from ostomy    Post-Op Diagnosis: superficial nonbleeding gastric cardia ulcer, mild esophagitis, nonbleeding esophageal varices       Procedure(s):  EGD ESOPHAGOGASTRODUODENOSCOPY    Surgeon(s):  Maria Guadalupe Davis MD    Assistant:   * No surgical staff found *    Anesthesia: Monitor Anesthesia Care    Estimated Blood Loss (mL): 0    Complications: none    Specimens:   * No specimens in log *    Implants:  * No implants in log *      Drains:   Colostomy LUQ (Active)   Stomal Appliance 2 piece;Dry;Clean; Intact 10/25/20 1047   Stoma  Assessment Moist;Red 10/25/20 2010   Peristomal Assessment Pink 10/25/20 2010   Treatment Bag change 10/25/20 1047   Stool Appearance Soft;Watery 10/25/20 2010   Stool Color Brown;Yellow 10/25/20 2010   Stool Amount Small 10/25/20 2010   Output (mL) 200 ml 10/25/20 0130             BRIEF HISTORY:  This is a 69 y.o. female who presents with the complaint of bleeding from ostomy. Colonoscopy was unremarkable. It was recommended the patient undergo an esophagogastrduodenoscopy for further evaluation. The risks/benefits/alternatives/expected outcomes were explained to the patient which include but are not limited to, bleeding, perforation and aspiration. The patient understands and agrees to proceed. PROCEDURE:  The patient was brought into the endoscopy suite and placed in the left lateral decubitus position. A bite block was placed in the patients mouth. After the initiation of LMAC anesthesia, an endoscope was inserted into the patient's mouth and passed into the esophagus and into the stomach. The scope was advanced through the pylorus into the first and second portion of the duodenum making the note of grossly normal mucosa. The scope was then withdrawn back into the stomach where it was retroflexed. There was no hiatal hernia noted. The scope was then straightened and a small, nonbleeding, superficial gastric cardia ulcer was identified. The stomach was desufflated. The scope was then withdrawn the entire length of the esophagus, making the note of mild esophagitis as well as nonbleeding esophageal varices. The scope was withdrawn entirely. The patient tolerated the procedure well and there were no complications. The patient was taken to PACU in stable condition.     Ed Morrison MD  10/26/2020    Electronically signed by Ed Morrison MD on 10/26/2020 at 9:30 AM no

## 2023-10-13 NOTE — DIETITIAN INITIAL EVALUATION ADULT - NSFNSPHYEXAMSKINFT_GEN_A_CORE
skin per wound care note 10/13:   right heel deep tissue injury present on admission  left heel deep tissue injury present on admission   right lower leg hematoma present on admission

## 2023-10-13 NOTE — CONSULT NOTE ADULT - ASSESSMENT
84 y/o male with pAF, HFpEF, prostate ca, dementia who presented with hematoma with possible superimposed cellulitis

## 2023-10-13 NOTE — CHART NOTE - NSCHARTNOTEFT_GEN_A_CORE
POST-OP NOTE    LEBRON TERRY | [id number] | [facility] 9MON 904 D1    Procedure: s/p RLE hematoma incision and debridement     Subjective: Patient seen and examined several hours postoperatively. Pain is controlled.     Vital Signs Last 24 Hrs  T(C): 36.7 (13 Oct 2023 20:36), Max: 37 (13 Oct 2023 19:28)  T(F): 98 (13 Oct 2023 20:36), Max: 98.6 (13 Oct 2023 19:28)  HR: 77 (13 Oct 2023 20:36) (61 - 77)  BP: 139/84 (13 Oct 2023 20:36) (118/58 - 150/65)  BP(mean): 84 (13 Oct 2023 18:45) (82 - 97)  RR: 18 (13 Oct 2023 20:36) (16 - 18)  SpO2: 96% (13 Oct 2023 20:36) (92% - 100%)    Parameters below as of 13 Oct 2023 20:36  Patient On (Oxygen Delivery Method): nasal cannula  O2 Flow (L/min): 2    I&O's Summary    13 Oct 2023 07:01  -  13 Oct 2023 22:19  --------------------------------------------------------  IN: 0 mL / OUT: 1000 mL / NET: -1000 mL                            10.3   4.84  )-----------( 148      ( 13 Oct 2023 03:53 )             32.6     10-13    137  |  103  |  26<H>  ----------------------------<  101<H>  4.2   |  24  |  1.21    Ca    8.6      13 Oct 2023 03:53  Phos  2.9     10-13  Mg     2.0     10-13    TPro  7.0  /  Alb  3.9  /  TBili  1.6<H>  /  DBili  x   /  AST  24  /  ALT  21  /  AlkPhos  104  10-12   PT/INR - ( 13 Oct 2023 10:28 )   PT: 18.1 sec;   INR: 1.67 ratio         PTT - ( 13 Oct 2023 05:34 )  PTT:37.4 sec    PHYSICAL EXAM:  Gen: NAD  Resp: breathing easily, no stridor  CV: RRR  RLE: foot and ankle wrapped in ace bandage. Dressing is c/d/i.

## 2023-10-13 NOTE — CONSULT NOTE ADULT - PROBLEM SELECTOR RECOMMENDATION 3
h/o pAF. On Coumadin for AC as well as amio and metoprolol.   - Would resume Amio and Metoprolol  - Holding AC for now  - Would resume AC when safe from surgical perspective

## 2023-10-13 NOTE — H&P ADULT - ASSESSMENT
83 year old male PMH atrial fibrillation on coumadin, heart failure, prostate CA presents to ED complaining of pain and swelling to the right lower extremity predominantly the right ankle and lower leg since last week.  Approximately 1 to 2 weeks ago patient had an injury to the right ankle presented to [facility] and was discharged after xrays show edema. Now returns with large swelling of the RLE distal shin/calf, very erythematous from below the knee to dorsal foot, tender to palpation, dark eschar overlaying the surface. Labs WNL, CT lower extremity with fluid collection Concern for hematoma with possible infection and surrounding cellulitis.     Plan:  - Admit to Dr. Winter on Trauma/ACS  - Unasyn  - Vitamin k 5mg stat  - Add on for hematoma evacuation in AM  - Med rec TBD  - F/u Bcx  - NPO/IVF  - Pain control PRN  - Strict I/O  - monitor for signs of systemic infection    Discussed with Dr. Winter    Trauma/ACS  [id number]

## 2023-10-13 NOTE — PATIENT PROFILE ADULT - FALL HARM RISK - HARM RISK INTERVENTIONS

## 2023-10-13 NOTE — DIETITIAN INITIAL EVALUATION ADULT - PERTINENT LABORATORY DATA
10-13    137  |  103  |  26<H>  ----------------------------<  101<H>  4.2   |  24  |  1.21    Ca    8.6      13 Oct 2023 03:53  Phos  2.9     10-13  Mg     2.0     10-13    TPro  7.0  /  Alb  3.9  /  TBili  1.6<H>  /  DBili  x   /  AST  24  /  ALT  21  /  AlkPhos  104  10-12

## 2023-10-13 NOTE — ADVANCED PRACTICE NURSE CONSULT - ASSESSMENT
arrived on unit, patient was found lying in a low air loss pressure redistribution support surface style bed.  patient  is unable to turn independently and staff assistance x 2 was provided. Once turned,  patient is incontinent of stool and urine condom cathter present.  right lower leg with : Pt with large swelling of the RLE distal shin/calf, very erythematous from below the knee to dorsal foot, tender to palpation, dark eschar overlaying the surface  - Planned for hematoma evacuation with surgery  chinedu rectal area with erythremia  and indurated consistent with incontinence dermatitis.  right heel with non blanchable deep red discoloration consistent with deep tissue injury size approximaly 3 cm x 2 cm x0 cm present on admission   left  heel with non blanchable deep red discoloration consistent with deep tissue injury size approximaly 3 cm x 2 cm x0 cm present on admission

## 2023-10-13 NOTE — H&P ADULT - NSHPPHYSICALEXAM_GEN_ALL_CORE
VITAL SIGNS:  Vital Signs Last 24 Hrs  T(C): 37.1 (12 Oct 2023 21:00), Max: 37.1 (12 Oct 2023 21:00)  T(F): 98.8 (12 Oct 2023 21:00), Max: 98.8 (12 Oct 2023 21:00)  HR: 76 (12 Oct 2023 21:00) (75 - 93)  BP: 164/74 (12 Oct 2023 21:00) (134/88 - 164/74)  BP(mean): 104 (12 Oct 2023 21:00) (104 - 104)  RR: 20 (12 Oct 2023 21:00) (18 - 20)  SpO2: 95% (12 Oct 2023 21:00) (95% - 100%)    Parameters below as of 12 Oct 2023 21:00  Patient On (Oxygen Delivery Method): room air          PHYSICAL EXAM:    General: NAD, Sitting in bed comfortably  HEENT: NC/AT, EOMI  Neck: Soft, supple  Cardio: RRR, nml S1/S2  Resp: Good effort, CTA b/l  Thorax: No chest wall tenderness  Breast: No lesions/masses, no drainage  GI/Abd: Soft, NT/ND, no rebound/guarding, no masses palpated  Vascular: Extremities warm, brisk cap refill, B/l radial pulses palpable, Right DP/PT signals Left DP/PT palpable, B/L fem palpable , no palpable abdominal pulsatile mass  Skin: Intact, no breakdown  Lymphatic/Nodes: No palpable lymphadenopathy  Musculoskeletal: All 4 extremities moving spontaneously, no limitations

## 2023-10-13 NOTE — DIETITIAN INITIAL EVALUATION ADULT - OTHER INFO
-- ordered for IVF Lactated Ringers @ 120ml/hr since 10/13/23  -- s/p Phytonadione  -- Lab on 10/13/23 revealed abnormal BUN 26H

## 2023-10-13 NOTE — DIETITIAN INITIAL EVALUATION ADULT - ORAL INTAKE PTA/DIET HISTORY
Aide reports pt with good PO intake at baseline, denies poor PO intake PTA. Aide states pt is on Coumadin so family is aware for pt to maintain consistent amount of green vegetables at home.   Confirms NKFA/intolerance  Micronutrient/Other supplementation: multivitamins/minerals per H&P   Protein-energy supplementation: none

## 2023-10-13 NOTE — DIETITIAN INITIAL EVALUATION ADULT - OTHER CALCULATIONS
Fluid needs deferred to team. Energy and protein needs based on dosing wt of 90kg in consideration of Increased nutrient needs for dx HF and impaired skin.

## 2023-10-13 NOTE — DIETITIAN INITIAL EVALUATION ADULT - REASON INDICATOR FOR ASSESSMENT
Pt seen for consult for pressure injury stage 2/>. Pt sleeping upon visit. Private aide (Delmy) at bedside to obtain information of pt. Other source: RN, electronic medical record. Chart reviewed, events noted.

## 2023-10-13 NOTE — DIETITIAN INITIAL EVALUATION ADULT - ADD RECOMMEND
-- Recommend multivitamins/minerals and Vitamin C, pending no medical contraindications, for micronutrient support.   -- Monitor for PO initiation/intake, GI tolerance, skin integrity, labs, weight, and bowel movement regularity.   -- F/U with [facility]

## 2023-10-13 NOTE — DIETITIAN INITIAL EVALUATION ADULT - CONTINUE CURRENT NUTRITION CARE PLAN
diet initiation per team. Rec low Na diet upon diet initiation. RD remains available to adjust diet as needed.

## 2023-10-13 NOTE — H&P ADULT - HISTORY OF PRESENT ILLNESS
83-year-old male PMH of atrial fibrillation on coumadin, heart failure, prostate CA presents to ED complaining of pain and swelling to the right lower extremity predominantly the right ankle and lower leg since last week.  Approximately 1 to 2 weeks ago patient had an injury to the right ankle was seen at an outside hospital last week and also seen by podiatry while in the ED, had x-rays showing ankle edema and swelling of the distal medial tib/fib patient was referred to podiatry outpatient.  Patient.  Was seen by podiatry last week and today, family reports that podiatry directed patient today to go to ED for further evaluation and vascular cardiology consult. Podiatry Dr. Hawk.  Patient and family and aide at bedside report worsening edema to the area over the past week, increased redness, Denies fevers, chills, diaphoresis, SOB, chest pain.    In the ED VSS, labs revealed no WBC, INR 2.1, no lactate, CT of RLE awaiting final read but reviewed by surgery team with likely hematoma 2/2 injury. General surgery consulted for RLE hematoma concern for possible infection.

## 2023-10-13 NOTE — DIETITIAN INITIAL EVALUATION ADULT - PHYSCIAL ASSESSMENT
Drug Dosing Weight  Height (cm): 182.9 (13 Oct 2023 15:10)  Weight (kg): 89.993 (13 Oct 2023 15:10)  BMI (kg/m2): 26.9 (13 Oct 2023 15:10)    Wt from current admission (standing/bedscale): none documented thus far  Wt obtained by RD at bedside: 243lb (10/13/23), ? accuracy of bed scale   UBW: ~223lb per health aide, states pt with wt fluctuation due to on medication causing fluid retention.   ** Noted with wt difference between dosing wt, current wt and UBW. RD will continue to monitor wt trends as available/able.     Wt history from previous admission: 90.5kg (5/2/22)   Wt history per Jassi HIE: no HIE      IBW: 178lb

## 2023-10-13 NOTE — DIETITIAN INITIAL EVALUATION ADULT - REASON FOR ADMISSION
Other injury of unspecified body region, initial encounter    Per chart: "83-year-old male PMH of atrial fibrillation on coumadin, heart failure, prostate CA presents to ED complaining of pain and swelling to the right lower extremity predominantly the right ankle and lower leg since last week.  Approximately 1 to 2 weeks ago patient had an injury to the right ankle was seen at an outside hospital last week and also seen by podiatry while in the ED, had x-rays showing ankle edema and swelling of the distal medial tib/fib patient was referred to podiatry outpatient.  Patient.  Was seen by podiatry last week and today, family reports that podiatry directed patient today to go to ED for further evaluation and vascular cardiology consult. Podiatry Dr. Hawk.  Patient and family and aide at bedside report worsening edema to the area over the past week, increased redness, Denies fevers, chills, diaphoresis, SOB, chest pain.    In the ED VSS, labs revealed no WBC, INR 2.1, no lactate, CT of RLE awaiting final read but reviewed by surgery team with likely hematoma 2/2 injury. General surgery consulted for RLE hematoma concern for possible infection."

## 2023-10-13 NOTE — DIETITIAN INITIAL EVALUATION ADULT - NSFNSGIASSESSMENTFT_GEN_A_CORE
Aide denies pt with nausea/vomiting/constipation/diarrhea at present. NO BM since admission (pt is NPO).

## 2023-10-13 NOTE — DIETITIAN INITIAL EVALUATION ADULT - PERTINENT MEDS FT
MEDICATIONS  (STANDING):  acetaminophen   IVPB .. 1000 milliGRAM(s) IV Intermittent every 6 hours  ampicillin/sulbactam  IVPB 3 Gram(s) IV Intermittent every 6 hours  lactated ringers. 1000 milliLiter(s) (120 mL/Hr) IV Continuous <Continuous>    MEDICATIONS  (PRN):  HYDROmorphone  Injectable 0.2 milliGRAM(s) IV Push every 4 hours PRN Moderate Pain (4 - 6)  HYDROmorphone  Injectable 0.5 milliGRAM(s) IV Push every 4 hours PRN Severe Pain (7 - 10)

## 2023-10-14 LAB
ANION GAP SERPL CALC-SCNC: 13 MMOL/L — SIGNIFICANT CHANGE UP (ref 5–17)
BUN SERPL-MCNC: 25 MG/DL — HIGH (ref 7–23)
CALCIUM SERPL-MCNC: 9.3 MG/DL — SIGNIFICANT CHANGE UP (ref 8.4–10.5)
CHLORIDE SERPL-SCNC: 103 MMOL/L — SIGNIFICANT CHANGE UP (ref 96–108)
CO2 SERPL-SCNC: 26 MMOL/L — SIGNIFICANT CHANGE UP (ref 22–31)
CREAT SERPL-MCNC: 1.29 MG/DL — SIGNIFICANT CHANGE UP (ref 0.5–1.3)
EGFR: 55 ML/MIN/1.73M2 — LOW
GLUCOSE SERPL-MCNC: 70 MG/DL — SIGNIFICANT CHANGE UP (ref 70–99)
HCT VFR BLD CALC: 34.6 % — LOW (ref 39–50)
HGB BLD-MCNC: 10.9 G/DL — LOW (ref 13–17)
MAGNESIUM SERPL-MCNC: 2.3 MG/DL — SIGNIFICANT CHANGE UP (ref 1.6–2.6)
MCHC RBC-ENTMCNC: 31.5 GM/DL — LOW (ref 32–36)
MCHC RBC-ENTMCNC: 32.2 PG — SIGNIFICANT CHANGE UP (ref 27–34)
MCV RBC AUTO: 102.4 FL — HIGH (ref 80–100)
NRBC # BLD: 0 /100 WBCS — SIGNIFICANT CHANGE UP (ref 0–0)
PHOSPHATE SERPL-MCNC: 4 MG/DL — SIGNIFICANT CHANGE UP (ref 2.5–4.5)
PLATELET # BLD AUTO: 192 K/UL — SIGNIFICANT CHANGE UP (ref 150–400)
POTASSIUM SERPL-MCNC: 4.6 MMOL/L — SIGNIFICANT CHANGE UP (ref 3.5–5.3)
POTASSIUM SERPL-SCNC: 4.6 MMOL/L — SIGNIFICANT CHANGE UP (ref 3.5–5.3)
RBC # BLD: 3.38 M/UL — LOW (ref 4.2–5.8)
RBC # FLD: 14.5 % — SIGNIFICANT CHANGE UP (ref 10.3–14.5)
SODIUM SERPL-SCNC: 142 MMOL/L — SIGNIFICANT CHANGE UP (ref 135–145)
WBC # BLD: 5.89 K/UL — SIGNIFICANT CHANGE UP (ref 3.8–10.5)
WBC # FLD AUTO: 5.89 K/UL — SIGNIFICANT CHANGE UP (ref 3.8–10.5)

## 2023-10-14 RX ORDER — LACTOBACILLUS ACIDOPHILUS 100MM CELL
1 CAPSULE ORAL DAILY
Refills: 0 | Status: DISCONTINUED | OUTPATIENT
Start: 2023-10-14 | End: 2023-10-25

## 2023-10-14 RX ORDER — ALLOPURINOL 300 MG
300 TABLET ORAL DAILY
Refills: 0 | Status: DISCONTINUED | OUTPATIENT
Start: 2023-10-14 | End: 2023-10-25

## 2023-10-14 RX ORDER — TAMSULOSIN HYDROCHLORIDE 0.4 MG/1
0.4 CAPSULE ORAL AT BEDTIME
Refills: 0 | Status: DISCONTINUED | OUTPATIENT
Start: 2023-10-14 | End: 2023-10-25

## 2023-10-14 RX ADMIN — Medication 1 TABLET(S): at 22:30

## 2023-10-14 RX ADMIN — Medication 300 MILLIGRAM(S): at 22:30

## 2023-10-14 RX ADMIN — AMPICILLIN SODIUM AND SULBACTAM SODIUM 200 GRAM(S): 250; 125 INJECTION, POWDER, FOR SUSPENSION INTRAMUSCULAR; INTRAVENOUS at 11:59

## 2023-10-14 RX ADMIN — Medication 25 MILLIGRAM(S): at 05:47

## 2023-10-14 RX ADMIN — LATANOPROST 1 DROP(S): 0.05 SOLUTION/ DROPS OPHTHALMIC; TOPICAL at 22:31

## 2023-10-14 RX ADMIN — AMPICILLIN SODIUM AND SULBACTAM SODIUM 200 GRAM(S): 250; 125 INJECTION, POWDER, FOR SUSPENSION INTRAMUSCULAR; INTRAVENOUS at 00:37

## 2023-10-14 RX ADMIN — TAMSULOSIN HYDROCHLORIDE 0.4 MILLIGRAM(S): 0.4 CAPSULE ORAL at 22:30

## 2023-10-14 RX ADMIN — AMPICILLIN SODIUM AND SULBACTAM SODIUM 200 GRAM(S): 250; 125 INJECTION, POWDER, FOR SUSPENSION INTRAMUSCULAR; INTRAVENOUS at 05:44

## 2023-10-14 RX ADMIN — CHLORHEXIDINE GLUCONATE 1 APPLICATION(S): 213 SOLUTION TOPICAL at 06:09

## 2023-10-14 RX ADMIN — AMIODARONE HYDROCHLORIDE 200 MILLIGRAM(S): 400 TABLET ORAL at 05:47

## 2023-10-14 RX ADMIN — Medication 975 MILLIGRAM(S): at 10:26

## 2023-10-14 RX ADMIN — AMPICILLIN SODIUM AND SULBACTAM SODIUM 200 GRAM(S): 250; 125 INJECTION, POWDER, FOR SUSPENSION INTRAMUSCULAR; INTRAVENOUS at 18:11

## 2023-10-14 RX ADMIN — AMPICILLIN SODIUM AND SULBACTAM SODIUM 200 GRAM(S): 250; 125 INJECTION, POWDER, FOR SUSPENSION INTRAMUSCULAR; INTRAVENOUS at 00:00

## 2023-10-14 NOTE — PHYSICAL THERAPY INITIAL EVALUATION ADULT - PERTINENT HX OF CURRENT PROBLEM, REHAB EVAL
84 yo man with PMH of a fib (on warfarin), heart failure, and prostate cancer complaining of continuing pain and swelling in the RLE. The patient experienced a mechanical fall on 10/4 while transferring to his rolling walker from his bed. Portable x-ray at the time of fall did not demonstrate acute fracture at the time. Over the next two days, the patient's swelling increased and he was brought to an OSH for evaluation. At [facility], repeated x-rays of foot, ankle, and lower leg did not elucidate any acute fractures. Concern for hematoma with possible infection and surrounding cellulitis. Now s/p I&D in OR on 10/14. CT LE- no acute fx. x ray ankle r- No acute fracture or dislocation.  2.  No radiographic evidence of osteomyelitis.

## 2023-10-14 NOTE — PHYSICAL THERAPY INITIAL EVALUATION ADULT - MODALITIES TREATMENT COMMENTS
Orders received, chart reviewed. Pt radha PT WC eval well to R medial shin. Pt s/p I&D in OR on 10/14/23. Dressing removed. Wound measured 7cm x 5cm x 2.5cm. Wound with 10% granulation, 80% dusky/boggy appearance, 10% slough. Edema noted t/o RLE. No induration, no malodor, no erythema. Wound cleansed with NS, cavilon to periwound, aquacel Ag applied to wound bed, followed by DSD (Non-woven), wrapped with rebecca, secured with tape. ACE wrap applied from forefoot to proximal calf in figure-8 fashion with 50% tension/50% overlap. LE digits 1-5 with <3 sec cap refill after ACE application. Pt left as found in NAD, all lines intact, RLE supported and elevated with ANN lorenz and Trauma PA Maria Esther Anderson aware. 5 P's. Orders received, chart reviewed. Pt radha PT WC eval well to R medial shin. Pt s/p I&D in OR on 10/14/23. Dressing removed. Wound measured 7cm x 5cm x 2.5cm. Wound with 10% granulation, 10% eschar inferior border, 70% dusky/boggy appearance, 10% slough. Edema noted t/o RLE. No induration, no malodor, no erythema. Wound cleansed with NS, cavilon to periwound, aquacel Ag applied to wound bed, followed by DSD (Non-woven), wrapped with rebecca, secured with tape. ACE wrap applied from forefoot to proximal calf in figure-8 fashion with 50% tension/50% overlap. LE digits 1-5 with <3 sec cap refill after ACE application. Pt left as found in NAD, all lines intact, RLE supported and elevated with ANN lorenz and Trauma PA Maria Esther Anderson aware. 5 P's.

## 2023-10-14 NOTE — PHYSICAL THERAPY INITIAL EVALUATION ADULT - IMPAIRMENTS FOUND, PT EVAL
aerobic capacity/endurance/muscle strength aerobic capacity/endurance/integumentary integrity/muscle strength
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
## 2023-10-14 NOTE — PROGRESS NOTE ADULT - ASSESSMENT
83 year old male PMH atrial fibrillation on coumadin, heart failure, prostate CA presents to ED complaining of pain and swelling to the right lower extremity predominantly the right ankle and lower leg since last week.  Large swelling of the RLE distal shin/calf, very erythematous from below the knee to dorsal foot, tender to palpation, dark eschar overlaying the surface. Concern for hematoma with possible infection and surrounding cellulitis. Now s/p I&D in OR on 10/14.     Plan:  - Pain control PRN  - Strict I/O  - monitor for signs of systemic infection  - PT    Trauma/ACS  [id number]

## 2023-10-14 NOTE — PHYSICAL THERAPY INITIAL EVALUATION ADULT - PLANNED THERAPY INTERVENTIONS, PT EVAL
balance training/bed mobility training/strengthening GOAL: WC MANAGEMENT TO R MEDIAL [place]./balance training/bed mobility training/strengthening

## 2023-10-14 NOTE — PROGRESS NOTE ADULT - SUBJECTIVE AND OBJECTIVE BOX
Surgery Progress Note     24hour Events:   - RLE hematoma I&D    Subjective:  Patient seen and examined. Pain well managed with current medications.     Vital Signs:  Vital Signs Last 24 Hrs  T(C): 38.3 (14 Oct 2023 10:25), Max: 38.3 (14 Oct 2023 10:25)  T(F): 100.9 (14 Oct 2023 10:25), Max: 100.9 (14 Oct 2023 10:25)  HR: 92 (14 Oct 2023 09:14) (61 - 97)  BP: 139/79 (14 Oct 2023 09:14) (118/58 - 151/75)  BP(mean): 84 (13 Oct 2023 18:45) (82 - 97)  RR: 18 (14 Oct 2023 09:14) (16 - 18)  SpO2: 95% (14 Oct 2023 09:14) (92% - 100%)    Parameters below as of 14 Oct 2023 09:14  Patient On (Oxygen Delivery Method): nasal cannula  O2 Flow (L/min): 2    Physical Exam:  General: NAD, resting comfortably in bed  HEENT: Normocephalic atraumatic  Respiratory: Nonlabored respirations  Cardio: pulse present  Abdomen: soft, nontender, nondistended  MSK: RLE wound packed, wrapped, c/d/i    Labs:    10-14    142  |  103  |  25<H>  ----------------------------<  70  4.6   |  26  |  1.29    Ca    9.3      14 Oct 2023 06:51  Phos  4.0     10-14  Mg     2.3     10-14    TPro  7.0  /  Alb  3.9  /  TBili  1.6<H>  /  DBili  x   /  AST  24  /  ALT  21  /  AlkPhos  104  10-12    LIVER FUNCTIONS - ( 12 Oct 2023 16:48 )  Alb: 3.9 g/dL / Pro: 7.0 g/dL / ALK PHOS: 104 U/L / ALT: 21 U/L / AST: 24 U/L / GGT: x                                 10.9   5.89  )-----------( 192      ( 14 Oct 2023 06:51 )             34.6     PT/INR - ( 13 Oct 2023 10:28 )   PT: 18.1 sec;   INR: 1.67 ratio         PTT - ( 13 Oct 2023 05:34 )  PTT:37.4 sec

## 2023-10-15 LAB
ANION GAP SERPL CALC-SCNC: 10 MMOL/L — SIGNIFICANT CHANGE UP (ref 5–17)
BUN SERPL-MCNC: 31 MG/DL — HIGH (ref 7–23)
CALCIUM SERPL-MCNC: 8.5 MG/DL — SIGNIFICANT CHANGE UP (ref 8.4–10.5)
CHLORIDE SERPL-SCNC: 106 MMOL/L — SIGNIFICANT CHANGE UP (ref 96–108)
CO2 SERPL-SCNC: 24 MMOL/L — SIGNIFICANT CHANGE UP (ref 22–31)
CREAT SERPL-MCNC: 1.6 MG/DL — HIGH (ref 0.5–1.3)
EGFR: 43 ML/MIN/1.73M2 — LOW
GLUCOSE SERPL-MCNC: 88 MG/DL — SIGNIFICANT CHANGE UP (ref 70–99)
HCT VFR BLD CALC: 30.2 % — LOW (ref 39–50)
HCT VFR BLD CALC: 33.4 % — LOW (ref 39–50)
HGB BLD-MCNC: 10.2 G/DL — LOW (ref 13–17)
HGB BLD-MCNC: 9.2 G/DL — LOW (ref 13–17)
MAGNESIUM SERPL-MCNC: 2.2 MG/DL — SIGNIFICANT CHANGE UP (ref 1.6–2.6)
MCHC RBC-ENTMCNC: 30.5 GM/DL — LOW (ref 32–36)
MCHC RBC-ENTMCNC: 30.5 GM/DL — LOW (ref 32–36)
MCHC RBC-ENTMCNC: 31.5 PG — SIGNIFICANT CHANGE UP (ref 27–34)
MCHC RBC-ENTMCNC: 31.8 PG — SIGNIFICANT CHANGE UP (ref 27–34)
MCV RBC AUTO: 103.1 FL — HIGH (ref 80–100)
MCV RBC AUTO: 104.5 FL — HIGH (ref 80–100)
NRBC # BLD: 0 /100 WBCS — SIGNIFICANT CHANGE UP (ref 0–0)
NRBC # BLD: 0 /100 WBCS — SIGNIFICANT CHANGE UP (ref 0–0)
PHOSPHATE SERPL-MCNC: 3.5 MG/DL — SIGNIFICANT CHANGE UP (ref 2.5–4.5)
PLATELET # BLD AUTO: 156 K/UL — SIGNIFICANT CHANGE UP (ref 150–400)
PLATELET # BLD AUTO: 185 K/UL — SIGNIFICANT CHANGE UP (ref 150–400)
POTASSIUM SERPL-MCNC: 4.3 MMOL/L — SIGNIFICANT CHANGE UP (ref 3.5–5.3)
POTASSIUM SERPL-SCNC: 4.3 MMOL/L — SIGNIFICANT CHANGE UP (ref 3.5–5.3)
RBC # BLD: 2.89 M/UL — LOW (ref 4.2–5.8)
RBC # BLD: 3.24 M/UL — LOW (ref 4.2–5.8)
RBC # FLD: 14.3 % — SIGNIFICANT CHANGE UP (ref 10.3–14.5)
RBC # FLD: 14.3 % — SIGNIFICANT CHANGE UP (ref 10.3–14.5)
SODIUM SERPL-SCNC: 140 MMOL/L — SIGNIFICANT CHANGE UP (ref 135–145)
WBC # BLD: 3.79 K/UL — LOW (ref 3.8–10.5)
WBC # BLD: 4.91 K/UL — SIGNIFICANT CHANGE UP (ref 3.8–10.5)
WBC # FLD AUTO: 3.79 K/UL — LOW (ref 3.8–10.5)
WBC # FLD AUTO: 4.91 K/UL — SIGNIFICANT CHANGE UP (ref 3.8–10.5)

## 2023-10-15 RX ORDER — HEPARIN SODIUM 5000 [USP'U]/ML
5000 INJECTION INTRAVENOUS; SUBCUTANEOUS EVERY 8 HOURS
Refills: 0 | Status: DISCONTINUED | OUTPATIENT
Start: 2023-10-15 | End: 2023-10-16

## 2023-10-15 RX ADMIN — AMPICILLIN SODIUM AND SULBACTAM SODIUM 200 GRAM(S): 250; 125 INJECTION, POWDER, FOR SUSPENSION INTRAMUSCULAR; INTRAVENOUS at 00:04

## 2023-10-15 RX ADMIN — Medication 975 MILLIGRAM(S): at 05:43

## 2023-10-15 RX ADMIN — AMIODARONE HYDROCHLORIDE 200 MILLIGRAM(S): 400 TABLET ORAL at 05:43

## 2023-10-15 RX ADMIN — AMPICILLIN SODIUM AND SULBACTAM SODIUM 200 GRAM(S): 250; 125 INJECTION, POWDER, FOR SUSPENSION INTRAMUSCULAR; INTRAVENOUS at 23:55

## 2023-10-15 RX ADMIN — AMPICILLIN SODIUM AND SULBACTAM SODIUM 200 GRAM(S): 250; 125 INJECTION, POWDER, FOR SUSPENSION INTRAMUSCULAR; INTRAVENOUS at 05:43

## 2023-10-15 RX ADMIN — Medication 975 MILLIGRAM(S): at 06:15

## 2023-10-15 RX ADMIN — Medication 975 MILLIGRAM(S): at 00:05

## 2023-10-15 RX ADMIN — Medication 975 MILLIGRAM(S): at 18:17

## 2023-10-15 RX ADMIN — Medication 975 MILLIGRAM(S): at 00:35

## 2023-10-15 RX ADMIN — HEPARIN SODIUM 5000 UNIT(S): 5000 INJECTION INTRAVENOUS; SUBCUTANEOUS at 23:02

## 2023-10-15 RX ADMIN — LATANOPROST 1 DROP(S): 0.05 SOLUTION/ DROPS OPHTHALMIC; TOPICAL at 23:04

## 2023-10-15 RX ADMIN — AMPICILLIN SODIUM AND SULBACTAM SODIUM 200 GRAM(S): 250; 125 INJECTION, POWDER, FOR SUSPENSION INTRAMUSCULAR; INTRAVENOUS at 12:17

## 2023-10-15 RX ADMIN — Medication 975 MILLIGRAM(S): at 18:47

## 2023-10-15 RX ADMIN — Medication 1 TABLET(S): at 12:18

## 2023-10-15 RX ADMIN — Medication 25 MILLIGRAM(S): at 05:43

## 2023-10-15 RX ADMIN — TAMSULOSIN HYDROCHLORIDE 0.4 MILLIGRAM(S): 0.4 CAPSULE ORAL at 23:02

## 2023-10-15 RX ADMIN — Medication 300 MILLIGRAM(S): at 12:18

## 2023-10-15 RX ADMIN — AMPICILLIN SODIUM AND SULBACTAM SODIUM 200 GRAM(S): 250; 125 INJECTION, POWDER, FOR SUSPENSION INTRAMUSCULAR; INTRAVENOUS at 17:48

## 2023-10-15 RX ADMIN — CHLORHEXIDINE GLUCONATE 1 APPLICATION(S): 213 SOLUTION TOPICAL at 05:50

## 2023-10-15 NOTE — PROGRESS NOTE ADULT - ASSESSMENT
83 year old male PMH atrial fibrillation on coumadin, heart failure, prostate CA presents to ED complaining of pain and swelling to the right lower extremity predominantly the right ankle and lower leg since last week.  Large swelling of the RLE distal shin/calf, very erythematous from below the knee to dorsal foot, tender to palpation, dark eschar overlaying the surface. Concern for hematoma with possible infection and surrounding cellulitis. Now s/p I&D in OR on 10/14.     Plan:  - Pain control PRN  - Strict I/O  - monitor for signs of systemic infection  - PT    Trauma/ACS  [id number]

## 2023-10-16 DIAGNOSIS — Z29.9 ENCOUNTER FOR PROPHYLACTIC MEASURES, UNSPECIFIED: ICD-10-CM

## 2023-10-16 DIAGNOSIS — N17.9 ACUTE KIDNEY FAILURE, UNSPECIFIED: ICD-10-CM

## 2023-10-16 LAB
ANION GAP SERPL CALC-SCNC: 12 MMOL/L — SIGNIFICANT CHANGE UP (ref 5–17)
APTT BLD: 30.3 SEC — SIGNIFICANT CHANGE UP (ref 24.5–35.6)
APTT BLD: 76.9 SEC — HIGH (ref 24.5–35.6)
APTT BLD: 76.9 SEC — HIGH (ref 24.5–35.6)
BUN SERPL-MCNC: 29 MG/DL — HIGH (ref 7–23)
CALCIUM SERPL-MCNC: 8.8 MG/DL — SIGNIFICANT CHANGE UP (ref 8.4–10.5)
CHLORIDE SERPL-SCNC: 104 MMOL/L — SIGNIFICANT CHANGE UP (ref 96–108)
CO2 SERPL-SCNC: 27 MMOL/L — SIGNIFICANT CHANGE UP (ref 22–31)
CREAT SERPL-MCNC: 1.63 MG/DL — HIGH (ref 0.5–1.3)
EGFR: 42 ML/MIN/1.73M2 — LOW
GLUCOSE SERPL-MCNC: 85 MG/DL — SIGNIFICANT CHANGE UP (ref 70–99)
HCT VFR BLD CALC: 31.5 % — LOW (ref 39–50)
HGB BLD-MCNC: 9.8 G/DL — LOW (ref 13–17)
INR BLD: 1.19 RATIO — HIGH (ref 0.85–1.18)
MAGNESIUM SERPL-MCNC: 2.1 MG/DL — SIGNIFICANT CHANGE UP (ref 1.6–2.6)
MCHC RBC-ENTMCNC: 31.1 GM/DL — LOW (ref 32–36)
MCHC RBC-ENTMCNC: 31.8 PG — SIGNIFICANT CHANGE UP (ref 27–34)
MCV RBC AUTO: 102.3 FL — HIGH (ref 80–100)
NRBC # BLD: 0 /100 WBCS — SIGNIFICANT CHANGE UP (ref 0–0)
PHOSPHATE SERPL-MCNC: 3.1 MG/DL — SIGNIFICANT CHANGE UP (ref 2.5–4.5)
PLATELET # BLD AUTO: 179 K/UL — SIGNIFICANT CHANGE UP (ref 150–400)
POTASSIUM SERPL-MCNC: 4.2 MMOL/L — SIGNIFICANT CHANGE UP (ref 3.5–5.3)
POTASSIUM SERPL-SCNC: 4.2 MMOL/L — SIGNIFICANT CHANGE UP (ref 3.5–5.3)
PROTHROM AB SERPL-ACNC: 12.4 SEC — SIGNIFICANT CHANGE UP (ref 9.5–13)
RBC # BLD: 3.08 M/UL — LOW (ref 4.2–5.8)
RBC # FLD: 14.3 % — SIGNIFICANT CHANGE UP (ref 10.3–14.5)
SODIUM SERPL-SCNC: 143 MMOL/L — SIGNIFICANT CHANGE UP (ref 135–145)
WBC # BLD: 4.26 K/UL — SIGNIFICANT CHANGE UP (ref 3.8–10.5)
WBC # FLD AUTO: 4.26 K/UL — SIGNIFICANT CHANGE UP (ref 3.8–10.5)

## 2023-10-16 PROCEDURE — 99232 SBSQ HOSP IP/OBS MODERATE 35: CPT

## 2023-10-16 PROCEDURE — 71045 X-RAY EXAM CHEST 1 VIEW: CPT | Mod: 26

## 2023-10-16 RX ORDER — WARFARIN SODIUM 2.5 MG/1
2 TABLET ORAL AT BEDTIME
Refills: 0 | Status: DISCONTINUED | OUTPATIENT
Start: 2023-10-16 | End: 2023-10-16

## 2023-10-16 RX ORDER — WARFARIN SODIUM 2.5 MG/1
0 TABLET ORAL
Qty: 0 | Refills: 0 | DISCHARGE

## 2023-10-16 RX ORDER — HEPARIN SODIUM 5000 [USP'U]/ML
1400 INJECTION INTRAVENOUS; SUBCUTANEOUS
Qty: 25000 | Refills: 0 | Status: DISCONTINUED | OUTPATIENT
Start: 2023-10-16 | End: 2023-10-21

## 2023-10-16 RX ORDER — ACETAMINOPHEN 500 MG
650 TABLET ORAL EVERY 6 HOURS
Refills: 0 | Status: DISCONTINUED | OUTPATIENT
Start: 2023-10-16 | End: 2023-10-16

## 2023-10-16 RX ORDER — TAMSULOSIN HYDROCHLORIDE 0.4 MG/1
0 CAPSULE ORAL
Qty: 0 | Refills: 0 | DISCHARGE

## 2023-10-16 RX ORDER — AMIODARONE HYDROCHLORIDE 400 MG/1
0 TABLET ORAL
Qty: 0 | Refills: 0 | DISCHARGE

## 2023-10-16 RX ORDER — ALLOPURINOL 300 MG
0 TABLET ORAL
Qty: 0 | Refills: 1 | DISCHARGE

## 2023-10-16 RX ORDER — METOPROLOL TARTRATE 50 MG
0 TABLET ORAL
Qty: 0 | Refills: 1 | DISCHARGE

## 2023-10-16 RX ORDER — ACETAMINOPHEN 500 MG
650 TABLET ORAL EVERY 8 HOURS
Refills: 0 | Status: DISCONTINUED | OUTPATIENT
Start: 2023-10-16 | End: 2023-10-25

## 2023-10-16 RX ORDER — LATANOPROST 0.05 MG/ML
0 SOLUTION/ DROPS OPHTHALMIC; TOPICAL
Qty: 0 | Refills: 0 | DISCHARGE

## 2023-10-16 RX ORDER — WARFARIN SODIUM 2.5 MG/1
2 TABLET ORAL ONCE
Refills: 0 | Status: COMPLETED | OUTPATIENT
Start: 2023-10-16 | End: 2023-10-16

## 2023-10-16 RX ADMIN — Medication 650 MILLIGRAM(S): at 22:33

## 2023-10-16 RX ADMIN — Medication 650 MILLIGRAM(S): at 21:33

## 2023-10-16 RX ADMIN — AMIODARONE HYDROCHLORIDE 200 MILLIGRAM(S): 400 TABLET ORAL at 08:08

## 2023-10-16 RX ADMIN — Medication 300 MILLIGRAM(S): at 11:46

## 2023-10-16 RX ADMIN — Medication 1 TABLET(S): at 11:46

## 2023-10-16 RX ADMIN — Medication 25 MILLIGRAM(S): at 08:08

## 2023-10-16 RX ADMIN — LATANOPROST 1 DROP(S): 0.05 SOLUTION/ DROPS OPHTHALMIC; TOPICAL at 21:33

## 2023-10-16 RX ADMIN — TAMSULOSIN HYDROCHLORIDE 0.4 MILLIGRAM(S): 0.4 CAPSULE ORAL at 21:33

## 2023-10-16 RX ADMIN — HEPARIN SODIUM 5000 UNIT(S): 5000 INJECTION INTRAVENOUS; SUBCUTANEOUS at 08:08

## 2023-10-16 RX ADMIN — HEPARIN SODIUM 14 UNIT(S)/HR: 5000 INJECTION INTRAVENOUS; SUBCUTANEOUS at 21:33

## 2023-10-16 RX ADMIN — HEPARIN SODIUM 14 UNIT(S)/HR: 5000 INJECTION INTRAVENOUS; SUBCUTANEOUS at 14:27

## 2023-10-16 RX ADMIN — CHLORHEXIDINE GLUCONATE 1 APPLICATION(S): 213 SOLUTION TOPICAL at 13:19

## 2023-10-16 RX ADMIN — AMPICILLIN SODIUM AND SULBACTAM SODIUM 200 GRAM(S): 250; 125 INJECTION, POWDER, FOR SUSPENSION INTRAMUSCULAR; INTRAVENOUS at 08:08

## 2023-10-16 RX ADMIN — WARFARIN SODIUM 2 MILLIGRAM(S): 2.5 TABLET ORAL at 21:33

## 2023-10-16 NOTE — PROGRESS NOTE ADULT - ATTENDING COMMENTS
Patient examined by me this AM. Plan of care discussed with wife at the bedside. S/p I&D of RLE hematoma. Pain controlled and mental status at baseline. No complaints. RLE with no evidence of infection and OR cultures with no growth. Will DC abx. Labs significant for NATHAN, will continue to trend and avoid nephrotoxic medications. Will start bridging to home coumadin today.

## 2023-10-16 NOTE — PROGRESS NOTE ADULT - PROBLEM SELECTOR PLAN 1
Presented with large swelling of the RLE distal shin/calf, very erythematous from below the knee to dorsal foot, tender to palpation, dark eschar overlaying the surface  - s/p hematoma evacuation with surgery  - OR cultures with no growth  - hgb stable, per surgery ok to resume AC

## 2023-10-16 NOTE — PROGRESS NOTE ADULT - PROBLEM SELECTOR PLAN 3
- no echo on file  - will find out who his outpatient cardiologist is  - check CXR since he is requiring 2L NC

## 2023-10-16 NOTE — PROGRESS NOTE ADULT - PROBLEM SELECTOR PLAN 6
- resume home glaucoma eye drops  - tolerating regular diet - resume home glaucoma eye drops  - tolerating regular diet    -med rec completed - called [facility]

## 2023-10-16 NOTE — PROGRESS NOTE ADULT - PROBLEM SELECTOR PLAN 2
-baseline Cr seems to be 1.2-1.3  -currently having NATHAN on CKD, unclear etiology  -check UA, urine Na, urine Cr  -check bladder scan post void to rule out retention  -not on diuretics or ACE/ARB, and now off antibiotics  -monitor intake/output

## 2023-10-16 NOTE — PROGRESS NOTE ADULT - SUBJECTIVE AND OBJECTIVE BOX
Surgery Progress Note     24hour Events:   - wound packed    Subjective:  Patient seen and examined. Pain well managed with current medications.     Vital Signs:  Vital Signs Last 24 Hrs  T(C): 36.6 (16 Oct 2023 05:49), Max: 36.8 (15 Oct 2023 22:07)  T(F): 97.9 (16 Oct 2023 05:49), Max: 98.3 (15 Oct 2023 22:07)  HR: 68 (16 Oct 2023 05:49) (59 - 68)  BP: 157/76 (16 Oct 2023 05:49) (119/54 - 157/76)  BP(mean): --  RR: 18 (16 Oct 2023 05:49) (18 - 18)  SpO2: 100% (16 Oct 2023 05:49) (91% - 100%)    Parameters below as of 16 Oct 2023 05:49  Patient On (Oxygen Delivery Method): room air    Physical Exam:  General: NAD, resting comfortably in bed  HEENT: Normocephalic atraumatic  Respiratory: Nonlabored respirations  Cardio: pulse present  Abdomen: soft, nontender, nondistended  MSK: RLE wound packed, wrapped, c/d/i    Labs:    10-16    143  |  104  |  29<H>  ----------------------------<  85  4.2   |  27  |  1.63<H>    Ca    8.8      16 Oct 2023 07:35  Phos  3.1     10-16  Mg     2.1     10-16                              9.8    4.26  )-----------( 179      ( 16 Oct 2023 07:35 )             31.5

## 2023-10-16 NOTE — PROGRESS NOTE ADULT - ASSESSMENT
83 year old male PMH atrial fibrillation on coumadin, heart failure, prostate CA presents to ED complaining of pain and swelling to the right lower extremity predominantly the right ankle and lower leg since last week.  Large swelling of the RLE distal shin/calf, very erythematous from below the knee to dorsal foot, tender to palpation, dark eschar overlaying the surface. Concern for hematoma with possible infection and surrounding cellulitis. Now s/p I&D in OR on 10/14.     Plan:  - Pain control PRN  - Strict I/O  - monitor for signs of systemic infection  - PT  - VNS  - restart AC  - Medicine recs    Trauma/ACS  [id number]

## 2023-10-16 NOTE — PROGRESS NOTE ADULT - SUBJECTIVE AND OBJECTIVE BOX
[facility] Division of Hospital Medicine  Mónica Astudillo DO  Available on Teams Reinaldo    Patient is a 83y old  Male who presents with a chief complaint of Other injury of unspecified body region, initial encounter    Per chart: "83-year-old male PMH of atrial fibrillation on coumadin, heart failure, prostate CA presents to ED complaining of pain and swelling to the right lower extremity predominantly the right ankle and lower leg since last week.  Approximately 1 to 2 weeks ago patient had an injury to the right ankle was seen at an outside hospital last week and also seen by podiatry while in the ED, had x-rays showing ankle edema and swelling of the distal medial tib/fib patient was referred to podiatry outpatient.  Patient.  Was seen by podiatry last week and today, family reports that podiatry directed patient today to go to ED for further evaluation and vascular cardiology consult. Podiatry Dr. Hawk.  Patient and family and aide at bedside report worsening edema to the area over the past week, increased redness, Denies fevers, chills, diaphoresis, SOB, chest pain.    In the ED VSS, labs revealed no WBC, INR 2.1, no lactate, CT of RLE awaiting final read but reviewed by surgery team with likely hematoma 2/2 injury. General surgery consulted for RLE hematoma concern for possible infection."   (13 Oct 2023 15:55)      SUBJECTIVE / OVERNIGHT EVENTS: none. Patient is still on 2L NC (not on this at home), tries to pull it off at times. Wife and aid at bedside. Patient denies any complaints. Per aid he eats very well but does not drink very much.   ADDITIONAL REVIEW OF SYSTEMS: negative    MEDICATIONS  (STANDING):  acetaminophen     Tablet .. 650 milliGRAM(s) Oral every 6 hours  allopurinol 300 milliGRAM(s) Oral daily  aMIOdarone    Tablet 200 milliGRAM(s) Oral daily  chlorhexidine 2% Cloths 1 Application(s) Topical <User Schedule>  heparin  Infusion 1400 Unit(s)/Hr (14 mL/Hr) IV Continuous <Continuous>  lactobacillus acidophilus 1 Tablet(s) Oral daily  latanoprost 0.005% Ophthalmic Solution 1 Drop(s) Both EYES at bedtime  metoprolol succinate ER 25 milliGRAM(s) Oral daily  tamsulosin 0.4 milliGRAM(s) Oral at bedtime  warfarin 2 milliGRAM(s) Oral once    MEDICATIONS  (PRN):  aluminum hydroxide/magnesium hydroxide/simethicone Suspension 30 milliLiter(s) Oral every 4 hours PRN Dyspepsia  oxyCODONE    IR 2.5 milliGRAM(s) Oral every 6 hours PRN Moderate Pain (4 - 6)  oxyCODONE    IR 5 milliGRAM(s) Oral every 6 hours PRN Severe Pain (7 - 10)      CAPILLARY BLOOD GLUCOSE        I&O's Summary    15 Oct 2023 07:01  -  16 Oct 2023 07:00  --------------------------------------------------------  IN: 1160 mL / OUT: 1000 mL / NET: 160 mL    16 Oct 2023 07:01  -  16 Oct 2023 12:25  --------------------------------------------------------  IN: 100 mL / OUT: 0 mL / NET: 100 mL        PHYSICAL EXAM:  Vital Signs Last 24 Hrs  T(C): 36.7 (16 Oct 2023 10:40), Max: 36.8 (15 Oct 2023 22:07)  T(F): 98 (16 Oct 2023 10:40), Max: 98.3 (15 Oct 2023 22:07)  HR: 54 (16 Oct 2023 10:40) (54 - 68)  BP: 130/74 (16 Oct 2023 10:40) (119/54 - 157/76)  BP(mean): --  RR: 18 (16 Oct 2023 10:40) (18 - 18)  SpO2: 98% (16 Oct 2023 10:40) (91% - 100%)    Parameters below as of 16 Oct 2023 10:40  Patient On (Oxygen Delivery Method): nasal cannula  O2 Flow (L/min): 2      CONSTITUTIONAL: Well-groomed, in no apparent distress, asleep though wakes up easily, obese  EYES: No conjunctival or scleral injection, non-icteric; PERRLA and symmetric  ENMT: No external nasal lesions; no pharyngeal injection or exudates, oral mucosa with moist membranes  NECK: Trachea midline without palpable neck mass; thyroid not enlarged and non-tender  RESPIRATORY: Breathing comfortably; lungs CTA without wheeze/rhonchi/rales  CARDIOVASCULAR: +S1S2, RRR, no M/G/R; pedal pulses full and symmetric; no lower extremity edema  GASTROINTESTINAL: No palpable masses or tenderness, +BS throughout, no rebound/guarding  LYMPHATIC: No cervical LAD or tenderness; no axillary LAD or tenderness  MUSCULOSKELETAL: no digital clubbing or cyanosis; no paraspinal tenderness; right calf wrapped in ACE bandages, mild swelling in the toes  NEUROLOGIC: CN II-XII intact; sensation intact in LEs b/l to light touch  PSYCHIATRIC: asleep but easily awoken, oriented x2; mood and affect appropriate    LABS:                        9.8    4.26  )-----------( 179      ( 16 Oct 2023 07:35 )             31.5     10-16    143  |  104  |  29<H>  ----------------------------<  85  4.2   |  27  |  1.63<H>    Ca    8.8      16 Oct 2023 07:35  Phos  3.1     10-16  Mg     2.1     10-16            Urinalysis Basic - ( 16 Oct 2023 07:35 )    Color: x / Appearance: x / SG: x / pH: x  Gluc: 85 mg/dL / Ketone: x  / Bili: x / Urobili: x   Blood: x / Protein: x / Nitrite: x   Leuk Esterase: x / RBC: x / WBC x   Sq Epi: x / Non Sq Epi: x / Bacteria: x        Culture - Surgical Swab (collected 13 Oct 2023 22:34)  Source: .Surgical Swab right leg hematoma  Preliminary Report (15 Oct 2023 08:08):    No growth to date.

## 2023-10-16 NOTE — PROGRESS NOTE ADULT - PROBLEM SELECTOR PLAN 4
h/o pAF  - continue home Amio and Metoprolol  - per surgery ok to resume anticoagulation - warfarin with heparin gtt bridge  - check daily INR

## 2023-10-17 DIAGNOSIS — I50.22 CHRONIC SYSTOLIC (CONGESTIVE) HEART FAILURE: ICD-10-CM

## 2023-10-17 DIAGNOSIS — R19.7 DIARRHEA, UNSPECIFIED: ICD-10-CM

## 2023-10-17 LAB
ANION GAP SERPL CALC-SCNC: 11 MMOL/L — SIGNIFICANT CHANGE UP (ref 5–17)
ANION GAP SERPL CALC-SCNC: 11 MMOL/L — SIGNIFICANT CHANGE UP (ref 5–17)
APTT BLD: 100 SEC — HIGH (ref 24.5–35.6)
APTT BLD: 100 SEC — HIGH (ref 24.5–35.6)
APTT BLD: 73 SEC — HIGH (ref 24.5–35.6)
APTT BLD: 73 SEC — HIGH (ref 24.5–35.6)
APTT BLD: 82.2 SEC — HIGH (ref 24.5–35.6)
APTT BLD: 82.2 SEC — HIGH (ref 24.5–35.6)
APTT BLD: >200 SEC — CRITICAL HIGH (ref 24.5–35.6)
APTT BLD: >200 SEC — CRITICAL HIGH (ref 24.5–35.6)
BUN SERPL-MCNC: 27 MG/DL — HIGH (ref 7–23)
BUN SERPL-MCNC: 27 MG/DL — HIGH (ref 7–23)
CALCIUM SERPL-MCNC: 8.9 MG/DL — SIGNIFICANT CHANGE UP (ref 8.4–10.5)
CALCIUM SERPL-MCNC: 8.9 MG/DL — SIGNIFICANT CHANGE UP (ref 8.4–10.5)
CHLORIDE SERPL-SCNC: 105 MMOL/L — SIGNIFICANT CHANGE UP (ref 96–108)
CHLORIDE SERPL-SCNC: 105 MMOL/L — SIGNIFICANT CHANGE UP (ref 96–108)
CO2 SERPL-SCNC: 27 MMOL/L — SIGNIFICANT CHANGE UP (ref 22–31)
CO2 SERPL-SCNC: 27 MMOL/L — SIGNIFICANT CHANGE UP (ref 22–31)
CREAT SERPL-MCNC: 1.48 MG/DL — HIGH (ref 0.5–1.3)
CREAT SERPL-MCNC: 1.48 MG/DL — HIGH (ref 0.5–1.3)
EGFR: 47 ML/MIN/1.73M2 — LOW
EGFR: 47 ML/MIN/1.73M2 — LOW
GLUCOSE SERPL-MCNC: 83 MG/DL — SIGNIFICANT CHANGE UP (ref 70–99)
GLUCOSE SERPL-MCNC: 83 MG/DL — SIGNIFICANT CHANGE UP (ref 70–99)
HCT VFR BLD CALC: 31.2 % — LOW (ref 39–50)
HCT VFR BLD CALC: 31.2 % — LOW (ref 39–50)
HGB BLD-MCNC: 9.9 G/DL — LOW (ref 13–17)
HGB BLD-MCNC: 9.9 G/DL — LOW (ref 13–17)
INR BLD: 1.26 RATIO — HIGH (ref 0.85–1.18)
INR BLD: 1.26 RATIO — HIGH (ref 0.85–1.18)
MAGNESIUM SERPL-MCNC: 2 MG/DL — SIGNIFICANT CHANGE UP (ref 1.6–2.6)
MAGNESIUM SERPL-MCNC: 2 MG/DL — SIGNIFICANT CHANGE UP (ref 1.6–2.6)
MCHC RBC-ENTMCNC: 31.7 GM/DL — LOW (ref 32–36)
MCHC RBC-ENTMCNC: 31.7 GM/DL — LOW (ref 32–36)
MCHC RBC-ENTMCNC: 31.7 PG — SIGNIFICANT CHANGE UP (ref 27–34)
MCHC RBC-ENTMCNC: 31.7 PG — SIGNIFICANT CHANGE UP (ref 27–34)
MCV RBC AUTO: 100 FL — SIGNIFICANT CHANGE UP (ref 80–100)
MCV RBC AUTO: 100 FL — SIGNIFICANT CHANGE UP (ref 80–100)
NRBC # BLD: 0 /100 WBCS — SIGNIFICANT CHANGE UP (ref 0–0)
NRBC # BLD: 0 /100 WBCS — SIGNIFICANT CHANGE UP (ref 0–0)
PHOSPHATE SERPL-MCNC: 3 MG/DL — SIGNIFICANT CHANGE UP (ref 2.5–4.5)
PHOSPHATE SERPL-MCNC: 3 MG/DL — SIGNIFICANT CHANGE UP (ref 2.5–4.5)
PLATELET # BLD AUTO: 163 K/UL — SIGNIFICANT CHANGE UP (ref 150–400)
PLATELET # BLD AUTO: 163 K/UL — SIGNIFICANT CHANGE UP (ref 150–400)
POTASSIUM SERPL-MCNC: 4.1 MMOL/L — SIGNIFICANT CHANGE UP (ref 3.5–5.3)
POTASSIUM SERPL-MCNC: 4.1 MMOL/L — SIGNIFICANT CHANGE UP (ref 3.5–5.3)
POTASSIUM SERPL-SCNC: 4.1 MMOL/L — SIGNIFICANT CHANGE UP (ref 3.5–5.3)
POTASSIUM SERPL-SCNC: 4.1 MMOL/L — SIGNIFICANT CHANGE UP (ref 3.5–5.3)
PROTHROM AB SERPL-ACNC: 13.1 SEC — HIGH (ref 9.5–13)
PROTHROM AB SERPL-ACNC: 13.1 SEC — HIGH (ref 9.5–13)
RBC # BLD: 3.12 M/UL — LOW (ref 4.2–5.8)
RBC # BLD: 3.12 M/UL — LOW (ref 4.2–5.8)
RBC # FLD: 14.2 % — SIGNIFICANT CHANGE UP (ref 10.3–14.5)
RBC # FLD: 14.2 % — SIGNIFICANT CHANGE UP (ref 10.3–14.5)
SODIUM SERPL-SCNC: 143 MMOL/L — SIGNIFICANT CHANGE UP (ref 135–145)
SODIUM SERPL-SCNC: 143 MMOL/L — SIGNIFICANT CHANGE UP (ref 135–145)
WBC # BLD: 3.17 K/UL — LOW (ref 3.8–10.5)
WBC # BLD: 3.17 K/UL — LOW (ref 3.8–10.5)
WBC # FLD AUTO: 3.17 K/UL — LOW (ref 3.8–10.5)
WBC # FLD AUTO: 3.17 K/UL — LOW (ref 3.8–10.5)

## 2023-10-17 PROCEDURE — 99232 SBSQ HOSP IP/OBS MODERATE 35: CPT

## 2023-10-17 RX ORDER — BACITRACIN ZINC 500 UNIT/G
1 OINTMENT IN PACKET (EA) TOPICAL
Refills: 0 | Status: DISCONTINUED | OUTPATIENT
Start: 2023-10-17 | End: 2023-10-25

## 2023-10-17 RX ORDER — WARFARIN SODIUM 2.5 MG/1
4 TABLET ORAL ONCE
Refills: 0 | Status: COMPLETED | OUTPATIENT
Start: 2023-10-17 | End: 2023-10-17

## 2023-10-17 RX ADMIN — CHLORHEXIDINE GLUCONATE 1 APPLICATION(S): 213 SOLUTION TOPICAL at 12:21

## 2023-10-17 RX ADMIN — Medication 1 APPLICATION(S): at 22:30

## 2023-10-17 RX ADMIN — Medication 25 MILLIGRAM(S): at 05:35

## 2023-10-17 RX ADMIN — WARFARIN SODIUM 4 MILLIGRAM(S): 2.5 TABLET ORAL at 22:29

## 2023-10-17 RX ADMIN — Medication 300 MILLIGRAM(S): at 11:24

## 2023-10-17 RX ADMIN — Medication 1 TABLET(S): at 11:24

## 2023-10-17 RX ADMIN — AMIODARONE HYDROCHLORIDE 200 MILLIGRAM(S): 400 TABLET ORAL at 05:35

## 2023-10-17 RX ADMIN — LATANOPROST 1 DROP(S): 0.05 SOLUTION/ DROPS OPHTHALMIC; TOPICAL at 22:30

## 2023-10-17 RX ADMIN — HEPARIN SODIUM 12 UNIT(S)/HR: 5000 INJECTION INTRAVENOUS; SUBCUTANEOUS at 03:56

## 2023-10-17 RX ADMIN — TAMSULOSIN HYDROCHLORIDE 0.4 MILLIGRAM(S): 0.4 CAPSULE ORAL at 22:29

## 2023-10-17 NOTE — PROGRESS NOTE ADULT - PROBLEM SELECTOR PROBLEM 3
Chronic heart failure with preserved ejection fraction (HFpEF) Chronic systolic congestive heart failure

## 2023-10-17 NOTE — PROGRESS NOTE ADULT - PROBLEM SELECTOR PLAN 3
- no echo on file  - outpatient cardiologist is Dr. Loredo   - check CXR since he is requiring 2L NC - no echo on file - obtained echo from cardiologist's office: has HF with reduced ejection fraction  - outpatient cardiologist is Dr. Loredo   - CXR clear 10/16  - echo 3/2022: EF 40%, mod MR, mild-mod AR  - only on metoprolol succinate for GDMT, f/u as outpatient for further management

## 2023-10-17 NOTE — PROGRESS NOTE ADULT - SUBJECTIVE AND OBJECTIVE BOX
Surgery Progress Note     SUBJECTIVE:  Patient seen and examined. Pain well managed with current medications.     24 Hour/Overnight Events:  No acute events  ------------------------------------------------------------------------------------------------------------  OBJECTIVE:  Vital Signs Last 24 Hrs  T(C): 36.6 (17 Oct 2023 05:10), Max: 36.8 (17 Oct 2023 01:00)  T(F): 97.9 (17 Oct 2023 05:10), Max: 98.3 (17 Oct 2023 01:00)  HR: 65 (17 Oct 2023 05:10) (54 - 65)  BP: 157/75 (17 Oct 2023 05:10) (128/67 - 157/75)  BP(mean): --  RR: 18 (17 Oct 2023 05:10) (18 - 18)  SpO2: 96% (17 Oct 2023 05:10) (96% - 98%)    Parameters below as of 17 Oct 2023 05:10  Patient On (Oxygen Delivery Method): nasal cannula  O2 Flow (L/min): 2      I&O's Detail    16 Oct 2023 07:01  -  17 Oct 2023 07:00  --------------------------------------------------------  IN:    Heparin: 158 mL    Oral Fluid: 550 mL  Total IN: 708 mL    OUT:    Incontinent per Condom Catheter (mL): 0 mL  Total OUT: 0 mL    Total NET: 708 mL      MEDICATIONS  (STANDING):  acetaminophen     Tablet .. 650 milliGRAM(s) Oral every 8 hours  allopurinol 300 milliGRAM(s) Oral daily  aMIOdarone    Tablet 200 milliGRAM(s) Oral daily  chlorhexidine 2% Cloths 1 Application(s) Topical <User Schedule>  heparin  Infusion 1400 Unit(s)/Hr (12 mL/Hr) IV Continuous <Continuous>  lactobacillus acidophilus 1 Tablet(s) Oral daily  latanoprost 0.005% Ophthalmic Solution 1 Drop(s) Both EYES at bedtime  metoprolol succinate ER 25 milliGRAM(s) Oral daily  tamsulosin 0.4 milliGRAM(s) Oral at bedtime    MEDICATIONS  (PRN):  aluminum hydroxide/magnesium hydroxide/simethicone Suspension 30 milliLiter(s) Oral every 4 hours PRN Dyspepsia      LABS:                        9.8    4.26  )-----------( 179      ( 16 Oct 2023 07:35 )             31.5     10-16    143  |  104  |  29<H>  ----------------------------<  85  4.2   |  27  |  1.63<H>    Ca    8.8      16 Oct 2023 07:35  Phos  3.1     10-16  Mg     2.1     10-16    PT/INR - ( 16 Oct 2023 13:49 )   PT: 12.4 sec;   INR: 1.19 ratio    PTT - ( 17 Oct 2023 03:25 )  PTT:100.0 sec      Physical Exam:  General: NAD, resting comfortably in bed  HEENT: Normocephalic atraumatic  Respiratory: Nonlabored respirations  Cardio: pulse present  Abdomen: soft, nontender, nondistended  MSK: RLE calf wound wrapped in ACE; dressing c/d/i

## 2023-10-17 NOTE — PROGRESS NOTE ADULT - PROBLEM SELECTOR PLAN 1
Presented with large swelling of the RLE distal shin/calf, very erythematous from below the knee to dorsal foot, tender to palpation, dark eschar overlaying the surface  - s/p hematoma evacuation with surgery  - OR cultures with no growth  - hgb stable on heparin gtt while bridging to warfarin  - cont wound care Presented with large swelling of the RLE distal shin/calf, now with deep wound, no pus seen, bloody  - s/p hematoma evacuation with surgery  - OR cultures with S aureus  - hgb stable on heparin gtt while bridging to warfarin  - cont wound care

## 2023-10-17 NOTE — PROGRESS NOTE ADULT - PROBLEM SELECTOR PLAN 4
h/o pAF  - continue home Amio and Metoprolol  - per surgery ok to resume anticoagulation - warfarin with heparin gtt bridge  - check daily INR h/o pAF  - continue home Amio and Metoprolol  - s/p DCCV 2/2022  - per surgery ok to resume anticoagulation - warfarin with heparin gtt bridge  - check daily INR -per aid had 4 BMs overnight that were loose, but this morning just had one BM that was formed  -monitor - if 3 or more episodes per day would check GI PCR

## 2023-10-17 NOTE — PROGRESS NOTE ADULT - PROBLEM SELECTOR PLAN 5
- stop unasyn, low suspicion for ongoing cellulitis, and OR wound cx no growth  - Bcx no growth h/o pAF  - continue home Amio and Metoprolol  - s/p DCCV 2/2022  - per surgery ok to resume anticoagulation - warfarin with heparin gtt bridge  - check daily INR

## 2023-10-17 NOTE — PROGRESS NOTE ADULT - SUBJECTIVE AND OBJECTIVE BOX
[facility] Division of Hospital Medicine  Mónica Astudillo DO  Available on Teams Reinaldo    Patient is a 83y old  Male who presents with a chief complaint of Other injury of unspecified body region, initial encounter    Per chart: "83-year-old male PMH of atrial fibrillation on coumadin, heart failure, prostate CA presents to ED complaining of pain and swelling to the right lower extremity predominantly the right ankle and lower leg since last week.  Approximately 1 to 2 weeks ago patient had an injury to the right ankle was seen at an outside hospital last week and also seen by podiatry while in the ED, had x-rays showing ankle edema and swelling of the distal medial tib/fib patient was referred to podiatry outpatient.  Patient.  Was seen by podiatry last week and today, family reports that podiatry directed patient today to go to ED for further evaluation and vascular cardiology consult. Podiatry Dr. Hawk.  Patient and family and aide at bedside report worsening edema to the area over the past week, increased redness, Denies fevers, chills, diaphoresis, SOB, chest pain.    In the ED VSS, labs revealed no WBC, INR 2.1, no lactate, CT of RLE awaiting final read but reviewed by surgery team with likely hematoma 2/2 injury. General surgery consulted for RLE hematoma concern for possible infection."   (13 Oct 2023 15:55)      none. Per aid, patient had 4 loose BMs overnight, and this morning he had one that was more formed. He has no complaints. No shortness of breath, chest pain. Spoke with his aid and his wife Ronna over the phone.       SUBJECTIVE / OVERNIGHT EVENTS:  ADDITIONAL REVIEW OF SYSTEMS: negative    MEDICATIONS  (STANDING):  acetaminophen     Tablet .. 650 milliGRAM(s) Oral every 8 hours  allopurinol 300 milliGRAM(s) Oral daily  aMIOdarone    Tablet 200 milliGRAM(s) Oral daily  chlorhexidine 2% Cloths 1 Application(s) Topical <User Schedule>  heparin  Infusion 1400 Unit(s)/Hr (12 mL/Hr) IV Continuous <Continuous>  lactobacillus acidophilus 1 Tablet(s) Oral daily  latanoprost 0.005% Ophthalmic Solution 1 Drop(s) Both EYES at bedtime  metoprolol succinate ER 25 milliGRAM(s) Oral daily  Simbrinza (Brinzolamide and Brimonidine) 1 Drop(s) 1 Drop(s) Both EYES two times a day  tamsulosin 0.4 milliGRAM(s) Oral at bedtime    MEDICATIONS  (PRN):  aluminum hydroxide/magnesium hydroxide/simethicone Suspension 30 milliLiter(s) Oral every 4 hours PRN Dyspepsia      CAPILLARY BLOOD GLUCOSE        I&O's Summary    16 Oct 2023 07:01  -  17 Oct 2023 07:00  --------------------------------------------------------  IN: 708 mL / OUT: 0 mL / NET: 708 mL    17 Oct 2023 07:01  -  17 Oct 2023 12:43  --------------------------------------------------------  IN: 240 mL / OUT: 0 mL / NET: 240 mL        PHYSICAL EXAM:  Vital Signs Last 24 Hrs  T(C): 36.3 (17 Oct 2023 09:55), Max: 36.8 (17 Oct 2023 01:00)  T(F): 97.3 (17 Oct 2023 09:55), Max: 98.3 (17 Oct 2023 01:00)  HR: 73 (17 Oct 2023 09:55) (57 - 73)  BP: 156/72 (17 Oct 2023 09:55) (128/67 - 157/75)  BP(mean): --  RR: 18 (17 Oct 2023 09:55) (18 - 18)  SpO2: 94% (17 Oct 2023 09:55) (94% - 98%)    Parameters below as of 17 Oct 2023 09:55  Patient On (Oxygen Delivery Method): nasal cannula  O2 Flow (L/min): 2      CONSTITUTIONAL: Well-groomed, in no apparent distress, asleep though wakes up easily, obese  EYES: No conjunctival or scleral injection, non-icteric; PERRLA and symmetric  ENMT: No external nasal lesions; no pharyngeal injection or exudates, oral mucosa with moist membranes  NECK: Trachea midline without palpable neck mass; thyroid not enlarged and non-tender  RESPIRATORY: Breathing comfortably; lungs CTA without wheeze/rhonchi/rales  CARDIOVASCULAR: +S1S2, RRR, no M/G/R; pedal pulses full and symmetric; no lower extremity edema  GASTROINTESTINAL: No palpable masses or tenderness, +BS throughout, no rebound/guarding  LYMPHATIC: No cervical LAD or tenderness; no axillary LAD or tenderness  MUSCULOSKELETAL: no digital clubbing or cyanosis; no paraspinal tenderness; right calf wrapped in ACE bandages, mild swelling in the toes;   NEUROLOGIC: CN II-XII intact; sensation intact in LEs b/l to light touch  PSYCHIATRIC: asleep but easily awoken, oriented x2; mood and affect appropriate    LABS:                        9.9    3.17  )-----------( 163      ( 17 Oct 2023 07:26 )             31.2     10-17    143  |  105  |  27<H>  ----------------------------<  83  4.1   |  27  |  1.48<H>    Ca    8.9      17 Oct 2023 07:25  Phos  3.0     10-17  Mg     2.0     10-17      PT/INR - ( 17 Oct 2023 07:27 )   PT: 13.1 sec;   INR: 1.26 ratio         PTT - ( 17 Oct 2023 07:27 )  PTT:82.2 sec      Urinalysis Basic - ( 17 Oct 2023 07:25 )    Color: x / Appearance: x / SG: x / pH: x  Gluc: 83 mg/dL / Ketone: x  / Bili: x / Urobili: x   Blood: x / Protein: x / Nitrite: x   Leuk Esterase: x / RBC: x / WBC x   Sq Epi: x / Non Sq Epi: x / Bacteria: x

## 2023-10-17 NOTE — PROGRESS NOTE ADULT - ASSESSMENT
83 year old male PMH atrial fibrillation on coumadin, heart failure, prostate CA presents to ED complaining of pain and swelling to the right lower extremity predominantly the right ankle and lower leg since last week.  Large swelling of the RLE distal shin/calf, very erythematous from below the knee to dorsal foot, tender to palpation, dark eschar overlaying the surface. Concern for hematoma with possible infection and surrounding cellulitis. Now s/p I&D in OR on 10/14.     PLAN:  - Pain control PRN  - Monitor Cr     - Strict I/O     - UA and urine lytes pending  - Monitor for signs of systemic infection  - Appreciate medicine recs  - DVT ppx: Hep gtt to Coumadin bridge  - PT: HPT      Trauma/ACS  [id number]

## 2023-10-17 NOTE — PROGRESS NOTE ADULT - PROBLEM SELECTOR PLAN 2
-baseline Cr seems to be 1.2-1.3 based on chart review, per wife, he does not see a nephrologist, has never been told of any kidney disease  -currently having NATHAN on CKD, unclear etiology  -check UA, urine Na, urine Cr  -check bladder scan post void to rule out retention  -not on diuretics or ACE/ARB, and now off antibiotics  -monitor intake/output  -will reach out to PCP's office -baseline Cr seems to be 1.5, after calling his PCP's office   - per wife, he does not see a nephrologist, has never been told of any kidney disease  - not in NATHAN, however would still check UA, urine Na, urine Cr, bladder scan post void to rule out retention  -not on diuretics or ACE/ARB, and now off antibiotics  -monitor intake/output

## 2023-10-17 NOTE — PROGRESS NOTE ADULT - PROBLEM SELECTOR PLAN 6
- resume home glaucoma eye drops  - tolerating regular diet    -med rec completed - called [facility] - stop unasyn, low suspicion for ongoing cellulitis, and OR wound cx no growth  - Bcx no growth

## 2023-10-18 LAB
-  AMPICILLIN/SULBACTAM: SIGNIFICANT CHANGE UP
-  CEFAZOLIN: SIGNIFICANT CHANGE UP
-  CLINDAMYCIN: SIGNIFICANT CHANGE UP
-  ERYTHROMYCIN: SIGNIFICANT CHANGE UP
-  GENTAMICIN: SIGNIFICANT CHANGE UP
-  OXACILLIN: SIGNIFICANT CHANGE UP
-  PENICILLIN: SIGNIFICANT CHANGE UP
-  PENICILLIN: SIGNIFICANT CHANGE UP
-  RIFAMPIN: SIGNIFICANT CHANGE UP
-  TETRACYCLINE: SIGNIFICANT CHANGE UP
-  TRIMETHOPRIM/SULFAMETHOXAZOLE: SIGNIFICANT CHANGE UP
-  VANCOMYCIN: SIGNIFICANT CHANGE UP
ANION GAP SERPL CALC-SCNC: 9 MMOL/L — SIGNIFICANT CHANGE UP (ref 5–17)
ANION GAP SERPL CALC-SCNC: 9 MMOL/L — SIGNIFICANT CHANGE UP (ref 5–17)
APTT BLD: 64.1 SEC — HIGH (ref 24.5–35.6)
APTT BLD: 64.1 SEC — HIGH (ref 24.5–35.6)
BUN SERPL-MCNC: 26 MG/DL — HIGH (ref 7–23)
BUN SERPL-MCNC: 26 MG/DL — HIGH (ref 7–23)
CALCIUM SERPL-MCNC: 8.8 MG/DL — SIGNIFICANT CHANGE UP (ref 8.4–10.5)
CALCIUM SERPL-MCNC: 8.8 MG/DL — SIGNIFICANT CHANGE UP (ref 8.4–10.5)
CHLORIDE SERPL-SCNC: 106 MMOL/L — SIGNIFICANT CHANGE UP (ref 96–108)
CHLORIDE SERPL-SCNC: 106 MMOL/L — SIGNIFICANT CHANGE UP (ref 96–108)
CO2 SERPL-SCNC: 29 MMOL/L — SIGNIFICANT CHANGE UP (ref 22–31)
CO2 SERPL-SCNC: 29 MMOL/L — SIGNIFICANT CHANGE UP (ref 22–31)
CREAT SERPL-MCNC: 1.66 MG/DL — HIGH (ref 0.5–1.3)
CREAT SERPL-MCNC: 1.66 MG/DL — HIGH (ref 0.5–1.3)
CULTURE RESULTS: SIGNIFICANT CHANGE UP
EGFR: 41 ML/MIN/1.73M2 — LOW
EGFR: 41 ML/MIN/1.73M2 — LOW
GLUCOSE SERPL-MCNC: 87 MG/DL — SIGNIFICANT CHANGE UP (ref 70–99)
GLUCOSE SERPL-MCNC: 87 MG/DL — SIGNIFICANT CHANGE UP (ref 70–99)
HCT VFR BLD CALC: 32.2 % — LOW (ref 39–50)
HCT VFR BLD CALC: 32.2 % — LOW (ref 39–50)
HGB BLD-MCNC: 9.9 G/DL — LOW (ref 13–17)
HGB BLD-MCNC: 9.9 G/DL — LOW (ref 13–17)
INR BLD: 1.29 RATIO — HIGH (ref 0.85–1.18)
INR BLD: 1.29 RATIO — HIGH (ref 0.85–1.18)
MAGNESIUM SERPL-MCNC: 2 MG/DL — SIGNIFICANT CHANGE UP (ref 1.6–2.6)
MAGNESIUM SERPL-MCNC: 2 MG/DL — SIGNIFICANT CHANGE UP (ref 1.6–2.6)
MCHC RBC-ENTMCNC: 30.7 GM/DL — LOW (ref 32–36)
MCHC RBC-ENTMCNC: 30.7 GM/DL — LOW (ref 32–36)
MCHC RBC-ENTMCNC: 31.7 PG — SIGNIFICANT CHANGE UP (ref 27–34)
MCHC RBC-ENTMCNC: 31.7 PG — SIGNIFICANT CHANGE UP (ref 27–34)
MCV RBC AUTO: 103.2 FL — HIGH (ref 80–100)
MCV RBC AUTO: 103.2 FL — HIGH (ref 80–100)
METHOD TYPE: SIGNIFICANT CHANGE UP
NRBC # BLD: 0 /100 WBCS — SIGNIFICANT CHANGE UP (ref 0–0)
NRBC # BLD: 0 /100 WBCS — SIGNIFICANT CHANGE UP (ref 0–0)
PHOSPHATE SERPL-MCNC: 3.1 MG/DL — SIGNIFICANT CHANGE UP (ref 2.5–4.5)
PHOSPHATE SERPL-MCNC: 3.1 MG/DL — SIGNIFICANT CHANGE UP (ref 2.5–4.5)
PLATELET # BLD AUTO: 175 K/UL — SIGNIFICANT CHANGE UP (ref 150–400)
PLATELET # BLD AUTO: 175 K/UL — SIGNIFICANT CHANGE UP (ref 150–400)
POTASSIUM SERPL-MCNC: 4.1 MMOL/L — SIGNIFICANT CHANGE UP (ref 3.5–5.3)
POTASSIUM SERPL-MCNC: 4.1 MMOL/L — SIGNIFICANT CHANGE UP (ref 3.5–5.3)
POTASSIUM SERPL-SCNC: 4.1 MMOL/L — SIGNIFICANT CHANGE UP (ref 3.5–5.3)
POTASSIUM SERPL-SCNC: 4.1 MMOL/L — SIGNIFICANT CHANGE UP (ref 3.5–5.3)
PROTHROM AB SERPL-ACNC: 13.4 SEC — HIGH (ref 9.5–13)
PROTHROM AB SERPL-ACNC: 13.4 SEC — HIGH (ref 9.5–13)
RBC # BLD: 3.12 M/UL — LOW (ref 4.2–5.8)
RBC # BLD: 3.12 M/UL — LOW (ref 4.2–5.8)
RBC # FLD: 14.4 % — SIGNIFICANT CHANGE UP (ref 10.3–14.5)
RBC # FLD: 14.4 % — SIGNIFICANT CHANGE UP (ref 10.3–14.5)
SODIUM SERPL-SCNC: 144 MMOL/L — SIGNIFICANT CHANGE UP (ref 135–145)
SODIUM SERPL-SCNC: 144 MMOL/L — SIGNIFICANT CHANGE UP (ref 135–145)
SPECIMEN SOURCE: SIGNIFICANT CHANGE UP
WBC # BLD: 3.43 K/UL — LOW (ref 3.8–10.5)
WBC # BLD: 3.43 K/UL — LOW (ref 3.8–10.5)
WBC # FLD AUTO: 3.43 K/UL — LOW (ref 3.8–10.5)
WBC # FLD AUTO: 3.43 K/UL — LOW (ref 3.8–10.5)

## 2023-10-18 PROCEDURE — 99232 SBSQ HOSP IP/OBS MODERATE 35: CPT

## 2023-10-18 RX ORDER — FUROSEMIDE 40 MG
20 TABLET ORAL DAILY
Refills: 0 | Status: DISCONTINUED | OUTPATIENT
Start: 2023-10-18 | End: 2023-10-25

## 2023-10-18 RX ORDER — WARFARIN SODIUM 2.5 MG/1
2 TABLET ORAL ONCE
Refills: 0 | Status: COMPLETED | OUTPATIENT
Start: 2023-10-18 | End: 2023-10-18

## 2023-10-18 RX ORDER — WARFARIN SODIUM 2.5 MG/1
4 TABLET ORAL ONCE
Refills: 0 | Status: DISCONTINUED | OUTPATIENT
Start: 2023-10-18 | End: 2023-10-18

## 2023-10-18 RX ADMIN — Medication 300 MILLIGRAM(S): at 11:16

## 2023-10-18 RX ADMIN — HEPARIN SODIUM 12 UNIT(S)/HR: 5000 INJECTION INTRAVENOUS; SUBCUTANEOUS at 04:46

## 2023-10-18 RX ADMIN — WARFARIN SODIUM 2 MILLIGRAM(S): 2.5 TABLET ORAL at 21:55

## 2023-10-18 RX ADMIN — AMIODARONE HYDROCHLORIDE 200 MILLIGRAM(S): 400 TABLET ORAL at 05:41

## 2023-10-18 RX ADMIN — TAMSULOSIN HYDROCHLORIDE 0.4 MILLIGRAM(S): 0.4 CAPSULE ORAL at 21:56

## 2023-10-18 RX ADMIN — Medication 1 APPLICATION(S): at 11:41

## 2023-10-18 RX ADMIN — LATANOPROST 1 DROP(S): 0.05 SOLUTION/ DROPS OPHTHALMIC; TOPICAL at 21:55

## 2023-10-18 RX ADMIN — CHLORHEXIDINE GLUCONATE 1 APPLICATION(S): 213 SOLUTION TOPICAL at 11:16

## 2023-10-18 RX ADMIN — Medication 25 MILLIGRAM(S): at 05:42

## 2023-10-18 RX ADMIN — Medication 1 TABLET(S): at 11:16

## 2023-10-18 NOTE — PROGRESS NOTE ADULT - PROBLEM SELECTOR PLAN 1
Presented with large swelling of the RLE distal shin/calf, now with deep wound, no pus seen, bloody  - s/p hematoma evacuation with surgery  - was on unasyn 10/13-10/16  - OR cultures with 2 types of S aureus, blood cultures negative  - hgb stable on heparin gtt while bridging to warfarin  - cont wound care

## 2023-10-18 NOTE — PROGRESS NOTE ADULT - PROBLEM SELECTOR PLAN 2
-baseline Cr seems to be 1.5, after calling his PCP's office   - per wife, he does not see a nephrologist, has never been told of any kidney disease  - not in NATHAN, however would still check UA, urine Na, urine Cr, bladder scan post void to rule out retention  -monitor intake/output

## 2023-10-18 NOTE — PROGRESS NOTE ADULT - PROBLEM SELECTOR PLAN 3
- no echo on file - obtained echo from cardiologist's office: has HF with reduced ejection fraction  - outpatient cardiologist is Dr. Loredo   - CXR clear 10/16  - echo 3/2022: EF 40%, mod MR, mild-mod AR  - only on metoprolol succinate for GDMT, f/u as outpatient for further management  - consulted cardiology  - resume lasix 20mg QD

## 2023-10-18 NOTE — PROGRESS NOTE ADULT - PROBLEM SELECTOR PLAN 4
h/o pAF  - continue home Amio and Metoprolol  - s/p DCCV 2/2022  - per surgery ok to resume anticoagulation - warfarin with heparin gtt bridge  - coumadin regimen: 2mg every day, but 4mg on Tuesdays/Thursdays  - check daily INR

## 2023-10-18 NOTE — CONSULT NOTE ADULT - SUBJECTIVE AND OBJECTIVE BOX
DATE OF SERVICE: 10-18-23      CHIEF COMPLAINT:Patient is a 83y old  Male who presents with a chief complaint of Other injury of unspecified body region, initial encounter    HISTORY OF PRESENT ILLNESS:HPI:  83-year-old male PMH of atrial fibrillation on coumadin, heart failure, prostate CA presents to ED complaining of pain and swelling to the right lower extremity predominantly the right ankle and lower leg since last week.  Approximately 1 to 2 weeks ago patient had an injury to the right ankle was seen at an outside hospital last week and also seen by podiatry while in the ED, had x-rays showing ankle edema and swelling of the distal medial tib/fib patient was referred to podiatry outpatient.  Patient.  Was seen by podiatry last week and today, family reports that podiatry directed patient today to go to ED for further evaluation and vascular cardiology consult. Podiatry Dr. Hawk.  Patient and family and aide at bedside report worsening edema to the area over the past week, increased redness, Denies fevers, chills, diaphoresis, SOB, chest pain.    In the ED VSS, labs revealed no WBC, INR 2.1, no lactate, CT of RLE awaiting final read but reviewed by surgery team with likely hematoma 2/2 injury. General surgery consulted for RLE hematoma concern for possible infection.   (13 Oct 2023 01:19)      PAST MEDICAL & SURGICAL HISTORY:  Afib      Prostate cancer      Heart failure      History of scoliosis      S/P anal fissurectomy              MEDICATIONS:  aMIOdarone    Tablet 200 milliGRAM(s) Oral daily  furosemide    Tablet 20 milliGRAM(s) Oral daily  heparin  Infusion 1400 Unit(s)/Hr IV Continuous <Continuous>  metoprolol succinate ER 25 milliGRAM(s) Oral daily        acetaminophen     Tablet .. 650 milliGRAM(s) Oral every 8 hours    aluminum hydroxide/magnesium hydroxide/simethicone Suspension 30 milliLiter(s) Oral every 4 hours PRN    allopurinol 300 milliGRAM(s) Oral daily    bacitracin   Ointment 1 Application(s) Topical two times a day  chlorhexidine 2% Cloths 1 Application(s) Topical <User Schedule>  latanoprost 0.005% Ophthalmic Solution 1 Drop(s) Both EYES at bedtime  tamsulosin 0.4 milliGRAM(s) Oral at bedtime      FAMILY HISTORY:  No pertinent family history in first degree relatives        Non-contributory    SOCIAL HISTORY:    [ ] not a smoker  Allergies    No Known Allergies    Intolerances    	    REVIEW OF SYSTEMS:  CONSTITUTIONAL: No fever  EYES: No eye pain, visual disturbances, or discharge  ENMT:  No difficulty hearing, tinnitus  NECK: No pain or stiffness  RESPIRATORY: No cough, wheezing,  CARDIOVASCULAR: No chest pain, palpitations, passing out, dizziness, or leg swelling  GASTROINTESTINAL:  No nausea, vomiting, diarrhea or constipation. No melena.  GENITOURINARY: No dysuria, hematuria  NEUROLOGICAL: No stroke like symptoms  SKIN: No burning or lesions   ENDOCRINE: No heat or cold intolerance  MUSCULOSKELETAL: No joint pain or swelling  PSYCHIATRIC: No  anxiety, mood swings  HEME/LYMPH: No bleeding gums  ALLERGY AND IMMUNOLOGIC: No hives or eczema	    All other ROS negative    PHYSICAL EXAM:  T(C): 36.8 (10-18-23 @ 21:06), Max: 36.9 (10-18-23 @ 09:48)  HR: 61 (10-18-23 @ 21:06) (58 - 71)  BP: 149/69 (10-18-23 @ 21:06) (115/62 - 153/84)  RR: 18 (10-18-23 @ 21:06) (18 - 18)  SpO2: 98% (10-18-23 @ 21:06) (94% - 98%)  Wt(kg): --  I&O's Summary    17 Oct 2023 07:01  -  18 Oct 2023 07:00  --------------------------------------------------------  IN: 699 mL / OUT: 0 mL / NET: 699 mL    18 Oct 2023 07:01  -  19 Oct 2023 00:02  --------------------------------------------------------  IN: 720 mL / OUT: 0 mL / NET: 720 mL        Appearance: Normal	  HEENT:   Normal oral mucosa, EOMI	  Cardiovascular:  S1 S2, No JVD,    Respiratory: Lungs clear to auscultation	  Psychiatry: Alert  Gastrointestinal:  Soft, Non-tender, + BS	  Skin: No rashes   Neurologic: Non-focal  Extremities:  No edema  Vascular: Peripheral pulses palpable    	    	  	  CARDIAC MARKERS:  Labs personally reviewed by me                                  9.9    3.43  )-----------( 175      ( 18 Oct 2023 09:05 )             32.2     10-18    144  |  106  |  26<H>  ----------------------------<  87  4.1   |  29  |  1.66<H>    Ca    8.8      18 Oct 2023 09:05  Phos  3.1     10-18  Mg     2.0     10-18            EKG: Personally reviewed by me - NSR LBBB  Radiology: Personally reviewed by me - CXR small pleural effusions      Assessment and Plan:   · Assessment	  84 y/o male with pAF, HFrEF, prostate ca, dementia who presented with hematoma in RLE.      Problem/Plan - 1:  ·  Problem: Chronic systolic heart failure.   ·  Plan: Most recent office note from Jan 2020 notes EF 20-25% drop from 45% with previous normal cors  - echo 3/2022: EF 40%, mod MR, mild-mod AR  - NICM  - Needs GDMT- continue on metoprolol succinate    - Rec starting Entresto 24/26 BID and Farxiga 10mg daily  - Wife wishes to hold off until she discusses with Dr Ramos, states hasn't seen him in about 18 months but has appt soon.  - Will reach out to Dr Ramos Thursday    Problem/Plan - 2:  ·  Problem: Paroxysmal atrial fibrillation.   ·  Plan: h/o pAF  - continue home Amio and Metoprolol  - s/p DCCV 2/2022  - warfarin with heparin gtt bridge,       Problem/Plan - 3:  ·  Problem: Prophylactic measure.   ·  Plan: - resume home glaucoma eye drops  - tolerating regular diet              Differential diagnosis and plan of care discussed with patient after the evaluation. Counseling on diet, nutritional counseling, weight management, exercise and medication compliance was done.   Advanced care planning/advanced directives discussed with patient/family. DNR status including forceful chest compressions to attempt to restart the heart, ventilator support/artificial breathing, electric shock, artificial nutrition, health care proxy, Molst form all discussed with pt. Pt wishes to consider. More than fifteen minutes spent on discussing advanced directives.         Fede Javdan, DO [facility]  Cardiovascular Medicine  [address], [address]  Office [phone]  Available via call/text on [employer] Teams
TRAUMA COMANAGEMENT MEDICINE ATTENDING INITIAL CONSULT NOTE    HPI:  83-year-old male PMH of atrial fibrillation on coumadin, heart failure, prostate CA presents to ED complaining of pain and swelling to the right lower extremity predominantly the right ankle and lower leg since last week.  Approximately 1 to 2 weeks ago patient had an injury to the right ankle was seen at an outside hospital last week and also seen by podiatry while in the ED, had x-rays showing ankle edema and swelling of the distal medial tib/fib patient was referred to podiatry outpatient.  Patient.  Was seen by podiatry last week and today, family reports that podiatry directed patient today to go to ED for further evaluation and vascular cardiology consult. Podiatry Dr. Hawk.  Patient and family and aide at bedside report worsening edema to the area over the past week, increased redness, Denies fevers, chills, diaphoresis, SOB, chest pain.    In the ED VSS, labs revealed no WBC, INR 2.1, no lactate, CT of RLE awaiting final read but reviewed by surgery team with likely hematoma 2/2 injury. General surgery consulted for RLE hematoma concern for possible infection.   (13 Oct 2023 01:19)      SUBJECTIVE: Seen at bedside. Resting comfortably    Home Medications:  allopurinol 300 mg oral tablet: 1 tab(s) orally once a day (06 Oct 2023 13:55)  ALLOPURINOL 300 MG TABLET: TAKE 1 TABLET BY MOUTH EVERY DAY (13 Oct 2023 05:07)  amiodarone 200 mg oral tablet: 1 tab(s) orally once a day (06 Oct 2023 13:55)  AMIODARONE 200MG TAB:  (13 Oct 2023 05:07)  FUROSEMIDE 20MG TAB:  (13 Oct 2023 05:07)  latanoprost 0.005% ophthalmic solution: 1 drop(s) to each affected eye once a day (at bedtime) (06 Oct 2023 13:55)  METOPROLOL SUCC ER 25 MG TAB: TAKE 1 TABLET BY MOUTH EVERY DAY (13 Oct 2023 05:07)  metoprolol succinate 25 mg oral tablet, extended release: 1 tab(s) orally once a day (06 Oct 2023 13:55)  multivitamin: 1 tab(s) orally once a day (06 Oct 2023 13:55)  pantoprazole 40 mg oral delayed release tablet: 1 tab(s) orally once a day (before a meal) (06 Oct 2023 13:55)  Simbrinza 1%- 0.2% ophthalmic suspension: 1 drop(s) to each affected eye 2 times a day (06 Oct 2023 13:55)  tamsulosin 0.4 mg oral capsule: 1 cap(s) orally once a day (at bedtime) (06 Oct 2023 13:55)  TAMSULOSIN 0.4MG CAP:  (13 Oct 2023 05:07)  warfarin 2 mg oral tablet: 1 tab(s) orally once (06 Oct 2023 13:51)  WARFARIN 2MG TAB:  (13 Oct 2023 05:07)      PAST MEDICAL & SURGICAL HISTORY:  Afib      Prostate cancer      Heart failure      History of scoliosis      S/P anal fissurectomy          Review of Systems:   CONSTITUTIONAL: No fever, weight loss, or fatigue  EYES: No eye pain, visual disturbances, or discharge  ENMT:  No difficulty hearing, tinnitus, vertigo; No sinus or throat pain  NECK: No pain or stiffness  RESPIRATORY: No cough, wheezing, chills or hemoptysis; No shortness of breath  CARDIOVASCULAR: No chest pain, palpitations, dizziness, or leg swelling  GASTROINTESTINAL: No abdominal or epigastric pain. No nausea, vomiting, or hematemesis; No diarrhea or constipation. No melena or hematochezia.  GENITOURINARY: No dysuria, frequency, hematuria, or incontinence  NEUROLOGICAL: No headaches, memory loss, loss of strength, numbness, or tremors  SKIN: RLE hematoma   MUSCULOSKELETAL: No joint pain or swelling; No muscle, back, or extremity pain  PSYCHIATRIC: No depression, anxiety, mood swings, or difficulty sleeping      Allergies    No Known Allergies    Intolerances        Social History: Nonsmoker    FAMILY HISTORY:  No pertinent family history in first degree relatives        Vital Signs Last 24 Hrs  T(C): 36.4 (13 Oct 2023 09:00), Max: 37.1 (12 Oct 2023 21:00)  T(F): 97.6 (13 Oct 2023 09:00), Max: 98.8 (12 Oct 2023 21:00)  HR: 73 (13 Oct 2023 09:00) (72 - 93)  BP: 136/78 (13 Oct 2023 09:00) (134/76 - 164/74)  BP(mean): 95 (13 Oct 2023 02:43) (95 - 104)  RR: 18 (13 Oct 2023 09:00) (18 - 20)  SpO2: 95% (13 Oct 2023 09:00) (94% - 100%)    Parameters below as of 13 Oct 2023 09:00  Patient On (Oxygen Delivery Method): room air      CAPILLARY BLOOD GLUCOSE          PHYSICAL EXAM:  GENERAL: NAD, well-developed  HEAD:  NCAT  EYES: EOMI  NECK: Supple, No JVD  CHEST/LUNG: Clear to auscultation bilaterally; No wheeze  HEART: Reg rate. No M/R/G.  ABDOMEN: Soft, NT/ND, Bowel sounds present  EXTREMITIES:  2+ Peripheral Pulses, No clubbing, cyanosis, or edema  PSYCH: AAOx3  NEUROLOGY: non-focal  SKIN: RLE with erythema, wrapped in ace bandage    LABS:                        10.3   4.84  )-----------( 148      ( 13 Oct 2023 03:53 )             32.6     10-13    137  |  103  |  26<H>  ----------------------------<  101<H>  4.2   |  24  |  1.21    Ca    8.6      13 Oct 2023 03:53  Phos  2.9     10-13  Mg     2.0     10-13    TPro  7.0  /  Alb  3.9  /  TBili  1.6<H>  /  DBili  x   /  AST  24  /  ALT  21  /  AlkPhos  104  10-12    PT/INR - ( 13 Oct 2023 10:28 )   PT: 18.1 sec;   INR: 1.67 ratio         PTT - ( 13 Oct 2023 05:34 )  PTT:37.4 sec      Urinalysis Basic - ( 13 Oct 2023 03:53 )    Color: x / Appearance: x / SG: x / pH: x  Gluc: 101 mg/dL / Ketone: x  / Bili: x / Urobili: x   Blood: x / Protein: x / Nitrite: x   Leuk Esterase: x / RBC: x / WBC x   Sq Epi: x / Non Sq Epi: x / Bacteria: x          MEDICATIONS  (STANDING):  acetaminophen   IVPB .. 1000 milliGRAM(s) IV Intermittent every 6 hours  ampicillin/sulbactam  IVPB 3 Gram(s) IV Intermittent every 6 hours  lactated ringers. 1000 milliLiter(s) (120 mL/Hr) IV Continuous <Continuous>    MEDICATIONS  (PRN):  HYDROmorphone  Injectable 0.5 milliGRAM(s) IV Push every 4 hours PRN Severe Pain (7 - 10)  HYDROmorphone  Injectable 0.2 milliGRAM(s) IV Push every 4 hours PRN Moderate Pain (4 - 6)

## 2023-10-18 NOTE — PROGRESS NOTE ADULT - SUBJECTIVE AND OBJECTIVE BOX
[facility] Division of Hospital Medicine  Mónica Astudillo DO  Available on Teams Reinaldo    Patient is a 83y old  Male who presents with a chief complaint of Other injury of unspecified body region, initial encounter    Per chart: "83-year-old male PMH of atrial fibrillation on coumadin, heart failure, prostate CA presents to ED complaining of pain and swelling to the right lower extremity predominantly the right ankle and lower leg since last week.  Approximately 1 to 2 weeks ago patient had an injury to the right ankle was seen at an outside hospital last week and also seen by podiatry while in the ED, had x-rays showing ankle edema and swelling of the distal medial tib/fib patient was referred to podiatry outpatient.  Patient.  Was seen by podiatry last week and today, family reports that podiatry directed patient today to go to ED for further evaluation and vascular cardiology consult. Podiatry Dr. Hawk.  Patient and family and aide at bedside report worsening edema to the area over the past week, increased redness, Denies fevers, chills, diaphoresis, SOB, chest pain.    In the ED VSS, labs revealed no WBC, INR 2.1, no lactate, CT of RLE awaiting final read but reviewed by surgery team with likely hematoma 2/2 injury. General surgery consulted for RLE hematoma concern for possible infection."   (13 Oct 2023 15:55)      SUBJECTIVE / OVERNIGHT EVENTS: none. Patient had 2 BMs yesterday, no diarrhea. He is currently asleep. Aid at bedside.   ADDITIONAL REVIEW OF SYSTEMS: negative    MEDICATIONS  (STANDING):  acetaminophen     Tablet .. 650 milliGRAM(s) Oral every 8 hours  allopurinol 300 milliGRAM(s) Oral daily  aMIOdarone    Tablet 200 milliGRAM(s) Oral daily  bacitracin   Ointment 1 Application(s) Topical two times a day  chlorhexidine 2% Cloths 1 Application(s) Topical <User Schedule>  heparin  Infusion 1400 Unit(s)/Hr (12 mL/Hr) IV Continuous <Continuous>  lactobacillus acidophilus 1 Tablet(s) Oral daily  latanoprost 0.005% Ophthalmic Solution 1 Drop(s) Both EYES at bedtime  metoprolol succinate ER 25 milliGRAM(s) Oral daily  Simbrinza (Brinzolamide and Brimonidine) 1 Drop(s) 1 Drop(s) Both EYES two times a day  tamsulosin 0.4 milliGRAM(s) Oral at bedtime    MEDICATIONS  (PRN):  aluminum hydroxide/magnesium hydroxide/simethicone Suspension 30 milliLiter(s) Oral every 4 hours PRN Dyspepsia      CAPILLARY BLOOD GLUCOSE        I&O's Summary    17 Oct 2023 07:01  -  18 Oct 2023 07:00  --------------------------------------------------------  IN: 699 mL / OUT: 0 mL / NET: 699 mL        PHYSICAL EXAM:  Vital Signs Last 24 Hrs  T(C): 36.9 (18 Oct 2023 09:48), Max: 36.9 (18 Oct 2023 09:48)  T(F): 98.5 (18 Oct 2023 09:48), Max: 98.5 (18 Oct 2023 09:48)  HR: 60 (18 Oct 2023 09:48) (59 - 71)  BP: 144/80 (18 Oct 2023 09:48) (115/62 - 155/76)  BP(mean): --  RR: 18 (18 Oct 2023 09:48) (18 - 18)  SpO2: 98% (18 Oct 2023 09:48) (97% - 99%)    Parameters below as of 18 Oct 2023 09:48  Patient On (Oxygen Delivery Method): nasal cannula  O2 Flow (L/min): 2      CONSTITUTIONAL: Well-groomed, in no apparent distress, asleep though wakes up easily, obese  EYES: No conjunctival or scleral injection, non-icteric; PERRLA and symmetric  ENMT: No external nasal lesions; no pharyngeal injection or exudates, oral mucosa with moist membranes  NECK: Trachea midline without palpable neck mass; thyroid not enlarged and non-tender  RESPIRATORY: Breathing comfortably; lungs CTA without wheeze/rhonchi/rales  CARDIOVASCULAR: +S1S2, RRR, no M/G/R; pedal pulses full and symmetric; no lower extremity edema  GASTROINTESTINAL: No palpable masses or tenderness, +BS throughout, no rebound/guarding  LYMPHATIC: No cervical LAD or tenderness; no axillary LAD or tenderness  MUSCULOSKELETAL: no digital clubbing or cyanosis; no paraspinal tenderness; right calf wrapped in ACE bandages, mild swelling in the toes  NEUROLOGIC: CN II-XII intact; sensation intact in LEs b/l to light touch  PSYCHIATRIC: asleep but easily awoken, oriented x1; mood and affect appropriate    LABS:                        9.9    3.43  )-----------( 175      ( 18 Oct 2023 09:05 )             32.2     10-18    144  |  106  |  26<H>  ----------------------------<  87  4.1   |  29  |  1.66<H>    Ca    8.8      18 Oct 2023 09:05  Phos  3.1     10-18  Mg     2.0     10-18      PT/INR - ( 18 Oct 2023 09:05 )   PT: 13.4 sec;   INR: 1.29 ratio         PTT - ( 18 Oct 2023 09:05 )  PTT:64.1 sec      Urinalysis Basic - ( 18 Oct 2023 09:05 )    Color: x / Appearance: x / SG: x / pH: x  Gluc: 87 mg/dL / Ketone: x  / Bili: x / Urobili: x   Blood: x / Protein: x / Nitrite: x   Leuk Esterase: x / RBC: x / WBC x   Sq Epi: x / Non Sq Epi: x / Bacteria: x

## 2023-10-18 NOTE — PROGRESS NOTE ADULT - ASSESSMENT
83 year old male PMH atrial fibrillation on coumadin, heart failure, prostate CA presents to ED complaining of pain and swelling to the right lower extremity predominantly the right ankle and lower leg since last week.  Large swelling of the RLE distal shin/calf, very erythematous from below the knee to dorsal foot, tender to palpation, dark eschar overlaying the surface. Concern for hematoma with possible infection and surrounding cellulitis. Now s/p I&D in OR on 10/14.     PLAN:  - Pain control PRN  - Monitor Cr, rest of workup per medicine negative  - Monitor for signs of systemic infection  - Appreciate medicine recs  - DVT ppx: Hep gtt to Coumadin bridge  - PT: HPT      Trauma/ACS  [id number]

## 2023-10-19 LAB
ANION GAP SERPL CALC-SCNC: 13 MMOL/L — SIGNIFICANT CHANGE UP (ref 5–17)
ANION GAP SERPL CALC-SCNC: 13 MMOL/L — SIGNIFICANT CHANGE UP (ref 5–17)
APTT BLD: 80.6 SEC — HIGH (ref 24.5–35.6)
APTT BLD: 80.6 SEC — HIGH (ref 24.5–35.6)
BUN SERPL-MCNC: 26 MG/DL — HIGH (ref 7–23)
BUN SERPL-MCNC: 26 MG/DL — HIGH (ref 7–23)
CALCIUM SERPL-MCNC: 9 MG/DL — SIGNIFICANT CHANGE UP (ref 8.4–10.5)
CALCIUM SERPL-MCNC: 9 MG/DL — SIGNIFICANT CHANGE UP (ref 8.4–10.5)
CHLORIDE SERPL-SCNC: 104 MMOL/L — SIGNIFICANT CHANGE UP (ref 96–108)
CHLORIDE SERPL-SCNC: 104 MMOL/L — SIGNIFICANT CHANGE UP (ref 96–108)
CO2 SERPL-SCNC: 28 MMOL/L — SIGNIFICANT CHANGE UP (ref 22–31)
CO2 SERPL-SCNC: 28 MMOL/L — SIGNIFICANT CHANGE UP (ref 22–31)
CREAT SERPL-MCNC: 1.47 MG/DL — HIGH (ref 0.5–1.3)
CREAT SERPL-MCNC: 1.47 MG/DL — HIGH (ref 0.5–1.3)
CULTURE RESULTS: SIGNIFICANT CHANGE UP
CULTURE RESULTS: SIGNIFICANT CHANGE UP
EGFR: 47 ML/MIN/1.73M2 — LOW
EGFR: 47 ML/MIN/1.73M2 — LOW
GLUCOSE SERPL-MCNC: 100 MG/DL — HIGH (ref 70–99)
GLUCOSE SERPL-MCNC: 100 MG/DL — HIGH (ref 70–99)
HCT VFR BLD CALC: 32.6 % — LOW (ref 39–50)
HCT VFR BLD CALC: 32.6 % — LOW (ref 39–50)
HGB BLD-MCNC: 10 G/DL — LOW (ref 13–17)
HGB BLD-MCNC: 10 G/DL — LOW (ref 13–17)
INR BLD: 1.35 RATIO — HIGH (ref 0.85–1.18)
INR BLD: 1.35 RATIO — HIGH (ref 0.85–1.18)
MAGNESIUM SERPL-MCNC: 2 MG/DL — SIGNIFICANT CHANGE UP (ref 1.6–2.6)
MAGNESIUM SERPL-MCNC: 2 MG/DL — SIGNIFICANT CHANGE UP (ref 1.6–2.6)
MCHC RBC-ENTMCNC: 30.7 GM/DL — LOW (ref 32–36)
MCHC RBC-ENTMCNC: 30.7 GM/DL — LOW (ref 32–36)
MCHC RBC-ENTMCNC: 31.3 PG — SIGNIFICANT CHANGE UP (ref 27–34)
MCHC RBC-ENTMCNC: 31.3 PG — SIGNIFICANT CHANGE UP (ref 27–34)
MCV RBC AUTO: 102.2 FL — HIGH (ref 80–100)
MCV RBC AUTO: 102.2 FL — HIGH (ref 80–100)
NRBC # BLD: 0 /100 WBCS — SIGNIFICANT CHANGE UP (ref 0–0)
NRBC # BLD: 0 /100 WBCS — SIGNIFICANT CHANGE UP (ref 0–0)
ORGANISM # SPEC MICROSCOPIC CNT: SIGNIFICANT CHANGE UP
PHOSPHATE SERPL-MCNC: 3.2 MG/DL — SIGNIFICANT CHANGE UP (ref 2.5–4.5)
PHOSPHATE SERPL-MCNC: 3.2 MG/DL — SIGNIFICANT CHANGE UP (ref 2.5–4.5)
PLATELET # BLD AUTO: 157 K/UL — SIGNIFICANT CHANGE UP (ref 150–400)
PLATELET # BLD AUTO: 157 K/UL — SIGNIFICANT CHANGE UP (ref 150–400)
POTASSIUM SERPL-MCNC: 4.2 MMOL/L — SIGNIFICANT CHANGE UP (ref 3.5–5.3)
POTASSIUM SERPL-MCNC: 4.2 MMOL/L — SIGNIFICANT CHANGE UP (ref 3.5–5.3)
POTASSIUM SERPL-SCNC: 4.2 MMOL/L — SIGNIFICANT CHANGE UP (ref 3.5–5.3)
POTASSIUM SERPL-SCNC: 4.2 MMOL/L — SIGNIFICANT CHANGE UP (ref 3.5–5.3)
PROTHROM AB SERPL-ACNC: 14.7 SEC — HIGH (ref 9.5–13)
PROTHROM AB SERPL-ACNC: 14.7 SEC — HIGH (ref 9.5–13)
RBC # BLD: 3.19 M/UL — LOW (ref 4.2–5.8)
RBC # BLD: 3.19 M/UL — LOW (ref 4.2–5.8)
RBC # FLD: 14.2 % — SIGNIFICANT CHANGE UP (ref 10.3–14.5)
RBC # FLD: 14.2 % — SIGNIFICANT CHANGE UP (ref 10.3–14.5)
SODIUM SERPL-SCNC: 145 MMOL/L — SIGNIFICANT CHANGE UP (ref 135–145)
SODIUM SERPL-SCNC: 145 MMOL/L — SIGNIFICANT CHANGE UP (ref 135–145)
SPECIMEN SOURCE: SIGNIFICANT CHANGE UP
SPECIMEN SOURCE: SIGNIFICANT CHANGE UP
WBC # BLD: 3.55 K/UL — LOW (ref 3.8–10.5)
WBC # BLD: 3.55 K/UL — LOW (ref 3.8–10.5)
WBC # FLD AUTO: 3.55 K/UL — LOW (ref 3.8–10.5)
WBC # FLD AUTO: 3.55 K/UL — LOW (ref 3.8–10.5)

## 2023-10-19 PROCEDURE — 99232 SBSQ HOSP IP/OBS MODERATE 35: CPT

## 2023-10-19 RX ORDER — NYSTATIN CREAM 100000 [USP'U]/G
1 CREAM TOPICAL
Refills: 0 | Status: DISCONTINUED | OUTPATIENT
Start: 2023-10-19 | End: 2023-10-25

## 2023-10-19 RX ORDER — WARFARIN SODIUM 2.5 MG/1
4 TABLET ORAL ONCE
Refills: 0 | Status: COMPLETED | OUTPATIENT
Start: 2023-10-19 | End: 2023-10-19

## 2023-10-19 RX ORDER — SACUBITRIL AND VALSARTAN 24; 26 MG/1; MG/1
1 TABLET, FILM COATED ORAL
Refills: 0 | Status: DISCONTINUED | OUTPATIENT
Start: 2023-10-19 | End: 2023-10-25

## 2023-10-19 RX ADMIN — NYSTATIN CREAM 1 APPLICATION(S): 100000 CREAM TOPICAL at 17:44

## 2023-10-19 RX ADMIN — LATANOPROST 1 DROP(S): 0.05 SOLUTION/ DROPS OPHTHALMIC; TOPICAL at 22:41

## 2023-10-19 RX ADMIN — Medication 650 MILLIGRAM(S): at 13:00

## 2023-10-19 RX ADMIN — WARFARIN SODIUM 4 MILLIGRAM(S): 2.5 TABLET ORAL at 22:41

## 2023-10-19 RX ADMIN — Medication 20 MILLIGRAM(S): at 10:24

## 2023-10-19 RX ADMIN — Medication 1 APPLICATION(S): at 17:43

## 2023-10-19 RX ADMIN — Medication 650 MILLIGRAM(S): at 12:34

## 2023-10-19 RX ADMIN — HEPARIN SODIUM 12 UNIT(S)/HR: 5000 INJECTION INTRAVENOUS; SUBCUTANEOUS at 19:09

## 2023-10-19 RX ADMIN — CHLORHEXIDINE GLUCONATE 1 APPLICATION(S): 213 SOLUTION TOPICAL at 10:24

## 2023-10-19 RX ADMIN — AMIODARONE HYDROCHLORIDE 200 MILLIGRAM(S): 400 TABLET ORAL at 10:24

## 2023-10-19 RX ADMIN — Medication 25 MILLIGRAM(S): at 10:23

## 2023-10-19 RX ADMIN — Medication 1 TABLET(S): at 10:23

## 2023-10-19 RX ADMIN — Medication 1 APPLICATION(S): at 05:06

## 2023-10-19 RX ADMIN — TAMSULOSIN HYDROCHLORIDE 0.4 MILLIGRAM(S): 0.4 CAPSULE ORAL at 22:41

## 2023-10-19 RX ADMIN — HEPARIN SODIUM 12 UNIT(S)/HR: 5000 INJECTION INTRAVENOUS; SUBCUTANEOUS at 05:02

## 2023-10-19 RX ADMIN — Medication 300 MILLIGRAM(S): at 10:24

## 2023-10-19 RX ADMIN — HEPARIN SODIUM 12 UNIT(S)/HR: 5000 INJECTION INTRAVENOUS; SUBCUTANEOUS at 07:56

## 2023-10-19 RX ADMIN — HEPARIN SODIUM 12 UNIT(S)/HR: 5000 INJECTION INTRAVENOUS; SUBCUTANEOUS at 07:35

## 2023-10-19 RX ADMIN — SACUBITRIL AND VALSARTAN 1 TABLET(S): 24; 26 TABLET, FILM COATED ORAL at 17:43

## 2023-10-19 NOTE — PROGRESS NOTE ADULT - PROBLEM SELECTOR PLAN 3
- no echo on file - obtained echo from cardiologist's office: has HF with reduced ejection fraction  - outpatient cardiologist is Dr. Loredo   - CXR clear 10/16  - echo 3/2022: EF 40%, mod MR, mild-mod AR  - only on metoprolol succinate for GDMT, f/u as outpatient for further management  - discussed with cardiology, start entresto today for GDMT  - resumed lasix 20mg QD  - try to titrate nasal cannula off - discussed with aid, he may qualify for home O2 if SpO2 drops <88%. They are concerned since sometimes he has short periods of hyperventilation (respiratory distress?) at home however I discussed with them that it really depends on the numbers. Not sure why he would have episodes like that. He should not be hypoxic, has no hx of COPD and heart failure is well compensated. Might have some atelectasis from being bedbound however he is also unable to cooperative with incentive spirometry.

## 2023-10-19 NOTE — PROGRESS NOTE ADULT - SUBJECTIVE AND OBJECTIVE BOX
[facility] Division of Hospital Medicine  Mónica Astudillo DO  Available on Teams Reinaldo    Patient is a 83y old  Male who presents with a chief complaint of Fall (18 Oct 2023 18:01)      SUBJECTIVE / OVERNIGHT EVENTS: none. Aid and family now requesting that patient receive a normal diet rather than low fiber since he didn't have any BMs yesterday. Patient is asleep but easily wakes up. Does not know where he is. Again, per aid, has good appetite.  ADDITIONAL REVIEW OF SYSTEMS: negative    MEDICATIONS  (STANDING):  acetaminophen     Tablet .. 650 milliGRAM(s) Oral every 8 hours  allopurinol 300 milliGRAM(s) Oral daily  aMIOdarone    Tablet 200 milliGRAM(s) Oral daily  bacitracin   Ointment 1 Application(s) Topical two times a day  chlorhexidine 2% Cloths 1 Application(s) Topical <User Schedule>  furosemide    Tablet 20 milliGRAM(s) Oral daily  heparin  Infusion 1400 Unit(s)/Hr (12 mL/Hr) IV Continuous <Continuous>  lactobacillus acidophilus 1 Tablet(s) Oral daily  latanoprost 0.005% Ophthalmic Solution 1 Drop(s) Both EYES at bedtime  metoprolol succinate ER 25 milliGRAM(s) Oral daily  nystatin Powder 1 Application(s) Topical two times a day  sacubitril 24 mG/valsartan 26 mG 1 Tablet(s) Oral two times a day  Simbrinza (Brinzolamide and Brimonidine) 1 Drop(s) 1 Drop(s) Both EYES two times a day  tamsulosin 0.4 milliGRAM(s) Oral at bedtime  warfarin 4 milliGRAM(s) Oral once    MEDICATIONS  (PRN):  aluminum hydroxide/magnesium hydroxide/simethicone Suspension 30 milliLiter(s) Oral every 4 hours PRN Dyspepsia      CAPILLARY BLOOD GLUCOSE        I&O's Summary    18 Oct 2023 07:01  -  19 Oct 2023 07:00  --------------------------------------------------------  IN: 984 mL / OUT: 0 mL / NET: 984 mL    19 Oct 2023 07:01  -  19 Oct 2023 13:34  --------------------------------------------------------  IN: 288 mL / OUT: 0 mL / NET: 288 mL        PHYSICAL EXAM:  Vital Signs Last 24 Hrs  T(C): 36.6 (19 Oct 2023 12:40), Max: 36.8 (18 Oct 2023 21:06)  T(F): 97.9 (19 Oct 2023 12:40), Max: 98.2 (18 Oct 2023 21:06)  HR: 65 (19 Oct 2023 12:40) (58 - 67)  BP: 153/69 (19 Oct 2023 12:40) (125/61 - 164/91)  BP(mean): --  RR: 18 (19 Oct 2023 12:40) (18 - 18)  SpO2: 95% (19 Oct 2023 12:40) (94% - 98%)    Parameters below as of 19 Oct 2023 12:40  Patient On (Oxygen Delivery Method): room air    CONSTITUTIONAL: Well-groomed, in no apparent distress, asleep though wakes up easily, obese  EYES: No conjunctival or scleral injection, non-icteric; PERRLA and symmetric  ENMT: No external nasal lesions; no pharyngeal injection or exudates, oral mucosa with moist membranes  NECK: Trachea midline without palpable neck mass; thyroid not enlarged and non-tender  RESPIRATORY: Breathing comfortably; lungs CTA without wheeze/rhonchi/rales  CARDIOVASCULAR: +S1S2, RRR, no M/G/R; pedal pulses full and symmetric; no lower extremity edema  GASTROINTESTINAL: No palpable masses or tenderness, +BS throughout, no rebound/guarding  LYMPHATIC: No cervical LAD or tenderness; no axillary LAD or tenderness  MUSCULOSKELETAL: no digital clubbing or cyanosis; no paraspinal tenderness; right calf wrapped in ACE bandages, mild swelling in the toes.   NEUROLOGIC: CN II-XII intact; sensation intact in LEs b/l to light touch  PSYCHIATRIC: asleep but easily awoken, oriented x1; mood and affect appropriate    LABS:                        10.0   3.55  )-----------( 157      ( 19 Oct 2023 07:25 )             32.6     10-19    145  |  104  |  26<H>  ----------------------------<  100<H>  4.2   |  28  |  1.47<H>    Ca    9.0      19 Oct 2023 07:25  Phos  3.2     10-19  Mg     2.0     10-19      PT/INR - ( 19 Oct 2023 07:25 )   PT: 14.7 sec;   INR: 1.35 ratio         PTT - ( 19 Oct 2023 07:25 )  PTT:80.6 sec      Urinalysis Basic - ( 19 Oct 2023 07:25 )    Color: x / Appearance: x / SG: x / pH: x  Gluc: 100 mg/dL / Ketone: x  / Bili: x / Urobili: x   Blood: x / Protein: x / Nitrite: x   Leuk Esterase: x / RBC: x / WBC x   Sq Epi: x / Non Sq Epi: x / Bacteria: x

## 2023-10-19 NOTE — PROGRESS NOTE ADULT - SUBJECTIVE AND OBJECTIVE BOX
DATE OF SERVICE: 10-19-23 @ 12:56    Patient is a 83y old  Male who presents with a chief complaint of Fall (18 Oct 2023 18:01)      INTERVAL HISTORY: Denies sob and chest pain, appears comfortable     REVIEW OF SYSTEMS:  CONSTITUTIONAL: No weakness  EYES/ENT: No visual changes;  No throat pain   NECK: No pain or stiffness  RESPIRATORY: No cough, wheezing; No shortness of breath  CARDIOVASCULAR: No chest pain or palpitations  GASTROINTESTINAL: No abdominal  pain. No nausea, vomiting, or hematemesis  GENITOURINARY: No dysuria, frequency or hematuria  NEUROLOGICAL: No stroke like symptoms  SKIN: No rashes    	  MEDICATIONS:  aMIOdarone    Tablet 200 milliGRAM(s) Oral daily  furosemide    Tablet 20 milliGRAM(s) Oral daily  metoprolol succinate ER 25 milliGRAM(s) Oral daily  sacubitril 24 mG/valsartan 26 mG 1 Tablet(s) Oral two times a day        PHYSICAL EXAM:  T(C): 36.6 (10-19-23 @ 12:40), Max: 36.8 (10-18-23 @ 21:06)  HR: 65 (10-19-23 @ 12:40) (58 - 67)  BP: 153/69 (10-19-23 @ 12:40) (125/61 - 164/91)  RR: 18 (10-19-23 @ 12:40) (18 - 18)  SpO2: 95% (10-19-23 @ 12:40) (94% - 98%)  Wt(kg): --  I&O's Summary    18 Oct 2023 07:01  -  19 Oct 2023 07:00  --------------------------------------------------------  IN: 984 mL / OUT: 0 mL / NET: 984 mL    19 Oct 2023 07:01  -  19 Oct 2023 12:56  --------------------------------------------------------  IN: 288 mL / OUT: 0 mL / NET: 288 mL          Appearance: In no distress	  HEENT:    PERRL, EOMI	  Cardiovascular:  S1 S2, No JVD  Respiratory: Lungs clear to auscultation	  Gastrointestinal:  Soft, Non-tender, + BS	  Vascularature:  No edema of LE  Psychiatric: Appropriate affect   Neuro: no acute focal deficits                               10.0   3.55  )-----------( 157      ( 19 Oct 2023 07:25 )             32.6     10-19    145  |  104  |  26<H>  ----------------------------<  100<H>  4.2   |  28  |  1.47<H>    Ca    9.0      19 Oct 2023 07:25  Phos  3.2     10-19  Mg     2.0     10-19          Labs personally reviewed      ASSESSMENT/PLAN: 	  84 y/o male with pAF, HFrEF, prostate ca, dementia who presented with hematoma in RLE.      Problem/Plan - 1:  ·  Problem: Chronic systolic heart failure.   ·  Plan: Most recent office note from Jan 2020 notes EF 20-25% drop from 45% with previous normal cors  - echo 3/2022: EF 40%, mod MR, mild-mod AR  - NICM  - Needs GDMT- continue on metoprolol succinate    - Continue Entresto 24/26 BID and Farxiga 10mg daily  - Wife wishes to hold off until she discusses with Dr Ramos, states hasn't seen him in about 18 months but has appt soon.  - Will reach out to Dr Ramos Thursday    Problem/Plan - 2:  ·  Problem: Paroxysmal atrial fibrillation.   ·  Plan: h/o pAF  - continue home Amio and Metoprolol  - s/p DCCV 2/2022  - warfarin with heparin gtt bridge,       Problem/Plan - 3:  ·  Problem: Prophylactic measure.   ·  Plan: - resume home glaucoma eye drops  - tolerating regular diet                SELIN García,  [facility]  Cardiovascular Medicine  [address], [address]  Available via call or text on [employer] TE[employer]  Office: [phone]   DATE OF SERVICE: 10-19-23 @ 12:56    Patient is a 83y old  Male who presents with a chief complaint of Fall (18 Oct 2023 18:01)      INTERVAL HISTORY: Denies sob and chest pain, appears comfortable     REVIEW OF SYSTEMS:  CONSTITUTIONAL: No weakness  EYES/ENT: No visual changes;  No throat pain   NECK: No pain or stiffness  RESPIRATORY: No cough, wheezing; No shortness of breath  CARDIOVASCULAR: No chest pain or palpitations  GASTROINTESTINAL: No abdominal  pain. No nausea, vomiting, or hematemesis  GENITOURINARY: No dysuria, frequency or hematuria  NEUROLOGICAL: No stroke like symptoms  SKIN: No rashes    	  MEDICATIONS:  aMIOdarone    Tablet 200 milliGRAM(s) Oral daily  furosemide    Tablet 20 milliGRAM(s) Oral daily  metoprolol succinate ER 25 milliGRAM(s) Oral daily  sacubitril 24 mG/valsartan 26 mG 1 Tablet(s) Oral two times a day        PHYSICAL EXAM:  T(C): 36.6 (10-19-23 @ 12:40), Max: 36.8 (10-18-23 @ 21:06)  HR: 65 (10-19-23 @ 12:40) (58 - 67)  BP: 153/69 (10-19-23 @ 12:40) (125/61 - 164/91)  RR: 18 (10-19-23 @ 12:40) (18 - 18)  SpO2: 95% (10-19-23 @ 12:40) (94% - 98%)  Wt(kg): --  I&O's Summary    18 Oct 2023 07:01  -  19 Oct 2023 07:00  --------------------------------------------------------  IN: 984 mL / OUT: 0 mL / NET: 984 mL    19 Oct 2023 07:01  -  19 Oct 2023 12:56  --------------------------------------------------------  IN: 288 mL / OUT: 0 mL / NET: 288 mL          Appearance: In no distress	  HEENT:    PERRL, EOMI	  Cardiovascular:  S1 S2, No JVD  Respiratory: Lungs clear to auscultation	  Gastrointestinal:  Soft, Non-tender, + BS	  Vascularature:  No edema of LE  Psychiatric: Appropriate affect   Neuro: no acute focal deficits                               10.0   3.55  )-----------( 157      ( 19 Oct 2023 07:25 )             32.6     10-19    145  |  104  |  26<H>  ----------------------------<  100<H>  4.2   |  28  |  1.47<H>    Ca    9.0      19 Oct 2023 07:25  Phos  3.2     10-19  Mg     2.0     10-19          Labs personally reviewed      ASSESSMENT/PLAN: 	  82 y/o male with pAF, HFrEF, prostate ca, dementia who presented with hematoma in RLE.      Problem/Plan - 1:  ·  Problem: Chronic systolic heart failure.   ·  Plan: Most recent office note from Jan 2020 notes EF 20-25% drop from 45% with previous normal cors  - echo 3/2022: EF 40%, mod MR, mild-mod AR  - NICM  - Needs GDMT- continue on metoprolol succinate    - Start Entresto 24/26 BID and start Farxiga 10mg daily at discharge  - Wife wishes to hold off until she discusses with Dr Ramos, states hasn't seen him in about 18 months but has appt soon.  - Reached out to Dr Ramos    Problem/Plan - 2:  ·  Problem: Paroxysmal atrial fibrillation.   ·  Plan: h/o pAF  - continue home Amio and Metoprolol  - s/p DCCV 2/2022  - warfarin with heparin gtt bridge,       Problem/Plan - 3:  ·  Problem: Prophylactic measure.   ·  Plan: - resume home glaucoma eye drops  - tolerating regular diet                SELIN García DO [facility]  Cardiovascular Medicine  [address], [address]  Available via call or text on [employer] TE[employer]  Office: [phone]

## 2023-10-19 NOTE — PROGRESS NOTE ADULT - SUBJECTIVE AND OBJECTIVE BOX
Surgery Progress Note     24hour Events:   - dosing warfarin  - INR still <2    Subjective:  Patient seen and examined. Pain well managed with current medications. Out of bed and ambulating.     Vital Signs:  Vital Signs Last 24 Hrs  T(C): 36.7 (19 Oct 2023 05:02), Max: 36.9 (18 Oct 2023 09:48)  T(F): 98 (19 Oct 2023 05:02), Max: 98.5 (18 Oct 2023 09:48)  HR: 67 (19 Oct 2023 05:02) (58 - 67)  BP: 161/64 (19 Oct 2023 05:02) (125/61 - 161/64)  BP(mean): --  RR: 18 (19 Oct 2023 05:02) (18 - 18)  SpO2: 97% (19 Oct 2023 05:02) (94% - 98%)    Parameters below as of 19 Oct 2023 05:02  Patient On (Oxygen Delivery Method): nasal cannula  O2 Flow (L/min): 2    Physical Exam:  General: NAD, resting comfortably in bed  HEENT: Normocephalic atraumatic  Respiratory: Nonlabored respirations  Cardio: pulse present  Abdomen: soft, nontender, nondistended  MSK: RLE calf wound wrapped in ACE; dressing c/d/i     Labs:    10-18    144  |  106  |  26<H>  ----------------------------<  87  4.1   |  29  |  1.66<H>    Ca    8.8      18 Oct 2023 09:05  Phos  3.1     10-18  Mg     2.0     10-18                              10.0   3.55  )-----------( 157      ( 19 Oct 2023 07:25 )             32.6     PT/INR - ( 19 Oct 2023 07:25 )   PT: 14.7 sec;   INR: 1.35 ratio         PTT - ( 19 Oct 2023 07:25 )  PTT:80.6 sec

## 2023-10-19 NOTE — PROGRESS NOTE ADULT - PROBLEM SELECTOR PLAN 2
-baseline Cr seems to be 1.5, after calling his PCP's office   - per wife, he does not see a nephrologist, has never been told of any kidney disease  -monitor intake/output

## 2023-10-19 NOTE — PROGRESS NOTE ADULT - ASSESSMENT
83 year old male PMH atrial fibrillation on coumadin, heart failure, prostate CA presents to ED complaining of pain and swelling to the right lower extremity predominantly the right ankle and lower leg since last week.  Large swelling of the RLE distal shin/calf, very erythematous from below the knee to dorsal foot, tender to palpation, dark eschar overlaying the surface. Concern for hematoma with possible infection and surrounding cellulitis. Now s/p I&D in OR on 10/14.     PLAN:  - Pain control PRN  - Appreciate medicine recs  - DVT ppx: Hep gtt to Coumadin bridge  - PT: HPT and VNS    Trauma/ACS  [id number]

## 2023-10-19 NOTE — PROGRESS NOTE ADULT - PROBLEM SELECTOR PLAN 1
Presented with large swelling of the RLE distal shin/calf, now with deep wound, no pus seen, bloody  - s/p hematoma evacuation with surgery  - was on unasyn 10/13-10/16  - OR cultures with 2 types of S aureus, blood cultures negative  - hgb stable on heparin gtt while bridging to warfarin  - cont wound care  - per surgery plan for wound vac

## 2023-10-20 ENCOUNTER — TRANSCRIPTION ENCOUNTER (OUTPATIENT)
Age: 83
End: 2023-10-20

## 2023-10-20 LAB
ANION GAP SERPL CALC-SCNC: 10 MMOL/L — SIGNIFICANT CHANGE UP (ref 5–17)
ANION GAP SERPL CALC-SCNC: 10 MMOL/L — SIGNIFICANT CHANGE UP (ref 5–17)
APTT BLD: 83.9 SEC — HIGH (ref 24.5–35.6)
APTT BLD: 83.9 SEC — HIGH (ref 24.5–35.6)
BUN SERPL-MCNC: 22 MG/DL — SIGNIFICANT CHANGE UP (ref 7–23)
BUN SERPL-MCNC: 22 MG/DL — SIGNIFICANT CHANGE UP (ref 7–23)
CALCIUM SERPL-MCNC: 9.1 MG/DL — SIGNIFICANT CHANGE UP (ref 8.4–10.5)
CALCIUM SERPL-MCNC: 9.1 MG/DL — SIGNIFICANT CHANGE UP (ref 8.4–10.5)
CHLORIDE SERPL-SCNC: 103 MMOL/L — SIGNIFICANT CHANGE UP (ref 96–108)
CHLORIDE SERPL-SCNC: 103 MMOL/L — SIGNIFICANT CHANGE UP (ref 96–108)
CO2 SERPL-SCNC: 30 MMOL/L — SIGNIFICANT CHANGE UP (ref 22–31)
CO2 SERPL-SCNC: 30 MMOL/L — SIGNIFICANT CHANGE UP (ref 22–31)
CREAT SERPL-MCNC: 1.27 MG/DL — SIGNIFICANT CHANGE UP (ref 0.5–1.3)
CREAT SERPL-MCNC: 1.27 MG/DL — SIGNIFICANT CHANGE UP (ref 0.5–1.3)
EGFR: 56 ML/MIN/1.73M2 — LOW
EGFR: 56 ML/MIN/1.73M2 — LOW
GLUCOSE SERPL-MCNC: 101 MG/DL — HIGH (ref 70–99)
GLUCOSE SERPL-MCNC: 101 MG/DL — HIGH (ref 70–99)
HCT VFR BLD CALC: 34.6 % — LOW (ref 39–50)
HCT VFR BLD CALC: 34.6 % — LOW (ref 39–50)
HGB BLD-MCNC: 11 G/DL — LOW (ref 13–17)
HGB BLD-MCNC: 11 G/DL — LOW (ref 13–17)
INR BLD: 1.65 RATIO — HIGH (ref 0.85–1.18)
INR BLD: 1.65 RATIO — HIGH (ref 0.85–1.18)
MAGNESIUM SERPL-MCNC: 2 MG/DL — SIGNIFICANT CHANGE UP (ref 1.6–2.6)
MAGNESIUM SERPL-MCNC: 2 MG/DL — SIGNIFICANT CHANGE UP (ref 1.6–2.6)
MCHC RBC-ENTMCNC: 31.5 PG — SIGNIFICANT CHANGE UP (ref 27–34)
MCHC RBC-ENTMCNC: 31.5 PG — SIGNIFICANT CHANGE UP (ref 27–34)
MCHC RBC-ENTMCNC: 31.8 GM/DL — LOW (ref 32–36)
MCHC RBC-ENTMCNC: 31.8 GM/DL — LOW (ref 32–36)
MCV RBC AUTO: 99.1 FL — SIGNIFICANT CHANGE UP (ref 80–100)
MCV RBC AUTO: 99.1 FL — SIGNIFICANT CHANGE UP (ref 80–100)
NRBC # BLD: 0 /100 WBCS — SIGNIFICANT CHANGE UP (ref 0–0)
NRBC # BLD: 0 /100 WBCS — SIGNIFICANT CHANGE UP (ref 0–0)
PHOSPHATE SERPL-MCNC: 2.7 MG/DL — SIGNIFICANT CHANGE UP (ref 2.5–4.5)
PHOSPHATE SERPL-MCNC: 2.7 MG/DL — SIGNIFICANT CHANGE UP (ref 2.5–4.5)
PLATELET # BLD AUTO: 166 K/UL — SIGNIFICANT CHANGE UP (ref 150–400)
PLATELET # BLD AUTO: 166 K/UL — SIGNIFICANT CHANGE UP (ref 150–400)
POTASSIUM SERPL-MCNC: 3.8 MMOL/L — SIGNIFICANT CHANGE UP (ref 3.5–5.3)
POTASSIUM SERPL-MCNC: 3.8 MMOL/L — SIGNIFICANT CHANGE UP (ref 3.5–5.3)
POTASSIUM SERPL-SCNC: 3.8 MMOL/L — SIGNIFICANT CHANGE UP (ref 3.5–5.3)
POTASSIUM SERPL-SCNC: 3.8 MMOL/L — SIGNIFICANT CHANGE UP (ref 3.5–5.3)
PROTHROM AB SERPL-ACNC: 17.9 SEC — HIGH (ref 9.5–13)
PROTHROM AB SERPL-ACNC: 17.9 SEC — HIGH (ref 9.5–13)
RBC # BLD: 3.49 M/UL — LOW (ref 4.2–5.8)
RBC # BLD: 3.49 M/UL — LOW (ref 4.2–5.8)
RBC # FLD: 14.2 % — SIGNIFICANT CHANGE UP (ref 10.3–14.5)
RBC # FLD: 14.2 % — SIGNIFICANT CHANGE UP (ref 10.3–14.5)
SODIUM SERPL-SCNC: 143 MMOL/L — SIGNIFICANT CHANGE UP (ref 135–145)
SODIUM SERPL-SCNC: 143 MMOL/L — SIGNIFICANT CHANGE UP (ref 135–145)
WBC # BLD: 3.98 K/UL — SIGNIFICANT CHANGE UP (ref 3.8–10.5)
WBC # BLD: 3.98 K/UL — SIGNIFICANT CHANGE UP (ref 3.8–10.5)
WBC # FLD AUTO: 3.98 K/UL — SIGNIFICANT CHANGE UP (ref 3.8–10.5)
WBC # FLD AUTO: 3.98 K/UL — SIGNIFICANT CHANGE UP (ref 3.8–10.5)

## 2023-10-20 PROCEDURE — 99232 SBSQ HOSP IP/OBS MODERATE 35: CPT

## 2023-10-20 RX ORDER — WARFARIN SODIUM 2.5 MG/1
2 TABLET ORAL ONCE
Refills: 0 | Status: COMPLETED | OUTPATIENT
Start: 2023-10-20 | End: 2023-10-20

## 2023-10-20 RX ADMIN — Medication 650 MILLIGRAM(S): at 10:44

## 2023-10-20 RX ADMIN — Medication 25 MILLIGRAM(S): at 07:58

## 2023-10-20 RX ADMIN — NYSTATIN CREAM 1 APPLICATION(S): 100000 CREAM TOPICAL at 06:19

## 2023-10-20 RX ADMIN — LATANOPROST 1 DROP(S): 0.05 SOLUTION/ DROPS OPHTHALMIC; TOPICAL at 21:01

## 2023-10-20 RX ADMIN — CHLORHEXIDINE GLUCONATE 1 APPLICATION(S): 213 SOLUTION TOPICAL at 12:23

## 2023-10-20 RX ADMIN — SACUBITRIL AND VALSARTAN 1 TABLET(S): 24; 26 TABLET, FILM COATED ORAL at 06:19

## 2023-10-20 RX ADMIN — HEPARIN SODIUM 12 UNIT(S)/HR: 5000 INJECTION INTRAVENOUS; SUBCUTANEOUS at 02:00

## 2023-10-20 RX ADMIN — WARFARIN SODIUM 2 MILLIGRAM(S): 2.5 TABLET ORAL at 21:01

## 2023-10-20 RX ADMIN — NYSTATIN CREAM 1 APPLICATION(S): 100000 CREAM TOPICAL at 18:21

## 2023-10-20 RX ADMIN — Medication 1 APPLICATION(S): at 18:20

## 2023-10-20 RX ADMIN — SACUBITRIL AND VALSARTAN 1 TABLET(S): 24; 26 TABLET, FILM COATED ORAL at 18:21

## 2023-10-20 RX ADMIN — AMIODARONE HYDROCHLORIDE 200 MILLIGRAM(S): 400 TABLET ORAL at 06:19

## 2023-10-20 RX ADMIN — Medication 20 MILLIGRAM(S): at 06:19

## 2023-10-20 RX ADMIN — Medication 1 TABLET(S): at 12:20

## 2023-10-20 RX ADMIN — HEPARIN SODIUM 12 UNIT(S)/HR: 5000 INJECTION INTRAVENOUS; SUBCUTANEOUS at 19:33

## 2023-10-20 RX ADMIN — Medication 300 MILLIGRAM(S): at 12:22

## 2023-10-20 RX ADMIN — TAMSULOSIN HYDROCHLORIDE 0.4 MILLIGRAM(S): 0.4 CAPSULE ORAL at 21:01

## 2023-10-20 RX ADMIN — Medication 650 MILLIGRAM(S): at 11:50

## 2023-10-20 RX ADMIN — Medication 1 APPLICATION(S): at 06:18

## 2023-10-20 RX ADMIN — HEPARIN SODIUM 12 UNIT(S)/HR: 5000 INJECTION INTRAVENOUS; SUBCUTANEOUS at 07:35

## 2023-10-20 NOTE — PROGRESS NOTE ADULT - SUBJECTIVE AND OBJECTIVE BOX
DATE OF SERVICE: 10-20-23 @ 13:01    Patient is a 83y old  Male who presents with a chief complaint of Fall (18 Oct 2023 18:01)      INTERVAL HISTORY: Feels ok. Aide at bedside. Wife on phone at time of assessment.     REVIEW OF SYSTEMS:  CONSTITUTIONAL: No weakness  EYES/ENT: No visual changes;  No throat pain   NECK: No pain or stiffness  RESPIRATORY: No cough, wheezing; No shortness of breath  CARDIOVASCULAR: No chest pain or palpitations  GASTROINTESTINAL: No abdominal  pain. No nausea, vomiting, or hematemesis  GENITOURINARY: No dysuria, frequency or hematuria  NEUROLOGICAL: No stroke like symptoms  SKIN: No rashes    	  MEDICATIONS:  aMIOdarone    Tablet 200 milliGRAM(s) Oral daily  furosemide    Tablet 20 milliGRAM(s) Oral daily  metoprolol succinate ER 25 milliGRAM(s) Oral daily  sacubitril 24 mG/valsartan 26 mG 1 Tablet(s) Oral two times a day        PHYSICAL EXAM:  T(C): 37.1 (10-20-23 @ 07:50), Max: 37.1 (10-19-23 @ 16:40)  HR: 63 (10-20-23 @ 07:50) (58 - 67)  BP: 144/72 (10-20-23 @ 07:50) (129/64 - 169/87)  RR: 18 (10-20-23 @ 07:50) (18 - 18)  SpO2: 94% (10-20-23 @ 07:50) (92% - 94%)  Wt(kg): --  I&O's Summary    19 Oct 2023 07:01  -  20 Oct 2023 07:00  --------------------------------------------------------  IN: 1008 mL / OUT: 950 mL / NET: 58 mL    20 Oct 2023 07:01  -  20 Oct 2023 13:01  --------------------------------------------------------  IN: 0 mL / OUT: 800 mL / NET: -800 mL          Appearance: In no distress	  HEENT:    PERRL, EOMI	  Cardiovascular:  S1 S2, No JVD  Respiratory: Lungs clear to auscultation	  Gastrointestinal:  Soft, Non-tender, + BS	  Vascularature:  No edema of LE  Psychiatric: Appropriate affect   Neuro: no acute focal deficits                               11.0   3.98  )-----------( 166      ( 20 Oct 2023 06:33 )             34.6     10-20    143  |  103  |  22  ----------------------------<  101<H>  3.8   |  30  |  1.27    Ca    9.1      20 Oct 2023 06:33  Phos  2.7     10-20  Mg     2.0     10-20          Labs personally reviewed      ASSESSMENT/PLAN: 	    84 y/o male with pAF, HFrEF, prostate ca, dementia who presented with hematoma in RLE.      Problem/Plan - 1:  ·  Problem: Chronic systolic heart failure.   ·  Plan: Most recent office note from Jan 2020 notes EF 20-25% drop from 45% with previous normal cors  - echo 3/2022: EF 40%, mod MR, mild-mod AR  - NICM  - Needs GDMT- continue on metoprolol succinate    - Cont Entresto 24/26 BID and start Farxiga 10mg daily at discharge  - Cont Lasix 20mg PO daily  - OP follow up with Dr Ramos    Problem/Plan - 2:  ·  Problem: Paroxysmal atrial fibrillation.   ·  Plan: h/o pAF  - continue home Amio and Metoprolol  - s/p DCCV 2/2022  - warfarin with heparin gtt bridge       Problem/Plan - 3:  ·  Problem: Prophylactic measure.   ·  Plan: - resume home glaucoma eye drops  - tolerating regular diet              Demetrice Varner, BARTOLO-NP   Fede Mcallister DO [facility]  Cardiovascular Medicine  [address], [address]  Available through call or text on [employer] TE[employer]  Office: [phone]   DATE OF SERVICE: 10-20-23 @ 13:01    Patient is a 83y old  Male who presents with a chief complaint of Fall (18 Oct 2023 18:01)      INTERVAL HISTORY: Feels ok. Aide at bedside. Wife on phone at time of assessment.     REVIEW OF SYSTEMS:  CONSTITUTIONAL: No weakness  EYES/ENT: No visual changes;  No throat pain   NECK: No pain or stiffness  RESPIRATORY: No cough, wheezing; No shortness of breath  CARDIOVASCULAR: No chest pain or palpitations  GASTROINTESTINAL: No abdominal  pain. No nausea, vomiting, or hematemesis  GENITOURINARY: No dysuria, frequency or hematuria  NEUROLOGICAL: No stroke like symptoms  SKIN: No rashes    	  MEDICATIONS:  aMIOdarone    Tablet 200 milliGRAM(s) Oral daily  furosemide    Tablet 20 milliGRAM(s) Oral daily  metoprolol succinate ER 25 milliGRAM(s) Oral daily  sacubitril 24 mG/valsartan 26 mG 1 Tablet(s) Oral two times a day        PHYSICAL EXAM:  T(C): 37.1 (10-20-23 @ 07:50), Max: 37.1 (10-19-23 @ 16:40)  HR: 63 (10-20-23 @ 07:50) (58 - 67)  BP: 144/72 (10-20-23 @ 07:50) (129/64 - 169/87)  RR: 18 (10-20-23 @ 07:50) (18 - 18)  SpO2: 94% (10-20-23 @ 07:50) (92% - 94%)  Wt(kg): --  I&O's Summary    19 Oct 2023 07:01  -  20 Oct 2023 07:00  --------------------------------------------------------  IN: 1008 mL / OUT: 950 mL / NET: 58 mL    20 Oct 2023 07:01  -  20 Oct 2023 13:01  --------------------------------------------------------  IN: 0 mL / OUT: 800 mL / NET: -800 mL          Appearance: In no distress	  HEENT:    PERRL, EOMI	  Cardiovascular:  S1 S2, No JVD  Respiratory: Lungs clear to auscultation	  Gastrointestinal:  Soft, Non-tender, + BS	  Vascularature:  No edema of LE  Psychiatric: Appropriate affect   Neuro: no acute focal deficits                               11.0   3.98  )-----------( 166      ( 20 Oct 2023 06:33 )             34.6     10-20    143  |  103  |  22  ----------------------------<  101<H>  3.8   |  30  |  1.27    Ca    9.1      20 Oct 2023 06:33  Phos  2.7     10-20  Mg     2.0     10-20          Labs personally reviewed      ASSESSMENT/PLAN: 	  82 y/o male with pAF, HFrEF, prostate ca, dementia who presented with hematoma in RLE.    Problem/Plan - 1:  ·  Problem: Chronic systolic heart failure.   ·  Plan: Most recent office note from Jan 2020 notes EF 20-25% drop from 45% with previous normal cors  - echo 3/2022: EF 40%, mod MR, mild-mod AR  - NICM  - Needs GDMT- continue on metoprolol succinate    - Cont Entresto 24/26 BID and uptitrate to 49/51 BID as BP tolerates   - start Farxiga 10mg daily at discharge  - Cont Lasix 20mg PO daily  - OP follow up with Dr Ramos    Problem/Plan - 2:  ·  Problem: Paroxysmal atrial fibrillation.   ·  Plan: h/o pAF  - continue home Amio and Metoprolol  - s/p DCCV 2/2022  - warfarin with heparin gtt bridge       Problem/Plan - 3:  ·  Problem: Prophylactic measure.   ·  Plan: - resume home glaucoma eye drops  - tolerating regular diet              Demetrice Varner, BARTOLO-KASIA Mcallister DO [facility]  Cardiovascular Medicine  [address] [place] [address] Drive, [address]  Available through call or text on [employer] TE[employer]  Office: [phone]

## 2023-10-20 NOTE — PROGRESS NOTE ADULT - SUBJECTIVE AND OBJECTIVE BOX
Surgery Progress Note     24hour Events:   - Vac placed    Subjective:  Patient seen and examined. Pain well managed with current medications.     Vital Signs:  Vital Signs Last 24 Hrs  T(C): 37.1 (20 Oct 2023 07:50), Max: 37.1 (19 Oct 2023 16:40)  T(F): 98.7 (20 Oct 2023 07:50), Max: 98.8 (19 Oct 2023 16:40)  HR: 63 (20 Oct 2023 07:50) (58 - 67)  BP: 144/72 (20 Oct 2023 07:50) (129/64 - 169/87)  BP(mean): --  RR: 18 (20 Oct 2023 07:50) (18 - 18)  SpO2: 94% (20 Oct 2023 07:50) (92% - 95%)    Parameters below as of 20 Oct 2023 07:50  Patient On (Oxygen Delivery Method): room air      Physical Exam:  General: NAD, resting comfortably in bed  HEENT: Normocephalic atraumatic  Respiratory: Nonlabored respirations  Cardio: pulse present  Abdomen: soft, nontender, nondistended  MSK: RLE calf wound vac in place and holding suction    Labs:    10-20    143  |  103  |  22  ----------------------------<  101<H>  3.8   |  30  |  1.27    Ca    9.1      20 Oct 2023 06:33  Phos  2.7     10-20  Mg     2.0     10-20                              11.0   3.98  )-----------( 166      ( 20 Oct 2023 06:33 )             34.6     PT/INR - ( 20 Oct 2023 06:33 )   PT: 17.9 sec;   INR: 1.65 ratio         PTT - ( 20 Oct 2023 06:33 )  PTT:83.9 sec

## 2023-10-20 NOTE — PROGRESS NOTE ADULT - PROBLEM SELECTOR PLAN 5
- wound culture growing 2 strains of S aureus, however infection was cleared out in the OR w/I&D  - stopped unasyn due low suspicion for ongoing cellulitis  - Bcx no growth

## 2023-10-20 NOTE — DISCHARGE NOTE PROVIDER - NSDCCPCAREPLAN_GEN_ALL_CORE_FT
PRINCIPAL DISCHARGE DIAGNOSIS  Diagnosis: Hematoma of right lower leg  Assessment and Plan of Treatment: You had an evacuation of the Right Lower Extremity hematoma on 10/23  Please follow up with Dr. Bernard or one of his partners in 1-2 weeks after discharge from the hospital     PRINCIPAL DISCHARGE DIAGNOSIS  Diagnosis: Hematoma of right lower leg  Assessment and Plan of Treatment: You had an evacuation of the Right Lower Extremity hematoma on 10/23  Please follow up with Dr. Bernard or one of his partners in 1-2 weeks after discharge from the hospital      SECONDARY DISCHARGE DIAGNOSES  Diagnosis: Chronic systolic congestive heart failure  Assessment and Plan of Treatment: Do not take lasix or entresto after discharge. Moving forward, your primary doctor will tell you when to resume entresto (they need to check your kidney function on blood work first). Only take lasix when you feel short of breath or notice swelling in the arms and/or legs.

## 2023-10-20 NOTE — PROGRESS NOTE ADULT - ASSESSMENT
83 year old male PMH atrial fibrillation on coumadin, heart failure, prostate CA presents to ED complaining of pain and swelling to the right lower extremity predominantly the right ankle and lower leg since last week.  Large swelling of the RLE distal shin/calf, very erythematous from below the knee to dorsal foot, tender to palpation, dark eschar overlaying the surface. Concern for hematoma with possible infection and surrounding cellulitis. Now s/p I&D in OR on 10/14. Home with wound vac.     PLAN:  - Pain control PRN  - Appreciate medicine recs  - DVT ppx: Hep gtt to Coumadin bridge  - PT: HPT and VNS    Trauma/ACS  [id number]

## 2023-10-20 NOTE — DISCHARGE NOTE PROVIDER - NSDCCPTREATMENT_GEN_ALL_CORE_FT
PRINCIPAL PROCEDURE  Procedure: Evacuation of hematoma of lower leg  Findings and Treatment: WOUND CARE: You had a wound VAC placed on 10/19. Arrangements were made for home VAC and home care.   BATHING: You may shower and/or sponge bathe 24 hours after surgery. Do no submerge the incision underwater for the next 2 weeks.  ACTIVITY: No heavy lifting anything more than 10-15lbs or straining. Otherwise, you may return to your usual level of physical activity. If you are taking narcotic pain medication (such as Percocet), do NOT drive a car, operate machinery or make important decisions.  PAIN: For pain, you may take 975mg of tylenol every 6 hours. Do not exceed more than 4G per day.   NOTIFY YOUR SURGEON IF: You have any bleeding that does not stop, any pus draining from your wound, any fever (over 100.4 F) or chills, persistent nausea/vomiting with inability to tolerate food or liquids, persistent diarrhea, or if severe abdominal pain is not controlled on your discharge pain medications.  FOLLOW-UP:  1. Please call to make a follow-up appointment within one to two weeks of discharge with Dr. Bernard or one of his partners at [phone]  2. Please follow up with your primary care physician in one week regarding your hospitalization.

## 2023-10-20 NOTE — DISCHARGE NOTE PROVIDER - CARE PROVIDER_API CALL
Nicolette Campos  Surgical Critical Care  [address] [place] [address], [address]  [place] [address]  Phone: [phone]  Fax: [phone]  Follow Up Time: 1 week

## 2023-10-20 NOTE — DISCHARGE NOTE PROVIDER - HOSPITAL COURSE
83-year-old male PMH of atrial fibrillation on coumadin, heart failure, prostate CA presents to ED complaining of pain and swelling to the right lower extremity predominantly the right ankle and lower leg since last week.  Approximately 1 to 2 weeks ago patient had an injury to the right ankle was seen at an outside hospital last week and also seen by podiatry while in the ED, had x-rays showing ankle edema and swelling of the distal medial tib/fib patient was referred to podiatry outpatient.  Patient.  Was seen by podiatry last week and today, family reports that podiatry directed patient today to go to ED for further evaluation and vascular cardiology consult. Podiatry Dr. Hawk.  Patient and family and aide at bedside report worsening edema to the area over the past week, increased redness, Denies fevers, chills, diaphoresis, SOB, chest pain.    In the ED VSS, labs revealed no WBC, INR 2.1, no lactate, CT of RLE awaiting final read but reviewed by surgery team with likely hematoma 2/2 injury. General surgery consulted for RLE hematoma concern for possible infection.  He was admitted to the acute care surgical service.  On 10/13 pt underwent  Evacuation of RLE hematoma. He tolerated the procedure well, was extubated and sent to PACU  in  stable condition. The patient was hemodynamically stable and was transferred to a surgical floor. The patient's pain was controlled by IV pain medications and then by PO pain medications. He was evaluated bt PT who recommended home PT.  Cardiology was consulted, recommendations for medications were provided.  He was started on a heparin drip while coumadin was dosed.  Daily dressing changes were performed and on 10/19, wound VAC was placed.  Arrangements were made for home VAC and home care.  He is voiding, tolerating a regular diet, and pain is controlled with oral pain medications.  Patient is stable for discharge home and will follow-up with Dr--------------------------------- within 1-2 weeks.  He was also advised to follow-up with his cardiologist and PMD within 1-2 weeks for INR checks. 83-year-old male with PMHx of atrial fibrillation on coumadin, heart failure, prostate CA presents to ED complaining of pain and swelling to the right lower extremity predominantly the right ankle and lower leg since last week. Approximately 1 to 2 weeks ago patient had an injury to the right ankle was seen at an outside hospital last week and also seen by podiatry while in the ED, had x-rays showing ankle edema and swelling of the distal medial tib/fib patient was referred to podiatry outpatient. Patient was seen by podiatry last week and today, family reports that podiatry directed patient today to go to ED for further evaluation and vascular cardiology consult. Podiatry Dr. Hawk. Patient and family and aide at bedside report worsening edema to the area over the past week, increased redness. Denies fevers, chills, diaphoresis, SOB, chest pain.    In the ED VSS, labs revealed no WBC, INR 2.1, no lactate, CT of RLE awaiting final read but reviewed by surgery team with likely hematoma 2/2 injury. General surgery consulted for RLE hematoma concern for possible infection.    He was admitted to the acute care surgical service.  On 10/13 pt underwent evacuation of RLE hematoma. He tolerated the procedure well, was extubated and sent to PACU in stable condition. The patient was hemodynamically stable and was transferred to a surgical floor. The patient's pain was controlled by IV pain medications, and then by PO pain medications. Daily dressing changes were performed and on 10/19, wound VAC was placed. Arrangements were made for home VAC and home care.     Cardiology was consulted, recommendations for medications were provided. He was started on a heparin drip while coumadin was dosed.      He was evaluated by PT who recommended home PT.      He is voiding, tolerating a regular diet, and pain is controlled with oral pain medications. Patient is stable for discharge home and will follow-up with Dr Bernard within 1-2 weeks.  He was also advised to follow-up with his cardiologist and PMD within 1-2 weeks for INR checks. 83-year-old male with PMHx of atrial fibrillation on coumadin, heart failure, prostate CA presents to ED complaining of pain and swelling to the right lower extremity predominantly the right ankle and lower leg since last week. Approximately 1 to 2 weeks ago patient had an injury to the right ankle was seen at an outside hospital last week and also seen by podiatry while in the ED, had x-rays showing ankle edema and swelling of the distal medial tib/fib patient was referred to podiatry outpatient. Patient was seen by podiatry last week and today, family reports that podiatry directed patient today to go to ED for further evaluation and vascular cardiology consult. Podiatry Dr. Hawk. Patient and family and aide at bedside report worsening edema to the area over the past week, increased redness. Denies fevers, chills, diaphoresis, SOB, chest pain.    In the ED VSS, labs revealed no WBC, INR 2.1, no lactate, CT of RLE awaiting final read but reviewed by surgery team with likely hematoma 2/2 injury. General surgery consulted for RLE hematoma concern for possible infection.    He was admitted to the acute care surgical service.  On 10/13 pt underwent evacuation of RLE hematoma. He tolerated the procedure well, was extubated and sent to PACU in stable condition. The patient was hemodynamically stable and was transferred to a surgical floor. The patient's pain was controlled by IV pain medications, and then by PO pain medications. Daily dressing changes were performed and on 10/19, wound VAC was placed. Arrangements were made for home VAC and home care.     Cardiology was consulted, recommendations for medications were provided. He was started on a heparin drip while coumadin was dosed.      He was evaluated by PT who recommended home PT.      He is voiding, tolerating a regular diet, and pain is controlled with oral pain medications. Patient is stable for discharge home and will follow-up with Dr Bernard within 1-2 weeks.  He was also advised to follow-up with his cardiologist and PMD within 1-2 weeks for INR checks.    ***  Had mild NATHAN with Cr of 1.9 (from baseline Cr 1.5) on day of discharge, likely over diuresed -- discussed with patient's wife Ronna, will hold off on giving entresto and lasix for now. He will resume entresto when Cr goes to 1.6 or below. He will take lasix on an as needed basis, which is when he feels short of breath or has swelling in his arms and/or legs. He will need to specific instructions from the PCP to know when it is okay to resume the entresto.     ***  Mr. William will need a repeat BMP in 1 week, around 11/1, this can be done with next week's INR check. If creatinine is 1.6 or below, resume entresto 24/26mg bid.

## 2023-10-20 NOTE — DISCHARGE NOTE PROVIDER - NSDCMRMEDTOKEN_GEN_ALL_CORE_FT
allopurinol 300 mg oral tablet: 1 tab(s) orally once a day  amiodarone 200 mg oral tablet: 1 tab(s) orally once a day  FUROSEMIDE 20MG TAB:   latanoprost 0.005% ophthalmic solution: 1 drop(s) to each affected eye once a day (at bedtime)  metoprolol succinate 25 mg oral tablet, extended release: 1 tab(s) orally once a day  Simbrinza 1%- 0.2% ophthalmic suspension: 1 drop(s) to each affected eye 2 times a day  tamsulosin 0.4 mg oral capsule: 1 cap(s) orally once a day (at bedtime)  warfarin 2 mg oral tablet: 1 tab(s) orally once a day   acetaminophen 325 mg oral tablet: 2 tab(s) orally every 8 hours  allopurinol 300 mg oral tablet: 1 tab(s) orally once a day  amiodarone 200 mg oral tablet: 1 tab(s) orally once a day  Entresto 24 mg-26 mg oral tablet: 1 tab(s) orally once a day ONLY TAKE ONCE CLEARED BY PRIMARY CARE DOCTOR - THEY NEED TO CHECK KIDNEY FUNCTION FIRST TO MAKE SURE IT&#x27;S OKAY  FUROSEMIDE 20MG TAB: 1 tab(s) orally once a day Take as needed once a day for shortness of breath, swelling in legs and/or arms  latanoprost 0.005% ophthalmic solution: 1 drop(s) to each affected eye once a day (at bedtime)  metoprolol succinate 25 mg oral tablet, extended release: 1 tab(s) orally once a day  Simbrinza 1%- 0.2% ophthalmic suspension: 1 drop(s) to each affected eye 2 times a day  tamsulosin 0.4 mg oral capsule: 1 cap(s) orally once a day (at bedtime)  warfarin 2 mg oral tablet: 1 tab(s) orally once a day

## 2023-10-20 NOTE — PROGRESS NOTE ADULT - PROBLEM SELECTOR PLAN 3
- no echo on file - obtained echo from cardiologist's office: has HF with reduced ejection fraction  - outpatient cardiologist is Dr. Loredo   - CXR clear 10/16  - echo 3/2022: EF 40%, mod MR, mild-mod AR  - only on metoprolol succinate for GDMT  - discussed with cardiology, started entresto for GDMT, tolerating well  - resumed lasix 20mg QD

## 2023-10-20 NOTE — PROGRESS NOTE ADULT - SUBJECTIVE AND OBJECTIVE BOX
[facility] Division of Hospital Medicine  Mónica Astudillo DO  Available on Teams Reinaldo    Patient is a 83y old  Male who presents with a chief complaint of Fall (18 Oct 2023 18:01)      SUBJECTIVE / OVERNIGHT EVENTS: none. Patient has been monitored off nasal cannula, SpO2 remains >90%. He is asleep but easily awoken. Says he wants to go home now.   ADDITIONAL REVIEW OF SYSTEMS: negative    MEDICATIONS  (STANDING):  acetaminophen     Tablet .. 650 milliGRAM(s) Oral every 8 hours  allopurinol 300 milliGRAM(s) Oral daily  aMIOdarone    Tablet 200 milliGRAM(s) Oral daily  bacitracin   Ointment 1 Application(s) Topical two times a day  chlorhexidine 2% Cloths 1 Application(s) Topical <User Schedule>  furosemide    Tablet 20 milliGRAM(s) Oral daily  heparin  Infusion 1400 Unit(s)/Hr (12 mL/Hr) IV Continuous <Continuous>  lactobacillus acidophilus 1 Tablet(s) Oral daily  latanoprost 0.005% Ophthalmic Solution 1 Drop(s) Both EYES at bedtime  metoprolol succinate ER 25 milliGRAM(s) Oral daily  nystatin Powder 1 Application(s) Topical two times a day  sacubitril 24 mG/valsartan 26 mG 1 Tablet(s) Oral two times a day  Simbrinza (Brinzolamide and Brimonidine) 1 Drop(s) 1 Drop(s) Both EYES two times a day  tamsulosin 0.4 milliGRAM(s) Oral at bedtime  warfarin 2 milliGRAM(s) Oral once    MEDICATIONS  (PRN):  aluminum hydroxide/magnesium hydroxide/simethicone Suspension 30 milliLiter(s) Oral every 4 hours PRN Dyspepsia      CAPILLARY BLOOD GLUCOSE        I&O's Summary    19 Oct 2023 07:01  -  20 Oct 2023 07:00  --------------------------------------------------------  IN: 1008 mL / OUT: 950 mL / NET: 58 mL    20 Oct 2023 07:01  -  20 Oct 2023 12:47  --------------------------------------------------------  IN: 0 mL / OUT: 800 mL / NET: -800 mL        PHYSICAL EXAM:  Vital Signs Last 24 Hrs  T(C): 37.1 (20 Oct 2023 07:50), Max: 37.1 (19 Oct 2023 16:40)  T(F): 98.7 (20 Oct 2023 07:50), Max: 98.8 (19 Oct 2023 16:40)  HR: 63 (20 Oct 2023 07:50) (58 - 67)  BP: 144/72 (20 Oct 2023 07:50) (129/64 - 169/87)  BP(mean): --  RR: 18 (20 Oct 2023 07:50) (18 - 18)  SpO2: 94% (20 Oct 2023 07:50) (92% - 94%)    Parameters below as of 20 Oct 2023 07:50  Patient On (Oxygen Delivery Method): room air    CONSTITUTIONAL: Well-groomed, in no apparent distress, asleep though wakes up easily, obese  EYES: No conjunctival or scleral injection, non-icteric; PERRLA and symmetric  ENMT: No external nasal lesions; no pharyngeal injection or exudates, oral mucosa with moist membranes  NECK: Trachea midline without palpable neck mass; thyroid not enlarged and non-tender  RESPIRATORY: Breathing comfortably; lungs CTA without wheeze/rhonchi/rales  CARDIOVASCULAR: +S1S2, RRR, no M/G/R; pedal pulses full and symmetric; no lower extremity edema  GASTROINTESTINAL: No palpable masses or tenderness, +BS throughout, no rebound/guarding  LYMPHATIC: No cervical LAD or tenderness; no axillary LAD or tenderness  MUSCULOSKELETAL: no digital clubbing or cyanosis; no paraspinal tenderness; right calf wrapped in ACE bandages, mild swelling in the toes.   NEUROLOGIC: CN II-XII intact; sensation intact in LEs b/l to light touch  PSYCHIATRIC: asleep but easily awoken, oriented x1; mood and affect appropriate    LABS:                        11.0   3.98  )-----------( 166      ( 20 Oct 2023 06:33 )             34.6     10-20    143  |  103  |  22  ----------------------------<  101<H>  3.8   |  30  |  1.27    Ca    9.1      20 Oct 2023 06:33  Phos  2.7     10-20  Mg     2.0     10-20      PT/INR - ( 20 Oct 2023 06:33 )   PT: 17.9 sec;   INR: 1.65 ratio         PTT - ( 20 Oct 2023 06:33 )  PTT:83.9 sec      Urinalysis Basic - ( 20 Oct 2023 06:33 )    Color: x / Appearance: x / SG: x / pH: x  Gluc: 101 mg/dL / Ketone: x  / Bili: x / Urobili: x   Blood: x / Protein: x / Nitrite: x   Leuk Esterase: x / RBC: x / WBC x   Sq Epi: x / Non Sq Epi: x / Bacteria: x

## 2023-10-20 NOTE — PROGRESS NOTE ADULT - PROBLEM SELECTOR PLAN 1
Presented with large swelling of the RLE distal shin/calf, now with deep wound, no pus seen, bloody  - s/p hematoma evacuation with surgery  - was on unasyn 10/13-10/16  - OR cultures with 2 types of S aureus, blood cultures negative  - hgb stable on heparin gtt while bridging to warfarin  - cont wound care  - per surgery wound vac placed

## 2023-10-20 NOTE — DISCHARGE NOTE PROVIDER - NSDCACTIVITY_GEN_ALL_CORE
RECEIVED REQUST FOR D.A. - EMAILED TO PROVIDER AND PLACED IN CLINIC MAILBOX   Do not drive or operate machinery/No heavy lifting/straining/Follow Instructions Provided by your Surgical Team Do not drive or operate machinery/Stairs allowed/Walking - Indoors allowed/No heavy lifting/straining/Walking - Outdoors allowed/Follow Instructions Provided by your Surgical Team

## 2023-10-21 LAB
ANION GAP SERPL CALC-SCNC: 11 MMOL/L — SIGNIFICANT CHANGE UP (ref 5–17)
ANION GAP SERPL CALC-SCNC: 11 MMOL/L — SIGNIFICANT CHANGE UP (ref 5–17)
APTT BLD: 88.3 SEC — HIGH (ref 24.5–35.6)
APTT BLD: 88.3 SEC — HIGH (ref 24.5–35.6)
BUN SERPL-MCNC: 23 MG/DL — SIGNIFICANT CHANGE UP (ref 7–23)
BUN SERPL-MCNC: 23 MG/DL — SIGNIFICANT CHANGE UP (ref 7–23)
CALCIUM SERPL-MCNC: 9.3 MG/DL — SIGNIFICANT CHANGE UP (ref 8.4–10.5)
CALCIUM SERPL-MCNC: 9.3 MG/DL — SIGNIFICANT CHANGE UP (ref 8.4–10.5)
CHLORIDE SERPL-SCNC: 103 MMOL/L — SIGNIFICANT CHANGE UP (ref 96–108)
CHLORIDE SERPL-SCNC: 103 MMOL/L — SIGNIFICANT CHANGE UP (ref 96–108)
CO2 SERPL-SCNC: 28 MMOL/L — SIGNIFICANT CHANGE UP (ref 22–31)
CO2 SERPL-SCNC: 28 MMOL/L — SIGNIFICANT CHANGE UP (ref 22–31)
CREAT SERPL-MCNC: 1.28 MG/DL — SIGNIFICANT CHANGE UP (ref 0.5–1.3)
CREAT SERPL-MCNC: 1.28 MG/DL — SIGNIFICANT CHANGE UP (ref 0.5–1.3)
EGFR: 56 ML/MIN/1.73M2 — LOW
EGFR: 56 ML/MIN/1.73M2 — LOW
GLUCOSE SERPL-MCNC: 105 MG/DL — HIGH (ref 70–99)
GLUCOSE SERPL-MCNC: 105 MG/DL — HIGH (ref 70–99)
HCT VFR BLD CALC: 36.7 % — LOW (ref 39–50)
HCT VFR BLD CALC: 36.7 % — LOW (ref 39–50)
HGB BLD-MCNC: 11.7 G/DL — LOW (ref 13–17)
HGB BLD-MCNC: 11.7 G/DL — LOW (ref 13–17)
INR BLD: 2.02 RATIO — HIGH (ref 0.85–1.18)
INR BLD: 2.02 RATIO — HIGH (ref 0.85–1.18)
MAGNESIUM SERPL-MCNC: 2 MG/DL — SIGNIFICANT CHANGE UP (ref 1.6–2.6)
MAGNESIUM SERPL-MCNC: 2 MG/DL — SIGNIFICANT CHANGE UP (ref 1.6–2.6)
MCHC RBC-ENTMCNC: 31.3 PG — SIGNIFICANT CHANGE UP (ref 27–34)
MCHC RBC-ENTMCNC: 31.3 PG — SIGNIFICANT CHANGE UP (ref 27–34)
MCHC RBC-ENTMCNC: 31.9 GM/DL — LOW (ref 32–36)
MCHC RBC-ENTMCNC: 31.9 GM/DL — LOW (ref 32–36)
MCV RBC AUTO: 98.1 FL — SIGNIFICANT CHANGE UP (ref 80–100)
MCV RBC AUTO: 98.1 FL — SIGNIFICANT CHANGE UP (ref 80–100)
NRBC # BLD: 0 /100 WBCS — SIGNIFICANT CHANGE UP (ref 0–0)
NRBC # BLD: 0 /100 WBCS — SIGNIFICANT CHANGE UP (ref 0–0)
PHOSPHATE SERPL-MCNC: 3 MG/DL — SIGNIFICANT CHANGE UP (ref 2.5–4.5)
PHOSPHATE SERPL-MCNC: 3 MG/DL — SIGNIFICANT CHANGE UP (ref 2.5–4.5)
PLATELET # BLD AUTO: 162 K/UL — SIGNIFICANT CHANGE UP (ref 150–400)
PLATELET # BLD AUTO: 162 K/UL — SIGNIFICANT CHANGE UP (ref 150–400)
POTASSIUM SERPL-MCNC: 3.7 MMOL/L — SIGNIFICANT CHANGE UP (ref 3.5–5.3)
POTASSIUM SERPL-MCNC: 3.7 MMOL/L — SIGNIFICANT CHANGE UP (ref 3.5–5.3)
POTASSIUM SERPL-SCNC: 3.7 MMOL/L — SIGNIFICANT CHANGE UP (ref 3.5–5.3)
POTASSIUM SERPL-SCNC: 3.7 MMOL/L — SIGNIFICANT CHANGE UP (ref 3.5–5.3)
PROTHROM AB SERPL-ACNC: 20.8 SEC — HIGH (ref 9.5–13)
PROTHROM AB SERPL-ACNC: 20.8 SEC — HIGH (ref 9.5–13)
RBC # BLD: 3.74 M/UL — LOW (ref 4.2–5.8)
RBC # BLD: 3.74 M/UL — LOW (ref 4.2–5.8)
RBC # FLD: 14.5 % — SIGNIFICANT CHANGE UP (ref 10.3–14.5)
RBC # FLD: 14.5 % — SIGNIFICANT CHANGE UP (ref 10.3–14.5)
SARS-COV-2 RNA SPEC QL NAA+PROBE: SIGNIFICANT CHANGE UP
SARS-COV-2 RNA SPEC QL NAA+PROBE: SIGNIFICANT CHANGE UP
SODIUM SERPL-SCNC: 142 MMOL/L — SIGNIFICANT CHANGE UP (ref 135–145)
SODIUM SERPL-SCNC: 142 MMOL/L — SIGNIFICANT CHANGE UP (ref 135–145)
WBC # BLD: 4.11 K/UL — SIGNIFICANT CHANGE UP (ref 3.8–10.5)
WBC # BLD: 4.11 K/UL — SIGNIFICANT CHANGE UP (ref 3.8–10.5)
WBC # FLD AUTO: 4.11 K/UL — SIGNIFICANT CHANGE UP (ref 3.8–10.5)
WBC # FLD AUTO: 4.11 K/UL — SIGNIFICANT CHANGE UP (ref 3.8–10.5)

## 2023-10-21 RX ORDER — WARFARIN SODIUM 2.5 MG/1
2 TABLET ORAL ONCE
Refills: 0 | Status: COMPLETED | OUTPATIENT
Start: 2023-10-21 | End: 2023-10-21

## 2023-10-21 RX ORDER — POTASSIUM CHLORIDE 20 MEQ
20 PACKET (EA) ORAL
Refills: 0 | Status: COMPLETED | OUTPATIENT
Start: 2023-10-21 | End: 2023-10-21

## 2023-10-21 RX ADMIN — SACUBITRIL AND VALSARTAN 1 TABLET(S): 24; 26 TABLET, FILM COATED ORAL at 05:30

## 2023-10-21 RX ADMIN — Medication 1 TABLET(S): at 13:19

## 2023-10-21 RX ADMIN — Medication 1 APPLICATION(S): at 05:30

## 2023-10-21 RX ADMIN — CHLORHEXIDINE GLUCONATE 1 APPLICATION(S): 213 SOLUTION TOPICAL at 08:41

## 2023-10-21 RX ADMIN — SACUBITRIL AND VALSARTAN 1 TABLET(S): 24; 26 TABLET, FILM COATED ORAL at 17:43

## 2023-10-21 RX ADMIN — AMIODARONE HYDROCHLORIDE 200 MILLIGRAM(S): 400 TABLET ORAL at 05:30

## 2023-10-21 RX ADMIN — LATANOPROST 1 DROP(S): 0.05 SOLUTION/ DROPS OPHTHALMIC; TOPICAL at 21:07

## 2023-10-21 RX ADMIN — WARFARIN SODIUM 2 MILLIGRAM(S): 2.5 TABLET ORAL at 21:07

## 2023-10-21 RX ADMIN — TAMSULOSIN HYDROCHLORIDE 0.4 MILLIGRAM(S): 0.4 CAPSULE ORAL at 21:07

## 2023-10-21 RX ADMIN — Medication 1 APPLICATION(S): at 17:45

## 2023-10-21 RX ADMIN — Medication 20 MILLIEQUIVALENT(S): at 08:51

## 2023-10-21 RX ADMIN — Medication 300 MILLIGRAM(S): at 13:19

## 2023-10-21 RX ADMIN — Medication 20 MILLIEQUIVALENT(S): at 13:19

## 2023-10-21 RX ADMIN — HEPARIN SODIUM 12 UNIT(S)/HR: 5000 INJECTION INTRAVENOUS; SUBCUTANEOUS at 08:38

## 2023-10-21 RX ADMIN — NYSTATIN CREAM 1 APPLICATION(S): 100000 CREAM TOPICAL at 05:30

## 2023-10-21 RX ADMIN — Medication 25 MILLIGRAM(S): at 05:30

## 2023-10-21 RX ADMIN — Medication 20 MILLIGRAM(S): at 05:30

## 2023-10-21 NOTE — PROGRESS NOTE ADULT - ASSESSMENT
83 year old male PMH atrial fibrillation on coumadin, heart failure, prostate CA presents to ED complaining of pain and swelling to the right lower extremity predominantly the right ankle and lower leg since last week.  Large swelling of the RLE distal shin/calf, very erythematous from below the knee to dorsal foot, tender to palpation, dark eschar overlaying the surface. Concern for hematoma with possible infection and surrounding cellulitis. Now s/p I&D in OR on 10/14. Home with wound vac.     PLAN:  - Pain control PRN  - Appreciate medicine recs  - DVT ppx: Coumadin - INR therapeutic   - PT: HPT and VNS  - Will need home vac    Trauma/ACS  [id number]

## 2023-10-21 NOTE — PROGRESS NOTE ADULT - SUBJECTIVE AND OBJECTIVE BOX
DATE OF SERVICE: 10-21-23 @ 16:34    Patient is a 83y old  Male who presents with a chief complaint of Fall (18 Oct 2023 18:01)      INTERVAL HISTORY: no complaints     REVIEW OF SYSTEMS:  CONSTITUTIONAL: No weakness  EYES/ENT: No visual changes;  No throat pain   NECK: No pain or stiffness  RESPIRATORY: No cough, wheezing; No shortness of breath  CARDIOVASCULAR: No chest pain or palpitations  GASTROINTESTINAL: No abdominal  pain. No nausea, vomiting, or hematemesis  GENITOURINARY: No dysuria, frequency or hematuria  NEUROLOGICAL: No stroke like symptoms  SKIN: No rashes      MEDICATIONS:  aMIOdarone    Tablet 200 milliGRAM(s) Oral daily  furosemide    Tablet 20 milliGRAM(s) Oral daily  metoprolol succinate ER 25 milliGRAM(s) Oral daily  sacubitril 24 mG/valsartan 26 mG 1 Tablet(s) Oral two times a day        PHYSICAL EXAM:  T(C): 36.5 (10-21-23 @ 13:10), Max: 37 (10-20-23 @ 17:04)  HR: 66 (10-21-23 @ 13:10) (64 - 76)  BP: 137/75 (10-21-23 @ 13:10) (135/75 - 152/74)  RR: 18 (10-21-23 @ 13:10) (18 - 18)  SpO2: 93% (10-21-23 @ 13:10) (92% - 93%)  Wt(kg): --  I&O's Summary    20 Oct 2023 07:01  -  21 Oct 2023 07:00  --------------------------------------------------------  IN: 612 mL / OUT: 3200 mL / NET: -2588 mL          Appearance: In no distress	  HEENT:    PERRL, EOMI	  Cardiovascular:  S1 S2, No JVD  Respiratory: Lungs clear to auscultation	  Gastrointestinal:  Soft, Non-tender, + BS	  Vascularature:  No edema of LE  Psychiatric: Appropriate affect   Neuro: no acute focal deficits                               11.7   4.11  )-----------( 162      ( 21 Oct 2023 06:21 )             36.7     10-21    142  |  103  |  23  ----------------------------<  105<H>  3.7   |  28  |  1.28    Ca    9.3      21 Oct 2023 06:21  Phos  3.0     10-21  Mg     2.0     10-21          Labs personally reviewed      ASSESSMENT/PLAN: 	    84 y/o male with pAF, HFrEF, prostate ca, dementia who presented with hematoma in RLE.    Problem/Plan - 1:  ·  Problem: Chronic systolic heart failure.   ·  Plan: Most recent office note from Jan 2020 notes EF 20-25% drop from 45% with previous normal cors  - echo 3/2022: EF 40%, mod MR, mild-mod AR  - NICM  - Needs GDMT- continue on metoprolol succinate    - Cont Entresto 24/26 BID and uptitrate to 49/51 BID as BP tolerates   - start Farxiga 10mg daily at discharge  - Cont Lasix 20mg PO daily  - OP follow up with Dr Ramos    Problem/Plan - 2:  ·  Problem: Paroxysmal atrial fibrillation.   ·  Plan: h/o pAF  - continue home Amio and Metoprolol  - s/p DCCV 2/2022  - warfarin with heparin gtt bridge       Problem/Plan - 3:  ·  Problem: Prophylactic measure.   ·  Plan: - resume home glaucoma eye drops  - tolerating regular diet                  DELFINO Gomez DO [facility]  Cardiovascular Medicine  [address] [place] [address], Suite [facility]  Office: [phone]  Available via call/text on [employer] Teams  DATE OF SERVICE: 10-21-23 @ 16:34    Patient is a 83y old  Male who presents with a chief complaint of Fall (18 Oct 2023 18:01)      INTERVAL HISTORY: no complaints     REVIEW OF SYSTEMS:  CONSTITUTIONAL: No weakness  EYES/ENT: No visual changes;  No throat pain   NECK: No pain or stiffness  RESPIRATORY: No cough, wheezing; No shortness of breath  CARDIOVASCULAR: No chest pain or palpitations  GASTROINTESTINAL: No abdominal  pain. No nausea, vomiting, or hematemesis  GENITOURINARY: No dysuria, frequency or hematuria  NEUROLOGICAL: No stroke like symptoms  SKIN: No rashes      MEDICATIONS:  aMIOdarone    Tablet 200 milliGRAM(s) Oral daily  furosemide    Tablet 20 milliGRAM(s) Oral daily  metoprolol succinate ER 25 milliGRAM(s) Oral daily  sacubitril 24 mG/valsartan 26 mG 1 Tablet(s) Oral two times a day        PHYSICAL EXAM:  T(C): 36.5 (10-21-23 @ 13:10), Max: 37 (10-20-23 @ 17:04)  HR: 66 (10-21-23 @ 13:10) (64 - 76)  BP: 137/75 (10-21-23 @ 13:10) (135/75 - 152/74)  RR: 18 (10-21-23 @ 13:10) (18 - 18)  SpO2: 93% (10-21-23 @ 13:10) (92% - 93%)  Wt(kg): --  I&O's Summary    20 Oct 2023 07:01  -  21 Oct 2023 07:00  --------------------------------------------------------  IN: 612 mL / OUT: 3200 mL / NET: -2588 mL          Appearance: In no distress	  HEENT:    PERRL, EOMI	  Cardiovascular:  S1 S2, No JVD  Respiratory: Lungs clear to auscultation	  Gastrointestinal:  Soft, Non-tender, + BS	  Vascularature:  No edema of LE  Psychiatric: Appropriate affect   Neuro: no acute focal deficits                               11.7   4.11  )-----------( 162      ( 21 Oct 2023 06:21 )             36.7     10-21    142  |  103  |  23  ----------------------------<  105<H>  3.7   |  28  |  1.28    Ca    9.3      21 Oct 2023 06:21  Phos  3.0     10-21  Mg     2.0     10-21          Labs personally reviewed      ASSESSMENT/PLAN: 	    82 y/o male with pAF, HFrEF, prostate ca, dementia who presented with hematoma in RLE.    Problem/Plan - 1:  ·  Problem: Chronic systolic heart failure.   ·  Plan: Most recent office note from Jan 2020 notes EF 20-25% drop from 45% with previous normal cors  - echo 3/2022: EF 40%, mod MR, mild-mod AR  - NICM  - Needs GDMT- continue on metoprolol succinate    - Cont Entresto 24/26 BID and uptitrate to 49/51 BID as BP tolerates   - start Farxiga 10mg daily at discharge  - Cont Lasix 20mg PO daily  - OP follow up with Dr Ramos    Problem/Plan - 2:  ·  Problem: Paroxysmal atrial fibrillation.   ·  Plan: h/o pAF  - continue home Amio and Metoprolol  - s/p DCCV 2/2022  - warfarin with heparin gtt bridge       Problem/Plan - 3:  ·  Problem: Prophylactic measure.   ·  Plan: - resume home glaucoma eye drops  - tolerating regular diet            DELFINO Gomez DO [facility]  Cardiovascular Medicine  [address] [place] [address], Suite [facility]  Office: [phone]  Available via call/text on Microsoft Teams

## 2023-10-22 LAB
ANION GAP SERPL CALC-SCNC: 11 MMOL/L — SIGNIFICANT CHANGE UP (ref 5–17)
ANION GAP SERPL CALC-SCNC: 11 MMOL/L — SIGNIFICANT CHANGE UP (ref 5–17)
BUN SERPL-MCNC: 24 MG/DL — HIGH (ref 7–23)
BUN SERPL-MCNC: 24 MG/DL — HIGH (ref 7–23)
CALCIUM SERPL-MCNC: 9.2 MG/DL — SIGNIFICANT CHANGE UP (ref 8.4–10.5)
CALCIUM SERPL-MCNC: 9.2 MG/DL — SIGNIFICANT CHANGE UP (ref 8.4–10.5)
CHLORIDE SERPL-SCNC: 105 MMOL/L — SIGNIFICANT CHANGE UP (ref 96–108)
CHLORIDE SERPL-SCNC: 105 MMOL/L — SIGNIFICANT CHANGE UP (ref 96–108)
CO2 SERPL-SCNC: 25 MMOL/L — SIGNIFICANT CHANGE UP (ref 22–31)
CO2 SERPL-SCNC: 25 MMOL/L — SIGNIFICANT CHANGE UP (ref 22–31)
CREAT SERPL-MCNC: 1.3 MG/DL — SIGNIFICANT CHANGE UP (ref 0.5–1.3)
CREAT SERPL-MCNC: 1.3 MG/DL — SIGNIFICANT CHANGE UP (ref 0.5–1.3)
EGFR: 55 ML/MIN/1.73M2 — LOW
EGFR: 55 ML/MIN/1.73M2 — LOW
GLUCOSE SERPL-MCNC: 101 MG/DL — HIGH (ref 70–99)
GLUCOSE SERPL-MCNC: 101 MG/DL — HIGH (ref 70–99)
HCT VFR BLD CALC: 37.9 % — LOW (ref 39–50)
HCT VFR BLD CALC: 37.9 % — LOW (ref 39–50)
HGB BLD-MCNC: 11.9 G/DL — LOW (ref 13–17)
HGB BLD-MCNC: 11.9 G/DL — LOW (ref 13–17)
INR BLD: 2.15 RATIO — HIGH (ref 0.85–1.18)
INR BLD: 2.15 RATIO — HIGH (ref 0.85–1.18)
MAGNESIUM SERPL-MCNC: 2 MG/DL — SIGNIFICANT CHANGE UP (ref 1.6–2.6)
MAGNESIUM SERPL-MCNC: 2 MG/DL — SIGNIFICANT CHANGE UP (ref 1.6–2.6)
MCHC RBC-ENTMCNC: 31.3 PG — SIGNIFICANT CHANGE UP (ref 27–34)
MCHC RBC-ENTMCNC: 31.3 PG — SIGNIFICANT CHANGE UP (ref 27–34)
MCHC RBC-ENTMCNC: 31.4 GM/DL — LOW (ref 32–36)
MCHC RBC-ENTMCNC: 31.4 GM/DL — LOW (ref 32–36)
MCV RBC AUTO: 99.7 FL — SIGNIFICANT CHANGE UP (ref 80–100)
MCV RBC AUTO: 99.7 FL — SIGNIFICANT CHANGE UP (ref 80–100)
NRBC # BLD: 0 /100 WBCS — SIGNIFICANT CHANGE UP (ref 0–0)
NRBC # BLD: 0 /100 WBCS — SIGNIFICANT CHANGE UP (ref 0–0)
PHOSPHATE SERPL-MCNC: 3.9 MG/DL — SIGNIFICANT CHANGE UP (ref 2.5–4.5)
PHOSPHATE SERPL-MCNC: 3.9 MG/DL — SIGNIFICANT CHANGE UP (ref 2.5–4.5)
PLATELET # BLD AUTO: 163 K/UL — SIGNIFICANT CHANGE UP (ref 150–400)
PLATELET # BLD AUTO: 163 K/UL — SIGNIFICANT CHANGE UP (ref 150–400)
POTASSIUM SERPL-MCNC: 4 MMOL/L — SIGNIFICANT CHANGE UP (ref 3.5–5.3)
POTASSIUM SERPL-MCNC: 4 MMOL/L — SIGNIFICANT CHANGE UP (ref 3.5–5.3)
POTASSIUM SERPL-SCNC: 4 MMOL/L — SIGNIFICANT CHANGE UP (ref 3.5–5.3)
POTASSIUM SERPL-SCNC: 4 MMOL/L — SIGNIFICANT CHANGE UP (ref 3.5–5.3)
PROTHROM AB SERPL-ACNC: 22.1 SEC — HIGH (ref 9.5–13)
PROTHROM AB SERPL-ACNC: 22.1 SEC — HIGH (ref 9.5–13)
RBC # BLD: 3.8 M/UL — LOW (ref 4.2–5.8)
RBC # BLD: 3.8 M/UL — LOW (ref 4.2–5.8)
RBC # FLD: 14.6 % — HIGH (ref 10.3–14.5)
RBC # FLD: 14.6 % — HIGH (ref 10.3–14.5)
SODIUM SERPL-SCNC: 141 MMOL/L — SIGNIFICANT CHANGE UP (ref 135–145)
SODIUM SERPL-SCNC: 141 MMOL/L — SIGNIFICANT CHANGE UP (ref 135–145)
WBC # BLD: 4.19 K/UL — SIGNIFICANT CHANGE UP (ref 3.8–10.5)
WBC # BLD: 4.19 K/UL — SIGNIFICANT CHANGE UP (ref 3.8–10.5)
WBC # FLD AUTO: 4.19 K/UL — SIGNIFICANT CHANGE UP (ref 3.8–10.5)
WBC # FLD AUTO: 4.19 K/UL — SIGNIFICANT CHANGE UP (ref 3.8–10.5)

## 2023-10-22 RX ORDER — WARFARIN SODIUM 2.5 MG/1
2 TABLET ORAL ONCE
Refills: 0 | Status: COMPLETED | OUTPATIENT
Start: 2023-10-22 | End: 2023-10-22

## 2023-10-22 RX ADMIN — Medication 1 TABLET(S): at 11:34

## 2023-10-22 RX ADMIN — WARFARIN SODIUM 2 MILLIGRAM(S): 2.5 TABLET ORAL at 21:00

## 2023-10-22 RX ADMIN — CHLORHEXIDINE GLUCONATE 1 APPLICATION(S): 213 SOLUTION TOPICAL at 11:39

## 2023-10-22 RX ADMIN — SACUBITRIL AND VALSARTAN 1 TABLET(S): 24; 26 TABLET, FILM COATED ORAL at 05:26

## 2023-10-22 RX ADMIN — NYSTATIN CREAM 1 APPLICATION(S): 100000 CREAM TOPICAL at 18:04

## 2023-10-22 RX ADMIN — NYSTATIN CREAM 1 APPLICATION(S): 100000 CREAM TOPICAL at 05:27

## 2023-10-22 RX ADMIN — Medication 1 APPLICATION(S): at 05:26

## 2023-10-22 RX ADMIN — Medication 25 MILLIGRAM(S): at 05:26

## 2023-10-22 RX ADMIN — SACUBITRIL AND VALSARTAN 1 TABLET(S): 24; 26 TABLET, FILM COATED ORAL at 18:03

## 2023-10-22 RX ADMIN — TAMSULOSIN HYDROCHLORIDE 0.4 MILLIGRAM(S): 0.4 CAPSULE ORAL at 21:00

## 2023-10-22 RX ADMIN — Medication 300 MILLIGRAM(S): at 11:33

## 2023-10-22 RX ADMIN — Medication 1 APPLICATION(S): at 18:04

## 2023-10-22 RX ADMIN — Medication 20 MILLIGRAM(S): at 05:26

## 2023-10-22 RX ADMIN — AMIODARONE HYDROCHLORIDE 200 MILLIGRAM(S): 400 TABLET ORAL at 05:26

## 2023-10-22 RX ADMIN — LATANOPROST 1 DROP(S): 0.05 SOLUTION/ DROPS OPHTHALMIC; TOPICAL at 21:00

## 2023-10-22 NOTE — PROGRESS NOTE ADULT - SUBJECTIVE AND OBJECTIVE BOX
Surgery Progress Note     24hour Events:   - INR >2    Subjective:  Patient seen and examined. Pain well managed with current medications.     Vital Signs:  Vital Signs Last 24 Hrs  T(C): 36.6 (22 Oct 2023 10:07), Max: 36.7 (21 Oct 2023 17:02)  T(F): 97.8 (22 Oct 2023 10:07), Max: 98.1 (21 Oct 2023 17:02)  HR: 69 (22 Oct 2023 10:07) (64 - 70)  BP: 132/67 (22 Oct 2023 10:07) (117/73 - 165/79)  BP(mean): --  RR: 18 (22 Oct 2023 10:07) (18 - 18)  SpO2: 92% (22 Oct 2023 10:07) (92% - 94%)    Parameters below as of 22 Oct 2023 10:07  Patient On (Oxygen Delivery Method): room air      Physical Exam:  General: NAD, resting comfortably in bed  HEENT: Normocephalic atraumatic  Respiratory: Nonlabored respirations  Cardio: pulse present  Abdomen: soft, nontender, nondistended  MSK: RLE calf wound vac in place and holding suction    Labs:    10-22    141  |  105  |  24<H>  ----------------------------<  101<H>  4.0   |  25  |  1.30    Ca    9.2      22 Oct 2023 05:59  Phos  3.9     10-22  Mg     2.0     10-22                              11.9   4.19  )-----------( 163      ( 22 Oct 2023 05:59 )             37.9     PT/INR - ( 21 Oct 2023 06:21 )   PT: 20.8 sec;   INR: 2.02 ratio         PTT - ( 21 Oct 2023 06:21 )  PTT:88.3 sec

## 2023-10-22 NOTE — PROGRESS NOTE ADULT - ATTENDING COMMENTS
ATTENDING ATTESTATION:    83M history of afib on coumadin, s/p traumatic RLE hematoma s/p RLE hematoma evacuation (10/13/23).     Doing well  Vac applied to wound    WBC = 4  Hb = 11  INR = 2.1    A/P:  1) RLE wound  - continue vac therapy  - no suspicion for infection    2) afib  - bridged to warfarin, INR goal 2-3  - continue home warfarin dosing    DISPO - likely home tomorrow with VNS      Total time spent in the care of this patient today (excluding critical care, teaching & procedures): 25 minutes    Over 50% of the total time was spent in discussion and coordination of care with consulting services, dietary and rehab services.    Nicolette Campos MD  Acute Care Surgery

## 2023-10-22 NOTE — PROGRESS NOTE ADULT - ASSESSMENT
83 year old male PMH atrial fibrillation on coumadin, heart failure, prostate CA presents to ED complaining of pain and swelling to the right lower extremity predominantly the right ankle and lower leg since last week.  Large swelling of the RLE distal shin/calf, very erythematous from below the knee to dorsal foot, tender to palpation, dark eschar overlaying the surface. Concern for hematoma with possible infection and surrounding cellulitis. Now s/p I&D in OR on 10/14. Home with wound vac.     PLAN:  - Pain control PRN  - Appreciate medicine recs  - DVT ppx: Coumadin - INR therapeutic   - PT: HPT and VNS  - Will need home vac    Trauma/ACS  [id number]

## 2023-10-23 LAB
ANION GAP SERPL CALC-SCNC: 13 MMOL/L — SIGNIFICANT CHANGE UP (ref 5–17)
ANION GAP SERPL CALC-SCNC: 13 MMOL/L — SIGNIFICANT CHANGE UP (ref 5–17)
BUN SERPL-MCNC: 30 MG/DL — HIGH (ref 7–23)
BUN SERPL-MCNC: 30 MG/DL — HIGH (ref 7–23)
CALCIUM SERPL-MCNC: 9.9 MG/DL — SIGNIFICANT CHANGE UP (ref 8.4–10.5)
CALCIUM SERPL-MCNC: 9.9 MG/DL — SIGNIFICANT CHANGE UP (ref 8.4–10.5)
CHLORIDE SERPL-SCNC: 101 MMOL/L — SIGNIFICANT CHANGE UP (ref 96–108)
CHLORIDE SERPL-SCNC: 101 MMOL/L — SIGNIFICANT CHANGE UP (ref 96–108)
CO2 SERPL-SCNC: 28 MMOL/L — SIGNIFICANT CHANGE UP (ref 22–31)
CO2 SERPL-SCNC: 28 MMOL/L — SIGNIFICANT CHANGE UP (ref 22–31)
CREAT SERPL-MCNC: 1.54 MG/DL — HIGH (ref 0.5–1.3)
CREAT SERPL-MCNC: 1.54 MG/DL — HIGH (ref 0.5–1.3)
EGFR: 45 ML/MIN/1.73M2 — LOW
EGFR: 45 ML/MIN/1.73M2 — LOW
GLUCOSE SERPL-MCNC: 104 MG/DL — HIGH (ref 70–99)
GLUCOSE SERPL-MCNC: 104 MG/DL — HIGH (ref 70–99)
HCT VFR BLD CALC: 42.2 % — SIGNIFICANT CHANGE UP (ref 39–50)
HCT VFR BLD CALC: 42.2 % — SIGNIFICANT CHANGE UP (ref 39–50)
HGB BLD-MCNC: 13.4 G/DL — SIGNIFICANT CHANGE UP (ref 13–17)
HGB BLD-MCNC: 13.4 G/DL — SIGNIFICANT CHANGE UP (ref 13–17)
INR BLD: 2.05 RATIO — HIGH (ref 0.85–1.18)
INR BLD: 2.05 RATIO — HIGH (ref 0.85–1.18)
MAGNESIUM SERPL-MCNC: 2.2 MG/DL — SIGNIFICANT CHANGE UP (ref 1.6–2.6)
MAGNESIUM SERPL-MCNC: 2.2 MG/DL — SIGNIFICANT CHANGE UP (ref 1.6–2.6)
MCHC RBC-ENTMCNC: 31.7 PG — SIGNIFICANT CHANGE UP (ref 27–34)
MCHC RBC-ENTMCNC: 31.7 PG — SIGNIFICANT CHANGE UP (ref 27–34)
MCHC RBC-ENTMCNC: 31.8 GM/DL — LOW (ref 32–36)
MCHC RBC-ENTMCNC: 31.8 GM/DL — LOW (ref 32–36)
MCV RBC AUTO: 99.8 FL — SIGNIFICANT CHANGE UP (ref 80–100)
MCV RBC AUTO: 99.8 FL — SIGNIFICANT CHANGE UP (ref 80–100)
NRBC # BLD: 0 /100 WBCS — SIGNIFICANT CHANGE UP (ref 0–0)
NRBC # BLD: 0 /100 WBCS — SIGNIFICANT CHANGE UP (ref 0–0)
PHOSPHATE SERPL-MCNC: 4.3 MG/DL — SIGNIFICANT CHANGE UP (ref 2.5–4.5)
PHOSPHATE SERPL-MCNC: 4.3 MG/DL — SIGNIFICANT CHANGE UP (ref 2.5–4.5)
PLATELET # BLD AUTO: 194 K/UL — SIGNIFICANT CHANGE UP (ref 150–400)
PLATELET # BLD AUTO: 194 K/UL — SIGNIFICANT CHANGE UP (ref 150–400)
POTASSIUM SERPL-MCNC: 3.9 MMOL/L — SIGNIFICANT CHANGE UP (ref 3.5–5.3)
POTASSIUM SERPL-MCNC: 3.9 MMOL/L — SIGNIFICANT CHANGE UP (ref 3.5–5.3)
POTASSIUM SERPL-SCNC: 3.9 MMOL/L — SIGNIFICANT CHANGE UP (ref 3.5–5.3)
POTASSIUM SERPL-SCNC: 3.9 MMOL/L — SIGNIFICANT CHANGE UP (ref 3.5–5.3)
PROTHROM AB SERPL-ACNC: 22.1 SEC — HIGH (ref 9.5–13)
PROTHROM AB SERPL-ACNC: 22.1 SEC — HIGH (ref 9.5–13)
RBC # BLD: 4.23 M/UL — SIGNIFICANT CHANGE UP (ref 4.2–5.8)
RBC # BLD: 4.23 M/UL — SIGNIFICANT CHANGE UP (ref 4.2–5.8)
RBC # FLD: 14.7 % — HIGH (ref 10.3–14.5)
RBC # FLD: 14.7 % — HIGH (ref 10.3–14.5)
SODIUM SERPL-SCNC: 142 MMOL/L — SIGNIFICANT CHANGE UP (ref 135–145)
SODIUM SERPL-SCNC: 142 MMOL/L — SIGNIFICANT CHANGE UP (ref 135–145)
WBC # BLD: 4.97 K/UL — SIGNIFICANT CHANGE UP (ref 3.8–10.5)
WBC # BLD: 4.97 K/UL — SIGNIFICANT CHANGE UP (ref 3.8–10.5)
WBC # FLD AUTO: 4.97 K/UL — SIGNIFICANT CHANGE UP (ref 3.8–10.5)
WBC # FLD AUTO: 4.97 K/UL — SIGNIFICANT CHANGE UP (ref 3.8–10.5)

## 2023-10-23 PROCEDURE — 99232 SBSQ HOSP IP/OBS MODERATE 35: CPT

## 2023-10-23 PROCEDURE — 99024 POSTOP FOLLOW-UP VISIT: CPT

## 2023-10-23 RX ORDER — PETROLATUM,WHITE
1 JELLY (GRAM) TOPICAL ONCE
Refills: 0 | Status: COMPLETED | OUTPATIENT
Start: 2023-10-23 | End: 2023-10-23

## 2023-10-23 RX ORDER — WARFARIN SODIUM 2.5 MG/1
2 TABLET ORAL ONCE
Refills: 0 | Status: COMPLETED | OUTPATIENT
Start: 2023-10-23 | End: 2023-10-23

## 2023-10-23 RX ADMIN — AMIODARONE HYDROCHLORIDE 200 MILLIGRAM(S): 400 TABLET ORAL at 05:28

## 2023-10-23 RX ADMIN — SACUBITRIL AND VALSARTAN 1 TABLET(S): 24; 26 TABLET, FILM COATED ORAL at 05:28

## 2023-10-23 RX ADMIN — TAMSULOSIN HYDROCHLORIDE 0.4 MILLIGRAM(S): 0.4 CAPSULE ORAL at 21:12

## 2023-10-23 RX ADMIN — SACUBITRIL AND VALSARTAN 1 TABLET(S): 24; 26 TABLET, FILM COATED ORAL at 18:25

## 2023-10-23 RX ADMIN — NYSTATIN CREAM 1 APPLICATION(S): 100000 CREAM TOPICAL at 05:28

## 2023-10-23 RX ADMIN — Medication 1 APPLICATION(S): at 18:25

## 2023-10-23 RX ADMIN — Medication 1 APPLICATION(S): at 05:27

## 2023-10-23 RX ADMIN — Medication 650 MILLIGRAM(S): at 23:11

## 2023-10-23 RX ADMIN — NYSTATIN CREAM 1 APPLICATION(S): 100000 CREAM TOPICAL at 18:25

## 2023-10-23 RX ADMIN — Medication 1 DROP(S): at 14:38

## 2023-10-23 RX ADMIN — Medication 1 TABLET(S): at 11:33

## 2023-10-23 RX ADMIN — LATANOPROST 1 DROP(S): 0.05 SOLUTION/ DROPS OPHTHALMIC; TOPICAL at 21:13

## 2023-10-23 RX ADMIN — Medication 1 APPLICATION(S): at 14:38

## 2023-10-23 RX ADMIN — WARFARIN SODIUM 2 MILLIGRAM(S): 2.5 TABLET ORAL at 21:12

## 2023-10-23 RX ADMIN — Medication 20 MILLIGRAM(S): at 05:27

## 2023-10-23 RX ADMIN — Medication 300 MILLIGRAM(S): at 11:34

## 2023-10-23 RX ADMIN — CHLORHEXIDINE GLUCONATE 1 APPLICATION(S): 213 SOLUTION TOPICAL at 07:49

## 2023-10-23 NOTE — PROGRESS NOTE ADULT - ATTENDING COMMENTS
seen and examined 10-23-23 @ 0801    we replaced the VAC on the 4 x 6 cm wound on the anteromedial aspect of his distal right leg  the wound is clean without evidence of infection and starting to granulate  there is some evolving ecchymosis surrounding the right medial malleolus    WBC = 4  INR = 2.0      10/13/2023 - evacuation of traumatic right leg hematoma  -unlikely to benefit from split-thickness skin graft since the wound is fairly small    afib  -coumadin was restarted on 10/16, and there has been no evidence of recurrent bleeding    dispo  -discharge home with wound VAC (supplies are at bedside) when home services are available      I reviewed his labs.  plan discussed with his daughter at bedside

## 2023-10-23 NOTE — PROGRESS NOTE ADULT - SUBJECTIVE AND OBJECTIVE BOX
[facility] Division of Hospital Medicine  Mónica Astudillo DO  Available on Teams Reinaldo    Patient is a 83y old  Male who presents with a chief complaint of Fall (18 Oct 2023 18:01)      SUBJECTIVE / OVERNIGHT EVENTS: none. Patient is asleep, asks me to not wake him up. Appears comfortable.  ADDITIONAL REVIEW OF SYSTEMS: negative    MEDICATIONS  (STANDING):  acetaminophen     Tablet .. 650 milliGRAM(s) Oral every 8 hours  allopurinol 300 milliGRAM(s) Oral daily  aMIOdarone    Tablet 200 milliGRAM(s) Oral daily  bacitracin   Ointment 1 Application(s) Topical two times a day  chlorhexidine 2% Cloths 1 Application(s) Topical <User Schedule>  furosemide    Tablet 20 milliGRAM(s) Oral daily  lactobacillus acidophilus 1 Tablet(s) Oral daily  latanoprost 0.005% Ophthalmic Solution 1 Drop(s) Both EYES at bedtime  metoprolol succinate ER 25 milliGRAM(s) Oral daily  nystatin Powder 1 Application(s) Topical two times a day  sacubitril 24 mG/valsartan 26 mG 1 Tablet(s) Oral two times a day  Simbrinza (Brinzolamide and Brimonidine) 1 Drop(s) 1 Drop(s) Both EYES two times a day  tamsulosin 0.4 milliGRAM(s) Oral at bedtime  warfarin 2 milliGRAM(s) Oral once    MEDICATIONS  (PRN):  aluminum hydroxide/magnesium hydroxide/simethicone Suspension 30 milliLiter(s) Oral every 4 hours PRN Dyspepsia      CAPILLARY BLOOD GLUCOSE        I&O's Summary    22 Oct 2023 07:01  -  23 Oct 2023 07:00  --------------------------------------------------------  IN: 1160 mL / OUT: 2000 mL / NET: -840 mL    23 Oct 2023 07:01  -  23 Oct 2023 12:44  --------------------------------------------------------  IN: 240 mL / OUT: 250 mL / NET: -10 mL        PHYSICAL EXAM:  Vital Signs Last 24 Hrs  T(C): 36.5 (23 Oct 2023 10:52), Max: 37 (22 Oct 2023 16:37)  T(F): 97.7 (23 Oct 2023 10:52), Max: 98.6 (22 Oct 2023 16:37)  HR: 76 (23 Oct 2023 10:52) (66 - 76)  BP: 124/71 (23 Oct 2023 10:52) (102/64 - 133/77)  BP(mean): --  RR: 18 (23 Oct 2023 10:52) (18 - 18)  SpO2: 94% (23 Oct 2023 10:52) (92% - 96%)    Parameters below as of 23 Oct 2023 10:52  Patient On (Oxygen Delivery Method): room air      CONSTITUTIONAL: Well-groomed, in no apparent distress, asleep though wakes up easily, obese  EYES: No conjunctival or scleral injection, non-icteric; PERRLA and symmetric  ENMT: No external nasal lesions; no pharyngeal injection or exudates, oral mucosa with moist membranes  NECK: Trachea midline without palpable neck mass; thyroid not enlarged and non-tender  RESPIRATORY: Breathing comfortably; lungs CTA without wheeze/rhonchi/rales  CARDIOVASCULAR: +S1S2, RRR, no M/G/R; pedal pulses full and symmetric; no lower extremity edema  GASTROINTESTINAL: No palpable masses or tenderness, +BS throughout, no rebound/guarding  LYMPHATIC: No cervical LAD or tenderness; no axillary LAD or tenderness  MUSCULOSKELETAL: no digital clubbing or cyanosis; no paraspinal tenderness; right calf wrapped in ACE bandages, mild swelling in the toes.   NEUROLOGIC: CN II-XII intact; sensation intact in LEs b/l to light touch  PSYCHIATRIC: asleep but easily awoken, oriented x1; mood and affect appropriate    LABS:                        13.4   4.97  )-----------( 194      ( 23 Oct 2023 10:54 )             42.2     10-23    142  |  101  |  30<H>  ----------------------------<  104<H>  3.9   |  28  |  1.54<H>    Ca    9.9      23 Oct 2023 10:55  Phos  4.3     10-23  Mg     2.2     10-23      PT/INR - ( 23 Oct 2023 10:54 )   PT: 22.1 sec;   INR: 2.05 ratio               Urinalysis Basic - ( 23 Oct 2023 10:55 )    Color: x / Appearance: x / SG: x / pH: x  Gluc: 104 mg/dL / Ketone: x  / Bili: x / Urobili: x   Blood: x / Protein: x / Nitrite: x   Leuk Esterase: x / RBC: x / WBC x   Sq Epi: x / Non Sq Epi: x / Bacteria: x

## 2023-10-23 NOTE — PROGRESS NOTE ADULT - SUBJECTIVE AND OBJECTIVE BOX
DATE OF SERVICE: 10-23-23 @ 12:50    Patient is a 83y old  Male who presents with a chief complaint of Fall (18 Oct 2023 18:01)      INTERVAL HISTORY: Feels well    REVIEW OF SYSTEMS:  CONSTITUTIONAL: No weakness  EYES/ENT: No visual changes;  No throat pain   NECK: No pain or stiffness  RESPIRATORY: No cough, wheezing; No shortness of breath  CARDIOVASCULAR: No chest pain or palpitations  GASTROINTESTINAL: No abdominal  pain. No nausea, vomiting, or hematemesis  GENITOURINARY: No dysuria, frequency or hematuria  NEUROLOGICAL: No stroke like symptoms  SKIN: No rashes      	  MEDICATIONS:  aMIOdarone    Tablet 200 milliGRAM(s) Oral daily  furosemide    Tablet 20 milliGRAM(s) Oral daily  metoprolol succinate ER 25 milliGRAM(s) Oral daily  sacubitril 24 mG/valsartan 26 mG 1 Tablet(s) Oral two times a day        PHYSICAL EXAM:  T(C): 36.5 (10-23-23 @ 10:52), Max: 37 (10-22-23 @ 16:37)  HR: 76 (10-23-23 @ 10:52) (66 - 76)  BP: 124/71 (10-23-23 @ 10:52) (102/64 - 133/77)  RR: 18 (10-23-23 @ 10:52) (18 - 18)  SpO2: 94% (10-23-23 @ 10:52) (92% - 96%)  Wt(kg): --  I&O's Summary    22 Oct 2023 07:01  -  23 Oct 2023 07:00  --------------------------------------------------------  IN: 1160 mL / OUT: 2000 mL / NET: -840 mL    23 Oct 2023 07:01  -  23 Oct 2023 12:50  --------------------------------------------------------  IN: 240 mL / OUT: 250 mL / NET: -10 mL          Appearance: In no distress	  HEENT:    PERRL, EOMI	  Cardiovascular:  S1 S2, No JVD  Respiratory: Lungs clear to auscultation	  Gastrointestinal:  Soft, Non-tender, + BS	  Vascularature:  No edema of LE  Psychiatric: Appropriate affect   Neuro: no acute focal deficits                               13.4   4.97  )-----------( 194      ( 23 Oct 2023 10:54 )             42.2     10-23    142  |  101  |  30<H>  ----------------------------<  104<H>  3.9   |  28  |  1.54<H>    Ca    9.9      23 Oct 2023 10:55  Phos  4.3     10-23  Mg     2.2     10-23          Labs personally reviewed      ASSESSMENT/PLAN: 	  84 y/o male with pAF, HFrEF, prostate ca, dementia who presented with hematoma in RLE.    Problem/Plan - 1:  ·  Problem: Chronic systolic heart failure.   ·  Plan: Most recent office note from Jan 2020 notes EF 20-25% drop from 45% with previous normal cors  - echo 3/2022: EF 40%, mod MR, mild-mod AR  - NICM  - Needs GDMT- continue on metoprolol succinate    - Cont Entresto 24/26 BID and uptitrate to 49/51 BID as BP tolerates   - start Farxiga 10mg daily at discharge  - Cont Lasix 20mg PO daily  - OP follow up with Dr Ramos    Problem/Plan - 2:  ·  Problem: Paroxysmal atrial fibrillation.   ·  Plan: h/o pAF  - continue home Amio and Metoprolol  - s/p DCCV 2/2022  - warfarin with heparin gtt bridge       Problem/Plan - 3:  ·  Problem: Prophylactic measure.   ·  Plan: - resume home glaucoma eye drops  - tolerating regular diet                  SELIN García DO [facility]  Cardiovascular Medicine  [address] [place] [address], [address]  Available via call or text on [employer] TEAM[employer]  Office: [phone]

## 2023-10-23 NOTE — PROGRESS NOTE ADULT - PROBLEM SELECTOR PLAN 2
-baseline Cr seems to be 1.5, after calling his PCP's office   - per wife, he does not see a nephrologist, has never been told of any kidney disease  -monitor intake/output  -stable

## 2023-10-23 NOTE — PROGRESS NOTE ADULT - NSPROGADDITIONALINFOA_GEN_ALL_CORE
Spoke with daughter Nadine over the phone 10/18 for updates. Spoke with Ronna over the phone 10/20 for updates.

## 2023-10-23 NOTE — PROGRESS NOTE ADULT - SUBJECTIVE AND OBJECTIVE BOX
SURGERY DAILY PROGRESS NOTE:     SUBJECTIVE/ROS:     Overnight: no acute events   Patient seen and evaluated on AM rounds. Patient resting comfortably in bed.      OBJECTIVE:  Vital Signs Last 24 Hrs  T(C): 36.9 (23 Oct 2023 05:22), Max: 37 (22 Oct 2023 16:37)  T(F): 98.5 (23 Oct 2023 05:22), Max: 98.6 (22 Oct 2023 16:37)  HR: 72 (23 Oct 2023 05:22) (66 - 75)  BP: 108/70 (23 Oct 2023 05:22) (102/64 - 133/77)  BP(mean): --  RR: 18 (23 Oct 2023 05:22) (18 - 18)  SpO2: 94% (23 Oct 2023 05:22) (92% - 96%)    Parameters below as of 23 Oct 2023 05:22  Patient On (Oxygen Delivery Method): room air      I&O's Detail    22 Oct 2023 07:01  -  23 Oct 2023 07:00  --------------------------------------------------------  IN:    Oral Fluid: 1160 mL  Total IN: 1160 mL    OUT:    Incontinent per Condom Catheter (mL): 2000 mL  Total OUT: 2000 mL    Total NET: -840 mL        Daily     Daily   MEDICATIONS  (STANDING):  acetaminophen     Tablet .. 650 milliGRAM(s) Oral every 8 hours  allopurinol 300 milliGRAM(s) Oral daily  aMIOdarone    Tablet 200 milliGRAM(s) Oral daily  bacitracin   Ointment 1 Application(s) Topical two times a day  chlorhexidine 2% Cloths 1 Application(s) Topical <User Schedule>  furosemide    Tablet 20 milliGRAM(s) Oral daily  lactobacillus acidophilus 1 Tablet(s) Oral daily  latanoprost 0.005% Ophthalmic Solution 1 Drop(s) Both EYES at bedtime  metoprolol succinate ER 25 milliGRAM(s) Oral daily  nystatin Powder 1 Application(s) Topical two times a day  sacubitril 24 mG/valsartan 26 mG 1 Tablet(s) Oral two times a day  Simbrinza (Brinzolamide and Brimonidine) 1 Drop(s) 1 Drop(s) Both EYES two times a day  tamsulosin 0.4 milliGRAM(s) Oral at bedtime  warfarin 2 milliGRAM(s) Oral once    MEDICATIONS  (PRN):  aluminum hydroxide/magnesium hydroxide/simethicone Suspension 30 milliLiter(s) Oral every 4 hours PRN Dyspepsia      Labs:                        11.9   4.19  )-----------( 163      ( 22 Oct 2023 05:59 )             37.9     10-22    141  |  105  |  24<H>  ----------------------------<  101<H>  4.0   |  25  |  1.30    Ca    9.2      22 Oct 2023 05:59  Phos  3.9     10-22  Mg     2.0     10-22      PT/INR - ( 22 Oct 2023 14:39 )   PT: 22.1 sec;   INR: 2.15 ratio           Urinalysis Basic - ( 22 Oct 2023 05:59 )    Color: x / Appearance: x / SG: x / pH: x  Gluc: 101 mg/dL / Ketone: x  / Bili: x / Urobili: x   Blood: x / Protein: x / Nitrite: x   Leuk Esterase: x / RBC: x / WBC x   Sq Epi: x / Non Sq Epi: x / Bacteria: x            Physical Exam:  General: NAD, resting comfortably in bed  HEENT: Normocephalic atraumatic  Respiratory: Nonlabored respirations  Cardio: pulse present  Abdomen: soft, nontender, nondistended  MSK: RLE calf wound vac in place and holding suction

## 2023-10-23 NOTE — PROGRESS NOTE ADULT - PROBLEM SELECTOR PLAN 1
Presented with large swelling of the RLE distal shin/calf, now with deep wound, no pus seen, bloody  - s/p hematoma evacuation with surgery  - was on unasyn 10/13-10/16, low suspicion for onging cellulitis so no need for more abx  - hgb stable on warfarin  - cont wound care  - per surgery wound vac placed

## 2023-10-23 NOTE — PROGRESS NOTE ADULT - ASSESSMENT
83 year old male PMH atrial fibrillation on coumadin, heart failure, prostate CA presents to ED complaining of pain and swelling to the right lower extremity predominantly the right ankle and lower leg since last week.  Large swelling of the RLE distal shin/calf, very erythematous from below the knee to dorsal foot, tender to palpation, dark eschar overlaying the surface. Concern for hematoma with possible infection and surrounding cellulitis. Now s/p I&D in OR on 10/14. Home with wound vac.     PLAN:  - Pain control PRN  - Appreciate medicine recs  - DVT ppx: Coumadin - INR therapeutic   - PT: HPT and VNS  - Will need home vac  - Discharge planning     Trauma/ACS  [id number] 83 year old male PMH atrial fibrillation on coumadin, heart failure, prostate CA presents to ED complaining of pain and swelling to the right lower extremity predominantly the right ankle and lower leg since last week.  Large swelling of the RLE distal shin/calf, very erythematous from below the knee to dorsal foot, tender to palpation, dark eschar overlaying the surface. Concern for hematoma with possible infection and surrounding cellulitis. Now s/p I&D in OR on 10/14. Home with wound vac.     PLAN:  - Pain control PRN  - Appreciate medicine recs  - DVT ppx: Coumadin - INR therapeutic   - PT: HPT and VNS  - Will need home vac  - Discharge planning     Trauma/ACS  [id number]

## 2023-10-23 NOTE — PROGRESS NOTE ADULT - PROBLEM SELECTOR PLAN 4
h/o pAF  - continue home Amio and Metoprolol  - s/p DCCV 2/2022  - per surgery ok to resume anticoagulation - warfarin  - coumadin regimen: 2mg every day, but 4mg on Tuesdays/Thursdays  - check daily INR

## 2023-10-24 LAB
ANION GAP SERPL CALC-SCNC: 13 MMOL/L — SIGNIFICANT CHANGE UP (ref 5–17)
ANION GAP SERPL CALC-SCNC: 13 MMOL/L — SIGNIFICANT CHANGE UP (ref 5–17)
BUN SERPL-MCNC: 38 MG/DL — HIGH (ref 7–23)
BUN SERPL-MCNC: 38 MG/DL — HIGH (ref 7–23)
CALCIUM SERPL-MCNC: 9.4 MG/DL — SIGNIFICANT CHANGE UP (ref 8.4–10.5)
CALCIUM SERPL-MCNC: 9.4 MG/DL — SIGNIFICANT CHANGE UP (ref 8.4–10.5)
CHLORIDE SERPL-SCNC: 102 MMOL/L — SIGNIFICANT CHANGE UP (ref 96–108)
CHLORIDE SERPL-SCNC: 102 MMOL/L — SIGNIFICANT CHANGE UP (ref 96–108)
CO2 SERPL-SCNC: 24 MMOL/L — SIGNIFICANT CHANGE UP (ref 22–31)
CO2 SERPL-SCNC: 24 MMOL/L — SIGNIFICANT CHANGE UP (ref 22–31)
CREAT SERPL-MCNC: 1.72 MG/DL — HIGH (ref 0.5–1.3)
CREAT SERPL-MCNC: 1.72 MG/DL — HIGH (ref 0.5–1.3)
EGFR: 39 ML/MIN/1.73M2 — LOW
EGFR: 39 ML/MIN/1.73M2 — LOW
GLUCOSE SERPL-MCNC: 99 MG/DL — SIGNIFICANT CHANGE UP (ref 70–99)
GLUCOSE SERPL-MCNC: 99 MG/DL — SIGNIFICANT CHANGE UP (ref 70–99)
HCT VFR BLD CALC: 39.2 % — SIGNIFICANT CHANGE UP (ref 39–50)
HCT VFR BLD CALC: 39.2 % — SIGNIFICANT CHANGE UP (ref 39–50)
HGB BLD-MCNC: 12.4 G/DL — LOW (ref 13–17)
HGB BLD-MCNC: 12.4 G/DL — LOW (ref 13–17)
INR BLD: 2.18 RATIO — HIGH (ref 0.85–1.18)
INR BLD: 2.18 RATIO — HIGH (ref 0.85–1.18)
MAGNESIUM SERPL-MCNC: 2.1 MG/DL — SIGNIFICANT CHANGE UP (ref 1.6–2.6)
MAGNESIUM SERPL-MCNC: 2.1 MG/DL — SIGNIFICANT CHANGE UP (ref 1.6–2.6)
MCHC RBC-ENTMCNC: 31.3 PG — SIGNIFICANT CHANGE UP (ref 27–34)
MCHC RBC-ENTMCNC: 31.3 PG — SIGNIFICANT CHANGE UP (ref 27–34)
MCHC RBC-ENTMCNC: 31.6 GM/DL — LOW (ref 32–36)
MCHC RBC-ENTMCNC: 31.6 GM/DL — LOW (ref 32–36)
MCV RBC AUTO: 99 FL — SIGNIFICANT CHANGE UP (ref 80–100)
MCV RBC AUTO: 99 FL — SIGNIFICANT CHANGE UP (ref 80–100)
NRBC # BLD: 0 /100 WBCS — SIGNIFICANT CHANGE UP (ref 0–0)
NRBC # BLD: 0 /100 WBCS — SIGNIFICANT CHANGE UP (ref 0–0)
PHOSPHATE SERPL-MCNC: 4.3 MG/DL — SIGNIFICANT CHANGE UP (ref 2.5–4.5)
PHOSPHATE SERPL-MCNC: 4.3 MG/DL — SIGNIFICANT CHANGE UP (ref 2.5–4.5)
PLATELET # BLD AUTO: 171 K/UL — SIGNIFICANT CHANGE UP (ref 150–400)
PLATELET # BLD AUTO: 171 K/UL — SIGNIFICANT CHANGE UP (ref 150–400)
POTASSIUM SERPL-MCNC: 4 MMOL/L — SIGNIFICANT CHANGE UP (ref 3.5–5.3)
POTASSIUM SERPL-MCNC: 4 MMOL/L — SIGNIFICANT CHANGE UP (ref 3.5–5.3)
POTASSIUM SERPL-SCNC: 4 MMOL/L — SIGNIFICANT CHANGE UP (ref 3.5–5.3)
POTASSIUM SERPL-SCNC: 4 MMOL/L — SIGNIFICANT CHANGE UP (ref 3.5–5.3)
PROTHROM AB SERPL-ACNC: 23.4 SEC — HIGH (ref 9.5–13)
PROTHROM AB SERPL-ACNC: 23.4 SEC — HIGH (ref 9.5–13)
RBC # BLD: 3.96 M/UL — LOW (ref 4.2–5.8)
RBC # BLD: 3.96 M/UL — LOW (ref 4.2–5.8)
RBC # FLD: 14.8 % — HIGH (ref 10.3–14.5)
RBC # FLD: 14.8 % — HIGH (ref 10.3–14.5)
SODIUM SERPL-SCNC: 139 MMOL/L — SIGNIFICANT CHANGE UP (ref 135–145)
SODIUM SERPL-SCNC: 139 MMOL/L — SIGNIFICANT CHANGE UP (ref 135–145)
WBC # BLD: 5.03 K/UL — SIGNIFICANT CHANGE UP (ref 3.8–10.5)
WBC # BLD: 5.03 K/UL — SIGNIFICANT CHANGE UP (ref 3.8–10.5)
WBC # FLD AUTO: 5.03 K/UL — SIGNIFICANT CHANGE UP (ref 3.8–10.5)
WBC # FLD AUTO: 5.03 K/UL — SIGNIFICANT CHANGE UP (ref 3.8–10.5)

## 2023-10-24 PROCEDURE — 99024 POSTOP FOLLOW-UP VISIT: CPT

## 2023-10-24 PROCEDURE — 99232 SBSQ HOSP IP/OBS MODERATE 35: CPT

## 2023-10-24 RX ORDER — WARFARIN SODIUM 2.5 MG/1
4 TABLET ORAL ONCE
Refills: 0 | Status: COMPLETED | OUTPATIENT
Start: 2023-10-24 | End: 2023-10-24

## 2023-10-24 RX ADMIN — NYSTATIN CREAM 1 APPLICATION(S): 100000 CREAM TOPICAL at 17:07

## 2023-10-24 RX ADMIN — Medication 20 MILLIGRAM(S): at 06:11

## 2023-10-24 RX ADMIN — LATANOPROST 1 DROP(S): 0.05 SOLUTION/ DROPS OPHTHALMIC; TOPICAL at 21:33

## 2023-10-24 RX ADMIN — SACUBITRIL AND VALSARTAN 1 TABLET(S): 24; 26 TABLET, FILM COATED ORAL at 21:44

## 2023-10-24 RX ADMIN — Medication 1 TABLET(S): at 11:30

## 2023-10-24 RX ADMIN — Medication 650 MILLIGRAM(S): at 00:11

## 2023-10-24 RX ADMIN — SACUBITRIL AND VALSARTAN 1 TABLET(S): 24; 26 TABLET, FILM COATED ORAL at 06:11

## 2023-10-24 RX ADMIN — Medication 1 DROP(S): at 11:30

## 2023-10-24 RX ADMIN — Medication 650 MILLIGRAM(S): at 12:30

## 2023-10-24 RX ADMIN — Medication 300 MILLIGRAM(S): at 11:30

## 2023-10-24 RX ADMIN — NYSTATIN CREAM 1 APPLICATION(S): 100000 CREAM TOPICAL at 06:12

## 2023-10-24 RX ADMIN — WARFARIN SODIUM 4 MILLIGRAM(S): 2.5 TABLET ORAL at 21:33

## 2023-10-24 RX ADMIN — Medication 1 APPLICATION(S): at 17:07

## 2023-10-24 RX ADMIN — Medication 1 APPLICATION(S): at 06:11

## 2023-10-24 RX ADMIN — Medication 25 MILLIGRAM(S): at 06:11

## 2023-10-24 RX ADMIN — CHLORHEXIDINE GLUCONATE 1 APPLICATION(S): 213 SOLUTION TOPICAL at 14:20

## 2023-10-24 RX ADMIN — AMIODARONE HYDROCHLORIDE 200 MILLIGRAM(S): 400 TABLET ORAL at 06:11

## 2023-10-24 RX ADMIN — Medication 650 MILLIGRAM(S): at 11:42

## 2023-10-24 RX ADMIN — TAMSULOSIN HYDROCHLORIDE 0.4 MILLIGRAM(S): 0.4 CAPSULE ORAL at 21:33

## 2023-10-24 NOTE — PROGRESS NOTE ADULT - NSPROGADDITIONALINFOA_GEN_ALL_CORE
Spoke with daughter Nadine over the phone 10/18 for updates. Spoke with Ronna over the phone 10/20 for updates. Spoke with aid at bedside today Delmy 10/24. Plan for discharge home 10/25.

## 2023-10-24 NOTE — PROGRESS NOTE ADULT - PROBLEM SELECTOR PLAN 2
-baseline Cr seems to be 1.5, after calling his PCP's office   - per wife, he does not see a nephrologist, has never been told of any kidney disease  -monitor intake/output  -stable function  -Cr ranging from 1.3-1.7 mostly during this admission  -check bladder scan

## 2023-10-24 NOTE — PROGRESS NOTE ADULT - ATTENDING COMMENTS
seen and examined 10-24-23 @ 0954    VAC on the anteromedial aspect of his distal right leg is functioning well  there is some evolving ecchymosis surrounding the right medial malleolus    WBC = 5  INR = 2.1      10/13/2023 - evacuation of traumatic right leg hematoma  -unlikely to benefit from split-thickness skin graft since the wound is fairly small    afib  -coumadin was restarted on 10/16, and there has been no evidence of recurrent bleeding    dispo  -discharge home with wound VAC (supplies are at bedside) when home services are available      I reviewed his labs.  plan discussed with his aide at bedside seen and examined 10-24-23 @ 0915    VAC on the anteromedial aspect of his distal right leg is functioning well  there is some evolving ecchymosis surrounding the right medial malleolus    WBC = 5  INR = 2.1      10/13/2023 - evacuation of traumatic right leg hematoma  -unlikely to benefit from split-thickness skin graft since the wound is fairly small    afib  -coumadin was restarted on 10/16, and there has been no evidence of recurrent bleeding    dispo  -discharge home with wound VAC (supplies are at bedside) when home services are available      I reviewed his labs.  plan discussed with his private aide at bedside

## 2023-10-24 NOTE — PROGRESS NOTE ADULT - SUBJECTIVE AND OBJECTIVE BOX
[facility] Division of Hospital Medicine  Mónica Astudillo DO  Available on Teams Reinaldo    Patient is a 83y old  Male who presents with a chief complaint of Fall (18 Oct 2023 18:01)      SUBJECTIVE / OVERNIGHT EVENTS: none. Patient is awake, interacting with his aid at bedside. No complaints. Aid says his arms and legs are a lot less swollen than they were before.  ADDITIONAL REVIEW OF SYSTEMS: negative    MEDICATIONS  (STANDING):  acetaminophen     Tablet .. 650 milliGRAM(s) Oral every 8 hours  allopurinol 300 milliGRAM(s) Oral daily  aMIOdarone    Tablet 200 milliGRAM(s) Oral daily  artificial tears (preservative free) Ophthalmic Solution 1 Drop(s) Both EYES daily  bacitracin   Ointment 1 Application(s) Topical two times a day  chlorhexidine 2% Cloths 1 Application(s) Topical <User Schedule>  furosemide    Tablet 20 milliGRAM(s) Oral daily  lactobacillus acidophilus 1 Tablet(s) Oral daily  latanoprost 0.005% Ophthalmic Solution 1 Drop(s) Both EYES at bedtime  metoprolol succinate ER 25 milliGRAM(s) Oral daily  nystatin Powder 1 Application(s) Topical two times a day  sacubitril 24 mG/valsartan 26 mG 1 Tablet(s) Oral two times a day  Simbrinza (Brinzolamide and Brimonidine) 1 Drop(s) 1 Drop(s) Both EYES two times a day  tamsulosin 0.4 milliGRAM(s) Oral at bedtime  warfarin 4 milliGRAM(s) Oral once    MEDICATIONS  (PRN):  aluminum hydroxide/magnesium hydroxide/simethicone Suspension 30 milliLiter(s) Oral every 4 hours PRN Dyspepsia      CAPILLARY BLOOD GLUCOSE        I&O's Summary    23 Oct 2023 07:01  -  24 Oct 2023 07:00  --------------------------------------------------------  IN: 1340 mL / OUT: 1225 mL / NET: 115 mL        PHYSICAL EXAM:  Vital Signs Last 24 Hrs  T(C): 36.5 (24 Oct 2023 08:30), Max: 37.2 (23 Oct 2023 22:00)  T(F): 97.7 (24 Oct 2023 08:30), Max: 99 (23 Oct 2023 22:00)  HR: 81 (24 Oct 2023 08:30) (65 - 81)  BP: 120/73 (24 Oct 2023 08:30) (108/73 - 120/75)  BP(mean): --  RR: 18 (24 Oct 2023 08:30) (17 - 18)  SpO2: 94% (24 Oct 2023 08:30) (93% - 96%)    Parameters below as of 24 Oct 2023 08:30  Patient On (Oxygen Delivery Method): room air    CONSTITUTIONAL: Well-groomed, in no apparent distress, awake, obese  EYES: No conjunctival or scleral injection, non-icteric; PERRLA and symmetric  ENMT: No external nasal lesions; no pharyngeal injection or exudates, oral mucosa with moist membranes  NECK: Trachea midline without palpable neck mass; thyroid not enlarged and non-tender  RESPIRATORY: Breathing comfortably; lungs CTA without wheeze/rhonchi/rales  CARDIOVASCULAR: +S1S2, RRR, no M/G/R; pedal pulses full and symmetric; no lower extremity edema  GASTROINTESTINAL: No palpable masses or tenderness, +BS throughout, no rebound/guarding  LYMPHATIC: No cervical LAD or tenderness; no axillary LAD or tenderness  MUSCULOSKELETAL: no digital clubbing or cyanosis; no paraspinal tenderness; right calf wrapped in ACE bandages, mild swelling in the toes.   NEUROLOGIC: CN II-XII intact; sensation intact in LEs b/l to light touch  PSYCHIATRIC: oriented x1; mood and affect appropriate    LABS:                        12.4   5.03  )-----------( 171      ( 24 Oct 2023 07:03 )             39.2     10-24    139  |  102  |  38<H>  ----------------------------<  99  4.0   |  24  |  1.72<H>    Ca    9.4      24 Oct 2023 07:03  Phos  4.3     10-24  Mg     2.1     10-24      PT/INR - ( 24 Oct 2023 07:03 )   PT: 23.4 sec;   INR: 2.18 ratio               Urinalysis Basic - ( 24 Oct 2023 07:03 )    Color: x / Appearance: x / SG: x / pH: x  Gluc: 99 mg/dL / Ketone: x  / Bili: x / Urobili: x   Blood: x / Protein: x / Nitrite: x   Leuk Esterase: x / RBC: x / WBC x   Sq Epi: x / Non Sq Epi: x / Bacteria: x

## 2023-10-24 NOTE — PROGRESS NOTE ADULT - PROBLEM SELECTOR PLAN 1
Presented with large swelling of the RLE distal shin/calf, now with deep wound, no pus seen, bloody  - s/p hematoma evacuation with surgery  - was on unasyn 10/13-10/16, low suspicion for onging cellulitis so no need for more abx  - hgb stable on warfarin, now therapeutic  - cont wound care  - per surgery wound vac placed

## 2023-10-24 NOTE — PROGRESS NOTE ADULT - SUBJECTIVE AND OBJECTIVE BOX
DATE OF SERVICE: 10-24-23 @ 13:19    Patient is a 83y old  Male who presents with a chief complaint of Fall (18 Oct 2023 18:01)      INTERVAL HISTORY: Feels ok.     REVIEW OF SYSTEMS:  CONSTITUTIONAL: No weakness  EYES/ENT: No visual changes;  No throat pain   NECK: No pain or stiffness  RESPIRATORY: No cough, wheezing; No shortness of breath  CARDIOVASCULAR: No chest pain or palpitations  GASTROINTESTINAL: No abdominal  pain. No nausea, vomiting, or hematemesis  GENITOURINARY: No dysuria, frequency or hematuria  NEUROLOGICAL: No stroke like symptoms  SKIN: No rashes    	  MEDICATIONS:  aMIOdarone    Tablet 200 milliGRAM(s) Oral daily  furosemide    Tablet 20 milliGRAM(s) Oral daily  metoprolol succinate ER 25 milliGRAM(s) Oral daily  sacubitril 24 mG/valsartan 26 mG 1 Tablet(s) Oral two times a day        PHYSICAL EXAM:  T(C): 36.5 (10-24-23 @ 12:21), Max: 37.2 (10-23-23 @ 22:00)  HR: 64 (10-24-23 @ 12:21) (64 - 81)  BP: 119/73 (10-24-23 @ 12:21) (108/73 - 120/75)  RR: 18 (10-24-23 @ 12:21) (17 - 18)  SpO2: 95% (10-24-23 @ 12:21) (93% - 96%)  Wt(kg): --  I&O's Summary    23 Oct 2023 07:01  -  24 Oct 2023 07:00  --------------------------------------------------------  IN: 1340 mL / OUT: 1225 mL / NET: 115 mL    24 Oct 2023 07:01  -  24 Oct 2023 13:19  --------------------------------------------------------  IN: 360 mL / OUT: 450 mL / NET: -90 mL          Appearance: In no distress	  HEENT:    PERRL, EOMI	  Cardiovascular:  S1 S2, No JVD  Respiratory: Lungs clear to auscultation	  Gastrointestinal:  Soft, Non-tender, + BS	  Vascularature:  No edema of LE  Psychiatric: Appropriate affect   Neuro: no acute focal deficits                               12.4   5.03  )-----------( 171      ( 24 Oct 2023 07:03 )             39.2     10-24    139  |  102  |  38<H>  ----------------------------<  99  4.0   |  24  |  1.72<H>    Ca    9.4      24 Oct 2023 07:03  Phos  4.3     10-24  Mg     2.1     10-24          Labs personally reviewed      ASSESSMENT/PLAN: 	    84 y/o male with pAF, HFrEF, prostate ca, dementia who presented with hematoma in RLE.    Problem/Plan - 1:  ·  Problem: Chronic systolic heart failure.   ·  Plan: Most recent office note from Jan 2020 notes EF 20-25% drop from 45% with previous normal cors  - echo 3/2022: EF 40%, mod MR, mild-mod AR  - NICM  - Needs GDMT- continue on metoprolol succinate    - Cont Entresto 24/26 BID and uptitrate to 49/51 BID as BP tolerates   - start Farxiga 10mg daily at discharge  - Cont Lasix 20mg PO daily  - OP follow up with Dr Ramos    Problem/Plan - 2:  ·  Problem: Paroxysmal atrial fibrillation.   ·  Plan: h/o pAF  - continue home Amio and Metoprolol  - s/p DCCV 2/2022  - warfarin with target INR 2-3 (now therapeutic)       Problem/Plan - 3:  ·  Problem: Prophylactic measure.   ·  Plan: - resume home glaucoma eye drops  - tolerating regular diet              Demetrice Varner, BARTOLO-KASIA Mcallister DO [facility]  Cardiovascular Medicine  [address], [address]  Available through call or text on [employer] TE[employer]  Office: [phone]

## 2023-10-24 NOTE — PROGRESS NOTE ADULT - ASSESSMENT
83 year old male PMH atrial fibrillation on coumadin, heart failure, prostate CA presents to ED complaining of pain and swelling to the right lower extremity predominantly the right ankle and lower leg since last week.  Large swelling of the RLE distal shin/calf, very erythematous from below the knee to dorsal foot, tender to palpation, dark eschar overlaying the surface. Concern for hematoma with possible infection and surrounding cellulitis. Now s/p I&D in OR on 10/14. Home with wound vac.     PLAN:  - Pain control PRN  - Appreciate medicine recs  - DVT ppx: Coumadin - INR therapeutic   - PT: HPT and VNS  - Will need home vac  - Discharge planning     Trauma/ACS  [id number]

## 2023-10-24 NOTE — PROGRESS NOTE ADULT - SUBJECTIVE AND OBJECTIVE BOX
SURGERY DAILY PROGRESS NOTE:       SUBJECTIVE/ROS: Patient seen and examined at bedside.  No events overnight.       MEDICATIONS  (STANDING):  acetaminophen     Tablet .. 650 milliGRAM(s) Oral every 8 hours  allopurinol 300 milliGRAM(s) Oral daily  aMIOdarone    Tablet 200 milliGRAM(s) Oral daily  artificial tears (preservative free) Ophthalmic Solution 1 Drop(s) Both EYES daily  bacitracin   Ointment 1 Application(s) Topical two times a day  chlorhexidine 2% Cloths 1 Application(s) Topical <User Schedule>  furosemide    Tablet 20 milliGRAM(s) Oral daily  lactobacillus acidophilus 1 Tablet(s) Oral daily  latanoprost 0.005% Ophthalmic Solution 1 Drop(s) Both EYES at bedtime  metoprolol succinate ER 25 milliGRAM(s) Oral daily  nystatin Powder 1 Application(s) Topical two times a day  sacubitril 24 mG/valsartan 26 mG 1 Tablet(s) Oral two times a day  Simbrinza (Brinzolamide and Brimonidine) 1 Drop(s) 1 Drop(s) Both EYES two times a day  tamsulosin 0.4 milliGRAM(s) Oral at bedtime    MEDICATIONS  (PRN):  aluminum hydroxide/magnesium hydroxide/simethicone Suspension 30 milliLiter(s) Oral every 4 hours PRN Dyspepsia      OBJECTIVE:    Vital Signs Last 24 Hrs  T(C): 36.6 (24 Oct 2023 05:49), Max: 37.2 (23 Oct 2023 22:00)  T(F): 97.8 (24 Oct 2023 05:49), Max: 99 (23 Oct 2023 22:00)  HR: 65 (24 Oct 2023 05:49) (65 - 81)  BP: 109/69 (24 Oct 2023 05:49) (108/73 - 124/71)  BP(mean): --  RR: 18 (24 Oct 2023 05:49) (17 - 18)  SpO2: 93% (24 Oct 2023 05:49) (93% - 96%)    Parameters below as of 24 Oct 2023 05:49  Patient On (Oxygen Delivery Method): room air            I&O's Detail    23 Oct 2023 07:01  -  24 Oct 2023 07:00  --------------------------------------------------------  IN:    Oral Fluid: 1220 mL  Total IN: 1220 mL    OUT:    Incontinent per Condom Catheter (mL): 950 mL    Voided (mL): 275 mL  Total OUT: 1225 mL    Total NET: -5 mL      Physical Exam:  General: NAD, resting comfortably in bed  HEENT: Normocephalic atraumatic  Respiratory: Nonlabored respirations  Cardio: pulse present  Abdomen: soft, nontender, nondistended  MSK: RLE calf wound vac in place and holding suction        LABS:                        13.4   4.97  )-----------( 194      ( 23 Oct 2023 10:54 )             42.2     10-23    142  |  101  |  30<H>  ----------------------------<  104<H>  3.9   |  28  |  1.54<H>    Ca    9.9      23 Oct 2023 10:55  Phos  4.3     10-23  Mg     2.2     10-23      PT/INR - ( 23 Oct 2023 10:54 )   PT: 22.1 sec;   INR: 2.05 ratio           Urinalysis Basic - ( 23 Oct 2023 10:55 )    Color: x / Appearance: x / SG: x / pH: x  Gluc: 104 mg/dL / Ketone: x  / Bili: x / Urobili: x   Blood: x / Protein: x / Nitrite: x   Leuk Esterase: x / RBC: x / WBC x   Sq Epi: x / Non Sq Epi: x / Bacteria: x

## 2023-10-25 ENCOUNTER — INPATIENT (INPATIENT)
Facility: HOSPITAL | Age: 83
LOS: 1 days | Discharge: HOME HEALTH | DRG: 607 | End: 2023-10-27
Attending: STUDENT IN AN ORGANIZED HEALTH CARE EDUCATION/TRAINING PROGRAM | Admitting: STUDENT IN AN ORGANIZED HEALTH CARE EDUCATION/TRAINING PROGRAM
Payer: MEDICARE

## 2023-10-25 ENCOUNTER — TRANSCRIPTION ENCOUNTER (OUTPATIENT)
Age: 83
End: 2023-10-25

## 2023-10-25 VITALS
SYSTOLIC BLOOD PRESSURE: 120 MMHG | TEMPERATURE: 97 F | HEIGHT: 72 IN | HEART RATE: 72 BPM | RESPIRATION RATE: 18 BRPM | OXYGEN SATURATION: 94 % | DIASTOLIC BLOOD PRESSURE: 62 MMHG

## 2023-10-25 VITALS
OXYGEN SATURATION: 96 % | RESPIRATION RATE: 15 BRPM | TEMPERATURE: 98 F | SYSTOLIC BLOOD PRESSURE: 106 MMHG | HEART RATE: 69 BPM | DIASTOLIC BLOOD PRESSURE: 65 MMHG

## 2023-10-25 DIAGNOSIS — Z98.890 OTHER SPECIFIED POSTPROCEDURAL STATES: Chronic | ICD-10-CM

## 2023-10-25 LAB
ALBUMIN SERPL ELPH-MCNC: 3.9 G/DL — SIGNIFICANT CHANGE UP (ref 3.3–5)
ALBUMIN SERPL ELPH-MCNC: 3.9 G/DL — SIGNIFICANT CHANGE UP (ref 3.3–5)
ALP SERPL-CCNC: 104 U/L — SIGNIFICANT CHANGE UP (ref 40–120)
ALP SERPL-CCNC: 104 U/L — SIGNIFICANT CHANGE UP (ref 40–120)
ALT FLD-CCNC: 41 U/L — SIGNIFICANT CHANGE UP (ref 10–45)
ALT FLD-CCNC: 41 U/L — SIGNIFICANT CHANGE UP (ref 10–45)
ANION GAP SERPL CALC-SCNC: 11 MMOL/L — SIGNIFICANT CHANGE UP (ref 5–17)
ANION GAP SERPL CALC-SCNC: 11 MMOL/L — SIGNIFICANT CHANGE UP (ref 5–17)
ANION GAP SERPL CALC-SCNC: 12 MMOL/L — SIGNIFICANT CHANGE UP (ref 5–17)
ANION GAP SERPL CALC-SCNC: 12 MMOL/L — SIGNIFICANT CHANGE UP (ref 5–17)
AST SERPL-CCNC: 23 U/L — SIGNIFICANT CHANGE UP (ref 10–40)
AST SERPL-CCNC: 23 U/L — SIGNIFICANT CHANGE UP (ref 10–40)
BASOPHILS # BLD AUTO: 0.02 K/UL — SIGNIFICANT CHANGE UP (ref 0–0.2)
BASOPHILS # BLD AUTO: 0.02 K/UL — SIGNIFICANT CHANGE UP (ref 0–0.2)
BASOPHILS NFR BLD AUTO: 0.3 % — SIGNIFICANT CHANGE UP (ref 0–2)
BASOPHILS NFR BLD AUTO: 0.3 % — SIGNIFICANT CHANGE UP (ref 0–2)
BILIRUB SERPL-MCNC: 0.9 MG/DL — SIGNIFICANT CHANGE UP (ref 0.2–1.2)
BILIRUB SERPL-MCNC: 0.9 MG/DL — SIGNIFICANT CHANGE UP (ref 0.2–1.2)
BUN SERPL-MCNC: 48 MG/DL — HIGH (ref 7–23)
BUN SERPL-MCNC: 48 MG/DL — HIGH (ref 7–23)
BUN SERPL-MCNC: 50 MG/DL — HIGH (ref 7–23)
BUN SERPL-MCNC: 50 MG/DL — HIGH (ref 7–23)
CALCIUM SERPL-MCNC: 9 MG/DL — SIGNIFICANT CHANGE UP (ref 8.4–10.5)
CALCIUM SERPL-MCNC: 9 MG/DL — SIGNIFICANT CHANGE UP (ref 8.4–10.5)
CALCIUM SERPL-MCNC: 9.1 MG/DL — SIGNIFICANT CHANGE UP (ref 8.4–10.5)
CALCIUM SERPL-MCNC: 9.1 MG/DL — SIGNIFICANT CHANGE UP (ref 8.4–10.5)
CHLORIDE SERPL-SCNC: 101 MMOL/L — SIGNIFICANT CHANGE UP (ref 96–108)
CO2 SERPL-SCNC: 24 MMOL/L — SIGNIFICANT CHANGE UP (ref 22–31)
CO2 SERPL-SCNC: 24 MMOL/L — SIGNIFICANT CHANGE UP (ref 22–31)
CO2 SERPL-SCNC: 27 MMOL/L — SIGNIFICANT CHANGE UP (ref 22–31)
CO2 SERPL-SCNC: 27 MMOL/L — SIGNIFICANT CHANGE UP (ref 22–31)
CREAT SERPL-MCNC: 1.91 MG/DL — HIGH (ref 0.5–1.3)
CREAT SERPL-MCNC: 1.91 MG/DL — HIGH (ref 0.5–1.3)
CREAT SERPL-MCNC: 1.96 MG/DL — HIGH (ref 0.5–1.3)
CREAT SERPL-MCNC: 1.96 MG/DL — HIGH (ref 0.5–1.3)
EGFR: 34 ML/MIN/1.73M2 — LOW
EGFR: 34 ML/MIN/1.73M2 — LOW
EGFR: 35 ML/MIN/1.73M2 — LOW
EGFR: 35 ML/MIN/1.73M2 — LOW
EOSINOPHIL # BLD AUTO: 0.11 K/UL — SIGNIFICANT CHANGE UP (ref 0–0.5)
EOSINOPHIL # BLD AUTO: 0.11 K/UL — SIGNIFICANT CHANGE UP (ref 0–0.5)
EOSINOPHIL NFR BLD AUTO: 1.4 % — SIGNIFICANT CHANGE UP (ref 0–6)
EOSINOPHIL NFR BLD AUTO: 1.4 % — SIGNIFICANT CHANGE UP (ref 0–6)
GLUCOSE SERPL-MCNC: 117 MG/DL — HIGH (ref 70–99)
GLUCOSE SERPL-MCNC: 117 MG/DL — HIGH (ref 70–99)
GLUCOSE SERPL-MCNC: 98 MG/DL — SIGNIFICANT CHANGE UP (ref 70–99)
GLUCOSE SERPL-MCNC: 98 MG/DL — SIGNIFICANT CHANGE UP (ref 70–99)
HCT VFR BLD CALC: 38.9 % — LOW (ref 39–50)
HCT VFR BLD CALC: 38.9 % — LOW (ref 39–50)
HCT VFR BLD CALC: 39 % — SIGNIFICANT CHANGE UP (ref 39–50)
HCT VFR BLD CALC: 39 % — SIGNIFICANT CHANGE UP (ref 39–50)
HGB BLD-MCNC: 12.3 G/DL — LOW (ref 13–17)
IMM GRANULOCYTES NFR BLD AUTO: 1.3 % — HIGH (ref 0–0.9)
IMM GRANULOCYTES NFR BLD AUTO: 1.3 % — HIGH (ref 0–0.9)
INR BLD: 2.17 RATIO — HIGH (ref 0.85–1.18)
INR BLD: 2.17 RATIO — HIGH (ref 0.85–1.18)
LYMPHOCYTES # BLD AUTO: 0.73 K/UL — LOW (ref 1–3.3)
LYMPHOCYTES # BLD AUTO: 0.73 K/UL — LOW (ref 1–3.3)
LYMPHOCYTES # BLD AUTO: 9.2 % — LOW (ref 13–44)
LYMPHOCYTES # BLD AUTO: 9.2 % — LOW (ref 13–44)
MAGNESIUM SERPL-MCNC: 2.1 MG/DL — SIGNIFICANT CHANGE UP (ref 1.6–2.6)
MAGNESIUM SERPL-MCNC: 2.1 MG/DL — SIGNIFICANT CHANGE UP (ref 1.6–2.6)
MCHC RBC-ENTMCNC: 31.2 PG — SIGNIFICANT CHANGE UP (ref 27–34)
MCHC RBC-ENTMCNC: 31.2 PG — SIGNIFICANT CHANGE UP (ref 27–34)
MCHC RBC-ENTMCNC: 31.5 GM/DL — LOW (ref 32–36)
MCHC RBC-ENTMCNC: 31.5 GM/DL — LOW (ref 32–36)
MCHC RBC-ENTMCNC: 31.6 GM/DL — LOW (ref 32–36)
MCHC RBC-ENTMCNC: 31.6 GM/DL — LOW (ref 32–36)
MCHC RBC-ENTMCNC: 31.9 PG — SIGNIFICANT CHANGE UP (ref 27–34)
MCHC RBC-ENTMCNC: 31.9 PG — SIGNIFICANT CHANGE UP (ref 27–34)
MCV RBC AUTO: 101 FL — HIGH (ref 80–100)
MCV RBC AUTO: 101 FL — HIGH (ref 80–100)
MCV RBC AUTO: 98.7 FL — SIGNIFICANT CHANGE UP (ref 80–100)
MCV RBC AUTO: 98.7 FL — SIGNIFICANT CHANGE UP (ref 80–100)
MONOCYTES # BLD AUTO: 1.04 K/UL — HIGH (ref 0–0.9)
MONOCYTES # BLD AUTO: 1.04 K/UL — HIGH (ref 0–0.9)
MONOCYTES NFR BLD AUTO: 13.1 % — SIGNIFICANT CHANGE UP (ref 2–14)
MONOCYTES NFR BLD AUTO: 13.1 % — SIGNIFICANT CHANGE UP (ref 2–14)
NEUTROPHILS # BLD AUTO: 5.93 K/UL — SIGNIFICANT CHANGE UP (ref 1.8–7.4)
NEUTROPHILS # BLD AUTO: 5.93 K/UL — SIGNIFICANT CHANGE UP (ref 1.8–7.4)
NEUTROPHILS NFR BLD AUTO: 74.7 % — SIGNIFICANT CHANGE UP (ref 43–77)
NEUTROPHILS NFR BLD AUTO: 74.7 % — SIGNIFICANT CHANGE UP (ref 43–77)
NRBC # BLD: 0 /100 WBCS — SIGNIFICANT CHANGE UP (ref 0–0)
PHOSPHATE SERPL-MCNC: 3.8 MG/DL — SIGNIFICANT CHANGE UP (ref 2.5–4.5)
PHOSPHATE SERPL-MCNC: 3.8 MG/DL — SIGNIFICANT CHANGE UP (ref 2.5–4.5)
PLATELET # BLD AUTO: 181 K/UL — SIGNIFICANT CHANGE UP (ref 150–400)
PLATELET # BLD AUTO: 181 K/UL — SIGNIFICANT CHANGE UP (ref 150–400)
PLATELET # BLD AUTO: 230 K/UL — SIGNIFICANT CHANGE UP (ref 150–400)
PLATELET # BLD AUTO: 230 K/UL — SIGNIFICANT CHANGE UP (ref 150–400)
POTASSIUM SERPL-MCNC: 4.3 MMOL/L — SIGNIFICANT CHANGE UP (ref 3.5–5.3)
POTASSIUM SERPL-MCNC: 4.3 MMOL/L — SIGNIFICANT CHANGE UP (ref 3.5–5.3)
POTASSIUM SERPL-MCNC: 4.6 MMOL/L — SIGNIFICANT CHANGE UP (ref 3.5–5.3)
POTASSIUM SERPL-MCNC: 4.6 MMOL/L — SIGNIFICANT CHANGE UP (ref 3.5–5.3)
POTASSIUM SERPL-SCNC: 4.3 MMOL/L — SIGNIFICANT CHANGE UP (ref 3.5–5.3)
POTASSIUM SERPL-SCNC: 4.3 MMOL/L — SIGNIFICANT CHANGE UP (ref 3.5–5.3)
POTASSIUM SERPL-SCNC: 4.6 MMOL/L — SIGNIFICANT CHANGE UP (ref 3.5–5.3)
POTASSIUM SERPL-SCNC: 4.6 MMOL/L — SIGNIFICANT CHANGE UP (ref 3.5–5.3)
PROT SERPL-MCNC: 6.6 G/DL — SIGNIFICANT CHANGE UP (ref 6–8.3)
PROT SERPL-MCNC: 6.6 G/DL — SIGNIFICANT CHANGE UP (ref 6–8.3)
PROTHROM AB SERPL-ACNC: 22.3 SEC — HIGH (ref 9.5–13)
PROTHROM AB SERPL-ACNC: 22.3 SEC — HIGH (ref 9.5–13)
RBC # BLD: 3.86 M/UL — LOW (ref 4.2–5.8)
RBC # BLD: 3.86 M/UL — LOW (ref 4.2–5.8)
RBC # BLD: 3.94 M/UL — LOW (ref 4.2–5.8)
RBC # BLD: 3.94 M/UL — LOW (ref 4.2–5.8)
RBC # FLD: 14.8 % — HIGH (ref 10.3–14.5)
SODIUM SERPL-SCNC: 137 MMOL/L — SIGNIFICANT CHANGE UP (ref 135–145)
SODIUM SERPL-SCNC: 137 MMOL/L — SIGNIFICANT CHANGE UP (ref 135–145)
SODIUM SERPL-SCNC: 139 MMOL/L — SIGNIFICANT CHANGE UP (ref 135–145)
SODIUM SERPL-SCNC: 139 MMOL/L — SIGNIFICANT CHANGE UP (ref 135–145)
WBC # BLD: 5.81 K/UL — SIGNIFICANT CHANGE UP (ref 3.8–10.5)
WBC # BLD: 5.81 K/UL — SIGNIFICANT CHANGE UP (ref 3.8–10.5)
WBC # BLD: 7.93 K/UL — SIGNIFICANT CHANGE UP (ref 3.8–10.5)
WBC # BLD: 7.93 K/UL — SIGNIFICANT CHANGE UP (ref 3.8–10.5)
WBC # FLD AUTO: 5.81 K/UL — SIGNIFICANT CHANGE UP (ref 3.8–10.5)
WBC # FLD AUTO: 5.81 K/UL — SIGNIFICANT CHANGE UP (ref 3.8–10.5)
WBC # FLD AUTO: 7.93 K/UL — SIGNIFICANT CHANGE UP (ref 3.8–10.5)
WBC # FLD AUTO: 7.93 K/UL — SIGNIFICANT CHANGE UP (ref 3.8–10.5)

## 2023-10-25 PROCEDURE — 73630 X-RAY EXAM OF FOOT: CPT | Mod: 26,RT

## 2023-10-25 PROCEDURE — 99285 EMERGENCY DEPT VISIT HI MDM: CPT

## 2023-10-25 PROCEDURE — 99024 POSTOP FOLLOW-UP VISIT: CPT

## 2023-10-25 PROCEDURE — 99232 SBSQ HOSP IP/OBS MODERATE 35: CPT

## 2023-10-25 PROCEDURE — 71045 X-RAY EXAM CHEST 1 VIEW: CPT | Mod: 26

## 2023-10-25 RX ORDER — ACETAMINOPHEN 500 MG
2 TABLET ORAL
Qty: 0 | Refills: 0 | DISCHARGE
Start: 2023-10-25

## 2023-10-25 RX ORDER — AMPICILLIN SODIUM AND SULBACTAM SODIUM 250; 125 MG/ML; MG/ML
3 INJECTION, POWDER, FOR SUSPENSION INTRAMUSCULAR; INTRAVENOUS ONCE
Refills: 0 | Status: COMPLETED | OUTPATIENT
Start: 2023-10-25 | End: 2023-10-25

## 2023-10-25 RX ORDER — TAMSULOSIN HYDROCHLORIDE 0.4 MG/1
0.4 CAPSULE ORAL AT BEDTIME
Refills: 0 | Status: DISCONTINUED | OUTPATIENT
Start: 2023-10-25 | End: 2023-10-25

## 2023-10-25 RX ORDER — SACUBITRIL AND VALSARTAN 24; 26 MG/1; MG/1
1 TABLET, FILM COATED ORAL
Qty: 30 | Refills: 0
Start: 2023-10-25 | End: 2023-11-23

## 2023-10-25 RX ORDER — WARFARIN SODIUM 2.5 MG/1
2 TABLET ORAL ONCE
Refills: 0 | Status: COMPLETED | OUTPATIENT
Start: 2023-10-25 | End: 2023-10-25

## 2023-10-25 RX ADMIN — Medication 650 MILLIGRAM(S): at 01:38

## 2023-10-25 RX ADMIN — Medication 1 APPLICATION(S): at 05:28

## 2023-10-25 RX ADMIN — NYSTATIN CREAM 1 APPLICATION(S): 100000 CREAM TOPICAL at 05:29

## 2023-10-25 RX ADMIN — Medication 650 MILLIGRAM(S): at 09:11

## 2023-10-25 RX ADMIN — AMPICILLIN SODIUM AND SULBACTAM SODIUM 200 GRAM(S): 250; 125 INJECTION, POWDER, FOR SUSPENSION INTRAMUSCULAR; INTRAVENOUS at 23:41

## 2023-10-25 RX ADMIN — Medication 1 DROP(S): at 11:34

## 2023-10-25 RX ADMIN — Medication 25 MILLIGRAM(S): at 05:28

## 2023-10-25 RX ADMIN — Medication 650 MILLIGRAM(S): at 08:11

## 2023-10-25 RX ADMIN — Medication 20 MILLIGRAM(S): at 05:29

## 2023-10-25 RX ADMIN — Medication 650 MILLIGRAM(S): at 00:38

## 2023-10-25 RX ADMIN — WARFARIN SODIUM 2 MILLIGRAM(S): 2.5 TABLET ORAL at 23:42

## 2023-10-25 RX ADMIN — CHLORHEXIDINE GLUCONATE 1 APPLICATION(S): 213 SOLUTION TOPICAL at 08:17

## 2023-10-25 RX ADMIN — Medication 1 TABLET(S): at 11:33

## 2023-10-25 RX ADMIN — AMIODARONE HYDROCHLORIDE 200 MILLIGRAM(S): 400 TABLET ORAL at 05:28

## 2023-10-25 RX ADMIN — Medication 300 MILLIGRAM(S): at 11:34

## 2023-10-25 RX ADMIN — SACUBITRIL AND VALSARTAN 1 TABLET(S): 24; 26 TABLET, FILM COATED ORAL at 05:28

## 2023-10-25 NOTE — PROGRESS NOTE ADULT - PROVIDER SPECIALTY LIST ADULT
Cardiology
Surgery
Cardiology
Internal Medicine
Surgery
Trauma Surgery
Trauma Surgery
Internal Medicine

## 2023-10-25 NOTE — DISCHARGE NOTE NURSING/CASE MANAGEMENT/SOCIAL WORK - NSSCTYPOFSERV_GEN_ALL_CORE
Home RN services for wound vac, Home physical therapy. RN will contact you to schedule a visit time.

## 2023-10-25 NOTE — PROGRESS NOTE ADULT - ATTENDING COMMENTS
seen and examined 10-25-23 @ 1025    VAC on the anteromedial aspect of his distal right leg is functioning well  there is some evolving ecchymosis surrounding the right medial malleolus    WBC = 5  INR = 2.1  Cr - 1.3 -> 1.5 -> 1.7 -> 1.9      10/13/2023 - evacuation of traumatic right leg hematoma  -unlikely to benefit from split-thickness skin graft since the wound is fairly small    afib  -coumadin was restarted on 10/16, and there has been no evidence of recurrent bleeding    stage 3 CKD with mildly decreased GFR  -stop Lasix  -repeat BMP as an outpatient next week    dispo  -discharge home with wound VAC today      I reviewed his labs.  plan discussed with his wife and daughter in detail at bedside

## 2023-10-25 NOTE — DISCHARGE NOTE NURSING/CASE MANAGEMENT/SOCIAL WORK - PATIENT PORTAL LINK FT
You can access the FollowMyHealth Patient Portal offered by [facility] by registering at the following website: [email or web address]. By joining [facility]’s FollowMyHealth portal, you will also be able to view your health information using other applications (apps) compatible with our system.

## 2023-10-25 NOTE — PROGRESS NOTE ADULT - SUBJECTIVE AND OBJECTIVE BOX
SURGERY DAILY PROGRESS NOTE:     SUBJECTIVE/ROS:     Overnight: no acute events   Patient seen and evaluated on AM rounds. Patient states pain is controlled.         OBJECTIVE:  Vital Signs Last 24 Hrs  T(C): 36.8 (25 Oct 2023 05:22), Max: 37.2 (24 Oct 2023 17:07)  T(F): 98.3 (25 Oct 2023 05:22), Max: 99 (24 Oct 2023 17:07)  HR: 69 (25 Oct 2023 05:22) (64 - 81)  BP: 117/68 (25 Oct 2023 05:22) (111/72 - 135/81)  BP(mean): --  RR: 18 (25 Oct 2023 05:22) (18 - 18)  SpO2: 97% (25 Oct 2023 05:22) (94% - 97%)    Parameters below as of 25 Oct 2023 05:22  Patient On (Oxygen Delivery Method): room air      I&O's Detail    24 Oct 2023 07:01  -  25 Oct 2023 07:00  --------------------------------------------------------  IN:    Oral Fluid: 720 mL  Total IN: 720 mL    OUT:    Incontinent per Condom Catheter (mL): 950 mL    Voided (mL): 400 mL  Total OUT: 1350 mL    Total NET: -630 mL        Daily     Daily   MEDICATIONS  (STANDING):  acetaminophen     Tablet .. 650 milliGRAM(s) Oral every 8 hours  allopurinol 300 milliGRAM(s) Oral daily  aMIOdarone    Tablet 200 milliGRAM(s) Oral daily  artificial tears (preservative free) Ophthalmic Solution 1 Drop(s) Both EYES daily  bacitracin   Ointment 1 Application(s) Topical two times a day  chlorhexidine 2% Cloths 1 Application(s) Topical <User Schedule>  furosemide    Tablet 20 milliGRAM(s) Oral daily  lactobacillus acidophilus 1 Tablet(s) Oral daily  latanoprost 0.005% Ophthalmic Solution 1 Drop(s) Both EYES at bedtime  metoprolol succinate ER 25 milliGRAM(s) Oral daily  nystatin Powder 1 Application(s) Topical two times a day  sacubitril 24 mG/valsartan 26 mG 1 Tablet(s) Oral two times a day  Simbrinza (Brinzolamide and Brimonidine) 1 Drop(s) 1 Drop(s) Both EYES two times a day  tamsulosin 0.4 milliGRAM(s) Oral at bedtime    MEDICATIONS  (PRN):  aluminum hydroxide/magnesium hydroxide/simethicone Suspension 30 milliLiter(s) Oral every 4 hours PRN Dyspepsia      Labs:                        12.4   5.03  )-----------( 171      ( 24 Oct 2023 07:03 )             39.2     10-24    139  |  102  |  38<H>  ----------------------------<  99  4.0   |  24  |  1.72<H>    Ca    9.4      24 Oct 2023 07:03  Phos  4.3     10-24  Mg     2.1     10-24      PT/INR - ( 24 Oct 2023 07:03 )   PT: 23.4 sec;   INR: 2.18 ratio           Urinalysis Basic - ( 24 Oct 2023 07:03 )    Color: x / Appearance: x / SG: x / pH: x  Gluc: 99 mg/dL / Ketone: x  / Bili: x / Urobili: x   Blood: x / Protein: x / Nitrite: x   Leuk Esterase: x / RBC: x / WBC x   Sq Epi: x / Non Sq Epi: x / Bacteria: x                Physical Exam:  General: NAD, resting comfortably in bed  HEENT: Normocephalic atraumatic  Respiratory: Nonlabored respirations  Cardio: pulse present  Abdomen: soft, nontender, nondistended  MSK: RLE calf wound vac in place and holding suction

## 2023-10-25 NOTE — ED ADULT NURSE NOTE - OBJECTIVE STATEMENT
83y Male AOx4 with PMH of scoliosis, prostate CA, afib BIBA from home to the ED for wound check. Per aide at bedside, pt was DC today x1 hr ago and noticed large blister on top of right foot. 83y Male AOx4 with PMH of scoliosis, prostate CA, afib BIBA from home to the ED for wound check. Per aide at bedside, pt was DC today x1 hr ago and noticed large blister on top of right foot that was not present during hospital stay. Wound vac was placed on lower right leg s/p fall, still in place. Aide states she raised concerns when prior to DC "a doctor wrapped his foot way too tightly from his toes all the way right below his knee".  Denies N/V, fever/chills, SOB, chest pain. Spontaneous/unlabored respirations, speaking in full sentences. Side rails up, bed in lowest position,  safety maintained.

## 2023-10-25 NOTE — ED PROVIDER NOTE - OBJECTIVE STATEMENT
Private Physician Moy Surg  83y m pmh Afib on coumadin, CHF, Prostate ca, Recent admission for RLE infected hematoma, Pt was taken To OR w I&D 10/14 and dc today. Now comes to ed c/o swelling erythema and new blisters forming today found by aide when  pt arrived home this evening. Pt original injury pt had fallen and had swelling hematoma seen by by podiatry and when symptoms worsened  and referred to ED.

## 2023-10-25 NOTE — PROGRESS NOTE ADULT - ASSESSMENT
83 year old male PMH atrial fibrillation on coumadin, heart failure, prostate CA presents to ED complaining of pain and swelling to the right lower extremity predominantly the right ankle and lower leg since last week.  Large swelling of the RLE distal shin/calf, very erythematous from below the knee to dorsal foot, tender to palpation, dark eschar overlaying the surface. Concern for hematoma with possible infection and surrounding cellulitis. Now s/p I&D in OR on 10/14. Home with wound vac.     PLAN:  - Pain control PRN  - Appreciate medicine recs  - DVT ppx: Coumadin - f/u AM INR  - PT: HPT and VNS  - Will need home vac  - Discharge planning     Trauma/ACS  [id number] 83 year old male PMH atrial fibrillation on coumadin, heart failure, prostate CA presents to ED complaining of pain and swelling to the right lower extremity predominantly the right ankle and lower leg since last week.  Large swelling of the RLE distal shin/calf, very erythematous from below the knee to dorsal foot, tender to palpation, dark eschar overlaying the surface. Concern for hematoma with possible infection and surrounding cellulitis. Now s/p I&D in OR on 10/14. Home with wound vac.     PLAN:  - Pain control PRN  - Appreciate medicine recs, AM Cr 1.96  - DVT ppx: Coumadin - INR therapeutic  - PT: HPT and VNS  - Will need home vac, change today   - Discharge planning     Trauma/ACS  [id number]

## 2023-10-25 NOTE — PROGRESS NOTE ADULT - PROBLEM SELECTOR PLAN 3
- no echo on file - obtained echo from cardiologist's office: has HF with reduced ejection fraction  - outpatient cardiologist is Dr. Loredo   - echo 3/2022: EF 40%, mod MR, mild-mod AR  - only on metoprolol succinate for GDMT  - discussed with cardiology, started entresto for GDMT, tolerating well  ******  - due to NATHAN, hold entresto and lasix, resume entresto when Cr is 1.6 or below - needs repeat BMP (can be done along with next INR check) in 1 week. Discussed with patient's aid and wife, moving forward only take lasix as needed for mild shortness of breath, swelling of legs and/or arms.

## 2023-10-25 NOTE — PROGRESS NOTE ADULT - SUBJECTIVE AND OBJECTIVE BOX
DATE OF SERVICE: 10-25-23 @ 18:11    Patient is a 83y old  Male who presents with a chief complaint of Fall (18 Oct 2023 18:01)      INTERVAL HISTORY: Feels ok.     REVIEW OF SYSTEMS:  CONSTITUTIONAL: No weakness  EYES/ENT: No visual changes;  No throat pain   NECK: No pain or stiffness  RESPIRATORY: No cough, wheezing; No shortness of breath  CARDIOVASCULAR: No chest pain or palpitations  GASTROINTESTINAL: No abdominal  pain. No nausea, vomiting, or hematemesis  GENITOURINARY: No dysuria, frequency or hematuria  NEUROLOGICAL: No stroke like symptoms  SKIN: No rashes    	  MEDICATIONS:  aMIOdarone    Tablet 200 milliGRAM(s) Oral daily  metoprolol succinate ER 25 milliGRAM(s) Oral daily        PHYSICAL EXAM:  T(C): 36.6 (10-25-23 @ 16:53), Max: 36.8 (10-25-23 @ 05:22)  HR: 69 (10-25-23 @ 16:53) (59 - 74)  BP: 106/65 (10-25-23 @ 16:53) (99/62 - 117/68)  RR: 15 (10-25-23 @ 16:53) (15 - 18)  SpO2: 96% (10-25-23 @ 16:53) (93% - 97%)  Wt(kg): --  I&O's Summary    24 Oct 2023 07:01  -  25 Oct 2023 07:00  --------------------------------------------------------  IN: 960 mL / OUT: 1550 mL / NET: -590 mL    25 Oct 2023 07:01  -  25 Oct 2023 18:11  --------------------------------------------------------  IN: 480 mL / OUT: 700 mL / NET: -220 mL          Appearance: In no distress	  HEENT:    PERRL, EOMI	  Cardiovascular:  S1 S2, No JVD  Respiratory: Lungs clear to auscultation	  Gastrointestinal:  Soft, Non-tender, + BS	  Vascularature:  No edema of LE  Psychiatric: Appropriate affect   Neuro: no acute focal deficits                               12.3   5.81  )-----------( 181      ( 25 Oct 2023 07:26 )             39.0     10-25    139  |  101  |  48<H>  ----------------------------<  98  4.3   |  27  |  1.96<H>    Ca    9.0      25 Oct 2023 07:23  Phos  3.8     10-25  Mg     2.1     10-25          Labs personally reviewed      ASSESSMENT/PLAN: 	    82 y/o male with pAF, HFrEF, prostate ca, dementia who presented with hematoma in RLE.    Problem/Plan - 1:  ·  Problem: Chronic systolic heart failure.   ·  Plan: Most recent office note from Jan 2020 notes EF 20-25% drop from 45% with previous normal cors  - echo 3/2022: EF 40%, mod MR, mild-mod AR  - NICM  - Needs GDMT- continue on metoprolol succinate    - Lasix and Entresto on hold given NATHAN. Will resume as OP following repeat BMP.  - OP follow up with Dr Ramos    Problem/Plan - 2:  ·  Problem: Paroxysmal atrial fibrillation.   ·  Plan: h/o pAF  - continue home Amio and Metoprolol  - s/p DCCV 2/2022  - warfarin with target INR 2-3 (now therapeutic)       Problem/Plan - 3:  ·  Problem: Prophylactic measure.   ·  Plan: - resume home glaucoma eye drops  - tolerating regular diet      BARTOLO Gonzalez-NP   Fede Mcallister DO [facility]  Cardiovascular Medicine  [address] [place] [address], [address]  Available through call or text on [employer]  Office: [phone]   DATE OF SERVICE: 10-25-23 @ 18:11    Patient is a 83y old  Male who presents with a chief complaint of Fall (18 Oct 2023 18:01)      INTERVAL HISTORY: Feels ok.     REVIEW OF SYSTEMS:  CONSTITUTIONAL: No weakness  EYES/ENT: No visual changes;  No throat pain   NECK: No pain or stiffness  RESPIRATORY: No cough, wheezing; No shortness of breath  CARDIOVASCULAR: No chest pain or palpitations  GASTROINTESTINAL: No abdominal  pain. No nausea, vomiting, or hematemesis  GENITOURINARY: No dysuria, frequency or hematuria  NEUROLOGICAL: No stroke like symptoms  SKIN: No rashes    	  MEDICATIONS:  aMIOdarone    Tablet 200 milliGRAM(s) Oral daily  metoprolol succinate ER 25 milliGRAM(s) Oral daily        PHYSICAL EXAM:  T(C): 36.6 (10-25-23 @ 16:53), Max: 36.8 (10-25-23 @ 05:22)  HR: 69 (10-25-23 @ 16:53) (59 - 74)  BP: 106/65 (10-25-23 @ 16:53) (99/62 - 117/68)  RR: 15 (10-25-23 @ 16:53) (15 - 18)  SpO2: 96% (10-25-23 @ 16:53) (93% - 97%)  Wt(kg): --  I&O's Summary    24 Oct 2023 07:01  -  25 Oct 2023 07:00  --------------------------------------------------------  IN: 960 mL / OUT: 1550 mL / NET: -590 mL    25 Oct 2023 07:01  -  25 Oct 2023 18:11  --------------------------------------------------------  IN: 480 mL / OUT: 700 mL / NET: -220 mL          Appearance: In no distress	  HEENT:    PERRL, EOMI	  Cardiovascular:  S1 S2, No JVD  Respiratory: Lungs clear to auscultation	  Gastrointestinal:  Soft, Non-tender, + BS	  Vascularature:  No edema of LE  Psychiatric: Appropriate affect   Neuro: no acute focal deficits                               12.3   5.81  )-----------( 181      ( 25 Oct 2023 07:26 )             39.0     10-25    139  |  101  |  48<H>  ----------------------------<  98  4.3   |  27  |  1.96<H>    Ca    9.0      25 Oct 2023 07:23  Phos  3.8     10-25  Mg     2.1     10-25          Labs personally reviewed      ASSESSMENT/PLAN: 	    84 y/o male with pAF, HFrEF, prostate ca, dementia who presented with hematoma in RLE.    Problem/Plan - 1:  ·  Problem: Chronic systolic heart failure.   ·  Plan: Most recent office note from Jan 2020 notes EF 20-25% drop from 45% with previous normal cors  - echo 3/2022: EF 40%, mod MR, mild-mod AR  - NICM  - Needs GDMT- continue on metoprolol succinate    - Lasix and Entresto on hold given NATHAN. Will resume as OP following repeat BMP.  - OP follow up with Dr Ramos    Problem/Plan - 2:  ·  Problem: Paroxysmal atrial fibrillation.   ·  Plan: h/o pAF  - continue home Amio and Metoprolol  - s/p DCCV 2/2022  - warfarin with target INR 2-3 (now therapeutic)       Problem/Plan - 3:  ·  Problem: Prophylactic measure.   ·  Plan: - resume home glaucoma eye drops  - tolerating regular diet        BARTOLO Gonzalez-NP   Fede Mcallister DO [facility]  Cardiovascular Medicine  [address] [place] [address], [address]  Available through call or text on [employer] TEAM[employer]  Office: [phone]

## 2023-10-25 NOTE — ED ADULT NURSE NOTE - NSFALLHARMRISKINTERV_ED_ALL_ED

## 2023-10-25 NOTE — ED ADULT NURSE NOTE - COVID-19 RESULT DATE/TIME
Have You Had A Facial Before?: has had a previous facial
When Outside In The Sun, Do You...: mostly burns, rarely tans
Additional History: Deep pore\\nSteam. Double cleanse with BFW. Remove with hot towel and 4x4. Finishing touch scrub. Remove with hot towel. Facial massage. 2 minutes. Stop steam.  Cool towel. Extractions. Cool towel. Pink mask. 10 minutes. Remove with dark towels. Warm 4x4. Vitamin c and hydrating serum. Avene calm. Avene day.
21-Oct-2023 22:45

## 2023-10-25 NOTE — CHART NOTE - NSCHARTNOTEFT_GEN_A_CORE
informed by answering service that message left by patient- returned call at [phone]- patient's wife and daughter came to the phone- they were concerned about the dressing and how tight it was- stating there were more blisters. I instructed them to bring patient back to the ED and they declined asking to speak to administration and wanting to have someone look at it. I told them that someone could call them in the morning and directed to call [facility] for any complaints. informed by answering service that message left by patient- returned call at [phone]- patient's wife and daughter came to the phone- they were concerned about the dressing and how tight it was- stating there were more blisters. I instructed them to bring patient back to the ED and they declined asking to speak to administration and wanting to have someone look at it. I told them that someone could call  admin in the morning and directed to call [facility] for any complaints.  d/w trauma surgery- Nery Herrera

## 2023-10-25 NOTE — PROGRESS NOTE ADULT - PROBLEM SELECTOR PLAN 4
h/o pAF  - continue home Amio and Metoprolol  - s/p DCCV 2/2022  - per surgery ok to resume anticoagulation - warfarin  - coumadin regimen: 2mg every day, but 4mg on Tuesdays/Thursdays  - check daily INR while inpatient, and then resume weekly INR after discharge

## 2023-10-25 NOTE — ED ADULT NURSE NOTE - NS ED NURSE LEVEL OF CONSCIOUSNESS AFFECT
Lm letting pt mother know appt on 06/09/2020 has been canceled due to well child exams only being until 3pm. Last well exam daily is 230. So to please call office to reschedule for another day/time   Calm/Appropriate

## 2023-10-25 NOTE — ED PROVIDER NOTE - CLINICAL SUMMARY MEDICAL DECISION MAKING FREE TEXT BOX
Adult male w recent admit for infected ulcer rle pw c/o blisters to rt foot sp dc today. Returns for eval. Plan check xr, labs and cs surg.   Jonah Culver MD, Facep

## 2023-10-25 NOTE — CONSULT NOTE ADULT - ASSESSMENT
84 yo M w/ PMHx of atrial fibrillation on coumadin, heart failure, prostate CA recently admitted for RLE hematoma s/p evacuation 10/13 and discharge home today w/ wound vac. Patient now presents back to the ED w/ family and aid w/ concerns for developing R dorsal foot fluid blister and ecchymosis 2/2 compression wrapping. In the ED, AVSS. No leukocytosis. Surgery consulted for evaluation.     Recommendations:   - No acute surgical intervention,     Discussed with Dr. Bernard    ACS/Trauma  [id number]  84 yo M w/ PMHx of atrial fibrillation on coumadin, heart failure, prostate CA recently admitted for RLE hematoma s/p evacuation 10/13 and discharge home today w/ wound vac. Patient now presents back to the ED w/ family and aid w/ concerns for developing R dorsal foot fluid blister and ecchymosis 2/2 compression wrapping. In the ED, AVSS. No leukocytosis. Surgery consulted for evaluation.     Recommendations:   - No acute surgical intervention  - Recommend medicine admission, wound care evaluation in the AM   - Bacitracin to the dorsum of R foot   - Recommend Ancef x24 hours    Discussed with Dr. Bernard    ACS/Trauma  [id number]

## 2023-10-25 NOTE — PROGRESS NOTE ADULT - SUBJECTIVE AND OBJECTIVE BOX
[facility] Division of Hospital Medicine  Mónica Astudillo DO  Available on Teams Reinaldo    Patient is a 83y old  Male who presents with a chief complaint of Fall (18 Oct 2023 18:01)      SUBJECTIVE / OVERNIGHT EVENTS: none. Patient has no complaints. Not short of breath.  ADDITIONAL REVIEW OF SYSTEMS: negative    MEDICATIONS  (STANDING):  acetaminophen     Tablet .. 650 milliGRAM(s) Oral every 8 hours  allopurinol 300 milliGRAM(s) Oral daily  aMIOdarone    Tablet 200 milliGRAM(s) Oral daily  artificial tears (preservative free) Ophthalmic Solution 1 Drop(s) Both EYES daily  bacitracin   Ointment 1 Application(s) Topical two times a day  chlorhexidine 2% Cloths 1 Application(s) Topical <User Schedule>  lactobacillus acidophilus 1 Tablet(s) Oral daily  latanoprost 0.005% Ophthalmic Solution 1 Drop(s) Both EYES at bedtime  metoprolol succinate ER 25 milliGRAM(s) Oral daily  nystatin Powder 1 Application(s) Topical two times a day  Simbrinza (Brinzolamide and Brimonidine) 1 Drop(s) 1 Drop(s) Both EYES two times a day  tamsulosin 0.4 milliGRAM(s) Oral at bedtime    MEDICATIONS  (PRN):  aluminum hydroxide/magnesium hydroxide/simethicone Suspension 30 milliLiter(s) Oral every 4 hours PRN Dyspepsia      CAPILLARY BLOOD GLUCOSE        I&O's Summary    24 Oct 2023 07:01  -  25 Oct 2023 07:00  --------------------------------------------------------  IN: 960 mL / OUT: 1550 mL / NET: -590 mL    25 Oct 2023 07:01  -  25 Oct 2023 14:54  --------------------------------------------------------  IN: 480 mL / OUT: 400 mL / NET: 80 mL        PHYSICAL EXAM:  Vital Signs Last 24 Hrs  T(C): 36.4 (25 Oct 2023 13:22), Max: 37.2 (24 Oct 2023 17:07)  T(F): 97.5 (25 Oct 2023 13:22), Max: 99 (24 Oct 2023 17:07)  HR: 59 (25 Oct 2023 13:22) (59 - 78)  BP: 104/64 (25 Oct 2023 13:22) (104/64 - 135/81)  BP(mean): --  RR: 18 (25 Oct 2023 13:22) (18 - 18)  SpO2: 97% (25 Oct 2023 13:22) (93% - 97%)    Parameters below as of 25 Oct 2023 13:22  Patient On (Oxygen Delivery Method): oxygen tent    CONSTITUTIONAL: Well-groomed, in no apparent distress, awake, obese  EYES: No conjunctival or scleral injection, non-icteric; PERRLA and symmetric  ENMT: No external nasal lesions; no pharyngeal injection or exudates, oral mucosa with moist membranes  NECK: Trachea midline without palpable neck mass; thyroid not enlarged and non-tender  RESPIRATORY: Breathing comfortably; lungs CTA without wheeze/rhonchi/rales  CARDIOVASCULAR: +S1S2, RRR, no M/G/R; pedal pulses full and symmetric; no lower extremity edema  GASTROINTESTINAL: No palpable masses or tenderness, +BS throughout, no rebound/guarding  LYMPHATIC: No cervical LAD or tenderness; no axillary LAD or tenderness  MUSCULOSKELETAL: no digital clubbing or cyanosis; no paraspinal tenderness; right calf wrapped in ACE bandages, mild swelling in the toes.   NEUROLOGIC: CN II-XII intact; sensation intact in LEs b/l to light touch  PSYCHIATRIC: oriented x1; mood and affect appropriate    LABS:                        12.3   5.81  )-----------( 181      ( 25 Oct 2023 07:26 )             39.0     10-25    139  |  101  |  48<H>  ----------------------------<  98  4.3   |  27  |  1.96<H>    Ca    9.0      25 Oct 2023 07:23  Phos  3.8     10-25  Mg     2.1     10-25      PT/INR - ( 25 Oct 2023 07:26 )   PT: 22.3 sec;   INR: 2.17 ratio               Urinalysis Basic - ( 25 Oct 2023 07:23 )    Color: x / Appearance: x / SG: x / pH: x  Gluc: 98 mg/dL / Ketone: x  / Bili: x / Urobili: x   Blood: x / Protein: x / Nitrite: x   Leuk Esterase: x / RBC: x / WBC x   Sq Epi: x / Non Sq Epi: x / Bacteria: x

## 2023-10-25 NOTE — PROGRESS NOTE ADULT - PROBLEM SELECTOR PLAN 2
-baseline Cr seems to be 1.5, after calling his PCP's office   - per wife, he does not see a nephrologist, has never been told of any kidney disease  -monitor intake/output  -stable function  -Cr ranging from 1.3-1.7 mostly during this admission  -bladder scan done - not retaining    Mild NATHAN, with Cr 1.9 - likely overdiuresis, hold lasix 20mg and entresto

## 2023-10-26 ENCOUNTER — TRANSCRIPTION ENCOUNTER (OUTPATIENT)
Age: 83
End: 2023-10-26

## 2023-10-26 DIAGNOSIS — I50.22 CHRONIC SYSTOLIC (CONGESTIVE) HEART FAILURE: ICD-10-CM

## 2023-10-26 DIAGNOSIS — Z29.9 ENCOUNTER FOR PROPHYLACTIC MEASURES, UNSPECIFIED: ICD-10-CM

## 2023-10-26 DIAGNOSIS — T14.8XXA OTHER INJURY OF UNSPECIFIED BODY REGION, INITIAL ENCOUNTER: ICD-10-CM

## 2023-10-26 DIAGNOSIS — I48.0 PAROXYSMAL ATRIAL FIBRILLATION: ICD-10-CM

## 2023-10-26 DIAGNOSIS — N18.30 CHRONIC KIDNEY DISEASE, STAGE 3 UNSPECIFIED: ICD-10-CM

## 2023-10-26 LAB
ANION GAP SERPL CALC-SCNC: 12 MMOL/L — SIGNIFICANT CHANGE UP (ref 5–17)
ANION GAP SERPL CALC-SCNC: 12 MMOL/L — SIGNIFICANT CHANGE UP (ref 5–17)
APTT BLD: 38.5 SEC — HIGH (ref 24.5–35.6)
APTT BLD: 38.5 SEC — HIGH (ref 24.5–35.6)
BUN SERPL-MCNC: 49 MG/DL — HIGH (ref 7–23)
BUN SERPL-MCNC: 49 MG/DL — HIGH (ref 7–23)
CALCIUM SERPL-MCNC: 8.7 MG/DL — SIGNIFICANT CHANGE UP (ref 8.4–10.5)
CALCIUM SERPL-MCNC: 8.7 MG/DL — SIGNIFICANT CHANGE UP (ref 8.4–10.5)
CHLORIDE SERPL-SCNC: 102 MMOL/L — SIGNIFICANT CHANGE UP (ref 96–108)
CHLORIDE SERPL-SCNC: 102 MMOL/L — SIGNIFICANT CHANGE UP (ref 96–108)
CO2 SERPL-SCNC: 23 MMOL/L — SIGNIFICANT CHANGE UP (ref 22–31)
CO2 SERPL-SCNC: 23 MMOL/L — SIGNIFICANT CHANGE UP (ref 22–31)
CREAT SERPL-MCNC: 1.68 MG/DL — HIGH (ref 0.5–1.3)
CREAT SERPL-MCNC: 1.68 MG/DL — HIGH (ref 0.5–1.3)
EGFR: 40 ML/MIN/1.73M2 — LOW
EGFR: 40 ML/MIN/1.73M2 — LOW
GLUCOSE SERPL-MCNC: 120 MG/DL — HIGH (ref 70–99)
GLUCOSE SERPL-MCNC: 120 MG/DL — HIGH (ref 70–99)
HCT VFR BLD CALC: 37.6 % — LOW (ref 39–50)
HCT VFR BLD CALC: 37.6 % — LOW (ref 39–50)
HGB BLD-MCNC: 11.7 G/DL — LOW (ref 13–17)
HGB BLD-MCNC: 11.7 G/DL — LOW (ref 13–17)
INR BLD: 2.18 RATIO — HIGH (ref 0.85–1.18)
INR BLD: 2.18 RATIO — HIGH (ref 0.85–1.18)
INR BLD: 2.54 RATIO — HIGH (ref 0.85–1.18)
INR BLD: 2.54 RATIO — HIGH (ref 0.85–1.18)
MAGNESIUM SERPL-MCNC: 2 MG/DL — SIGNIFICANT CHANGE UP (ref 1.6–2.6)
MAGNESIUM SERPL-MCNC: 2 MG/DL — SIGNIFICANT CHANGE UP (ref 1.6–2.6)
MCHC RBC-ENTMCNC: 31.1 GM/DL — LOW (ref 32–36)
MCHC RBC-ENTMCNC: 31.1 GM/DL — LOW (ref 32–36)
MCHC RBC-ENTMCNC: 31.2 PG — SIGNIFICANT CHANGE UP (ref 27–34)
MCHC RBC-ENTMCNC: 31.2 PG — SIGNIFICANT CHANGE UP (ref 27–34)
MCV RBC AUTO: 100.3 FL — HIGH (ref 80–100)
MCV RBC AUTO: 100.3 FL — HIGH (ref 80–100)
NRBC # BLD: 0 /100 WBCS — SIGNIFICANT CHANGE UP (ref 0–0)
NRBC # BLD: 0 /100 WBCS — SIGNIFICANT CHANGE UP (ref 0–0)
PHOSPHATE SERPL-MCNC: 3.8 MG/DL — SIGNIFICANT CHANGE UP (ref 2.5–4.5)
PHOSPHATE SERPL-MCNC: 3.8 MG/DL — SIGNIFICANT CHANGE UP (ref 2.5–4.5)
PLATELET # BLD AUTO: 190 K/UL — SIGNIFICANT CHANGE UP (ref 150–400)
PLATELET # BLD AUTO: 190 K/UL — SIGNIFICANT CHANGE UP (ref 150–400)
POTASSIUM SERPL-MCNC: 4.1 MMOL/L — SIGNIFICANT CHANGE UP (ref 3.5–5.3)
POTASSIUM SERPL-MCNC: 4.1 MMOL/L — SIGNIFICANT CHANGE UP (ref 3.5–5.3)
POTASSIUM SERPL-SCNC: 4.1 MMOL/L — SIGNIFICANT CHANGE UP (ref 3.5–5.3)
POTASSIUM SERPL-SCNC: 4.1 MMOL/L — SIGNIFICANT CHANGE UP (ref 3.5–5.3)
PROTHROM AB SERPL-ACNC: 23.4 SEC — HIGH (ref 9.5–13)
PROTHROM AB SERPL-ACNC: 23.4 SEC — HIGH (ref 9.5–13)
PROTHROM AB SERPL-ACNC: 26 SEC — HIGH (ref 9.5–13)
PROTHROM AB SERPL-ACNC: 26 SEC — HIGH (ref 9.5–13)
RBC # BLD: 3.75 M/UL — LOW (ref 4.2–5.8)
RBC # BLD: 3.75 M/UL — LOW (ref 4.2–5.8)
RBC # FLD: 14.8 % — HIGH (ref 10.3–14.5)
RBC # FLD: 14.8 % — HIGH (ref 10.3–14.5)
SODIUM SERPL-SCNC: 137 MMOL/L — SIGNIFICANT CHANGE UP (ref 135–145)
SODIUM SERPL-SCNC: 137 MMOL/L — SIGNIFICANT CHANGE UP (ref 135–145)
WBC # BLD: 6.03 K/UL — SIGNIFICANT CHANGE UP (ref 3.8–10.5)
WBC # BLD: 6.03 K/UL — SIGNIFICANT CHANGE UP (ref 3.8–10.5)
WBC # FLD AUTO: 6.03 K/UL — SIGNIFICANT CHANGE UP (ref 3.8–10.5)
WBC # FLD AUTO: 6.03 K/UL — SIGNIFICANT CHANGE UP (ref 3.8–10.5)

## 2023-10-26 PROCEDURE — 99223 1ST HOSP IP/OBS HIGH 75: CPT

## 2023-10-26 PROCEDURE — 99222 1ST HOSP IP/OBS MODERATE 55: CPT

## 2023-10-26 RX ORDER — AMIODARONE HYDROCHLORIDE 400 MG/1
1 TABLET ORAL
Qty: 0 | Refills: 0 | DISCHARGE

## 2023-10-26 RX ORDER — CEFAZOLIN SODIUM 1 G
1000 VIAL (EA) INJECTION ONCE
Refills: 0 | Status: COMPLETED | OUTPATIENT
Start: 2023-10-26 | End: 2023-10-26

## 2023-10-26 RX ORDER — WARFARIN SODIUM 2.5 MG/1
2 TABLET ORAL ONCE
Refills: 0 | Status: COMPLETED | OUTPATIENT
Start: 2023-10-26 | End: 2023-10-26

## 2023-10-26 RX ORDER — CHLORHEXIDINE GLUCONATE 213 G/1000ML
1 SOLUTION TOPICAL
Refills: 0 | Status: DISCONTINUED | OUTPATIENT
Start: 2023-10-26 | End: 2023-10-27

## 2023-10-26 RX ORDER — BACITRACIN ZINC 500 UNIT/G
1 OINTMENT IN PACKET (EA) TOPICAL DAILY
Refills: 0 | Status: DISCONTINUED | OUTPATIENT
Start: 2023-10-26 | End: 2023-10-27

## 2023-10-26 RX ORDER — DORZOLAMIDE HYDROCHLORIDE 20 MG/ML
1 SOLUTION/ DROPS OPHTHALMIC
Refills: 0 | Status: DISCONTINUED | OUTPATIENT
Start: 2023-10-26 | End: 2023-10-26

## 2023-10-26 RX ORDER — LATANOPROST 0.05 MG/ML
1 SOLUTION/ DROPS OPHTHALMIC; TOPICAL AT BEDTIME
Refills: 0 | Status: DISCONTINUED | OUTPATIENT
Start: 2023-10-26 | End: 2023-10-27

## 2023-10-26 RX ORDER — TAMSULOSIN HYDROCHLORIDE 0.4 MG/1
0.4 CAPSULE ORAL AT BEDTIME
Refills: 0 | Status: DISCONTINUED | OUTPATIENT
Start: 2023-10-26 | End: 2023-10-27

## 2023-10-26 RX ORDER — BRINZOLAMIDE/BRIMONIDINE TARTRATE 10; 2 MG/ML; MG/ML
1 SUSPENSION/ DROPS OPHTHALMIC
Qty: 0 | Refills: 0 | DISCHARGE

## 2023-10-26 RX ORDER — CEFAZOLIN SODIUM 1 G
1000 VIAL (EA) INJECTION EVERY 8 HOURS
Refills: 0 | Status: DISCONTINUED | OUTPATIENT
Start: 2023-10-26 | End: 2023-10-27

## 2023-10-26 RX ORDER — METOPROLOL TARTRATE 50 MG
25 TABLET ORAL DAILY
Refills: 0 | Status: DISCONTINUED | OUTPATIENT
Start: 2023-10-26 | End: 2023-10-27

## 2023-10-26 RX ORDER — ALLOPURINOL 300 MG
300 TABLET ORAL DAILY
Refills: 0 | Status: DISCONTINUED | OUTPATIENT
Start: 2023-10-26 | End: 2023-10-27

## 2023-10-26 RX ORDER — BRIMONIDINE TARTRATE 2 MG/MG
1 SOLUTION/ DROPS OPHTHALMIC
Refills: 0 | Status: DISCONTINUED | OUTPATIENT
Start: 2023-10-26 | End: 2023-10-27

## 2023-10-26 RX ORDER — AMIODARONE HYDROCHLORIDE 400 MG/1
200 TABLET ORAL DAILY
Refills: 0 | Status: DISCONTINUED | OUTPATIENT
Start: 2023-10-26 | End: 2023-10-27

## 2023-10-26 RX ORDER — CEFAZOLIN SODIUM 1 G
VIAL (EA) INJECTION
Refills: 0 | Status: DISCONTINUED | OUTPATIENT
Start: 2023-10-26 | End: 2023-10-27

## 2023-10-26 RX ADMIN — Medication 25 MILLIGRAM(S): at 18:09

## 2023-10-26 RX ADMIN — Medication 100 MILLIGRAM(S): at 06:13

## 2023-10-26 RX ADMIN — TAMSULOSIN HYDROCHLORIDE 0.4 MILLIGRAM(S): 0.4 CAPSULE ORAL at 21:57

## 2023-10-26 RX ADMIN — LATANOPROST 1 DROP(S): 0.05 SOLUTION/ DROPS OPHTHALMIC; TOPICAL at 21:58

## 2023-10-26 RX ADMIN — WARFARIN SODIUM 2 MILLIGRAM(S): 2.5 TABLET ORAL at 21:57

## 2023-10-26 RX ADMIN — Medication 100 MILLIGRAM(S): at 21:58

## 2023-10-26 RX ADMIN — BRIMONIDINE TARTRATE 1 DROP(S): 2 SOLUTION/ DROPS OPHTHALMIC at 18:06

## 2023-10-26 RX ADMIN — Medication 1 APPLICATION(S): at 12:29

## 2023-10-26 RX ADMIN — Medication 100 MILLIGRAM(S): at 14:35

## 2023-10-26 RX ADMIN — Medication 300 MILLIGRAM(S): at 12:29

## 2023-10-26 NOTE — H&P ADULT - NSHPPHYSICALEXAM_GEN_ALL_CORE
Vital Signs Last 24 Hrs  T(C): 36.6 (26 Oct 2023 05:00), Max: 37.2 (26 Oct 2023 01:00)  T(F): 97.9 (26 Oct 2023 05:00), Max: 98.9 (26 Oct 2023 01:00)  HR: 65 (26 Oct 2023 05:00) (59 - 76)  BP: 128/60 (26 Oct 2023 05:00) (90/71 - 128/60)  BP(mean): 79 (26 Oct 2023 05:00) (69 - 79)  RR: 18 (26 Oct 2023 05:00) (15 - 19)  SpO2: 97% (26 Oct 2023 05:00) (93% - 97%)    Parameters below as of 26 Oct 2023 05:00  Patient On (Oxygen Delivery Method): room air        CONSTITUTIONAL: Well-groomed, in no apparent distress  EYES: No conjunctival or scleral injection, non-icteric; PERRLA and symmetric  ENMT: No external nasal lesions; nasal mucosa not inflamed; oral mucosa with dry membranes  RESPIRATORY: Breathing comfortably; mild expiratory wheezing in anterior lung fields  CARDIOVASCULAR: Irregular rate, +S1S2, no M/G/R; pedal pulses full and symmetric; 2+ edema in RLE and foot, 1+ edema in LLE  GASTROINTESTINAL: No palpable masses or tenderness, +BS throughout, no rebound/guarding; no hepatosplenomegaly; no hernia palpated  LYMPHATIC: No cervical LAD or tenderness  MUSCULOSKELETAL: No malalignment of extremities, no joint swelling or tenderness  SKIN: Large blister over dorsal R foot with serosanguineous fluid underneath. Skin overlying blister intact, no drainage. Surrounding erythema. Wound vac in place over anterior RLE. No purulence noted.   NEUROLOGIC: limited exam. moves upper extremities spontaneously,   PSYCHIATRIC: A+O x 1

## 2023-10-26 NOTE — DISCHARGE NOTE PROVIDER - NSDCCPCAREPLAN_GEN_ALL_CORE_FT
PRINCIPAL DISCHARGE DIAGNOSIS  Diagnosis: Blister  Assessment and Plan of Treatment: Blister overlying dorsal R foot that developed after compression wrapping. Surrounding erythema. Suspect from friction/pressure rather than infection/cellulitis.  Improvement since foot was unwrapped. Will  keep skin overlying blister intact, do not rupture. Evaluated by surgery, no acute surgical intervention. Wound care  recommended __________*****     PRINCIPAL DISCHARGE DIAGNOSIS  Diagnosis: Blister  Assessment and Plan of Treatment: Blister overlying dorsal R foot that developed after compression wrapping. Surrounding erythema. Suspect from friction/pressure rather than infection/cellulitis.  Improvement since foot was unwrapped. Will  keep skin overlying blister intact, do not rupture. Evaluated by surgery, no acute surgical intervention. Wound care  recommended __________*****      SECONDARY DISCHARGE DIAGNOSES  Diagnosis: Heart failure  Assessment and Plan of Treatment: Please follow up with your primary care doctor and cardiologist within one week of discharge re: resuming lasix and entresto. Have repeat blood work during the visit to trend your kidney function.     PRINCIPAL DISCHARGE DIAGNOSIS  Diagnosis: Blister  Assessment and Plan of Treatment: Blister overlying dorsal R foot that developed after compression wrapping. Surrounding erythema. Suspect from friction/pressure rather than infection/cellulitis.  Improvement since foot was unwrapped. Will  keep skin overlying blister intact, do not rupture. Evaluated by surgery, no acute surgical intervention. F/U Wound care  recommendation      SECONDARY DISCHARGE DIAGNOSES  Diagnosis: Heart failure  Assessment and Plan of Treatment: Please follow up with your primary care doctor and cardiologist within one week of discharge re: resuming lasix and entresto. Have repeat blood work during the visit to trend your kidney function.

## 2023-10-26 NOTE — H&P ADULT - PROBLEM SELECTOR PLAN 6
DVT ppx: on coumadin, INR checks daily and dose accordingly  Diet: regular  Dispo: pending  GOC: Full Code

## 2023-10-26 NOTE — DISCHARGE NOTE PROVIDER - HOSPITAL COURSE
HPI:  83 M with hx of paroxysmal Afib on coumadin, HFrEF (EF 40% in 3/2022), prostate cancer, dementia, recent admission for RLE hematoma s/p evacuation in OR with surgery presenting with R foot swelling, erythema, and blister. Pt was just discharged 10/25 with wound vac to site of RLE wound where hematoma previously was. History obtained from health aide at bedside. Per aide, prior to discharge, patient had his R foot wrapped by the surgery team. There was concern that the wrapping was too tight given that the patient was in pain. At home, patient had worsening pain and family noticed that there was developing erythema and formation of blisters around the area. Family called hospital and was advised to bring patient back to ED for evaluation. Foot is now unwrapped with a large blister over dorsal aspect of R foot with serosanguineous fluid collection. Skin overlying blister is intact. Per aide, the erythema and swelling has improved since foot was unwrapped. Patient currently not endorsing pain or discomfort. In ED, patient was given IV unasyn. Pt was evaluated by surgery and was recommended for admission for IV antibiotics.  (26 Oct 2023 05:31)    Hospital Course: Pt presenting with R foot swelling, erythema, and blister. Pt admitted to medicine for further management. Blister overlying dorsal R foot that developed after compression wrapping. Surrounding erythema. Suspect from friction/pressure rather than infection/cellulitis.  Improvement since foot was unwrapped.  Will  keep skin overlying blister intact, do not rupture. Evaluated by surgery, no acute surgical intervention. Low suspicion for acute infection, but will start IV ancef q8h for 24 hours as per surgery.  - Wound care consulted and rec -> ______________*****  Pt is medically stable for discharge home.     Important Medication Changes and Reason:__________***    Active or Pending Issues Requiring Follow-up:___________***    Advanced Directives:   [ x] Full code  [ ] DNR  [ ] Hospice    Discharge Diagnoses:  right foot swelling   paroxysmal Afib    HFrEF   prostate cancer   dementia   RLE hematoma         HPI:  83 M with hx of paroxysmal Afib on coumadin, HFrEF (EF 40% in 3/2022), prostate cancer, dementia, recent admission for RLE hematoma s/p evacuation in OR with surgery presenting with R foot swelling, erythema, and blister. Pt was just discharged 10/25 with wound vac to site of RLE wound where hematoma previously was. History obtained from health aide at bedside. Per aide, prior to discharge, patient had his R foot wrapped by the surgery team. There was concern that the wrapping was too tight given that the patient was in pain. At home, patient had worsening pain and family noticed that there was developing erythema and formation of blisters around the area. Family called hospital and was advised to bring patient back to ED for evaluation. Foot is now unwrapped with a large blister over dorsal aspect of R foot with serosanguineous fluid collection. Skin overlying blister is intact. Per aide, the erythema and swelling has improved since foot was unwrapped. Patient currently not endorsing pain or discomfort. In ED, patient was given IV unasyn. Pt was evaluated by surgery and was recommended for admission for IV antibiotics.  (26 Oct 2023 05:31)    Hospital Course: Pt presenting with R foot swelling, erythema, and blister. Pt admitted to medicine for further management. Blister overlying dorsal R foot that developed after compression wrapping. Surrounding erythema. Suspect from friction/pressure rather than infection/cellulitis.  Improvement since foot was unwrapped.  Will  keep skin overlying blister intact, do not rupture. Evaluated by surgery, no acute surgical intervention. Low suspicion for acute infection, but will start IV ancef q8h for 24 hours as per surgery.  - Wound care consulted and rec -> ______________*****  Pt is medically stable for discharge home.     Important Medication Changes and Reason:__________***    Active or Pending Issues Requiring Follow-up:___________***    Advanced Directives:   [ x] Full code  [ ] DNR  [ ] Hospice    Discharge Diagnoses:  right foot swelling   paroxysmal Afib   HFrEF  prostate cancer  dementia  RLE hematoma    Based on my assessment, this patient’s condition has improved and no longer warrants inpatient status and will be changed to outpatient status prior to being discharged from the hospital.  This decision is based on the following reasons: wound addressed and cared for, discharge instructions per wound care, patient with established discharge plan as set at last hospitalization, discharge date yesterday.       HPI:  83 M with hx of paroxysmal Afib on coumadin, HFrEF (EF 40% in 3/2022), prostate cancer, dementia, recent admission for RLE hematoma s/p evacuation in OR with surgery presenting with R foot swelling, erythema, and blister. Pt was just discharged 10/25 with wound vac to site of RLE wound where hematoma previously was. History obtained from health aide at bedside. Per aide, prior to discharge, patient had his R foot wrapped by the surgery team. There was concern that the wrapping was too tight given that the patient was in pain. At home, patient had worsening pain and family noticed that there was developing erythema and formation of blisters around the area. Family called hospital and was advised to bring patient back to ED for evaluation. Foot is now unwrapped with a large blister over dorsal aspect of R foot with serosanguineous fluid collection. Skin overlying blister is intact. Per aide, the erythema and swelling has improved since foot was unwrapped. Patient currently not endorsing pain or discomfort. In ED, patient was given IV unasyn. Pt was evaluated by surgery and was recommended for admission for IV antibiotics.  (26 Oct 2023 05:31)    Hospital Course: Pt presenting with R foot swelling, erythema, and blister. Pt admitted to medicine for further management. Blister overlying dorsal R foot that developed after compression wrapping. Surrounding erythema. Suspect from friction/pressure rather than infection/cellulitis.  Improvement since foot was unwrapped.  Will  keep skin overlying blister intact, do not rupture. Evaluated by surgery, no acute surgical intervention. Low suspicion for acute infection, but will start IV ancef q8h for 24 hours as per surgery.  - Wound care consulted and rec -> ______________*****      Important Medication Changes and Reason:__________***    Active or Pending Issues Requiring Follow-up:___________***    Advanced Directives:   [ x] Full code  [ ] DNR  [ ] Hospice    Discharge Diagnoses:  right foot swelling   paroxysmal Afib   HFrEF  prostate cancer  dementia  RLE hematoma         HPI:  83 M with hx of paroxysmal Afib on coumadin, HFrEF (EF 40% in 3/2022), prostate cancer, dementia, recent admission for RLE hematoma s/p evacuation in OR with surgery presenting with R foot swelling, erythema, and blister. Pt was just discharged 10/25 with wound vac to site of RLE wound where hematoma previously was. History obtained from health aide at bedside. Per aide, prior to discharge, patient had his R foot wrapped by the surgery team. There was concern that the wrapping was too tight given that the patient was in pain. At home, patient had worsening pain and family noticed that there was developing erythema and formation of blisters around the area. Family called hospital and was advised to bring patient back to ED for evaluation. Foot is now unwrapped with a large blister over dorsal aspect of R foot with serosanguineous fluid collection. Skin overlying blister is intact. Per aide, the erythema and swelling has improved since foot was unwrapped. Patient currently not endorsing pain or discomfort. In ED, patient was given IV unasyn. Pt was evaluated by surgery and was recommended for admission for IV antibiotics.  (26 Oct 2023 05:31)    Hospital Course: Pt presenting with R foot swelling, erythema, and blister. Pt admitted to medicine for further management. Blister overlying dorsal R foot that developed after compression wrapping. Surrounding erythema. Suspect from friction/pressure rather than infection/cellulitis.  Improvement since foot was unwrapped.  Will  keep skin overlying blister intact, do not rupture. Evaluated by surgery, no acute surgical intervention. Low suspicion for acute infection, but will start IV ancef q8h for 24 hours as per surgery.      Wound care consulted and rec -> Wound measured 7cm x 5cm x 2.5cm. Wound with 10% granulation, 10% eschar inferior border, 70% dusky/boggy appearance, 10% slough. Edema noted t/o RLE. No induration, no malodor, no erythema. Wound cleansed with NS, cavilon to periwound, aquacel Ag applied to wound bed, followed by DSD (Non-woven), wrapped with rebecca, secured with tape. ACE wrap applied from forefoot to proximal calf in figure-8 fashion with 50% tension/50% overlap. LE digits 1-5 with <3 sec cap refill after ACE application. Pt left as found in NAD, all lines intact, RLE supported and elevated with pillow      Important Medication Changes and Reason: holding Lasix and Entresto    Active or Pending Issues Requiring Follow-up: BMP for Cr in 1 week with PMD    Advanced Directives:   [ x] Full code  [ ] DNR  [ ] Hospice    Discharge Diagnoses:  right foot swelling   paroxysmal Afib   HFrEF  prostate cancer  dementia  RLE hematoma         HPI:  83 M with hx of paroxysmal Afib on coumadin, HFrEF (EF 40% in 3/2022), prostate cancer, dementia, recent admission for RLE hematoma s/p evacuation in OR with surgery presenting with R foot swelling, erythema, and blister. Pt was just discharged 10/25 with wound vac to site of RLE wound where hematoma previously was. History obtained from health aide at bedside. Per aide, prior to discharge, patient had his R foot wrapped by the surgery team. There was concern that the wrapping was too tight given that the patient was in pain. At home, patient had worsening pain and family noticed that there was developing erythema and formation of blisters around the area. Family called hospital and was advised to bring patient back to ED for evaluation. Foot is now unwrapped with a large blister over dorsal aspect of R foot with serosanguineous fluid collection. Skin overlying blister is intact. Per aide, the erythema and swelling has improved since foot was unwrapped. Patient currently not endorsing pain or discomfort. In ED, patient was given IV unasyn. Pt was evaluated by surgery and was recommended for admission for IV antibiotics.  (26 Oct 2023 05:31)    Hospital Course: Pt presenting with R foot swelling, erythema, and blister. Pt admitted to medicine for further management. Blister overlying dorsal R foot that developed after compression wrapping. Surrounding erythema. Suspect from friction/pressure rather than infection/cellulitis.  Improvement since foot was unwrapped.  Will  keep skin overlying blister intact, do not rupture. Evaluated by surgery, no acute surgical intervention. Low suspicion for acute infection, but will start IV ancef q8h for 24 hours as per surgery.      Wound care consulted and rec -> Wound measured 7cm x 5cm x 2.5cm. Wound with 10% granulation, 10% eschar inferior border, 70% dusky/boggy appearance, 10% slough. Edema noted t/o RLE. No induration, no malodor, no erythema. Wound cleansed with NS, cavilon to periwound, aquacel Ag applied to wound bed, followed by DSD (Non-woven), wrapped with rebecca, secured with tape. ACE wrap applied from forefoot to proximal calf in figure-8 fashion with 50% tension/50% overlap. LE digits 1-5 with <3 sec cap refill after ACE application. Pt left as found in NAD, all lines intact, RLE supported and elevated with pillow      Important Medication Changes and Reason: holding Lasix and Entresto     Active or Pending Issues Requiring Follow-up: BMP for Cr in 1 week with PMD. F/u PMD and cardiology 1 week re: resuming lasix and entresto     Advanced Directives:   [ x] Full code  [ ] DNR  [ ] Hospice    Discharge Diagnoses:  right foot swelling   paroxysmal Afib   HFrEF  prostate cancer  dementia  RLE hematoma

## 2023-10-26 NOTE — H&P ADULT - PROBLEM SELECTOR PLAN 3
Scr. 1.91. Baseline appears to be around 1.5 according to PCP  - Trend BMP, Scr  - Monitor I/Os  - Avoid nephrotoxic medications, renally dose medications as appropriate  - Will continue to hold lasix and entresto

## 2023-10-26 NOTE — DISCHARGE NOTE PROVIDER - NSDCMRMEDTOKEN_GEN_ALL_CORE_FT
acetaminophen 325 mg oral tablet: 2 tab(s) orally every 8 hours  allopurinol 300 mg oral tablet: 1 tab(s) orally once a day  amiodarone 200 mg oral tablet: 1 tab(s) orally once a day  Entresto 24 mg-26 mg oral tablet: 1 tab(s) orally once a day ONLY TAKE ONCE CLEARED BY PRIMARY CARE DOCTOR - THEY NEED TO CHECK KIDNEY FUNCTION FIRST TO MAKE SURE IT&#x27;S OKAY  FUROSEMIDE 20MG TAB: 1 tab(s) orally once a day Take as needed once a day for shortness of breath, swelling in legs and/or arms  latanoprost 0.005% ophthalmic solution: 1 drop(s) to each affected eye once a day (at bedtime)  metoprolol succinate 25 mg oral tablet, extended release: 1 tab(s) orally once a day  Simbrinza 1%- 0.2% ophthalmic suspension: 1 drop(s) to each affected eye 2 times a day  tamsulosin 0.4 mg oral capsule: 1 cap(s) orally once a day (at bedtime)  warfarin 2 mg oral tablet: 1 tab(s) orally once a day   allopurinol 300 mg oral tablet: 1 tab(s) orally once a day  amiodarone 200 mg oral tablet: 1 tab(s) orally once a day  bacitracin 500 units/g topical ointment: 1 Apply topically to affected area once a day  brimonidine 0.2% ophthalmic solution: 1 drop(s) to each affected eye 2 times a day  latanoprost 0.005% ophthalmic solution: 1 drop(s) to each affected eye once a day (at bedtime)  metoprolol succinate 25 mg oral tablet, extended release: 1 tab(s) orally once a day  Simbrinza 1%- 0.2% ophthalmic suspension: 1 drop(s) to each affected eye 2 times a day  tamsulosin 0.4 mg oral capsule: 1 cap(s) orally once a day (at bedtime)  warfarin 2 mg oral tablet: 1 tab(s) orally once a day

## 2023-10-26 NOTE — H&P ADULT - TIME BILLING
- Ordering, reviewing, and interpreting labs, testing, and imaging.  - Independently obtaining a review of systems and performing a physical exam  - Reviewing consultant documentation/recommendations  - Counselling and educating patient and family regarding interpretation of aforementioned items and plan of care.

## 2023-10-26 NOTE — DISCHARGE NOTE PROVIDER - NSDCHHBASESERVICE_GEN_ALL_CORE
no lesions, no deformities, no traumatic injuries, no significant scars are present, chest wall non-tender, no masses present, breathing is unlabored without accessory muscle use,normal breath sounds Nursing

## 2023-10-26 NOTE — CHART NOTE - NSCHARTNOTEFT_GEN_A_CORE
82 yo M w/ PMHx of atrial fibrillation on coumadin, heart failure, prostate CA recently admitted for RLE hematoma s/p evacuation 10/13 and discharge home today w/ wound vac. Patient now presents back to the ED w/ family and aid w/ concerns for developing R dorsal foot fluid blister and ecchymosis 2/2 compression wrapping. In the ED, AVSS. No leukocytosis. Surgery consulted for evaluation.     Patient's aide/family refusing surgery involvement in patient's care during this hospitalization. Please call back with any additional questions or concerns.    ACS  [phone]

## 2023-10-26 NOTE — CONSULT NOTE ADULT - SUBJECTIVE AND OBJECTIVE BOX
Wound SURGERY CONSULT NOTE    HPI:  83 M with hx of paroxysmal Afib on coumadin, HFrEF (EF 40% in 3/2022), prostate cancer, dementia, recent admission for RLE hematoma s/p evacuation in OR with surgery presenting with R foot swelling, erythema, and blister. Pt was just discharged 10/25 with wound vac to site of RLE wound where hematoma previously was. History obtained from health aide at bedside. Per aide, prior to discharge, patient had his R foot wrapped by the surgery team. There was concern that the wrapping was too tight given that the patient was in pain and the ace wrapping was discontinued. At home, patient had worsening pain and family noticed that there was developing erythema and formation of blisters around the area. Family called hospital and was advised to bring patient back to ED for evaluation. Foot  now uwith a large blister over dorsal aspect of R foot. Skin overlying blister is intact. {t's wife doesn't want the VAC to be change as it's not due to be changed and she doesn't want to cause him more discomfort.  Per aide, the erythema and swelling has improved since foot was unwrapped. Patient currently not endorsing pain or discomfort. In ED, patient was given IV unasyn. Pt was evaluated by surgery and was recommended for admission for IV antibiotics.    Wound consult requested by team to assist w/ management of RLE wound.  Pt unable to c/o pain, drainage, odor, color change,  or worsening swelling. Offloading and pericare initiated upon admission as pt sedentary 2/2 to illness. Pt is Incontinent of urine & stool. No additional  h/o bites, scratches, falls, trauma.  Appetite good w/o  weight loss.   All questions asked and answered to pt's family's expressed understanding and satisfaction.    Current Diet: Diet, Regular:   DASH/TLC Sodium & Cholesterol Restricted (DASH) (10-26-23 @ 08:36)      PAST MEDICAL & SURGICAL HISTORY:  Afib    Dementia    RLE hematoma s/p Evacuation    Prostate cancer    Heart failure    scoliosis    S/P anal fissurectomy        REVIEW OF SYSTEMS: Pt unable to offer      MEDICATIONS  (STANDING):  allopurinol 300 milliGRAM(s) Oral daily  aMIOdarone    Tablet 200 milliGRAM(s) Oral daily  bacitracin   Ointment 1 Application(s) Topical daily  brimonidine 0.2% Ophthalmic Solution 1 Drop(s) Both EYES two times a day  ceFAZolin   IVPB 1000 milliGRAM(s) IV Intermittent every 8 hours     chlorhexidine 2% Cloths 1 Application(s) Topical <User Schedule>  latanoprost 0.005% Ophthalmic Solution 1 Drop(s) Both EYES at bedtime  metoprolol succinate ER 25 milliGRAM(s) Oral daily  tamsulosin 0.4 milliGRAM(s) Oral at bedtime  warfarin 2 milliGRAM(s) Oral once      No Known Allergies      SOCIAL HISTORY:  /  (+)HHA/, No current smoking, ETOH, drugs    FAMILY HISTORY: no h/o PVD or wound healing or skin/ significant problems    PHYSICAL EXAM:  Vital Signs Last 24 Hrs  T(C): 36.8 (26 Oct 2023 09:33), Max: 37.2 (26 Oct 2023 01:00)  T(F): 98.2 (26 Oct 2023 09:33), Max: 98.9 (26 Oct 2023 01:00)  HR: 67 (26 Oct 2023 09:33) (64 - 76)  BP: 100/64 (26 Oct 2023 09:33) (90/71 - 128/60)  BP(mean): 79 (26 Oct 2023 05:00) (69 - 79)  RR: 17 (26 Oct 2023 09:33) (17 - 19)  SpO2: 100% (26 Oct 2023 09:33) (94% - 100%)    Parameters below as of 26 Oct 2023 09:33  Patient On (Oxygen Delivery Method): room air      NAD, lethargic, but arousable, Obese, frail,  WD/ WN/ WG  HEENT:  NC/AT,  sclera clear, mucosa moist, throat clear, trachea midline, neck supple  Respiratory: nonlabored w/ equal chest rise  Gastrointestinal: soft NT/ND  Neurology:  nonverbal, limited follow commands  Psych: calm/ appropriate  Musculoskeletal:  pROM, no deformities/ contractures  Vascular: BLE equally warm,  no cyanosis, clubbing,  nor acute ischemia        RLE edema w/  BLE DP/PT pulses palpable          BLE hemosiderin staining  Skin:  thin, dry, pale, frail,  RLE ecchymosis w/o hematoma  RLE w/ medial leg w/ VAC in place w/ good seal- wife requesting it not be changed  Rt shin maroon/ purple bruise   Rt dorsal foot w/ clear blister w/ maroon/ purple bruise at base     no drainage  No odor, erythema, increased warmth, tenderness, induration, fluctuance, nor crepitus    LABS/ CULTURES/ RADIOLOGY:                        11.7   6.03  )-----------( 190      ( 26 Oct 2023 06:48 )             37.6       137  |  102  |  49  ----------------------------<  120      [10-26-23 @ 06:48]  4.1   |  23  |  1.68        Ca     8.7     [10-26-23 @ 06:48]      Mg     2.0     [10-26-23 @ 06:48]      Phos  3.8     [10-26-23 @ 06:48]    TPro  6.6  /  Alb  3.9  /  TBili  0.9  /  DBili  x   /  AST  23  /  ALT  41  /  AlkPhos  104  [10-25-23 @ 22:53]    PT/INR: PT 26.0 , INR 2.54       [10-26-23 @ 06:48]  PTT: 38.5       [10-25-23 @ 23:34]        < from: Xray Foot AP + Lateral + Oblique, Right (10.25.23 @ 22:57) >  ACC: [id number] EXAM:  XR FOOT COMP MIN 3 VIEWS RT   ORDERED BY: TROY DAIGLE     PROCEDURE DATE:  10/25/2023          INTERPRETATION:  CLINICAL INDICATION: Right foot wound, rule out   osteomyelitis    TECHNIQUE: 3 views of the right foot    COMPARISON: Right foot x-ray 10/12/2023.    IMPRESSION:  Diffuse soft tissue swelling.    Generalized osteopenia otherwise no discrete lytic or blastic lesions.    Hallux valgus deformity with bunion. Poorly visualized indistinct lateral   aspect of 1st metatarsal head indeterminate for osteopenia versus   infection related erosive change/osteomyelitis, correlate clinically with   MRI suggested to further assess as warranted. No tracking gas collections   or additional suspected areas of osteomyelitis.    < end of copied text >                      
SURGERY CONSULT NOTE    HPI:  82 yo M w/ PMHx of atrial fibrillation on coumadin, heart failure, prostate CA recently admitted for RLE hematoma s/p evacuation 10/13 and discharge home today w/ wound vac. Patient now presents back to the ED w/ family and aid w/ concerns for developing R dosal foot fluid blister and ecchymosis 2/2 compression wrapping. In the ED, AVSS. No leukocytosis. Surgery consulted for evaluation.       PAST MEDICAL HISTORY:  Afib    Prostate cancer    Heart failure    Heart failure    History of scoliosis        PAST SURGICAL HISTORY:  No significant past surgical history    No significant past surgical history    S/P anal fissurectomy        MEDICATIONS:  ampicillin/sulbactam  IVPB 3 Gram(s) IV Intermittent Once  warfarin 2 milliGRAM(s) Oral once      ALLERGIES:  No Known Allergies      VITALS & I/Os:  Vital Signs Last 24 Hrs  T(C): 37.1 (25 Oct 2023 22:25), Max: 37.1 (25 Oct 2023 22:25)  T(F): 98.8 (25 Oct 2023 22:25), Max: 98.8 (25 Oct 2023 22:25)  HR: 69 (25 Oct 2023 22:25) (59 - 74)  BP: 105/52 (25 Oct 2023 22:25) (99/62 - 120/62)  BP(mean): 69 (25 Oct 2023 22:25) (69 - 69)  RR: 19 (25 Oct 2023 22:25) (15 - 19)  SpO2: 97% (25 Oct 2023 22:25) (93% - 97%)    Parameters below as of 25 Oct 2023 22:25  Patient On (Oxygen Delivery Method): room air        I&O's Summary      PHYSICAL EXAM:  General: NAD, resting comfortably in bed  HEENT: Normocephalic atraumatic  Respiratory: Nonlabored respirations  Cardio: pulse present  Abdomen: soft, nontender, nondistended  MSK: RLE calf wound vac in place and holding suction. R foot edema, dorsum w/ ecchymosis and fluid filled blister, Anterior shin ecchymosis           LABS:                        12.3   7.93  )-----------( 230      ( 25 Oct 2023 22:53 )             38.9     10-25    137  |  101  |  50<H>  ----------------------------<  117<H>  4.6   |  24  |  1.91<H>    Ca    9.1      25 Oct 2023 22:53  Phos  3.8     10-25  Mg     2.1     10-25    TPro  6.6  /  Alb  3.9  /  TBili  0.9  /  DBili  x   /  AST  23  /  ALT  41  /  AlkPhos  104  10-25    Lactate:    PT/INR - ( 25 Oct 2023 07:26 )   PT: 22.3 sec;   INR: 2.17 ratio          Urinalysis Basic - ( 25 Oct 2023 22:53 )    Color: x / Appearance: x / SG: x / pH: x  Gluc: 117 mg/dL / Ketone: x  / Bili: x / Urobili: x   Blood: x / Protein: x / Nitrite: x   Leuk Esterase: x / RBC: x / WBC x   Sq Epi: x / Non Sq Epi: x / Bacteria: x

## 2023-10-26 NOTE — DISCHARGE NOTE PROVIDER - NSDCFUADDAPPT_GEN_ALL_CORE_FT
APPTS ARE READY TO BE MADE: [ x] YES    Best Family or Patient Contact (if needed):    Additional Information about above appointments (if needed):    1:   2:   3:     Other comments or requests:    APPTS ARE READY TO BE MADE: [ x] YES    Best Family or Patient Contact (if needed):    Additional Information about above appointments (if needed):    1: Wound Center [address] [place] [address] [phone]    2:   3:     Other comments or requests:    APPTS ARE READY TO BE MADE: [X] YES    Best Family or Patient Contact (if needed):    Additional Information about above appointments (if needed):    1: Wound Center [address] [facility] [phone]    2: PCP 1 week of discharge   3: Cardiology 1 week from discharge    Other comments or requests:

## 2023-10-26 NOTE — CHART NOTE - NSCHARTNOTEFT_GEN_A_CORE
Patient seen and examined with his private aide at bedside. Plan as discussed w/ ACP Sheyla Root: patient appears to be at his baseline per aide's input; he denies acute pain or discomfort; awaiting wound care's evaluation for blister as formed on foot; appreciate surgery's recommendations, for now will continue w/ IV ABx x24H though infection less likely; pending wound care's recommendations plan for discharge likely according to the established plan at time of discharge yesterday; CM's input appreciated.     Mau Ladd M.D.  Division of Hospital Medicine  Available on Microsoft Teams Patient seen and examined with his private aide at bedside. Plan as discussed w/ ACP Sheyla Root: patient appears to be at his baseline per aide's input; he denies acute pain or discomfort; awaiting wound care's evaluation for blister as formed on foot; appreciate surgery's recommendations, for now will continue w/ IV ABx x24H though infection less likely; pending wound care's recommendations plan for discharge likely according to the established plan at time of discharge yesterday; CM's input appreciated; if patient remains hospitalized will dose home Coumadin and follow-up INR in AM.     Mau Ladd M.D.  Division of Hospital Medicine  Available on [employer] Teams

## 2023-10-26 NOTE — CONSULT NOTE ADULT - ASSESSMENT
A/P: 83 M with hx of paroxysmal Afib on coumadin, HFrEF (EF 40% in 3/2022), prostate cancer, dementia, recent admission for RLE hematoma s/p evacuation in OR with surgery presenting with R foot swelling, erythema, and blister. Pt admitted to medicine for further management.       Wound Consult requested to assist w/ management of RLE wounds      RLE - Cavilon and Adaptic Dressing QD  BLE elevation  Abx per Medicine  RLE Xray appreciated as above  Moisturize intact skin w/ SWEEN cream BID  Nutrition optimization       encourage high quality protein, saulo/ prosource, MVI & Vit C to promote wound healing  Continue turning and positioning w/ offloading assistive devices as per protocol  Buttocks/ Sacrum Rachel BID and prn soiling        Continue w/ attends under pads and Pericare as per protocol  Waffle Cushion to chair when oob to chair  Low air loss pressure redistribution bed surface while admitted  Care as per medicine, will follow w/ you  Upon discharge f/u as outpatient at Wound Center [address] [place] [address] [phone]  D/w team & RN & attng  Thank you for this consult  Mae Hawk PA-C [id number]  Nights/ Weekends/ Holidays please call:  General Surgery Consult pager [phone]) for emergencies  Wound PT for multilayer leg wrapping or VAC issues (x [phone])   I spent 55minutes face to face w/ this pt of which more than 50% of the time was spent counseling & coordinating care of this pt.

## 2023-10-26 NOTE — H&P ADULT - ASSESSMENT
83 M with hx of paroxysmal Afib on coumadin, HFrEF (EF 40% in 3/2022), prostate cancer, dementia, recent admission for RLE hematoma s/p evacuation in OR with surgery presenting with R foot swelling, erythema, and blister. Pt admitted to medicine for further management.

## 2023-10-26 NOTE — H&P ADULT - PROBLEM SELECTOR PLAN 2
RLE hematoma s/p evacuation by surgery on 10/14. Now with wound vac in place. Site appears clean, mild erythema, no acute signs of infection.   - Continue wound vac  - F/u surgery recs if any  - Monitor Hgb and signs of bleeding while on coumadin. Hgb 12.3 currently

## 2023-10-26 NOTE — H&P ADULT - PROBLEM SELECTOR PLAN 5
Outpatient cardiologist is Dr. Loredo, no recent TTE on file here.  - echo 3/2022: EF 40%, mod MR, mild-mod AR  - only on metoprolol succinate for GDMT  - was started entresto for GDMT, but then due to NATHAN, entresto now on hold. Resume entresto when Cr is 1.6 or below  - monitor I/Os, daily weights - continue home Amiodarone and Metoprolol  - coumadin regimen: 2mg every day, but 4mg on Tuesdays/Thursdays  - Received 2mg coumadin 10/25 evening, INR therapeutic 2.18  - check daily INR while inpatient, and then resume weekly INR after discharge.  - EKG personally reviewed showing sinus rhythm, LBBB, HR 69, qtc 495. unchanged from EKG on 10/12/23

## 2023-10-26 NOTE — DISCHARGE NOTE PROVIDER - NSDCFUADDINST_GEN_ALL_CORE_FT
RLE:  Medial leg wound - VAC change as per protocol  RLE: Shin and Dorsal Foot wounds  Cleanse w/ NS  Pat dry  Moisturize intact periwound skin w/ Sween cream  Cover wound w/ CAVILON + ADAPTIC  Cover w/ ABD pad  Secure w/ cling roll gauze  TIW w/ VAC change

## 2023-10-26 NOTE — DISCHARGE NOTE PROVIDER - ATTENDING DISCHARGE PHYSICAL EXAMINATION:
CONSTITUTIONAL: NAD  EYES: No conjunctival or scleral injection, non-icteric;  RESPIRATORY: Breathing comfortably; CTA   CARDIOVASCULAR: Irregular rate, +S1S2  GASTROINTESTINAL: No palpable masses or tenderness, +BS throughout, no rebound/guarding  SKIN: Large blister over dorsal R foot. Skin overlying blister intact, no drainage. Wound vac in place over anterior RLE. No purulence noted.   NEURO: A+O x 1 (BS per wife)

## 2023-10-26 NOTE — H&P ADULT - PROBLEM SELECTOR PLAN 1
Blister overlying dorsal R foot that developed after compression wrapping. Surrounding erythema. Suspect from friction/pressure rather than infection/cellulitis.   - Improvement since foot was unwrapped  - Keep skin overlying blister intact, do not rupture.  - Wound care consult  - Evaluated by surgery, no acute surgical intervention  - Low suspicion for acute infection, but will start IV ancef q8h for 24 hours as insisted by surgery Blister overlying dorsal R foot that developed after compression wrapping. Surrounding erythema. Suspect from friction/pressure rather than infection/cellulitis.   - Improvement since foot was unwrapped  - Keep skin overlying blister intact, do not rupture.  - Wound care consult  - Evaluated by surgery, no acute surgical intervention  - Low suspicion for acute infection, but will start IV ancef q8h for 24 hours as per surgery

## 2023-10-26 NOTE — ED ADULT NURSE REASSESSMENT NOTE - NS ED NURSE REASSESS COMMENT FT1
Report received from ANN Mike. Pt at baseline mental status, A&Ox0-1, respirations even and unlabored on room air. Personal aid present at bedside.

## 2023-10-26 NOTE — H&P ADULT - NSHPLABSRESULTS_GEN_ALL_CORE
10-25    137  |  101  |  50<H>  ----------------------------<  117<H>  4.6   |  24  |  1.91<H>  10-25    139  |  101  |  48<H>  ----------------------------<  98  4.3   |  27  |  1.96<H>  10-24    139  |  102  |  38<H>  ----------------------------<  99  4.0   |  24  |  1.72<H>    Ca    9.1      25 Oct 2023 22:53  Ca    9.0      25 Oct 2023 07:23  Ca    9.4      24 Oct 2023 07:03  Phos  3.8     10-25  Mg     2.1     10-25    TPro  6.6  /  Alb  3.9  /  TBili  0.9  /  DBili  x   /  AST  23  /  ALT  41  /  AlkPhos  104  10-25    Magnesium: 2.1 mg/dL (10-25-23 @ 07:23)  Magnesium: 2.1 mg/dL (10-24-23 @ 07:03)  Magnesium: 2.2 mg/dL (10-23-23 @ 10:55)    Phosphorus: 3.8 mg/dL (10-25-23 @ 07:23)  Phosphorus: 4.3 mg/dL (10-24-23 @ 07:03)  Phosphorus: 4.3 mg/dL (10-23-23 @ 10:55)      PT/INR - ( 25 Oct 2023 23:34 )   PT: 23.4 sec;   INR: 2.18 ratio         PTT - ( 25 Oct 2023 23:34 )  PTT:38.5 sec              Urinalysis Basic - ( 25 Oct 2023 22:53 )    Color: x / Appearance: x / SG: x / pH: x  Gluc: 117 mg/dL / Ketone: x  / Bili: x / Urobili: x   Blood: x / Protein: x / Nitrite: x   Leuk Esterase: x / RBC: x / WBC x   Sq Epi: x / Non Sq Epi: x / Bacteria: x                          12.3   7.93  )-----------( 230      ( 25 Oct 2023 22:53 )             38.9                         12.3   5.81  )-----------( 181      ( 25 Oct 2023 07:26 )             39.0                         12.4   5.03  )-----------( 171      ( 24 Oct 2023 07:03 )             39.2     CAPILLARY BLOOD GLUCOSE

## 2023-10-26 NOTE — H&P ADULT - NSHPSOCIALHISTORY_GEN_ALL_CORE
Social History:    Marital Status:   Occupation: n/a  Lives with: wife, has HHA    Substance Use (street drugs): none  Tobacco Usage: none  Alcohol Usage: none

## 2023-10-26 NOTE — H&P ADULT - PROBLEM SELECTOR PLAN 4
- continue home Amio and Metoprolol  - coumadin regimen: 2mg every day, but 4mg on Tuesdays/Thursdays  - Received 2mg coumadin 10/25 evening, INR therapeutic 2.18  - check daily INR while inpatient, and then resume weekly INR after discharge. Outpatient cardiologist is Dr. Loredo, no recent TTE on file here.  - echo 3/2022: EF 40%, mod MR, mild-mod AR  - only on metoprolol succinate for GDMT  - was started entresto for GDMT, but then due to NATHAN, entresto now on hold. Resume entresto when Cr is 1.6 or below  - monitor I/Os, daily weights

## 2023-10-27 ENCOUNTER — TRANSCRIPTION ENCOUNTER (OUTPATIENT)
Age: 83
End: 2023-10-27

## 2023-10-27 VITALS
RESPIRATION RATE: 18 BRPM | SYSTOLIC BLOOD PRESSURE: 107 MMHG | DIASTOLIC BLOOD PRESSURE: 68 MMHG | HEART RATE: 60 BPM | OXYGEN SATURATION: 95 % | TEMPERATURE: 98 F

## 2023-10-27 LAB
ANION GAP SERPL CALC-SCNC: 10 MMOL/L — SIGNIFICANT CHANGE UP (ref 5–17)
ANION GAP SERPL CALC-SCNC: 10 MMOL/L — SIGNIFICANT CHANGE UP (ref 5–17)
BASOPHILS # BLD AUTO: 0.04 K/UL — SIGNIFICANT CHANGE UP (ref 0–0.2)
BASOPHILS # BLD AUTO: 0.04 K/UL — SIGNIFICANT CHANGE UP (ref 0–0.2)
BASOPHILS NFR BLD AUTO: 0.8 % — SIGNIFICANT CHANGE UP (ref 0–2)
BASOPHILS NFR BLD AUTO: 0.8 % — SIGNIFICANT CHANGE UP (ref 0–2)
BUN SERPL-MCNC: 49 MG/DL — HIGH (ref 7–23)
BUN SERPL-MCNC: 49 MG/DL — HIGH (ref 7–23)
CALCIUM SERPL-MCNC: 9.2 MG/DL — SIGNIFICANT CHANGE UP (ref 8.4–10.5)
CALCIUM SERPL-MCNC: 9.2 MG/DL — SIGNIFICANT CHANGE UP (ref 8.4–10.5)
CHLORIDE SERPL-SCNC: 103 MMOL/L — SIGNIFICANT CHANGE UP (ref 96–108)
CHLORIDE SERPL-SCNC: 103 MMOL/L — SIGNIFICANT CHANGE UP (ref 96–108)
CO2 SERPL-SCNC: 26 MMOL/L — SIGNIFICANT CHANGE UP (ref 22–31)
CO2 SERPL-SCNC: 26 MMOL/L — SIGNIFICANT CHANGE UP (ref 22–31)
CREAT SERPL-MCNC: 1.62 MG/DL — HIGH (ref 0.5–1.3)
CREAT SERPL-MCNC: 1.62 MG/DL — HIGH (ref 0.5–1.3)
EGFR: 42 ML/MIN/1.73M2 — LOW
EGFR: 42 ML/MIN/1.73M2 — LOW
EOSINOPHIL # BLD AUTO: 0.13 K/UL — SIGNIFICANT CHANGE UP (ref 0–0.5)
EOSINOPHIL # BLD AUTO: 0.13 K/UL — SIGNIFICANT CHANGE UP (ref 0–0.5)
EOSINOPHIL NFR BLD AUTO: 2.5 % — SIGNIFICANT CHANGE UP (ref 0–6)
EOSINOPHIL NFR BLD AUTO: 2.5 % — SIGNIFICANT CHANGE UP (ref 0–6)
GLUCOSE SERPL-MCNC: 88 MG/DL — SIGNIFICANT CHANGE UP (ref 70–99)
GLUCOSE SERPL-MCNC: 88 MG/DL — SIGNIFICANT CHANGE UP (ref 70–99)
HCT VFR BLD CALC: 38.2 % — LOW (ref 39–50)
HCT VFR BLD CALC: 38.2 % — LOW (ref 39–50)
HGB BLD-MCNC: 12.2 G/DL — LOW (ref 13–17)
HGB BLD-MCNC: 12.2 G/DL — LOW (ref 13–17)
IMM GRANULOCYTES NFR BLD AUTO: 1.6 % — HIGH (ref 0–0.9)
IMM GRANULOCYTES NFR BLD AUTO: 1.6 % — HIGH (ref 0–0.9)
INR BLD: 2.45 RATIO — HIGH (ref 0.85–1.18)
INR BLD: 2.45 RATIO — HIGH (ref 0.85–1.18)
LYMPHOCYTES # BLD AUTO: 1.06 K/UL — SIGNIFICANT CHANGE UP (ref 1–3.3)
LYMPHOCYTES # BLD AUTO: 1.06 K/UL — SIGNIFICANT CHANGE UP (ref 1–3.3)
LYMPHOCYTES # BLD AUTO: 20.5 % — SIGNIFICANT CHANGE UP (ref 13–44)
LYMPHOCYTES # BLD AUTO: 20.5 % — SIGNIFICANT CHANGE UP (ref 13–44)
MAGNESIUM SERPL-MCNC: 2.1 MG/DL — SIGNIFICANT CHANGE UP (ref 1.6–2.6)
MAGNESIUM SERPL-MCNC: 2.1 MG/DL — SIGNIFICANT CHANGE UP (ref 1.6–2.6)
MCHC RBC-ENTMCNC: 31.2 PG — SIGNIFICANT CHANGE UP (ref 27–34)
MCHC RBC-ENTMCNC: 31.2 PG — SIGNIFICANT CHANGE UP (ref 27–34)
MCHC RBC-ENTMCNC: 31.9 GM/DL — LOW (ref 32–36)
MCHC RBC-ENTMCNC: 31.9 GM/DL — LOW (ref 32–36)
MCV RBC AUTO: 97.7 FL — SIGNIFICANT CHANGE UP (ref 80–100)
MCV RBC AUTO: 97.7 FL — SIGNIFICANT CHANGE UP (ref 80–100)
MONOCYTES # BLD AUTO: 0.68 K/UL — SIGNIFICANT CHANGE UP (ref 0–0.9)
MONOCYTES # BLD AUTO: 0.68 K/UL — SIGNIFICANT CHANGE UP (ref 0–0.9)
MONOCYTES NFR BLD AUTO: 13.2 % — SIGNIFICANT CHANGE UP (ref 2–14)
MONOCYTES NFR BLD AUTO: 13.2 % — SIGNIFICANT CHANGE UP (ref 2–14)
NEUTROPHILS # BLD AUTO: 3.17 K/UL — SIGNIFICANT CHANGE UP (ref 1.8–7.4)
NEUTROPHILS # BLD AUTO: 3.17 K/UL — SIGNIFICANT CHANGE UP (ref 1.8–7.4)
NEUTROPHILS NFR BLD AUTO: 61.4 % — SIGNIFICANT CHANGE UP (ref 43–77)
NEUTROPHILS NFR BLD AUTO: 61.4 % — SIGNIFICANT CHANGE UP (ref 43–77)
NRBC # BLD: 0 /100 WBCS — SIGNIFICANT CHANGE UP (ref 0–0)
NRBC # BLD: 0 /100 WBCS — SIGNIFICANT CHANGE UP (ref 0–0)
PHOSPHATE SERPL-MCNC: 3.6 MG/DL — SIGNIFICANT CHANGE UP (ref 2.5–4.5)
PHOSPHATE SERPL-MCNC: 3.6 MG/DL — SIGNIFICANT CHANGE UP (ref 2.5–4.5)
PLATELET # BLD AUTO: 179 K/UL — SIGNIFICANT CHANGE UP (ref 150–400)
PLATELET # BLD AUTO: 179 K/UL — SIGNIFICANT CHANGE UP (ref 150–400)
POTASSIUM SERPL-MCNC: 4.4 MMOL/L — SIGNIFICANT CHANGE UP (ref 3.5–5.3)
POTASSIUM SERPL-MCNC: 4.4 MMOL/L — SIGNIFICANT CHANGE UP (ref 3.5–5.3)
POTASSIUM SERPL-SCNC: 4.4 MMOL/L — SIGNIFICANT CHANGE UP (ref 3.5–5.3)
POTASSIUM SERPL-SCNC: 4.4 MMOL/L — SIGNIFICANT CHANGE UP (ref 3.5–5.3)
PROTHROM AB SERPL-ACNC: 26.3 SEC — HIGH (ref 9.5–13)
PROTHROM AB SERPL-ACNC: 26.3 SEC — HIGH (ref 9.5–13)
RBC # BLD: 3.91 M/UL — LOW (ref 4.2–5.8)
RBC # BLD: 3.91 M/UL — LOW (ref 4.2–5.8)
RBC # FLD: 14.8 % — HIGH (ref 10.3–14.5)
RBC # FLD: 14.8 % — HIGH (ref 10.3–14.5)
SODIUM SERPL-SCNC: 139 MMOL/L — SIGNIFICANT CHANGE UP (ref 135–145)
SODIUM SERPL-SCNC: 139 MMOL/L — SIGNIFICANT CHANGE UP (ref 135–145)
WBC # BLD: 5.16 K/UL — SIGNIFICANT CHANGE UP (ref 3.8–10.5)
WBC # BLD: 5.16 K/UL — SIGNIFICANT CHANGE UP (ref 3.8–10.5)
WBC # FLD AUTO: 5.16 K/UL — SIGNIFICANT CHANGE UP (ref 3.8–10.5)
WBC # FLD AUTO: 5.16 K/UL — SIGNIFICANT CHANGE UP (ref 3.8–10.5)

## 2023-10-27 PROCEDURE — 97605 NEG PRS WND THER DME<=50SQCM: CPT

## 2023-10-27 PROCEDURE — 99239 HOSP IP/OBS DSCHRG MGMT >30: CPT

## 2023-10-27 PROCEDURE — 99232 SBSQ HOSP IP/OBS MODERATE 35: CPT | Mod: 25

## 2023-10-27 RX ORDER — BACITRACIN ZINC 500 UNIT/G
1 OINTMENT IN PACKET (EA) TOPICAL
Qty: 0 | Refills: 0 | DISCHARGE
Start: 2023-10-27

## 2023-10-27 RX ORDER — INFLUENZA VIRUS VACCINE 15; 15; 15; 15 UG/.5ML; UG/.5ML; UG/.5ML; UG/.5ML
0.7 SUSPENSION INTRAMUSCULAR ONCE
Refills: 0 | Status: DISCONTINUED | OUTPATIENT
Start: 2023-10-27 | End: 2023-10-27

## 2023-10-27 RX ORDER — BRIMONIDINE TARTRATE 2 MG/MG
1 SOLUTION/ DROPS OPHTHALMIC
Qty: 0 | Refills: 0 | DISCHARGE
Start: 2023-10-27

## 2023-10-27 RX ORDER — FUROSEMIDE 40 MG
1 TABLET ORAL
Qty: 0 | Refills: 0 | DISCHARGE

## 2023-10-27 RX ADMIN — CHLORHEXIDINE GLUCONATE 1 APPLICATION(S): 213 SOLUTION TOPICAL at 05:16

## 2023-10-27 RX ADMIN — Medication 300 MILLIGRAM(S): at 11:28

## 2023-10-27 RX ADMIN — Medication 25 MILLIGRAM(S): at 05:06

## 2023-10-27 RX ADMIN — Medication 100 MILLIGRAM(S): at 05:06

## 2023-10-27 RX ADMIN — BRIMONIDINE TARTRATE 1 DROP(S): 2 SOLUTION/ DROPS OPHTHALMIC at 05:08

## 2023-10-27 RX ADMIN — Medication 1 APPLICATION(S): at 11:30

## 2023-10-27 RX ADMIN — AMIODARONE HYDROCHLORIDE 200 MILLIGRAM(S): 400 TABLET ORAL at 05:07

## 2023-10-27 NOTE — DISCHARGE NOTE NURSING/CASE MANAGEMENT/SOCIAL WORK - NSDCFUADDAPPT_GEN_ALL_CORE_FT
APPTS ARE READY TO BE MADE: [ x] YES    Best Family or Patient Contact (if needed):    Additional Information about above appointments (if needed):    1: Wound Center [address] [place] [address] [phone]    2:   3:     Other comments or requests:

## 2023-10-27 NOTE — PROGRESS NOTE ADULT - SUBJECTIVE AND OBJECTIVE BOX
[facility]-- WOUND TEAM -- FOLLOW UP NOTE  --------------------------------------------------------------------------------    24 hour events/subjective:          Diet:  Diet, Regular:   DASH/TLC Sodium & Cholesterol Restricted (DASH) (10-26-23 @ 08:36)      ROS: General/ SKIN/ MSK/ Neuro/ GI see HPI  all other systems negative  pt unable to offer    ALLERGIES & MEDICATIONS  --------------------------------------------------------------------------------  Allergies    No Known Allergies    Intolerances          STANDING INPATIENT MEDICATIONS    allopurinol 300 milliGRAM(s) Oral daily  aMIOdarone    Tablet 200 milliGRAM(s) Oral daily  bacitracin   Ointment 1 Application(s) Topical daily  brimonidine 0.2% Ophthalmic Solution 1 Drop(s) Both EYES two times a day  ceFAZolin   IVPB 1000 milliGRAM(s) IV Intermittent every 8 hours  ceFAZolin   IVPB      chlorhexidine 2% Cloths 1 Application(s) Topical <User Schedule>  influenza  Vaccine (HIGH DOSE) 0.7 milliLiter(s) IntraMuscular once  latanoprost 0.005% Ophthalmic Solution 1 Drop(s) Both EYES at bedtime  metoprolol succinate ER 25 milliGRAM(s) Oral daily  tamsulosin 0.4 milliGRAM(s) Oral at bedtime      PRN INPATIENT MEDICATION        VITALS/PHYSICAL EXAM  --------------------------------------------------------------------------------  T(C): 36.5 (10-27-23 @ 11:44), Max: 36.8 (10-26-23 @ 18:09)  HR: 53 (10-27-23 @ 11:44) (53 - 100)  BP: 115/72 (10-27-23 @ 11:44) (106/68 - 123/71)  RR: 18 (10-27-23 @ 11:44) (17 - 18)  SpO2: 97% (10-27-23 @ 11:44) (95% - 100%)  Wt(kg): --  Height (cm): 182.9 (10-25-23 @ 21:27)            LABS/ CULTURES/ RADIOLOGY:              12.2   5.16  >-----------<  179      [10-27-23 @ 06:57]              38.2     139  |  103  |  49  ----------------------------<  88      [10-27-23 @ 06:57]  4.4   |  26  |  1.62        Ca     9.2     [10-27-23 @ 06:57]      Mg     2.1     [10-27-23 @ 06:57]      Phos  3.6     [10-27-23 @ 06:57]    TPro  6.6  /  Alb  3.9  /  TBili  0.9  /  DBili  x   /  AST  23  /  ALT  41  /  AlkPhos  104  [10-25-23 @ 22:53]    PT/INR: PT 26.3 , INR 2.45       [10-27-23 @ 06:57]  PTT: 38.5       [10-25-23 @ 23:34]          CAPILLARY BLOOD GLUCOSE                           [facility]-- WOUND TEAM -- FOLLOW UP NOTE  --------------------------------------------------------------------------------    24 hour events/subjective:    afebrile   tolerating po w/o n/v  tolerating dressings    no odor pain drainage  HHA at bedside      Diet:  Diet, Regular:   DASH/TLC Sodium & Cholesterol Restricted (DASH) (10-26-23 @ 08:36)      ROS: pt unable to offer    ALLERGIES & MEDICATIONS  --------------------------------------------------------------------------------    No Known Allergies      STANDING INPATIENT MEDICATIONS  allopurinol 300 milliGRAM(s) Oral daily  aMIOdarone Tablet 200 milliGRAM(s) Oral daily  bacitracin   Ointment 1 Application(s) Topical daily  brimonidine 0.2% Ophthalmic Solution 1 Drop(s) Both EYES two times a day  ceFAZolin IVPB 1000 milliGRAM(s) IV Intermittent every 8 hours  chlorhexidine 2% Cloths 1 Application(s) Topical <User Schedule>  influenza  Vaccine (HIGH DOSE) 0.7 milliLiter(s) IntraMuscular once  latanoprost 0.005% Ophthalmic Solution 1 Drop(s) Both EYES at bedtime  metoprolol succinate ER 25 milliGRAM(s) Oral daily  tamsulosin 0.4 milliGRAM(s) Oral at bedtime      VITALS/PHYSICAL EXAM  --------------------------------------------------------------------------------  T(C): 36.5 (10-27-23 @ 11:44), Max: 36.8 (10-26-23 @ 18:09)  HR: 53 (10-27-23 @ 11:44) (53 - 100)  BP: 115/72 (10-27-23 @ 11:44) (106/68 - 123/71)  RR: 18 (10-27-23 @ 11:44) (17 - 18)  SpO2: 97% (10-27-23 @ 11:44) (95% - 100%)  Wt(kg): --  Height (cm): 182.9 (10-25-23 @ 21:27)    NAD, lethargic, but arousable, Obese, frail,  WD/ WN/ WG  HEENT:  NC/AT,  sclera clear, mucosa moist, throat clear, trachea midline, neck supple  Neurology:  nonverbal, limited follow commands  Psych: calm/ appropriate  Musculoskeletal:  pROM, no deformities/ contractures  Vascular: BLE equally warm,  no cyanosis, clubbing,  nor acute ischemia        RLE edema w/  BLE DP/PT pulses palpable          BLE hemosiderin staining  Skin:  thin, dry, pale, frail,  RLE ecchymosis w/o hematoma  RLE w/ medial leg w/ moist granular wound      6.5cm x 4 cm x 0.5cm w/ lip of underming circumferentially     (+) serosanguinous drainage  Rt shin maroon/ purple bruise   Rt dorsal foot w/ clear blister w/ maroon/ purple bruise at base     no drainage  No odor, erythema, increased warmth, tenderness, induration, fluctuance, nor crepitus        LABS/ CULTURES/ RADIOLOGY:              12.2   5.16  >-----------<  179      [10-27-23 @ 06:57]              38.2     139  |  103  |  49  ----------------------------<  88      [10-27-23 @ 06:57]  4.4   |  26  |  1.62        Ca     9.2     [10-27-23 @ 06:57]      Mg     2.1     [10-27-23 @ 06:57]      Phos  3.6     [10-27-23 @ 06:57]    TPro  6.6  /  Alb  3.9  /  TBili  0.9  /  DBili  x   /  AST  23  /  ALT  41  /  AlkPhos  104  [10-25-23 @ 22:53]    PT/INR: PT 26.3 , INR 2.45       [10-27-23 @ 06:57]  PTT: 38.5       [10-25-23 @ 23:34]

## 2023-10-27 NOTE — PHYSICAL THERAPY INITIAL EVALUATION ADULT - PERTINENT HX OF CURRENT PROBLEM, REHAB EVAL
84 yo man with PMH of a fib (on warfarin), heart failure, and prostate cancer complaining of continuing pain and swelling in the RLE. The patient experienced a mechanical fall on 10/4 while transferring to his rolling walker from his bed. Portable x-ray at the time of fall did not demonstrate acute fracture at the time. Over the next two days, the patient's swelling increased and he was brought to an OSH for evaluation. At [facility], repeated x-rays of foot, ankle, and lower leg did not elucidate any acute fractures. Concern for hematoma with possible infection and surrounding cellulitis. Now s/p I&D in OR on 10/14. CT LE- no acute fx. x ray ankle r- No acute fracture or dislocation. 2.  No radiographic evidence of osteomyelitis.. On this readmission, pt adm with evolving blisters to R LE from compression therapy applied. VAC placed to R medial leg post op. DC to home with VAC.

## 2023-10-27 NOTE — PATIENT PROFILE ADULT - FALL HARM RISK - RISK INTERVENTIONS

## 2023-10-27 NOTE — PROGRESS NOTE ADULT - ASSESSMENT
A/P: 83 M with hx of paroxysmal Afib on coumadin, HFrEF (EF 40% in 3/2022), prostate cancer, dementia, recent admission for RLE hematoma s/p evacuation in OR with surgery presenting with R foot swelling, erythema, and blister. Pt admitted to medicine for further management.       Wound Consult requested to assist w/ management of RLE wounds    Rt medial leg wound- VAC as per protocol w/ WOund PT  RLE - Cavilon and Adaptic Dressing QD  BLE elevation  Abx per Medicine  RLE Xray appreciated as above  Moisturize intact skin w/ SWEEN cream BID  Nutrition optimization       encourage high quality protein, saulo/ prosource, MVI & Vit C to promote wound healing  Continue turning and positioning w/ offloading assistive devices as per protocol  Buttocks/ Sacrum Rachel BID and prn soiling        Continue w/ attends under pads and Pericare as per protocol  Waffle Cushion to chair when oob to chair  Low air loss pressure redistribution bed surface while admitted  Care as per medicine, will follow w/ you  Upon discharge f/[facility] as outpatient at [facility] [address] [place], [place] [phone]  D/w team & RN & attng  Mae Hawk PA-C CWS [id number]  Nights/ Weekends/ Holidays please call:  General Surgery Consult pager ([phone]) for emergencies  Wound PT for multilayer leg wrapping or VAC issues ([phone])   I spent 35minutes face to face w/ this pt of which more than 50% of the time was spent counseling & coordinating care of this pt.

## 2023-10-27 NOTE — PHYSICAL THERAPY INITIAL EVALUATION ADULT - MODALITIES TREATMENT COMMENTS
Orders received, chart reviewed. Pt radha PT WC eval well to R medial shin. Pt s/p I&D in OR on 10/14/23. Dressing removed. Wound measured 7cm x 5cm x 2.5cm. Wound with 10% granulation, 10% eschar inferior border, 70% dusky/boggy appearance, 10% slough. Edema noted t/o RLE. No induration, no malodor, no erythema. Wound cleansed with NS, cavilon to periwound, aquacel Ag applied to wound bed, followed by DSD (Non-woven), wrapped with rebecca, secured with tape. ACE wrap applied from forefoot to proximal calf in figure-8 fashion with 50% tension/50% overlap. LE digits 1-5 with <3 sec cap refill after ACE application. Pt left as found in NAD, all lines intact, RLE supported and elevated with ANN lorenz and Trauma PA Maria Esther Anderson aware. 5 P's.

## 2023-10-27 NOTE — CHART NOTE - NSCHARTNOTEFT_GEN_A_CORE
Patient seen and examined at bedside. At BS mental status per family. He denies any pain, CP, SOB, abd pain. Foot does not appear to infected at this time. Afebrile and without leukocytosis. Ancef x24hrs. D/w wound PT team - plan for wound vac change today. D/w surgery - no further intervention at this time. D/w CM home care to be resumed today. Discussed plan with wife Ronna over the phone. She is in agreement with current plan; has PCP follow up next week; also recommended f/u with cardiology 1 week after discharge.

## 2023-10-27 NOTE — DISCHARGE NOTE NURSING/CASE MANAGEMENT/SOCIAL WORK - NSSCCARECORD_GEN_ALL_CORE
Home Care Agency/Durable Medical Equipment Agency/Community [facility]

## 2023-10-27 NOTE — DISCHARGE NOTE NURSING/CASE MANAGEMENT/SOCIAL WORK - NSDCPEFALRISK_GEN_ALL_CORE
For information on Fall & Injury Prevention, visit: [email or web address] OR  [email or web address] OR  https://www.cdc.gov/steadi/patient.html

## 2023-10-27 NOTE — DISCHARGE NOTE NURSING/CASE MANAGEMENT/SOCIAL WORK - NSDCPETBCESMAN_GEN_ALL_CORE
Called pt to reschedule her appt with Delisa Flores on [email or web address] due to provider being out the office     Lvm for pt to return call     Electronically signed by Jose Lan on 10/13/2022 at 11:50 AM
If you are a smoker, it is important for your health to stop smoking. Please be aware that second hand smoke is also harmful.

## 2023-10-27 NOTE — PATIENT PROFILE ADULT - BILL PAYMENT
Assessment/Plan


Assessment/Plan


IMPRESSION:


1. Hypotension. Resolved.


2. Sepsis. Resolved


3. Atrial fibrillation.


4. Bradycardia.


5. Diabetes mellitus.


6. End-stage renal disease on dialysis.


7. Left lung collapse. Resolved


8. Respiratory failure. S/p tracheostomy


9. Dysphagia.


10. Dementia.


11. Anemia








DISCUSSION:


1. Continue medications.


2. On amiodarone


3. Status post tracheostomy


4. S/p G-tube 


5. Continue HD














  ______________________________________________


  Quentin Cardoso M.D.





Subjective


Interval Events:  None new


Constitutional:  Reports: no symptoms


HEENT:  Repors: no symptoms


Respiratory:  Reports: no symptoms


Cardiovascular:  Reports: no symptoms


Gastrointestinal/Abdominal:  Reports: no symptoms


Genitourinary:  Reports: no symptoms


Neurologic:  Reports: no symptoms


Allergies:  


Coded Allergies:  


     No Known Allergies (Unverified , 11/26/18)





Objective





Last 24 Hour Vital Signs








  Date Time  Temp Pulse Resp B/P (MAP) Pulse Ox O2 Delivery O2 Flow Rate FiO2


 


1/17/19 05:30  80 15     30


 


1/17/19 04:00      Mechanical Ventilator  





      Mechanical Ventilator  


 


1/17/19 04:00 98.4 86 17 95/54 (68) 95   


 


1/17/19 04:00  82      


 


1/17/19 04:00        40


 


1/17/19 03:00  82 18     30


 


1/17/19 01:30  81 20     30


 


1/17/19 00:00 98.0 81 20 118/68 (85) 96   


 


1/17/19 00:00      Mechanical Ventilator  





      Mechanical Ventilator  


 


1/17/19 00:00        40


 


1/17/19 00:00  88      


 


1/16/19 23:24  80 18     30


 


1/16/19 21:30  82 18     30


 


1/16/19 20:47    146/72    


 


1/16/19 20:00  91      


 


1/16/19 20:00 97.8 91 21 142/82 (102) 96   


 


1/16/19 20:00        40


 


1/16/19 20:00      Mechanical Ventilator  





      Mechanical Ventilator  


 


1/16/19 19:30  83 17     30


 


1/16/19 16:55  89 22     40


 


1/16/19 16:00  87      


 


1/16/19 16:00        40


 


1/16/19 16:00 99.0 84 21 112/55 (74) 98   


 


1/16/19 16:00      Mechanical Ventilator  





      Mechanical Ventilator  


 


1/16/19 14:48  98 11     40


 


1/16/19 13:20  90 21     40


 


1/16/19 12:00        40


 


1/16/19 12:00      Mechanical Ventilator  





      Mechanical Ventilator  


 


1/16/19 12:00 98.6 79 21 137/31 (66) 98   


 


1/16/19 11:24  85      


 


1/16/19 10:51  87 16     40

















Intake and Output  


 


 1/16/19 1/17/19





 18:59 06:59


 


Intake Total 655 ml 530 ml


 


Output Total 3000 ml 


 


Balance -2345 ml 530 ml


 


  


 


Free Water 60 ml 50 ml


 


IV Total 115 ml 


 


Tube Feeding 480 ml 480 ml


 


Hemodialysis UF 3000 ml 


 


# Voids 1 2


 


# Bowel Movements 50 60








General Appearance:  no acute distress


HEENT:  normocephalic


Respiratory/Chest:  chest wall non-tender, lungs clear


Cardiovascular:  normal peripheral pulses, normal rate


Abdomen:  normal bowel sounds, soft, non tender


Laboratory Tests


1/16/19 16:30: Stool Occult Blood Positive


1/17/19 03:20: 


White Blood Count 10.1, Red Blood Count 2.61L, Hemoglobin 7.8L, Hematocrit 24.9L

, Mean Corpuscular Volume 96, Mean Corpuscular Hemoglobin 29.9, Mean 

Corpuscular Hemoglobin Concent 31.3L, Red Cell Distribution Width 16.3H, 

Platelet Count 399, Mean Platelet Volume 6.3L, Neutrophils (%) (Auto) , 

Lymphocytes (%) (Auto) , Monocytes (%) (Auto) , Eosinophils (%) (Auto) , 

Basophils (%) (Auto) , Neutrophils % (Manual) [Pending], Lymphocytes % (Manual) 

[Pending], Platelet Estimate [Pending], Platelet Morphology [Pending], Sodium 

Level 136, Potassium Level 4.0, Chloride Level 102, Carbon Dioxide Level 31, 

Anion Gap 3L, Blood Urea Nitrogen 29H, Creatinine 1.5H, Estimat Glomerular 

Filtration Rate , Glucose Level 102, Calcium Level 9.2


1/17/19 06:30: Stool Occult Blood Positive





Current Medications








 Medications


  (Trade)  Dose


 Ordered  Sig/Tony


 Route


 PRN Reason  Start Time


 Stop Time Status Last Admin


Dose Admin


 


 Acetaminophen


  (Tylenol)  650 mg  Q6H  PRN


 GT


 Mild Pain/Temp > 100.5  12/23/18 16:00


 1/20/19 15:59  1/10/19 08:49


 


 


 Acetaminophen/


 Hydrocodone Bitart


  (Norco 5/325)  1 tab  Q6H  PRN


 GT


 For Pain  1/15/19 16:45


 1/22/19 16:44  1/15/19 20:37


 


 


 Amiodarone HCl


  (Cordarone)  200 mg  DAILY


 GT


   1/2/19 09:00


 1/26/19 20:59  1/16/19 08:42


 


 


 Apixaban


  (Eliquis)  2.5 mg  BID


 GT


   12/27/18 18:00


 1/26/19 17:59  1/16/19 17:25


 


 


 Artificial Tears


  (Akwa-Tears)  1 drop  Q4H  PRN


 BOTH EYES


 Dry Eyes  12/23/18 15:30


 1/21/19 15:29  1/1/19 16:53


 


 


 Chlorhexidine


 Gluconate


  (Juana-Hex 2%)  1 applic  DAILY@2000


 TOPIC


   12/30/18 20:00


 1/29/19 19:59  1/16/19 20:46


 


 


 Collagenase


  (Santyl)  1 applic  DAILY


 TOPIC


   1/7/19 21:00


 2/6/19 08:59  1/16/19 08:43


 


 


 Epoetin Rupesh


  (Procrit (for


 ESRD on dialysis))  10,000 units  MON-WED-FRI


 SUBQ


   1/14/19 21:00


 2/13/19 20:59  1/16/19 20:45


 


 


 Iron Sucrose 100


 mg/Sodium Chloride  60 ml @ 


 240 mls/hr  DAILY


 IV


   1/15/19 09:00


 1/17/19 09:14  1/16/19 09:30


 


 


 Lansoprazole


  (Prevacid)  30 mg  Q12HR


 GT


   12/27/18 21:00


 1/26/19 20:59  1/16/19 20:45


 


 


 Meropenem 500 mg/


 Sodium Chloride  55 ml @ 


 110 mls/hr  DAILY


 IVPB


   1/16/19 09:00


 1/17/19 23:59  1/16/19 08:23


 


 


 Minoxidil


  (Loniten)  2.5 mg  Q12HR


 GT


   12/27/18 21:00


 1/26/19 20:59  1/16/19 20:47


 


 


 Quetiapine


 Fumarate


  (SEROquel)  25 mg  EVERY 6  HOURS


 ORAL


   1/16/19 00:00


 2/15/19 00:00  1/17/19 06:10


 


 


 Sodium Chloride  1,000 ml @ 


 500 mls/hr  Q2H PRN


 IVLG


 sbp<90 during hd  1/15/19 16:43


 2/14/19 16:42   


 


 


 Sodium Chloride


  (Ocean Nasal


 Spray)  1 spray  Q4H  PRN


 NASAL


 dry nose  12/23/18 14:30


 1/21/19 18:29  1/12/19 08:35


 


 


 Zolpidem Tartrate


  (Ambien)  5 mg  HSPRN  PRN


 ORAL


 Insomnia  1/11/19 08:00


 1/18/19 07:59  1/14/19 04:12


 

















Quentin Cardoso MD Jan 17, 2019 08:49 no

## 2023-11-09 ENCOUNTER — APPOINTMENT (OUTPATIENT)
Dept: WOUND CARE | Facility: CLINIC | Age: 83
End: 2023-11-09
Payer: MEDICARE

## 2023-11-09 ENCOUNTER — OUTPATIENT (OUTPATIENT)
Dept: OUTPATIENT SERVICES | Facility: HOSPITAL | Age: 83
LOS: 1 days | End: 2023-11-09
Payer: MEDICARE

## 2023-11-09 DIAGNOSIS — Z98.890 OTHER SPECIFIED POSTPROCEDURAL STATES: Chronic | ICD-10-CM

## 2023-11-09 DIAGNOSIS — I50.9 HEART FAILURE, UNSPECIFIED: ICD-10-CM

## 2023-11-09 PROCEDURE — G0463: CPT | Mod: 25

## 2023-11-09 PROCEDURE — 99204 OFFICE O/P NEW MOD 45 MIN: CPT | Mod: 25

## 2023-11-09 PROCEDURE — 11042 DBRDMT SUBQ TIS 1ST 20SQCM/<: CPT

## 2023-11-09 RX ORDER — PNV NO.95/FERROUS FUM/FOLIC AC 28MG-0.8MG
100 TABLET ORAL
Refills: 0 | Status: ACTIVE | COMMUNITY

## 2023-11-09 RX ORDER — AMOXICILLIN AND CLAVULANATE POTASSIUM 500; 125 MG/1; MG/1
500-125 TABLET, FILM COATED ORAL
Qty: 20 | Refills: 0 | Status: DISCONTINUED | COMMUNITY
Start: 2018-04-12 | End: 2023-11-09

## 2023-11-09 RX ORDER — CRANBERRY FRUIT EXTRACT 650 MG
CAPSULE ORAL
Refills: 0 | Status: ACTIVE | COMMUNITY

## 2023-11-09 RX ORDER — ELECTROLYTES/DEXTROSE
SOLUTION, ORAL ORAL
Refills: 0 | Status: ACTIVE | COMMUNITY

## 2023-11-09 RX ORDER — BICALUTAMIDE 50 MG/1
50 TABLET ORAL
Qty: 90 | Refills: 3 | Status: DISCONTINUED | COMMUNITY
Start: 2017-06-15 | End: 2023-11-09

## 2023-11-09 RX ORDER — COLLAGENASE SANTYL 250 [ARB'U]/G
250 OINTMENT TOPICAL DAILY
Qty: 1 | Refills: 0 | Status: ACTIVE | COMMUNITY
Start: 2023-11-09 | End: 1900-01-01

## 2023-11-09 RX ORDER — ALLOPURINOL 300 MG/1
300 TABLET ORAL
Refills: 0 | Status: ACTIVE | COMMUNITY

## 2023-11-09 RX ORDER — METOPROLOL TARTRATE 25 MG/1
25 TABLET, FILM COATED ORAL
Refills: 0 | Status: ACTIVE | COMMUNITY

## 2023-11-09 RX ORDER — AMIODARONE HYDROCHLORIDE 200 MG/1
200 TABLET ORAL
Refills: 0 | Status: ACTIVE | COMMUNITY

## 2023-11-09 RX ORDER — TAMSULOSIN HYDROCHLORIDE 0.4 MG/1
0.4 CAPSULE ORAL
Refills: 0 | Status: ACTIVE | COMMUNITY

## 2023-11-09 RX ORDER — BICALUTAMIDE 50 MG/1
50 TABLET ORAL
Qty: 90 | Refills: 3 | Status: DISCONTINUED | COMMUNITY
Start: 2017-09-29 | End: 2023-11-09

## 2023-11-13 DIAGNOSIS — I50.9 HEART FAILURE, UNSPECIFIED: ICD-10-CM

## 2023-11-13 DIAGNOSIS — S81.801A UNSPECIFIED OPEN WOUND, RIGHT LOWER LEG, INITIAL ENCOUNTER: ICD-10-CM

## 2023-11-13 DIAGNOSIS — S91.301A UNSPECIFIED OPEN WOUND, RIGHT FOOT, INITIAL ENCOUNTER: ICD-10-CM

## 2023-11-13 PROCEDURE — 87040 BLOOD CULTURE FOR BACTERIA: CPT

## 2023-11-13 PROCEDURE — 82947 ASSAY GLUCOSE BLOOD QUANT: CPT

## 2023-11-13 PROCEDURE — 97161 PT EVAL LOW COMPLEX 20 MIN: CPT

## 2023-11-13 PROCEDURE — 84100 ASSAY OF PHOSPHORUS: CPT

## 2023-11-13 PROCEDURE — 85027 COMPLETE CBC AUTOMATED: CPT

## 2023-11-13 PROCEDURE — 85730 THROMBOPLASTIN TIME PARTIAL: CPT

## 2023-11-13 PROCEDURE — 86140 C-REACTIVE PROTEIN: CPT

## 2023-11-13 PROCEDURE — 82435 ASSAY OF BLOOD CHLORIDE: CPT

## 2023-11-13 PROCEDURE — G1004: CPT

## 2023-11-13 PROCEDURE — 97162 PT EVAL MOD COMPLEX 30 MIN: CPT

## 2023-11-13 PROCEDURE — 71045 X-RAY EXAM CHEST 1 VIEW: CPT

## 2023-11-13 PROCEDURE — 87635 SARS-COV-2 COVID-19 AMP PRB: CPT

## 2023-11-13 PROCEDURE — 36415 COLL VENOUS BLD VENIPUNCTURE: CPT

## 2023-11-13 PROCEDURE — 85025 COMPLETE CBC W/AUTO DIFF WBC: CPT

## 2023-11-13 PROCEDURE — 96374 THER/PROPH/DIAG INJ IV PUSH: CPT

## 2023-11-13 PROCEDURE — 82330 ASSAY OF CALCIUM: CPT

## 2023-11-13 PROCEDURE — 97605 NEG PRS WND THER DME<=50SQCM: CPT

## 2023-11-13 PROCEDURE — 87070 CULTURE OTHR SPECIMN AEROBIC: CPT

## 2023-11-13 PROCEDURE — 80053 COMPREHEN METABOLIC PANEL: CPT

## 2023-11-13 PROCEDURE — 86901 BLOOD TYPING SEROLOGIC RH(D): CPT

## 2023-11-13 PROCEDURE — 73701 CT LOWER EXTREMITY W/DYE: CPT | Mod: ME

## 2023-11-13 PROCEDURE — 80048 BASIC METABOLIC PNL TOTAL CA: CPT

## 2023-11-13 PROCEDURE — 85610 PROTHROMBIN TIME: CPT

## 2023-11-13 PROCEDURE — 83735 ASSAY OF MAGNESIUM: CPT

## 2023-11-13 PROCEDURE — 84132 ASSAY OF SERUM POTASSIUM: CPT

## 2023-11-13 PROCEDURE — 96375 TX/PRO/DX INJ NEW DRUG ADDON: CPT

## 2023-11-13 PROCEDURE — 97110 THERAPEUTIC EXERCISES: CPT

## 2023-11-13 PROCEDURE — 99285 EMERGENCY DEPT VISIT HI MDM: CPT | Mod: 25

## 2023-11-13 PROCEDURE — 73630 X-RAY EXAM OF FOOT: CPT

## 2023-11-13 PROCEDURE — 86850 RBC ANTIBODY SCREEN: CPT

## 2023-11-13 PROCEDURE — 82803 BLOOD GASES ANY COMBINATION: CPT

## 2023-11-13 PROCEDURE — 73610 X-RAY EXAM OF ANKLE: CPT

## 2023-11-13 PROCEDURE — 87077 CULTURE AEROBIC IDENTIFY: CPT

## 2023-11-13 PROCEDURE — 85652 RBC SED RATE AUTOMATED: CPT

## 2023-11-13 PROCEDURE — 85014 HEMATOCRIT: CPT

## 2023-11-13 PROCEDURE — 93971 EXTREMITY STUDY: CPT

## 2023-11-13 PROCEDURE — 73590 X-RAY EXAM OF LOWER LEG: CPT

## 2023-11-13 PROCEDURE — 99285 EMERGENCY DEPT VISIT HI MDM: CPT

## 2023-11-13 PROCEDURE — 85018 HEMOGLOBIN: CPT

## 2023-11-13 PROCEDURE — 87186 SC STD MICRODIL/AGAR DIL: CPT

## 2023-11-13 PROCEDURE — 87637 SARSCOV2&INF A&B&RSV AMP PRB: CPT

## 2023-11-13 PROCEDURE — 86900 BLOOD TYPING SEROLOGIC ABO: CPT

## 2023-11-13 PROCEDURE — 83605 ASSAY OF LACTIC ACID: CPT

## 2023-11-13 PROCEDURE — 97164 PT RE-EVAL EST PLAN CARE: CPT

## 2023-11-13 PROCEDURE — 84295 ASSAY OF SERUM SODIUM: CPT

## 2023-11-22 ENCOUNTER — OUTPATIENT (OUTPATIENT)
Dept: OUTPATIENT SERVICES | Facility: HOSPITAL | Age: 83
LOS: 1 days | End: 2023-11-22
Payer: MEDICARE

## 2023-11-22 ENCOUNTER — APPOINTMENT (OUTPATIENT)
Dept: WOUND CARE | Facility: HOSPITAL | Age: 83
End: 2023-11-22
Payer: MEDICARE

## 2023-11-22 DIAGNOSIS — Z98.890 OTHER SPECIFIED POSTPROCEDURAL STATES: Chronic | ICD-10-CM

## 2023-11-22 PROCEDURE — 11042 DBRDMT SUBQ TIS 1ST 20SQCM/<: CPT

## 2023-11-22 PROCEDURE — 99024 POSTOP FOLLOW-UP VISIT: CPT
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
## 2023-11-30 DIAGNOSIS — S91.301A UNSPECIFIED OPEN WOUND, RIGHT FOOT, INITIAL ENCOUNTER: ICD-10-CM

## 2023-11-30 DIAGNOSIS — S81.801A UNSPECIFIED OPEN WOUND, RIGHT LOWER LEG, INITIAL ENCOUNTER: ICD-10-CM

## 2023-12-19 ENCOUNTER — OUTPATIENT (OUTPATIENT)
Dept: OUTPATIENT SERVICES | Facility: HOSPITAL | Age: 83
LOS: 1 days | End: 2023-12-19
Payer: MEDICARE

## 2023-12-19 ENCOUNTER — APPOINTMENT (OUTPATIENT)
Dept: WOUND CARE | Facility: HOSPITAL | Age: 83
End: 2023-12-19
Payer: MEDICARE

## 2023-12-19 VITALS
HEART RATE: 55 BPM | DIASTOLIC BLOOD PRESSURE: 68 MMHG | OXYGEN SATURATION: 95 % | RESPIRATION RATE: 18 BRPM | TEMPERATURE: 97.2 F | SYSTOLIC BLOOD PRESSURE: 112 MMHG

## 2023-12-19 DIAGNOSIS — Z98.890 OTHER SPECIFIED POSTPROCEDURAL STATES: Chronic | ICD-10-CM

## 2023-12-19 PROCEDURE — 93970 EXTREMITY STUDY: CPT | Mod: 26

## 2023-12-19 PROCEDURE — 17250 CHEM CAUT OF GRANLTJ TISSUE: CPT

## 2023-12-19 PROCEDURE — 17250 CHEM CAUT OF GRANLTJ TISSUE: CPT | Mod: 58

## 2023-12-19 PROCEDURE — 93922 UPR/L XTREMITY ART 2 LEVELS: CPT | Mod: 26

## 2023-12-19 RX ORDER — WARFARIN SODIUM 2 MG/1
2 TABLET ORAL
Refills: 0 | Status: ACTIVE | COMMUNITY

## 2023-12-21 NOTE — REVIEW OF SYSTEMS
[Feeling Poorly] : feeling poorly [Feeling Tired] : feeling tired [As noted in HPI] : as noted in HPI [Abdominal Pain] : abdominal pain [Heartburn] : heartburn [Joint Swelling] : joint swelling [Joint Stiffness] : joint stiffness [Skin Wound] : skin wound [Negative] : Endocrine [FreeTextEntry3] : glaucoma [FreeTextEntry5] : heart failure [FreeTextEntry7] : after eating [FreeTextEntry9] : bedbound

## 2023-12-21 NOTE — PHYSICAL EXAM
[1+] : right 1+ [Ankle Swelling Bilaterally] : severe [Skin Ulcer] : ulcer [Alert] : alert [Oriented to Person] : oriented to person [Oriented to Place] : oriented to place [Oriented to Time] : oriented to time [Calm] : calm [Please See PDF for Tissue Analytics] : Please See PDF for Tissue Analytics. [JVD] : no jugular venous distention  [de-identified] : pale, weak, lethargic [de-identified] : weak arms and legs, sitting in wheelchair, bedbound

## 2023-12-21 NOTE — ASSESSMENT
[FreeTextEntry1] : 84 y/o presents with family, PMH significant for heart failure, prostate cancer, has been bedbound for over a year. Right leg developed a hematoma, initially went to [facility] when family saw that it was swelling, sent home, became worse, presented to [facility], s/p evacuation of a hematoma, with subsequent vac placement  Pt. has visiting nurse, and home health aide. right medial lower leg vac removed, clean, pink wound base, depth superficial, has pulled up to surface enough not to warrant continuing vac. Right dorsal foot edematous, macerated, wet, slough and necrotic tissue over wound bed right midline calf scabbing, superficial opening, clean Mechanical debridement, then excisional debridement  discussed importance of leg elevation and diuretic? is currently not taking, had blood work done , strongly advised to follow up with cardiology recently started on Entresto 11/22/23 right leg improved, upper shin wound - s/p excisional debridement small amt. yellow slough right medial wound - hypergranular - silver nitrate applied, right dorsal foot - improved, s/p excisional debridement, less edematous overall improvement 12/19/23 right medial calf wound continues to improve, tissue is hypergranular, surrounding chinedu wound is ecchymotic, denies any trauma to area right dorsal foot continues to improve, small area of hypergranular tissue, all treated with silver nitrate foot is ecchymotic , warm, + sensation

## 2023-12-21 NOTE — PLAN
[FreeTextEntry1] : 11//9/23 Plan - all wounds to be washed with dove soap, wipa all wounds down with vashe medial right calf - d/c vac, start adaptic/ drawtex/rebecca/ace midline calf - adaptic/aquacel/rebecca/ace right dorsal foot - d/c medihoney, adaptic/drawtex, santyl ordered for dime size eschar which remains leg elevation must follow up with cardiology regarding diuretics maybe will qualify for house calls program, will inquire, discussed with pt. and family orders for nurse follow up 2 weeks 11/22/23 Plan - c/w adaptic/drawtex/rebecca ace, only difference is medial - adaptic/aquacel/drawtex to fully dry out orders for nurse follow up 3 weeks 12/19/23 Plan - wash soap/water, adaptic/drawtex /rebecca/ace

## 2023-12-21 NOTE — HISTORY OF PRESENT ILLNESS
[FreeTextEntry1] : 82 y/o presents with family, PMH significant for heart failure, prostate cancer, has been bedbound for over a year. Right leg developed a hematoma, initially went to [facility] when family saw that it was swelling, sent home, became worse, presented to [facility], s/p evacuation of a hematoma, with subsequent vac placement  Pt. has visiting nurse, and home health aide. right medial lower leg vac removed, clean, pink wound base, depth superficial, has pulled up to surface enough not to warrant continuing vac. Right dorsal foot edematous, macerated, wet, slough and necrotic tissue over wound bed right midline calf scabbing, superficial opening, clean Mechanical debridement, then excisional debridement  discussed importance of leg elevation and diuretic? is currently not taking, had blood work done , strongly advised to follow up with cardiology recently started on Entresto

## 2023-12-26 DIAGNOSIS — S81.801A UNSPECIFIED OPEN WOUND, RIGHT LOWER LEG, INITIAL ENCOUNTER: ICD-10-CM
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
## 2023-12-26 DIAGNOSIS — S91.301A UNSPECIFIED OPEN WOUND, RIGHT FOOT, INITIAL ENCOUNTER: ICD-10-CM

## 2024-01-02 ENCOUNTER — NON-APPOINTMENT (OUTPATIENT)
Age: 84
End: 2024-01-02

## 2024-01-03 ENCOUNTER — APPOINTMENT (OUTPATIENT)
Dept: WOUND CARE | Facility: HOSPITAL | Age: 84
End: 2024-01-03
Payer: MEDICARE

## 2024-01-03 ENCOUNTER — OUTPATIENT (OUTPATIENT)
Dept: OUTPATIENT SERVICES | Facility: HOSPITAL | Age: 84
LOS: 1 days | End: 2024-01-03
Payer: MEDICARE

## 2024-01-03 DIAGNOSIS — Z98.890 OTHER SPECIFIED POSTPROCEDURAL STATES: Chronic | ICD-10-CM

## 2024-01-03 DIAGNOSIS — T14.8XXA OTHER INJURY OF UNSPECIFIED BODY REGION, INITIAL ENCOUNTER: ICD-10-CM

## 2024-01-03 PROCEDURE — 99203 OFFICE O/P NEW LOW 30 MIN: CPT | Mod: 95

## 2024-01-03 PROCEDURE — G0463: CPT

## 2024-01-08 PROBLEM — T14.8XXA OPEN WOUND: Status: ACTIVE | Noted: 2024-01-08

## 2024-01-10 ENCOUNTER — NON-APPOINTMENT (OUTPATIENT)
Age: 84
End: 2024-01-10
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
## 2024-01-11 DIAGNOSIS — T14.8XXA OTHER INJURY OF UNSPECIFIED BODY REGION, INITIAL ENCOUNTER: ICD-10-CM

## 2024-01-23 ENCOUNTER — APPOINTMENT (OUTPATIENT)
Dept: WOUND CARE | Facility: HOSPITAL | Age: 84
End: 2024-01-23
Payer: MEDICARE

## 2024-01-23 ENCOUNTER — OUTPATIENT (OUTPATIENT)
Dept: OUTPATIENT SERVICES | Facility: HOSPITAL | Age: 84
LOS: 1 days | End: 2024-01-23
Payer: MEDICARE

## 2024-01-23 VITALS
OXYGEN SATURATION: 95 % | TEMPERATURE: 97.4 F | RESPIRATION RATE: 18 BRPM | DIASTOLIC BLOOD PRESSURE: 64 MMHG | SYSTOLIC BLOOD PRESSURE: 118 MMHG | HEART RATE: 80 BPM

## 2024-01-23 DIAGNOSIS — Z98.890 OTHER SPECIFIED POSTPROCEDURAL STATES: Chronic | ICD-10-CM

## 2024-01-23 DIAGNOSIS — S81.801A UNSPECIFIED OPEN WOUND, RIGHT LOWER LEG, INITIAL ENCOUNTER: ICD-10-CM

## 2024-01-23 PROCEDURE — 17250 CHEM CAUT OF GRANLTJ TISSUE: CPT

## 2024-01-24 NOTE — ASSESSMENT
[FreeTextEntry1] : 84 y/o presents with family, PMH significant for heart failure, prostate cancer, has been bedbound for over a year. Right leg developed a hematoma, initially went to [facility] when family saw that it was swelling, sent home, became worse, presented to [facility], s/p evacuation of a hematoma, with subsequent vac placement  Pt. has visiting nurse, and home health aide. right medial lower leg vac removed, clean, pink wound base, depth superficial, has pulled up to surface enough not to warrant continuing vac. Right dorsal foot edematous, macerated, wet, slough and necrotic tissue over wound bed right midline calf scabbing, superficial opening, clean Mechanical debridement, then excisional debridement  discussed importance of leg elevation and diuretic? is currently not taking, had blood work done , strongly advised to follow up with cardiology recently started on Entresto 11/22/23 right leg improved, upper shin wound - s/p excisional debridement small amt. yellow slough right medial wound - hypergranular - silver nitrate applied, right dorsal foot - improved, s/p excisional debridement, less edematous overall improvement 12/19/23 right medial calf wound continues to improve, tissue is hypergranular, surrounding chinedu wound is ecchymotic, denies any trauma to area right dorsal foot continues to improve, small area of hypergranular tissue, all treated with silver nitrate foot is ecchymotic , warm, + sensation 1/23/24 right leg/foot wounds - all improving upper anterior calf - almost closed, clean medial wound - hypergranular, silver nitrate, smaller foot - clean, hypergranular - silver nitrate

## 2024-01-24 NOTE — DISCUSSION/SUMMARY
[FreeTextEntry1] : Mr. LEBRON TERRY is a 83 year with persistent and worsening  right lower extremity venous insufficiency, CEAP classification C6 which is unresponsive to at least 3 months of compression stockings 20-30 mmHg, leg elevation, exercise and over the counter pain medication_(Ibuprofen).  Patient has complaints of worsening leg discomfort with swelling, fatigue, heaviness, achiness, cramping, restlessness, painful ulcer,  Patient had a recent rupture of a varicosity near the wound bed. He has significant chronic venous hypertension with purple discoloration to his lower extremity.  The patients symptoms interfere with their ADLs, such as walking, shopping and house work, and their ability to work and exercise. Patient has intact pulses in both legs without evidence of arterial insufficiency.    Treatment is indicated not for cosmetic reasons but for symptomatic venous reflux disease with symptoms which is refractory to conservative therapy. Venous duplex study demonstrates right  lower extremity venous insufficiency. The need for definitive effective treatment is based on severe, interfering and worsening reflux symptoms with evidence of venous insufficiency on venous ultrasound.   Patient is a candidate for endovenous ablation treatment of the right  GSV and Varithena treatment of the tributaries of the RLE.   The risks and benefits of endovenous ablation and Varithena treatment versus continued conservative management were discussed with the patient.  Patient chooses endovenous ablation and Varithena treatments. Treatment plan to be scheduled.

## 2024-01-24 NOTE — PHYSICAL EXAM
[1+] : right 1+ [Skin Ulcer] : ulcer [Ankle Swelling Bilaterally] : severe [Alert] : alert [Oriented to Person] : oriented to person [Oriented to Place] : oriented to place [Oriented to Time] : oriented to time [Calm] : calm [Please See PDF for Tissue Analytics] : Please See PDF for Tissue Analytics. [JVD] : no jugular venous distention  [de-identified] : pale, weak, lethargic [de-identified] : weak arms and legs, sitting in wheelchair, bedbound

## 2024-01-24 NOTE — PLAN
[FreeTextEntry1] : 11//9/23 Plan - all wounds to be washed with dove soap, wipa all wounds down with vashe medial right calf - d/c vac, start adaptic/ drawtex/rebecca/ace midline calf - adaptic/aquacel/rebecca/ace right dorsal foot - d/c medihoney, adaptic/drawtex, santyl ordered for dime size eschar which remains leg elevation must follow up with cardiology regarding diuretics maybe will qualify for house calls program, will inquire, discussed with pt. and family orders for nurse follow up 2 weeks 11/22/23 Plan - c/w adaptic/drawtex/rebecca ace, only difference is medial - adaptic/aquacel/drawtex to fully dry out orders for nurse follow up 3 weeks 12/19/23 Plan - wash soap/water, adaptic/drawtex /rebecca/ace 1/8/24 d/w  patient's wife and homecare RN regarding wound. Wound improving as per RN. No clinical sign of infection. f/u 2 weeks on TEB 1/23/24 Plan - wash wounds/adapticdrawtex/rebecca/ace follow up 3 weeks

## 2024-01-24 NOTE — HISTORY OF PRESENT ILLNESS
[FreeTextEntry1] : 84 y/o presents with family, PMH significant for heart failure, prostate cancer, has been bedbound for over a year. Patient has superficial ulcerations. Patient declines office visit.   Right leg developed a hematoma, initially went to [facility] when family saw that it was swelling, sent home, became worse, presented to [facility], s/p evacuation of a hematoma, with subsequent vac placement  Pt. has visiting nurse, and home health aide. right medial lower leg vac removed, clean, pink wound base, depth superficial, has pulled up to surface enough not to warrant continuing vac. Right dorsal foot edematous, macerated, wet, slough and necrotic tissue over wound bed right midline calf scabbing, superficial opening, clean Mechanical debridement, then excisional debridement  discussed importance of leg elevation and diuretic? is currently not taking, had blood work done , strongly advised to follow up with cardiology recently started on Entresto

## 2024-01-25 PROBLEM — S81.801A: Status: ACTIVE | Noted: 2023-11-09

## 2024-01-30 DIAGNOSIS — S81.801A UNSPECIFIED OPEN WOUND, RIGHT LOWER LEG, INITIAL ENCOUNTER: ICD-10-CM

## 2024-01-30 DIAGNOSIS — S91.301A UNSPECIFIED OPEN WOUND, RIGHT FOOT, INITIAL ENCOUNTER: ICD-10-CM

## 2024-02-20 ENCOUNTER — APPOINTMENT (OUTPATIENT)
Dept: WOUND CARE | Facility: HOSPITAL | Age: 84
End: 2024-02-20
Payer: MEDICARE

## 2024-02-20 ENCOUNTER — OUTPATIENT (OUTPATIENT)
Dept: OUTPATIENT SERVICES | Facility: HOSPITAL | Age: 84
LOS: 1 days | End: 2024-02-20
Payer: MEDICARE

## 2024-02-20 DIAGNOSIS — Z98.890 OTHER SPECIFIED POSTPROCEDURAL STATES: Chronic | ICD-10-CM

## 2024-02-20 DIAGNOSIS — S81.801D UNSPECIFIED OPEN WOUND, RIGHT LOWER LEG, SUBSEQUENT ENCOUNTER: ICD-10-CM

## 2024-02-20 PROCEDURE — 17250 CHEM CAUT OF GRANLTJ TISSUE: CPT

## 2024-02-20 NOTE — PHYSICAL EXAM
[1+] : right 1+ [Ankle Swelling Bilaterally] : severe [Skin Ulcer] : ulcer [Alert] : alert [Oriented to Person] : oriented to person [Oriented to Place] : oriented to place [Oriented to Time] : oriented to time [Calm] : calm [Please See PDF for Tissue Analytics] : Please See PDF for Tissue Analytics. [JVD] : no jugular venous distention  [de-identified] : pale, weak, lethargic [de-identified] : weak arms and legs, sitting in wheelchair, bedbound

## 2024-02-20 NOTE — PLAN
[FreeTextEntry1] : 11//9/23 Plan - all wounds to be washed with dove soap, wipa all wounds down with vashe medial right calf - d/c vac, start adaptic/ drawtex/rebecca/ace midline calf - adaptic/aquacel/rebecca/ace right dorsal foot - d/c medihoney, adaptic/drawtex, santyl ordered for dime size eschar which remains leg elevation must follow up with cardiology regarding diuretics maybe will qualify for house calls program, will inquire, discussed with pt. and family orders for nurse follow up 2 weeks 11/22/23 Plan - c/w adaptic/drawtex/rebecca ace, only difference is medial - adaptic/aquacel/drawtex to fully dry out orders for nurse follow up 3 weeks 12/19/23 Plan - wash soap/water, adaptic/drawtex /rebecca/ace 1/8/24 d/w  patient's wife and homecare RN regarding wound. Wound improving as per RN. No clinical sign of infection. f/u 2 weeks on TEB 1/23/24 Plan - wash wounds/adapticdrawtex/rebecca/ace follow up 3 weeks 2/20/24 Plan - cavilon to healed shin wound medial calf and foot - adaptic/aquacel/rebecca/ace no zinc, drying up skin moisturize with aquaphor/cerave follow up 1 month

## 2024-02-20 NOTE — ASSESSMENT
[FreeTextEntry1] : 84 y/o presents with family, PMH significant for heart failure, prostate cancer, has been bedbound for over a year. Right leg developed a hematoma, initially went to [facility] when family saw that it was swelling, sent home, became worse, presented to [facility], s/p evacuation of a hematoma, with subsequent vac placement  Pt. has visiting nurse, and home health aide. right medial lower leg vac removed, clean, pink wound base, depth superficial, has pulled up to surface enough not to warrant continuing vac. Right dorsal foot edematous, macerated, wet, slough and necrotic tissue over wound bed right midline calf scabbing, superficial opening, clean Mechanical debridement, then excisional debridement  discussed importance of leg elevation and diuretic? is currently not taking, had blood work done , strongly advised to follow up with cardiology recently started on Entresto 11/22/23 right leg improved, upper shin wound - s/p excisional debridement small amt. yellow slough right medial wound - hypergranular - silver nitrate applied, right dorsal foot - improved, s/p excisional debridement, less edematous overall improvement 12/19/23 right medial calf wound continues to improve, tissue is hypergranular, surrounding chinedu wound is ecchymotic, denies any trauma to area right dorsal foot continues to improve, small area of hypergranular tissue, all treated with silver nitrate foot is ecchymotic , warm, + sensation 1/23/24 right leg/foot wounds - all improving upper anterior calf - almost closed, clean medial wound - hypergranular, silver nitrate, smaller foot - clean, hypergranular - silver nitrate 2/20/24 right anterior upper shin is healed right medial calf wound, smaller, hypergranular, treated with silver nitrate right dorsal foot - no open areas, small raised area, marble sized, non painful overall great improvement
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
## 2024-02-23 DIAGNOSIS — S81.801D UNSPECIFIED OPEN WOUND, RIGHT LOWER LEG, SUBSEQUENT ENCOUNTER: ICD-10-CM

## 2024-02-23 DIAGNOSIS — S91.301A UNSPECIFIED OPEN WOUND, RIGHT FOOT, INITIAL ENCOUNTER: ICD-10-CM

## 2024-02-23 DIAGNOSIS — Z87.828 PERSONAL HISTORY OF OTHER (HEALED) PHYSICAL INJURY AND TRAUMA: ICD-10-CM

## 2024-03-06 NOTE — PATIENT PROFILE ADULT - MST SCORE
Form faxed with confirmation received. Original sent to scanning and copy placed in completed forms.    0

## 2024-03-19 ENCOUNTER — APPOINTMENT (OUTPATIENT)
Dept: WOUND CARE | Facility: HOSPITAL | Age: 84
End: 2024-03-19
Payer: MEDICARE

## 2024-03-19 ENCOUNTER — OUTPATIENT (OUTPATIENT)
Dept: OUTPATIENT SERVICES | Facility: HOSPITAL | Age: 84
LOS: 1 days | End: 2024-03-19

## 2024-03-19 VITALS — HEART RATE: 66 BPM | TEMPERATURE: 98 F

## 2024-03-19 DIAGNOSIS — Z98.890 OTHER SPECIFIED POSTPROCEDURAL STATES: Chronic | ICD-10-CM

## 2024-03-19 DIAGNOSIS — S91.301A UNSPECIFIED OPEN WOUND, RIGHT FOOT, INITIAL ENCOUNTER: ICD-10-CM

## 2024-03-19 PROCEDURE — 17250 CHEM CAUT OF GRANLTJ TISSUE: CPT

## 2024-03-19 RX ORDER — FUROSEMIDE 20 MG/1
20 TABLET ORAL
Refills: 0 | Status: ACTIVE | COMMUNITY

## 2024-03-21 PROBLEM — S91.301A WOUND OF RIGHT FOOT: Status: ACTIVE | Noted: 2023-11-09

## 2024-03-21 NOTE — ASSESSMENT
[FreeTextEntry1] : 82 y/o presents with family, PMH significant for heart failure, prostate cancer, has been bedbound for over a year. Right leg developed a hematoma, initially went to [facility] when family saw that it was swelling, sent home, became worse, presented to [facility], s/p evacuation of a hematoma, with subsequent vac placement  Pt. has visiting nurse, and home health aide. right medial lower leg vac removed, clean, pink wound base, depth superficial, has pulled up to surface enough not to warrant continuing vac. Right dorsal foot edematous, macerated, wet, slough and necrotic tissue over wound bed right midline calf scabbing, superficial opening, clean Mechanical debridement, then excisional debridement  discussed importance of leg elevation and diuretic? is currently not taking, had blood work done , strongly advised to follow up with cardiology recently started on Entresto 11/22/23 right leg improved, upper shin wound - s/p excisional debridement small amt. yellow slough right medial wound - hypergranular - silver nitrate applied, right dorsal foot - improved, s/p excisional debridement, less edematous overall improvement 12/19/23 right medial calf wound continues to improve, tissue is hypergranular, surrounding chinedu wound is ecchymotic, denies any trauma to area right dorsal foot continues to improve, small area of hypergranular tissue, all treated with silver nitrate foot is ecchymotic , warm, + sensation 1/23/24 right leg/foot wounds - all improving upper anterior calf - almost closed, clean medial wound - hypergranular, silver nitrate, smaller foot - clean, hypergranular - silver nitrate 2/20/24 right anterior upper shin is healed right medial calf wound, smaller, hypergranular, treated with silver nitrate right dorsal foot - no open areas, small raised area, marble sized, non painful overall great improvement 3/19/24 Right calf medial wound closed cavilon  wound below knee pin point opening - clark, culture taken leg not swollen foot wound - hyper granular tissue - treated with silver nitrate

## 2024-03-21 NOTE — PHYSICAL EXAM
[1+] : right 1+ [Ankle Swelling Bilaterally] : severe [Skin Ulcer] : ulcer [Alert] : alert [Oriented to Person] : oriented to person [Oriented to Place] : oriented to place [Oriented to Time] : oriented to time [Calm] : calm [Please See PDF for Tissue Analytics] : Please See PDF for Tissue Analytics. [JVD] : no jugular venous distention  [de-identified] : pale, weak, lethargic [de-identified] : weak arms and legs, sitting in wheelchair, bedbound

## 2024-03-21 NOTE — REVIEW OF SYSTEMS
[Feeling Poorly] : feeling poorly [Feeling Tired] : feeling tired [As noted in HPI] : as noted in HPI [Abdominal Pain] : abdominal pain [Heartburn] : heartburn [Joint Swelling] : joint swelling [Joint Stiffness] : joint stiffness [Skin Wound] : skin wound [Negative] : Endocrine [FreeTextEntry3] : glaucoma [FreeTextEntry5] : heart failure [FreeTextEntry9] : bedbound [FreeTextEntry7] : after eating

## 2024-03-21 NOTE — DISCUSSION/SUMMARY
[FreeTextEntry1] : Mr. LEBRON TERRY is a 83 year with persistent and worsening  right lower extremity venous insufficiency, CEAP classification C6 which is unresponsive to at least 3 months of compression stockings 20-30 mmHg, leg elevation, exercise and over the counter pain medication_(Ibuprofen).  Patient has complaints of worsening leg discomfort with swelling, fatigue, heaviness, achiness, cramping, restlessness, painful ulcer,  Patient had a recent rupture of a varicosity near the wound bed. He has significant chronic venous hypertension with purple discoloration to his lower extremity.  The patients symptoms interfere with their ADLs, such as walking, shopping and house work, and their ability to work and exercise. Patient has intact pulses in both legs without evidence of arterial insufficiency.    Treatment is indicated not for cosmetic reasons but for symptomatic venous reflux disease with symptoms which is refractory to conservative therapy. Venous duplex study demonstrates right  lower extremity venous insufficiency. The need for definitive effective treatment is based on severe, interfering and worsening reflux symptoms with evidence of venous insufficiency on venous ultrasound.   Patient is a candidate for endovenous ablation treatment of the right  GSV and Varithena treatment of the tributaries of the RLE.   The risks and benefits of endovenous ablation and Varithena treatment versus continued conservative management were discussed with the patient.  Patient chooses endovenous ablation and Varithena treatments. Treatment plan to be scheduled.  
VIEW ALL

## 2024-03-21 NOTE — PLAN
[FreeTextEntry1] : 11//9/23 Plan - all wounds to be washed with dove soap, wipa all wounds down with vashe medial right calf - d/c vac, start adaptic/ drawtex/rebecca/ace midline calf - adaptic/aquacel/rebecca/ace right dorsal foot - d/c medihoney, adaptic/drawtex, santyl ordered for dime size eschar which remains leg elevation must follow up with cardiology regarding diuretics maybe will qualify for house calls program, will inquire, discussed with pt. and family orders for nurse follow up 2 weeks 11/22/23 Plan - c/w adaptic/drawtex/rebecca ace, only difference is medial - adaptic/aquacel/drawtex to fully dry out orders for nurse follow up 3 weeks 12/19/23 Plan - wash soap/water, adaptic/drawtex /rebecca/ace 1/8/24 d/w  patient's wife and homecare RN regarding wound. Wound improving as per RN. No clinical sign of infection. f/u 2 weeks on TEB 1/23/24 Plan - wash wounds/adapticdrawtex/rebecca/ace follow up 3 weeks 2/20/24 Plan - cavilon to healed shin wound medial calf and foot - adaptic/aquacel/rebecca/ace no zinc, drying up skin moisturize with aquaphor/cerave follow up 1 month 3/19/24 Plan -  d/c adaptic, start aquacel to 2 open areas/rebecca/ace diuretic 3x/week leg elevation follow up 6 weeks orders for nurse - gave ok to close case, caregiver is competent in dressing change

## 2024-03-21 NOTE — HISTORY OF PRESENT ILLNESS
[FreeTextEntry1] : 82 y/o presents with family, PMH significant for heart failure, prostate cancer, has been bedbound for over a year. Patient has superficial ulcerations. Patient declines office visit.   Right leg developed a hematoma, initially went to [facility] when family saw that it was swelling, sent home, became worse, presented to [facility], s/p evacuation of a hematoma, with subsequent vac placement  Pt. has visiting nurse, and home health aide. right medial lower leg vac removed, clean, pink wound base, depth superficial, has pulled up to surface enough not to warrant continuing vac. Right dorsal foot edematous, macerated, wet, slough and necrotic tissue over wound bed right midline calf scabbing, superficial opening, clean Mechanical debridement, then excisional debridement  discussed importance of leg elevation and diuretic? is currently not taking, had blood work done , strongly advised to follow up with cardiology recently started on Entresto

## 2024-03-22 DIAGNOSIS — B95.8 UNSPECIFIED STAPHYLOCOCCUS AS THE CAUSE OF DISEASES CLASSIFIED ELSEWHERE: ICD-10-CM

## 2024-03-22 LAB — BACTERIA SPEC CULT: ABNORMAL

## 2024-03-22 RX ORDER — CLINDAMYCIN HYDROCHLORIDE 300 MG/1
300 CAPSULE ORAL
Qty: 10 | Refills: 1 | Status: ACTIVE | COMMUNITY
Start: 2024-03-22 | End: 1900-01-01

## 2024-04-02 DIAGNOSIS — Z87.828 PERSONAL HISTORY OF OTHER (HEALED) PHYSICAL INJURY AND TRAUMA: ICD-10-CM
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
## 2024-04-02 DIAGNOSIS — S91.301A UNSPECIFIED OPEN WOUND, RIGHT FOOT, INITIAL ENCOUNTER: ICD-10-CM
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
## 2024-04-30 ENCOUNTER — APPOINTMENT (OUTPATIENT)
Dept: WOUND CARE | Facility: HOSPITAL | Age: 84
End: 2024-04-30
Payer: MEDICARE

## 2024-04-30 ENCOUNTER — OUTPATIENT (OUTPATIENT)
Dept: OUTPATIENT SERVICES | Facility: HOSPITAL | Age: 84
LOS: 1 days | End: 2024-04-30
Payer: MEDICARE

## 2024-04-30 VITALS
SYSTOLIC BLOOD PRESSURE: 130 MMHG | OXYGEN SATURATION: 90 % | HEART RATE: 60 BPM | TEMPERATURE: 97.7 F | RESPIRATION RATE: 24 BRPM | DIASTOLIC BLOOD PRESSURE: 76 MMHG

## 2024-04-30 DIAGNOSIS — M79.89 OTHER SPECIFIED SOFT TISSUE DISORDERS: ICD-10-CM

## 2024-04-30 DIAGNOSIS — Z98.890 OTHER SPECIFIED POSTPROCEDURAL STATES: Chronic | ICD-10-CM

## 2024-04-30 DIAGNOSIS — Z87.828 PERSONAL HISTORY OF OTHER (HEALED) PHYSICAL INJURY AND TRAUMA: ICD-10-CM

## 2024-04-30 PROCEDURE — G0463: CPT

## 2024-04-30 PROCEDURE — 99214 OFFICE O/P EST MOD 30 MIN: CPT

## 2024-05-06 ENCOUNTER — APPOINTMENT (OUTPATIENT)
Dept: PULMONOLOGY | Facility: CLINIC | Age: 84
End: 2024-05-06
Payer: MEDICARE

## 2024-05-06 VITALS — SYSTOLIC BLOOD PRESSURE: 117 MMHG | HEART RATE: 88 BPM | OXYGEN SATURATION: 92 % | DIASTOLIC BLOOD PRESSURE: 74 MMHG

## 2024-05-06 DIAGNOSIS — R09.02 HYPOXEMIA: ICD-10-CM

## 2024-05-06 DIAGNOSIS — R06.3 PERIODIC BREATHING: ICD-10-CM

## 2024-05-06 DIAGNOSIS — Z79.899 OTHER LONG TERM (CURRENT) DRUG THERAPY: ICD-10-CM

## 2024-05-06 PROCEDURE — 99204 OFFICE O/P NEW MOD 45 MIN: CPT

## 2024-05-06 RX ORDER — SACUBITRIL AND VALSARTAN 24; 26 MG/1; MG/1
24-26 TABLET, FILM COATED ORAL
Refills: 0 | Status: DISCONTINUED | COMMUNITY
End: 2024-05-06

## 2024-05-06 NOTE — PHYSICAL EXAM
[No Acute Distress] : no acute distress [Normal Oropharynx] : normal oropharynx [Normal Appearance] : normal appearance [No Neck Mass] : no neck mass [Normal Rate/Rhythm] : normal rate/rhythm [Normal S1, S2] : normal s1, s2 [No Murmurs] : no murmurs [No Resp Distress] : no resp distress [Clear to Auscultation Bilaterally] : clear to auscultation bilaterally [Normal to Percussion] : normal to percussion [No Abnormalities] : no abnormalities [Benign] : benign [No HSM] : no hsm [Normal Gait] : normal gait [No Clubbing] : no clubbing [No Cyanosis] : no cyanosis [No Edema] : no edema [FROM] : FROM [Normal Color/ Pigmentation] : normal color/ pigmentation [No Focal Deficits] : no focal deficits [Oriented x3] : oriented x3 [Normal Affect] : normal affect [TextBox_2] : Overweight [TextBox_68] : Cheyne Sarmiento Pattern.

## 2024-05-06 NOTE — HISTORY OF PRESENT ILLNESS
[Never] : never [TextBox_4] : LEBRON TERRY is a 84 year old  M referred for pulmonary evaluation for possible hypoxemia.   Seeing wound specialist. Referred to pulmonary for resp. status.  Told sats 89-90. Is bed bound and aide notes SOB.  Lying flat noted to have some SOB. Better on side. No cough or wheezing.  minimal snoring.   Past pulmonary history. 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  Family history of pulmonary disease. N [facility]  Last hospitalization 2022 for atrial fib.   In 2022 fell and dx. drop foot.  takes PRN diuretic  On amiodarone since 2022.

## 2024-05-06 NOTE — ASSESSMENT
[FreeTextEntry1] : Not interested in sleep study.  Did discuss and recommend. Nocturnal Oximetry Cardiology follow-up. Should obtain chest radiograph when possible.

## 2024-05-06 NOTE — DISCUSSION/SUMMARY
[FreeTextEntry1] : By history periods of hypoxemia likely related to cardiac disease and possible obstructive sleep apnea syndrome. Today had Cheyne-Sarmiento pattern of respiration. On amiodarone.

## 2024-05-09 PROBLEM — M79.89 SWELLING OF LEFT LOWER EXTREMITY: Status: ACTIVE | Noted: 2024-05-09

## 2024-05-09 PROBLEM — Z87.828 HEALED WOUND: Status: ACTIVE | Noted: 2024-02-20

## 2024-05-09 NOTE — PLAN
[FreeTextEntry1] : 11//9/23 Plan - all wounds to be washed with dove soap, wipa all wounds down with vashe medial right calf - d/c vac, start adaptic/ drawtex/rebecca/ace midline calf - adaptic/aquacel/rebecca/ace right dorsal foot - d/c medihoney, adaptic/drawtex, santyl ordered for dime size eschar which remains leg elevation must follow up with cardiology regarding diuretics maybe will qualify for house calls program, will inquire, discussed with pt. and family orders for nurse follow up 2 weeks 11/22/23 Plan - c/w adaptic/drawtex/rebecca ace, only difference is medial - adaptic/aquacel/drawtex to fully dry out orders for nurse follow up 3 weeks 12/19/23 Plan - wash soap/water, adaptic/drawtex /rebecca/ace 1/8/24 d/w  patient's wife and homecare RN regarding wound. Wound improving as per RN. No clinical sign of infection. f/u 2 weeks on TEB 1/23/24 Plan - wash wounds/adapticdrawtex/rebecca/ace follow up 3 weeks 2/20/24 Plan - cavilon to healed shin wound medial calf and foot - adaptic/aquacel/rebecca/ace no zinc, drying up skin moisturize with aquaphor/cerave follow up 1 month 3/19/24 Plan -  d/c adaptic, start aquacel to 2 open areas/rebecca/ace diuretic 3x/week leg elevation follow up 6 weeks orders for nurse - gave ok to close case, caregiver is competent in dressing change 4/30/24 Plan - advised to call cardiologist, primary to see about possibly qualifying for home o2 cavilon applied to healed wounds advised to keep leg elevated, ace wrap during day may moisturize with cerave or aquaphor no need for follow up, call if concerns will inquire about house call program for them, as it is difficult for family to transport him

## 2024-05-09 NOTE — PHYSICAL EXAM
[1+] : right 1+ [Ankle Swelling Bilaterally] : severe [Skin Ulcer] : ulcer [Alert] : alert [Oriented to Person] : oriented to person [Oriented to Place] : oriented to place [Oriented to Time] : oriented to time [Calm] : calm [Please See PDF for Tissue Analytics] : Please See PDF for Tissue Analytics. [JVD] : no jugular venous distention  [de-identified] : pale, weak, lethargic [de-identified] : weak arms and legs, sitting in wheelchair, bedbound

## 2024-05-09 NOTE — HISTORY OF PRESENT ILLNESS
[FreeTextEntry1] : 82 y/o presents with family, PMH significant for heart failure, prostate cancer, has been bedbound for over a year. Patient has superficial ulcerations. Patient declines office visit.   Right leg developed a hematoma, initially went to [facility] when family saw that it was swelling, sent home, became worse, presented to [facility], s/p evacuation of a hematoma, with subsequent vac placement  Pt. has visiting nurse, and home health aide. right medial lower leg vac removed, clean, pink wound base, depth superficial, has pulled up to surface enough not to warrant continuing vac. Right dorsal foot edematous, macerated, wet, slough and necrotic tissue over wound bed right midline calf scabbing, superficial opening, clean Mechanical debridement, then excisional debridement  discussed importance of leg elevation and diuretic? is currently not taking, had blood work done , strongly advised to follow up with cardiology recently started on Entresto

## 2024-05-09 NOTE — ASSESSMENT
[FreeTextEntry1] : 82 y/o presents with family, PMH significant for heart failure, prostate cancer, has been bedbound for over a year. Right leg developed a hematoma, initially went to [facility] when family saw that it was swelling, sent home, became worse, presented to [facility], s/p evacuation of a hematoma, with subsequent vac placement  Pt. has visiting nurse, and home health aide. right medial lower leg vac removed, clean, pink wound base, depth superficial, has pulled up to surface enough not to warrant continuing vac. Right dorsal foot edematous, macerated, wet, slough and necrotic tissue over wound bed right midline calf scabbing, superficial opening, clean Mechanical debridement, then excisional debridement  discussed importance of leg elevation and diuretic? is currently not taking, had blood work done , strongly advised to follow up with cardiology recently started on Entresto 11/22/23 right leg improved, upper shin wound - s/p excisional debridement small amt. yellow slough right medial wound - hypergranular - silver nitrate applied, right dorsal foot - improved, s/p excisional debridement, less edematous overall improvement 12/19/23 right medial calf wound continues to improve, tissue is hypergranular, surrounding chinedu wound is ecchymotic, denies any trauma to area right dorsal foot continues to improve, small area of hypergranular tissue, all treated with silver nitrate foot is ecchymotic , warm, + sensation 1/23/24 right leg/foot wounds - all improving upper anterior calf - almost closed, clean medial wound - hypergranular, silver nitrate, smaller foot - clean, hypergranular - silver nitrate 2/20/24 right anterior upper shin is healed right medial calf wound, smaller, hypergranular, treated with silver nitrate right dorsal foot - no open areas, small raised area, marble sized, non painful overall great improvement 3/19/24 Right calf medial wound closed cavilon  wound below knee pin point opening - clark, culture taken leg not swollen foot wound - hyper granular tissue - treated with silver nitrate 4/30/24  all left leg wounds healed, cavilon applied lethargic, color pale o2 sat 90%, has sleep apnea, does not wear bipap

## 2024-05-16 DIAGNOSIS — Z87.828 PERSONAL HISTORY OF OTHER (HEALED) PHYSICAL INJURY AND TRAUMA: ICD-10-CM

## 2024-05-16 DIAGNOSIS — M79.89 OTHER SPECIFIED SOFT TISSUE DISORDERS: ICD-10-CM

## 2024-05-20 ENCOUNTER — NON-APPOINTMENT (OUTPATIENT)
Age: 84
End: 2024-05-20

## 2024-05-22 ENCOUNTER — APPOINTMENT (OUTPATIENT)
Dept: PULMONOLOGY | Facility: CLINIC | Age: 84
End: 2024-05-22
Payer: MEDICARE

## 2024-05-22 DIAGNOSIS — I50.9 HEART FAILURE, UNSPECIFIED: ICD-10-CM

## 2024-05-22 PROCEDURE — 99213 OFFICE O/P EST LOW 20 MIN: CPT

## 2024-05-22 NOTE — DISCUSSION/SUMMARY
[FreeTextEntry1] : so2 desaturations on room air related to CHF Prior had Cheyne-Sarmiento pattern of respiration. On amiodarone.

## 2024-05-22 NOTE — ASSESSMENT
[FreeTextEntry1] : will order stationary o2 2liters o2 recommend f/u in office to assess if meets for portable o2  may need ABG, pt and family not interested in getting Cardiology follow-up. Should obtain chest radiograph when possible.

## 2024-05-30 PROCEDURE — 0: CUSTOM

## 2024-07-24 ENCOUNTER — EMERGENCY (EMERGENCY)
Facility: HOSPITAL | Age: 84
LOS: 1 days | Discharge: HOME | End: 2024-07-24
Attending: EMERGENCY MEDICINE | Admitting: EMERGENCY MEDICINE
Payer: MEDICARE

## 2024-07-24 VITALS
HEART RATE: 74 BPM | OXYGEN SATURATION: 93 % | TEMPERATURE: 98 F | SYSTOLIC BLOOD PRESSURE: 142 MMHG | RESPIRATION RATE: 17 BRPM | DIASTOLIC BLOOD PRESSURE: 81 MMHG

## 2024-07-24 VITALS
WEIGHT: 250 LBS | SYSTOLIC BLOOD PRESSURE: 113 MMHG | OXYGEN SATURATION: 97 % | DIASTOLIC BLOOD PRESSURE: 71 MMHG | TEMPERATURE: 97 F | RESPIRATION RATE: 16 BRPM | HEIGHT: 72 IN | HEART RATE: 56 BPM

## 2024-07-24 DIAGNOSIS — Z98.890 OTHER SPECIFIED POSTPROCEDURAL STATES: Chronic | ICD-10-CM

## 2024-07-24 LAB
ALBUMIN SERPL ELPH-MCNC: 3.2 G/DL — LOW (ref 3.3–5)
ALP SERPL-CCNC: 77 U/L — SIGNIFICANT CHANGE UP (ref 30–120)
ALT FLD-CCNC: 15 U/L — SIGNIFICANT CHANGE UP (ref 10–60)
ANION GAP SERPL CALC-SCNC: 2 MMOL/L — LOW (ref 5–17)
APPEARANCE UR: CLEAR — SIGNIFICANT CHANGE UP
APTT BLD: 43.5 SEC — HIGH (ref 24.5–35.6)
AST SERPL-CCNC: 17 U/L — SIGNIFICANT CHANGE UP (ref 10–40)
BACTERIA # UR AUTO: NEGATIVE /HPF — SIGNIFICANT CHANGE UP
BASOPHILS # BLD AUTO: 0.01 K/UL — SIGNIFICANT CHANGE UP (ref 0–0.2)
BASOPHILS NFR BLD AUTO: 0.2 % — SIGNIFICANT CHANGE UP (ref 0–2)
BILIRUB SERPL-MCNC: 1.1 MG/DL — SIGNIFICANT CHANGE UP (ref 0.2–1.2)
BILIRUB UR-MCNC: NEGATIVE — SIGNIFICANT CHANGE UP
BUN SERPL-MCNC: 35 MG/DL — HIGH (ref 7–23)
CALCIUM SERPL-MCNC: 9.1 MG/DL — SIGNIFICANT CHANGE UP (ref 8.4–10.5)
CHLORIDE SERPL-SCNC: 102 MMOL/L — SIGNIFICANT CHANGE UP (ref 96–108)
CO2 SERPL-SCNC: 36 MMOL/L — HIGH (ref 22–31)
COLOR SPEC: YELLOW — SIGNIFICANT CHANGE UP
CREAT SERPL-MCNC: 1.53 MG/DL — HIGH (ref 0.5–1.3)
DIFF PNL FLD: ABNORMAL
EGFR: 45 ML/MIN/1.73M2 — LOW
EGFR: 45 ML/MIN/1.73M2 — LOW
EOSINOPHIL # BLD AUTO: 0.09 K/UL — SIGNIFICANT CHANGE UP (ref 0–0.5)
EOSINOPHIL NFR BLD AUTO: 1.7 % — SIGNIFICANT CHANGE UP (ref 0–6)
EPI CELLS # UR: PRESENT
GLUCOSE SERPL-MCNC: 125 MG/DL — HIGH (ref 70–99)
GLUCOSE UR QL: NEGATIVE MG/DL — SIGNIFICANT CHANGE UP
HCT VFR BLD CALC: 42.6 % — SIGNIFICANT CHANGE UP (ref 39–50)
HGB BLD-MCNC: 13.8 G/DL — SIGNIFICANT CHANGE UP (ref 13–17)
IMM GRANULOCYTES NFR BLD AUTO: 0.6 % — SIGNIFICANT CHANGE UP (ref 0–0.9)
INR BLD: 2.23 RATIO — HIGH (ref 0.85–1.18)
KETONES UR-MCNC: NEGATIVE MG/DL — SIGNIFICANT CHANGE UP
LEUKOCYTE ESTERASE UR-ACNC: NEGATIVE — SIGNIFICANT CHANGE UP
LIDOCAIN IGE QN: 94 U/L — HIGH (ref 16–77)
LYMPHOCYTES # BLD AUTO: 0.97 K/UL — LOW (ref 1–3.3)
LYMPHOCYTES # BLD AUTO: 17.9 % — SIGNIFICANT CHANGE UP (ref 13–44)
MCHC RBC-ENTMCNC: 31.9 PG — SIGNIFICANT CHANGE UP (ref 27–34)
MCHC RBC-ENTMCNC: 32.4 GM/DL — SIGNIFICANT CHANGE UP (ref 32–36)
MCV RBC AUTO: 98.4 FL — SIGNIFICANT CHANGE UP (ref 80–100)
MONOCYTES # BLD AUTO: 0.53 K/UL — SIGNIFICANT CHANGE UP (ref 0–0.9)
MONOCYTES NFR BLD AUTO: 9.8 % — SIGNIFICANT CHANGE UP (ref 2–14)
NEUTROPHILS # BLD AUTO: 3.79 K/UL — SIGNIFICANT CHANGE UP (ref 1.8–7.4)
NEUTROPHILS NFR BLD AUTO: 69.8 % — SIGNIFICANT CHANGE UP (ref 43–77)
NITRITE UR-MCNC: NEGATIVE — SIGNIFICANT CHANGE UP
NRBC # BLD: 0 /100 WBCS — SIGNIFICANT CHANGE UP (ref 0–0)
NRBC BLD-RTO: 0 /100 WBCS — SIGNIFICANT CHANGE UP (ref 0–0)
PH UR: 7 — SIGNIFICANT CHANGE UP (ref 5–8)
PLATELET # BLD AUTO: 146 K/UL — LOW (ref 150–400)
POTASSIUM SERPL-MCNC: 4.3 MMOL/L — SIGNIFICANT CHANGE UP (ref 3.5–5.3)
POTASSIUM SERPL-SCNC: 4.3 MMOL/L — SIGNIFICANT CHANGE UP (ref 3.5–5.3)
PROT SERPL-MCNC: 6.6 G/DL — SIGNIFICANT CHANGE UP (ref 6–8.3)
PROT UR-MCNC: NEGATIVE MG/DL — SIGNIFICANT CHANGE UP
PROTHROM AB SERPL-ACNC: 24.4 SEC — HIGH (ref 9.5–13)
RBC # BLD: 4.33 M/UL — SIGNIFICANT CHANGE UP (ref 4.2–5.8)
RBC # FLD: 16 % — HIGH (ref 10.3–14.5)
RBC CASTS # UR COMP ASSIST: 1 /HPF — SIGNIFICANT CHANGE UP (ref 0–4)
SODIUM SERPL-SCNC: 140 MMOL/L — SIGNIFICANT CHANGE UP (ref 135–145)
SP GR SPEC: 1.02 — SIGNIFICANT CHANGE UP (ref 1–1.03)
UROBILINOGEN FLD QL: 1 MG/DL — SIGNIFICANT CHANGE UP (ref 0.2–1)
WBC # BLD: 5.42 K/UL — SIGNIFICANT CHANGE UP (ref 3.8–10.5)
WBC # FLD AUTO: 5.42 K/UL — SIGNIFICANT CHANGE UP (ref 3.8–10.5)
WBC UR QL: 3 /HPF — SIGNIFICANT CHANGE UP (ref 0–5)

## 2024-07-24 PROCEDURE — 74176 CT ABD & PELVIS W/O CONTRAST: CPT | Mod: 26,MC

## 2024-07-24 PROCEDURE — 99284 EMERGENCY DEPT VISIT MOD MDM: CPT | Mod: FS

## 2024-07-24 RX ORDER — ACETAMINOPHEN 500 MG/5ML
650 LIQUID (ML) ORAL ONCE
Refills: 0 | Status: DISCONTINUED | OUTPATIENT
Start: 2024-07-24 | End: 2024-07-24

## 2024-07-26 LAB
CULTURE RESULTS: NO GROWTH — SIGNIFICANT CHANGE UP
SPECIMEN SOURCE: SIGNIFICANT CHANGE UP

## 2024-07-27 ENCOUNTER — INPATIENT (INPATIENT)
Facility: HOSPITAL | Age: 84
LOS: 9 days | Discharge: HOME HEALTH | DRG: 690 | End: 2024-08-06
Attending: INTERNAL MEDICINE | Admitting: INTERNAL MEDICINE
Payer: MEDICARE

## 2024-07-27 VITALS
HEIGHT: 72 IN | OXYGEN SATURATION: 95 % | HEART RATE: 74 BPM | DIASTOLIC BLOOD PRESSURE: 89 MMHG | TEMPERATURE: 98 F | SYSTOLIC BLOOD PRESSURE: 153 MMHG | RESPIRATION RATE: 20 BRPM

## 2024-07-27 DIAGNOSIS — Z98.890 OTHER SPECIFIED POSTPROCEDURAL STATES: Chronic | ICD-10-CM

## 2024-07-27 DIAGNOSIS — N39.0 URINARY TRACT INFECTION, SITE NOT SPECIFIED: ICD-10-CM

## 2024-07-27 LAB
ALBUMIN SERPL ELPH-MCNC: 4 G/DL — SIGNIFICANT CHANGE UP (ref 3.3–5)
ALP SERPL-CCNC: 84 U/L — SIGNIFICANT CHANGE UP (ref 40–120)
ALT FLD-CCNC: 15 U/L — SIGNIFICANT CHANGE UP (ref 10–45)
ANION GAP SERPL CALC-SCNC: 13 MMOL/L — SIGNIFICANT CHANGE UP (ref 5–17)
APPEARANCE UR: ABNORMAL
AST SERPL-CCNC: 22 U/L — SIGNIFICANT CHANGE UP (ref 10–40)
BACTERIA # UR AUTO: ABNORMAL /HPF
BASE EXCESS BLDV CALC-SCNC: 5.6 MMOL/L — HIGH (ref -2–3)
BASOPHILS # BLD AUTO: 0.02 K/UL — SIGNIFICANT CHANGE UP (ref 0–0.2)
BASOPHILS NFR BLD AUTO: 0.3 % — SIGNIFICANT CHANGE UP (ref 0–2)
BILIRUB SERPL-MCNC: 1.1 MG/DL — SIGNIFICANT CHANGE UP (ref 0.2–1.2)
BILIRUB UR-MCNC: NEGATIVE — SIGNIFICANT CHANGE UP
BUN SERPL-MCNC: 28 MG/DL — HIGH (ref 7–23)
CA-I SERPL-SCNC: 1.24 MMOL/L — SIGNIFICANT CHANGE UP (ref 1.15–1.33)
CALCIUM SERPL-MCNC: 10 MG/DL — SIGNIFICANT CHANGE UP (ref 8.4–10.5)
CAST: 1 /LPF — SIGNIFICANT CHANGE UP (ref 0–4)
CHLORIDE BLDV-SCNC: 100 MMOL/L — SIGNIFICANT CHANGE UP (ref 96–108)
CHLORIDE SERPL-SCNC: 97 MMOL/L — SIGNIFICANT CHANGE UP (ref 96–108)
CO2 BLDV-SCNC: 36 MMOL/L — HIGH (ref 22–26)
CO2 SERPL-SCNC: 27 MMOL/L — SIGNIFICANT CHANGE UP (ref 22–31)
COLOR SPEC: YELLOW — SIGNIFICANT CHANGE UP
CREAT SERPL-MCNC: 1.57 MG/DL — HIGH (ref 0.5–1.3)
DIFF PNL FLD: ABNORMAL
EGFR: 43 ML/MIN/1.73M2 — LOW
EOSINOPHIL # BLD AUTO: 0.06 K/UL — SIGNIFICANT CHANGE UP (ref 0–0.5)
EOSINOPHIL NFR BLD AUTO: 0.8 % — SIGNIFICANT CHANGE UP (ref 0–6)
GAS PNL BLDV: 133 MMOL/L — LOW (ref 136–145)
GAS PNL BLDV: SIGNIFICANT CHANGE UP
GLUCOSE BLDV-MCNC: 108 MG/DL — HIGH (ref 70–99)
GLUCOSE SERPL-MCNC: 109 MG/DL — HIGH (ref 70–99)
GLUCOSE UR QL: NEGATIVE MG/DL — SIGNIFICANT CHANGE UP
HCO3 BLDV-SCNC: 34 MMOL/L — HIGH (ref 22–29)
HCT VFR BLD CALC: 44.7 % — SIGNIFICANT CHANGE UP (ref 39–50)
HCT VFR BLDA CALC: 43 % — SIGNIFICANT CHANGE UP (ref 39–51)
HGB BLD CALC-MCNC: 14.2 G/DL — SIGNIFICANT CHANGE UP (ref 12.6–17.4)
HGB BLD-MCNC: 13.9 G/DL — SIGNIFICANT CHANGE UP (ref 13–17)
IMM GRANULOCYTES NFR BLD AUTO: 0.8 % — SIGNIFICANT CHANGE UP (ref 0–0.9)
KETONES UR-MCNC: NEGATIVE MG/DL — SIGNIFICANT CHANGE UP
LACTATE BLDV-MCNC: 2.2 MMOL/L — HIGH (ref 0.5–2)
LEUKOCYTE ESTERASE UR-ACNC: ABNORMAL
LIDOCAIN IGE QN: 78 U/L — HIGH (ref 7–60)
LYMPHOCYTES # BLD AUTO: 1.05 K/UL — SIGNIFICANT CHANGE UP (ref 1–3.3)
LYMPHOCYTES # BLD AUTO: 13.7 % — SIGNIFICANT CHANGE UP (ref 13–44)
MCHC RBC-ENTMCNC: 31 PG — SIGNIFICANT CHANGE UP (ref 27–34)
MCHC RBC-ENTMCNC: 31.1 GM/DL — LOW (ref 32–36)
MCV RBC AUTO: 99.6 FL — SIGNIFICANT CHANGE UP (ref 80–100)
MONOCYTES # BLD AUTO: 0.73 K/UL — SIGNIFICANT CHANGE UP (ref 0–0.9)
MONOCYTES NFR BLD AUTO: 9.5 % — SIGNIFICANT CHANGE UP (ref 2–14)
NEUTROPHILS # BLD AUTO: 5.73 K/UL — SIGNIFICANT CHANGE UP (ref 1.8–7.4)
NEUTROPHILS NFR BLD AUTO: 74.9 % — SIGNIFICANT CHANGE UP (ref 43–77)
NITRITE UR-MCNC: NEGATIVE — SIGNIFICANT CHANGE UP
NRBC # BLD: 0 /100 WBCS — SIGNIFICANT CHANGE UP (ref 0–0)
OB PNL STL: NEGATIVE — SIGNIFICANT CHANGE UP
PCO2 BLDV: 64 MMHG — HIGH (ref 42–55)
PH BLDV: 7.33 — SIGNIFICANT CHANGE UP (ref 7.32–7.43)
PH UR: 7.5 — SIGNIFICANT CHANGE UP (ref 5–8)
PLATELET # BLD AUTO: 147 K/UL — LOW (ref 150–400)
PO2 BLDV: 33 MMHG — SIGNIFICANT CHANGE UP (ref 25–45)
POTASSIUM BLDV-SCNC: 4.3 MMOL/L — SIGNIFICANT CHANGE UP (ref 3.5–5.1)
POTASSIUM SERPL-MCNC: 4.2 MMOL/L — SIGNIFICANT CHANGE UP (ref 3.5–5.3)
POTASSIUM SERPL-SCNC: 4.2 MMOL/L — SIGNIFICANT CHANGE UP (ref 3.5–5.3)
PROT SERPL-MCNC: 6.9 G/DL — SIGNIFICANT CHANGE UP (ref 6–8.3)
PROT UR-MCNC: 30 MG/DL
RBC # BLD: 4.49 M/UL — SIGNIFICANT CHANGE UP (ref 4.2–5.8)
RBC # FLD: 16.1 % — HIGH (ref 10.3–14.5)
RBC CASTS # UR COMP ASSIST: 55 /HPF — HIGH (ref 0–4)
SAO2 % BLDV: 60.2 % — LOW (ref 67–88)
SODIUM SERPL-SCNC: 137 MMOL/L — SIGNIFICANT CHANGE UP (ref 135–145)
SP GR SPEC: 1.02 — SIGNIFICANT CHANGE UP (ref 1–1.03)
SQUAMOUS # UR AUTO: 3 /HPF — SIGNIFICANT CHANGE UP (ref 0–5)
UROBILINOGEN FLD QL: 1 MG/DL — SIGNIFICANT CHANGE UP (ref 0.2–1)
WBC # BLD: 7.65 K/UL — SIGNIFICANT CHANGE UP (ref 3.8–10.5)
WBC # FLD AUTO: 7.65 K/UL — SIGNIFICANT CHANGE UP (ref 3.8–10.5)
WBC UR QL: 66 /HPF — HIGH (ref 0–5)

## 2024-07-27 PROCEDURE — 99285 EMERGENCY DEPT VISIT HI MDM: CPT | Mod: GC

## 2024-07-27 PROCEDURE — 74176 CT ABD & PELVIS W/O CONTRAST: CPT | Mod: 26,MC

## 2024-07-27 PROCEDURE — 71045 X-RAY EXAM CHEST 1 VIEW: CPT | Mod: 26

## 2024-07-27 RX ORDER — TAMSULOSIN HCL 0.4 MG
0.4 CAPSULE ORAL AT BEDTIME
Refills: 0 | Status: DISCONTINUED | OUTPATIENT
Start: 2024-07-27 | End: 2024-08-06

## 2024-07-27 RX ORDER — LORATADINE 10 MG
17 TABLET,DISINTEGRATING ORAL DAILY
Refills: 0 | Status: DISCONTINUED | OUTPATIENT
Start: 2024-07-27 | End: 2024-07-28

## 2024-07-27 RX ORDER — AMIODARONE HYDROCHLORIDE 50 MG/ML
200 INJECTION, SOLUTION INTRAVENOUS DAILY
Refills: 0 | Status: DISCONTINUED | OUTPATIENT
Start: 2024-07-27 | End: 2024-08-06

## 2024-07-27 RX ORDER — WARFARIN SODIUM 4 MG/1
2 TABLET ORAL ONCE
Refills: 0 | Status: DISCONTINUED | OUTPATIENT
Start: 2024-07-27 | End: 2024-07-28

## 2024-07-27 RX ORDER — ASPIRIN 325 MG
10 TABLET ORAL DAILY
Refills: 0 | Status: DISCONTINUED | OUTPATIENT
Start: 2024-07-27 | End: 2024-08-06

## 2024-07-27 RX ORDER — SPIRONOLACTONE 50 MG/1
25 TABLET, FILM COATED ORAL DAILY
Refills: 0 | Status: DISCONTINUED | OUTPATIENT
Start: 2024-07-27 | End: 2024-08-06

## 2024-07-27 RX ORDER — METOPROLOL TARTRATE 100 MG
25 TABLET ORAL DAILY
Refills: 0 | Status: DISCONTINUED | OUTPATIENT
Start: 2024-07-27 | End: 2024-08-06

## 2024-07-27 RX ORDER — ALLOPURINOL 100 MG/1
300 TABLET ORAL DAILY
Refills: 0 | Status: DISCONTINUED | OUTPATIENT
Start: 2024-07-27 | End: 2024-08-06

## 2024-07-27 RX ORDER — LATANOPROST 0.005 %
1 DROPS OPHTHALMIC (EYE) AT BEDTIME
Refills: 0 | Status: DISCONTINUED | OUTPATIENT
Start: 2024-07-27 | End: 2024-08-06

## 2024-07-27 RX ORDER — BRIMONIDINE TARTRATE 1 MG/ML
1 SOLUTION/ DROPS OPHTHALMIC
Refills: 0 | Status: DISCONTINUED | OUTPATIENT
Start: 2024-07-27 | End: 2024-08-06

## 2024-07-27 RX ADMIN — Medication 100 MILLIGRAM(S): at 15:42

## 2024-07-27 RX ADMIN — Medication 1 DROP(S): at 22:02

## 2024-07-27 RX ADMIN — Medication 17 GRAM(S): at 22:02

## 2024-07-27 NOTE — H&P ADULT - ASSESSMENT
83-year-old male with a past medical history of A-fib on Coumadin, dementia, CHF, prostate cancer, nonambulatory who presents emergency department complaining of flank pain and abdominal pain.      Fecal  Impaction   UTI     Bowel regimen   IV abx   Follow up urine cx    Afib on AC AC as per INR     Plan of care discussed with patients wife bed side

## 2024-07-27 NOTE — ED PROVIDER NOTE - CLINICAL SUMMARY MEDICAL DECISION MAKING FREE TEXT BOX
Patient presents emergency department complaining of abdominal pain.  Patient hemodynamically stable afebrile on presentation.  Patient physical and significant for diffuse abdominal tenderness.  Given patient presentation and history obtain lab work and imaging to evaluate for acute pathologies.  Pending labs and imaging for disposition.

## 2024-07-27 NOTE — ED ADULT NURSE NOTE - NSFALLHARMRISKINTERV_ED_ALL_ED
Assistance OOB with selected safe patient handling equipment if applicable/Communicate risk of Fall with Harm to all staff, patient, and family/Provide patient with walking aids/Provide visual cue: red socks, yellow wristband, yellow gown, etc/Reinforce activity limits and safety measures with patient and family/Bed in lowest position, wheels locked, appropriate side rails in place/Call bell, personal items and telephone in reach/Instruct patient to call for assistance before getting out of bed/chair/stretcher/Non-slip footwear applied when patient is off stretcher/[place] to call system/Physically safe environment - no spills, clutter or unnecessary equipment/Purposeful Proactive Rounding/Room/bathroom lighting operational, light cord in reach

## 2024-07-27 NOTE — H&P ADULT - HISTORY OF PRESENT ILLNESS
83-year-old male with a past medical history of A-fib on Coumadin, dementia, CHF, prostate cancer, nonambulatory who presents emergency department complaining of flank pain and abdominal pain.        CT abdomen shows Stool-filled colon suggesting constipation with stool impaction in the   rectum, slightly decreased compared to the recent prior CT.  Diverticulosis.  Normal appendix.    83-year-old male with a past medical history of A-fib on Coumadin, dementia, CHF, prostate cancer, nonambulatory who presents emergency department complaining of flank pain and abdominal pain.  Pain abdomen diffuse associated with Constipation, flatulance.   No nausea/vomiting/ diarrhea.  No hx sick contacts.     CT abdomen shows Stool-filled colon suggesting constipation with stool impaction in the   rectum, slightly decreased compared to the recent prior CT.  Diverticulosis.  Normal appendix.

## 2024-07-27 NOTE — ED PROVIDER NOTE - OBJECTIVE STATEMENT
Patient is a 83-year-old male with a past medical history of A-fib on Coumadin, dementia, CHF, prostate cancer, nonambulatory who presents emergency department complaining of flank pain and abdominal pain.  Patient states that he was recently seen in the emergency department for renal colic.  Patient was discharged and returned home.  Patient states that he initially presented with flank pain but now is having worsening abdominal pain.  Patient had a bowel movement 2 days ago.  Denies any fevers, chills, chest pain, shortness of breath, palpitations.  Patient denies any blood in stool.

## 2024-07-27 NOTE — ED ADULT NURSE NOTE - OBJECTIVE STATEMENT
85 yo male with a PMH of dementia, a.fib on coumadin, prostate Ca, scoliosis, bed bound for 2 years presents to the ED from home complaining of abdominal pain. Patient has a 24h healthcare aide who is present at this time with his wife. As per health aide reports that patient normally is able to change positions in bed with the help of aide to clean and change diaper. Today, was unable to change positions due to significant abdominal pain and reports that abdomen felt "rigid." Patient was complaining that he was feeling SOB due to abdominal discomfort. Reports that night aides have reported normal bowel movements, LBM being 2 nights ago. Upon exam, patient reports that pain initially started in L flank and now radiates to generalized abdomen. Abdomen is round and distended. Mild rigidity noted. Patient was changed and cleaned, noted to have a normal bowel movement. Denies headache, dizziness, vision changes, chest pain,nausea, vomiting, diarrhea, fevers, chills, dysuria, hematuria, recent illness travel or fall.

## 2024-07-27 NOTE — ED PROVIDER NOTE - ATTENDING CONTRIBUTION TO CARE
PMD Peter Kurzaweil  84y m PMH A-fib on Coumadin, dementia, CHF, prostate cancer, scoliosis, nonambulatory for the past 2 years.  Patient comes to ER complaining of 1 week history of flank pain.  Patient was seen 3 days ago at [facility] ED CT was negative for renal colic.  Patient was discharged home and now returns worsening pains today across the abdomen.  Last BM 2 days ago.  There is no fever chills chest pain short of breath palpitations.  Physical exam adult male awake alert GCS 15 oriented x 2.  HEENT no psychotic manic.  Chest clear A&P.  CV no rubs gallops murmurs.  Abdomen soft positive bowel sounds.  Neuro GCS 15 oriented x 2 lower extremity power 1-2 out of 5 upper extremity 5 out of 5.  Jonah Culver MD, Facep

## 2024-07-28 LAB
ANION GAP SERPL CALC-SCNC: 12 MMOL/L — SIGNIFICANT CHANGE UP (ref 5–17)
APTT BLD: 47.2 SEC — HIGH (ref 24.5–35.6)
BUN SERPL-MCNC: 27 MG/DL — HIGH (ref 7–23)
CALCIUM SERPL-MCNC: 9.4 MG/DL — SIGNIFICANT CHANGE UP (ref 8.4–10.5)
CHLORIDE SERPL-SCNC: 102 MMOL/L — SIGNIFICANT CHANGE UP (ref 96–108)
CO2 SERPL-SCNC: 29 MMOL/L — SIGNIFICANT CHANGE UP (ref 22–31)
CREAT SERPL-MCNC: 1.32 MG/DL — HIGH (ref 0.5–1.3)
EGFR: 54 ML/MIN/1.73M2 — LOW
GLUCOSE SERPL-MCNC: 91 MG/DL — SIGNIFICANT CHANGE UP (ref 70–99)
HCT VFR BLD CALC: 38.6 % — LOW (ref 39–50)
HGB BLD-MCNC: 12.6 G/DL — LOW (ref 13–17)
INR BLD: 2.86 RATIO — HIGH (ref 0.85–1.18)
INR BLD: 3.03 RATIO — HIGH (ref 0.85–1.18)
LACTATE SERPL-SCNC: 0.8 MMOL/L — SIGNIFICANT CHANGE UP (ref 0.5–2)
MCHC RBC-ENTMCNC: 32.1 PG — SIGNIFICANT CHANGE UP (ref 27–34)
MCHC RBC-ENTMCNC: 32.6 GM/DL — SIGNIFICANT CHANGE UP (ref 32–36)
MCV RBC AUTO: 98.5 FL — SIGNIFICANT CHANGE UP (ref 80–100)
NRBC # BLD: 0 /100 WBCS — SIGNIFICANT CHANGE UP (ref 0–0)
PLATELET # BLD AUTO: 121 K/UL — LOW (ref 150–400)
POTASSIUM SERPL-MCNC: 4.4 MMOL/L — SIGNIFICANT CHANGE UP (ref 3.5–5.3)
POTASSIUM SERPL-SCNC: 4.4 MMOL/L — SIGNIFICANT CHANGE UP (ref 3.5–5.3)
PROTHROM AB SERPL-ACNC: 29.1 SEC — HIGH (ref 9.5–13)
PROTHROM AB SERPL-ACNC: 32.3 SEC — HIGH (ref 9.5–13)
RBC # BLD: 3.92 M/UL — LOW (ref 4.2–5.8)
RBC # FLD: 15.9 % — HIGH (ref 10.3–14.5)
SODIUM SERPL-SCNC: 143 MMOL/L — SIGNIFICANT CHANGE UP (ref 135–145)
WBC # BLD: 5.31 K/UL — SIGNIFICANT CHANGE UP (ref 3.8–10.5)
WBC # FLD AUTO: 5.31 K/UL — SIGNIFICANT CHANGE UP (ref 3.8–10.5)

## 2024-07-28 RX ORDER — LORATADINE 10 MG
17 TABLET,DISINTEGRATING ORAL
Refills: 0 | Status: DISCONTINUED | OUTPATIENT
Start: 2024-07-28 | End: 2024-08-06

## 2024-07-28 RX ORDER — ACETAMINOPHEN 500 MG
650 TABLET ORAL EVERY 6 HOURS
Refills: 0 | Status: DISCONTINUED | OUTPATIENT
Start: 2024-07-28 | End: 2024-08-03

## 2024-07-28 RX ORDER — ACETAMINOPHEN 500 MG
1000 TABLET ORAL ONCE
Refills: 0 | Status: COMPLETED | OUTPATIENT
Start: 2024-07-28 | End: 2024-07-28

## 2024-07-28 RX ORDER — SENNOSIDES 8.6 MG/1
2 TABLET ORAL AT BEDTIME
Refills: 0 | Status: DISCONTINUED | OUTPATIENT
Start: 2024-07-28 | End: 2024-08-06

## 2024-07-28 RX ADMIN — Medication 400 MILLIGRAM(S): at 01:50

## 2024-07-28 RX ADMIN — Medication 17 GRAM(S): at 13:12

## 2024-07-28 RX ADMIN — Medication 650 MILLIGRAM(S): at 13:13

## 2024-07-28 RX ADMIN — ALLOPURINOL 300 MILLIGRAM(S): 100 TABLET ORAL at 13:13

## 2024-07-28 RX ADMIN — BRIMONIDINE TARTRATE 1 DROP(S): 1 SOLUTION/ DROPS OPHTHALMIC at 21:46

## 2024-07-28 RX ADMIN — Medication 100 MILLIGRAM(S): at 14:46

## 2024-07-28 RX ADMIN — Medication 25 MILLIGRAM(S): at 10:38

## 2024-07-28 RX ADMIN — Medication 1 DROP(S): at 21:48

## 2024-07-28 RX ADMIN — BRIMONIDINE TARTRATE 1 DROP(S): 1 SOLUTION/ DROPS OPHTHALMIC at 13:48

## 2024-07-28 RX ADMIN — SPIRONOLACTONE 25 MILLIGRAM(S): 50 TABLET, FILM COATED ORAL at 10:38

## 2024-07-28 RX ADMIN — Medication 17 GRAM(S): at 18:42

## 2024-07-28 RX ADMIN — AMIODARONE HYDROCHLORIDE 200 MILLIGRAM(S): 50 INJECTION, SOLUTION INTRAVENOUS at 10:39

## 2024-07-28 RX ADMIN — Medication 10 MILLIGRAM(S): at 13:19

## 2024-07-28 RX ADMIN — Medication 650 MILLIGRAM(S): at 13:55

## 2024-07-28 RX ADMIN — Medication 1000 MILLIGRAM(S): at 02:30

## 2024-07-28 RX ADMIN — Medication 0.4 MILLIGRAM(S): at 20:10

## 2024-07-28 RX ADMIN — SENNOSIDES 2 TABLET(S): 8.6 TABLET ORAL at 20:10

## 2024-07-28 NOTE — PATIENT PROFILE ADULT - FALL HARM RISK - HARM RISK INTERVENTIONS
Assistance with ambulation/Assistance OOB with selected safe patient handling equipment/Communicate Risk of Fall with Harm to all staff/Discuss with provider need for PT consult/Monitor gait and stability/Reinforce activity limits and safety measures with patient and family/Tailored Fall Risk Interventions/Visual Cue: Yellow wristband and red socks/Bed in lowest position, wheels locked, appropriate side rails in place/Call bell, personal items and telephone in reach/Instruct patient to call for assistance before getting out of bed or chair/Non-slip footwear when patient is out of bed/[place] to call system/Physically safe environment - no spills, clutter or unnecessary equipment/Purposeful Proactive Rounding/Room/bathroom lighting operational, light cord in reach

## 2024-07-28 NOTE — PATIENT PROFILE ADULT - DO YOU LACK THE NECESSARY SUPPORT TO HELP YOU COPE WITH LIFE CHALLENGES?
----- Message from Jimi Oneil sent at 6/21/2017 10:24 AM CDT -----  Contact: Dr Mayte Velasquez office- [facility]- [phone]  The doctor's office returning the nurse phone call.Thanks!     no

## 2024-07-28 NOTE — DISCHARGE NOTE PROVIDER - NSDCFUADDAPPT_GEN_ALL_CORE_FT
APPTS ARE READY TO BE MADE: [x ] YES    Best Family or Patient Contact (if needed):    Additional Information about above appointments (if needed):    1: Please follow up with PCP  with in a week  2: Routine  follow up  with Urologist   DR Rosen   3:     Other comments or requests:    APPTS ARE READY TO BE MADE: [x ] YES    Best Family or Patient Contact (if needed):    Additional Information about above appointments (if needed):    1: Please follow up with PCP  with in a week  2: Routine  follow up  with Urologist   DR Rosen   3:     Other comments or requests:   Appointment was scheduled by our team on the patient's behalf through the provider's office.  Appt is on 8/22/24 at 11:30am with  NP [address] [place] [phone]    [facility]- Patient informed us they already have secured a follow up appointment which is not visible on [facility].

## 2024-07-28 NOTE — DISCHARGE NOTE PROVIDER - PROVIDER TOKENS
PROVIDER:[TOKEN:[[id number]]],PROVIDER:[TOKEN:[[id number]]],PROVIDER:[TOKEN:[[id number]]]

## 2024-07-28 NOTE — DISCHARGE NOTE PROVIDER - NSDCMRMEDTOKEN_GEN_ALL_CORE_FT
allopurinol 300 mg oral tablet: 1 tab(s) orally once a day  amiodarone 200 mg oral tablet: 1 tab(s) orally once a day  brimonidine 0.2% ophthalmic solution: 1 drop(s) to each affected eye 2 times a day  latanoprost 0.005% ophthalmic solution: 1 drop(s) to each affected eye once a day (at bedtime)  metoprolol succinate 25 mg oral tablet, extended release: 1 tab(s) orally once a day  Simbrinza 1%- 0.2% ophthalmic suspension: 1 drop(s) to each affected eye 2 times a day  spironolactone 25 mg oral tablet: 1 tab(s) orally once a day  tamsulosin 0.4 mg oral capsule: 1 cap(s) orally once a day (at bedtime)  warfarin 2 mg oral tablet: 1 tab(s) orally once a day   allopurinol 300 mg oral tablet: 1 tab(s) orally once a day  amiodarone 200 mg oral tablet: 1 tab(s) orally once a day  amoxicillin 875 mg oral tablet: 1 tab(s) orally 2 times a day  bisacodyl 10 mg rectal suppository: 1 suppository(ies) rectal once a day  brimonidine 0.2% ophthalmic solution: 1 drop(s) to each affected eye 2 times a day  latanoprost 0.005% ophthalmic solution: 1 drop(s) to each affected eye once a day (at bedtime)  metoprolol succinate 25 mg oral tablet, extended release: 1 tab(s) orally once a day  polyethylene glycol 3350 oral powder for reconstitution: 17 gram(s) orally 2 times a day  senna leaf extract oral tablet: 2 tab(s) orally once a day (at bedtime)  Simbrinza 1%- 0.2% ophthalmic suspension: 1 drop(s) to each affected eye 2 times a day  spironolactone 25 mg oral tablet: 1 tab(s) orally once a day  tamsulosin 0.4 mg oral capsule: 1 cap(s) orally once a day (at bedtime)  warfarin 2 mg oral tablet: 1 tab(s) orally once a day

## 2024-07-28 NOTE — CONSULT NOTE ADULT - ASSESSMENT
83M hx of AFib on Coumadin, dementia, CHF, prostate cancer, nonambulatory who presents emergency department complaining of flank pain and abdominal pain with associated constipation found to have large colonic stool burden with fecal impaction on abdominal CTAP    Recommendations:  - Miralax 17g BID, Senna daily, Dulcolax 10mg suppository daily  - Tap water enemas daily  - If the above does not provide relief would consider SMOG enema, glycerin suppository  - Serial abdominal exams  - IVF  - Will follow with you    Ike Whitley M.D.  [facility]  [phone]

## 2024-07-28 NOTE — DISCHARGE NOTE PROVIDER - CARE PROVIDERS DIRECT ADDRESSES
[email or web address]

## 2024-07-28 NOTE — DISCHARGE NOTE PROVIDER - CARE PROVIDER_API CALL
Ty Rosen  Urology  [address] [place] [address]  Phone: [phone]  Fax: [phone]  Follow Up Time:     Buster Castro  Gastroenterology  [address] [place] [id number]  Phone: [phone]  Fax: [phone]  Follow Up Time:     Kurzweil, Peter J.  Internal Medicine  [address] [place] [address]  Phone: [phone]  Fax: [phone]  Follow Up Time:

## 2024-07-28 NOTE — DISCHARGE NOTE PROVIDER - HOSPITAL COURSE
83-year-old male with a past medical history of A-fib on Coumadin, dementia, CHF, prostate cancer, nonambulatory who presents emergency department complaining of flank pain and abdominal pain.  Patient states that he was recently seen in the emergency department for renal colic.  Patient was discharged and returned home.  Patient states that he initially presented with flank pain but now is having worsening abdominal pain.  Patient had a bowel movement 2 days ago.  Pt found with UTI started on Ceftriaxone, UA + pending Urine Culture.  CT a/p done for c/o flank and abdominal pain, noted with stool-filled colon suggesting constipation with stool impaction in the   rectum, slightly decreased compared to the recent prior CT. No evidence for bowel obstruction and diverticulosis. Patient started on bowel regimen.       HPI:   83-year-old male with a past medical history of A-fib on Coumadin, dementia, CHF, prostate cancer, nonambulatory who presents emergency department complaining of flank pain and abdominal pain.  Pain abdomen diffuse associated with Constipation, flatulance.   No nausea/vomiting/ diarrhea.  No hx sick contacts.     CT abdomen shows Stool-filled colon suggesting constipation with stool impaction in the   rectum, slightly decreased compared to the recent prior CT.  Diverticulosis.  Normal appendix.   (27 Jul 2024 17:43)    Hospital Course:  83-year-old male with a past medical history of A-fib on Coumadin, dementia, CHF, prostate cancer, nonambulatory who presents emergency department complaining of flank pain and abdominal pain.  Symptoms secondary to   Constipation and UTI      Ct abdomen on admission  with conerns for  Renal mass , Pt  had   Urology consult Patient has received CT renal mass protocol which shows simple cysts with thin internal septations. 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 Patient can be seen outpatient for yearly imaging but otherwise no interventions are required was recommended by Urology .  ID eval for E. faecalis UTI Continue amoxicillin 875mg PO BID for 7d course until tomorrow 8/7      Important Medication Changes and Reason: UTI Continue amoxicillin 875mg PO BID for 7d course until tomorrow 8/7    Active or Pending Issues Requiring Follow-up: yearly  Imaging of Kidneys    to monitor  cysts      Advanced Directives:   [ X] Full code  [ ] DNR  [ ] Hospice    Discharge Diagnoses:  UTI  Renal cysts   Constipation   Afib

## 2024-07-28 NOTE — DISCHARGE NOTE PROVIDER - NSDCCPCAREPLAN_GEN_ALL_CORE_FT
PRINCIPAL DISCHARGE DIAGNOSIS  Diagnosis: Acute UTI  Assessment and Plan of Treatment: HOME CARE INSTRUCTIONS  If you were prescribed antibiotics, take them exactly as your caregiver instructs you. Finish the medication even if you feel better after you have only taken some of the medication.  Drink enough water and fluids to keep your urine clear or pale yellow.  Avoid caffeine, tea, and carbonated beverages. They tend to irritate your bladder.  Empty your bladder often. Avoid holding urine for long periods of time.  Empty your bladder before and after sexual intercourse.  After a bowel movement, women should cleanse from front to back. Use each tissue only once.  SEEK MEDICAL CARE IF:  You have back pain.  You develop a fever.  Your symptoms do not begin to resolve within 3 days.  SEEK IMMEDIATE MEDICAL CARE IF:  You have severe back pain or lower abdominal pain.  You develop chills.  You have nausea or vomiting.  You have continued burning or discomfort with urination.       PRINCIPAL DISCHARGE DIAGNOSIS  Diagnosis: Acute UTI  Assessment and Plan of Treatment: HOME CARE INSTRUCTIONS  If you were prescribed antibiotics, take them exactly as your caregiver instructs you. Finish the medication even if you feel better after you have only taken some of the medication.  Drink enough water and fluids to keep your urine clear or pale yellow.  Avoid caffeine, tea, and carbonated beverages. They tend to irritate your bladder.  Empty your bladder often. Avoid holding urine for long periods of time.  Empty your bladder before and after sexual intercourse.  After a bowel movement, women should cleanse from front to back. Use each tissue only once.  SEEK MEDICAL CARE IF:  You have back pain.  You develop a fever.  Your symptoms do not begin to resolve within 3 days.  SEEK IMMEDIATE MEDICAL CARE IF:  You have severe back pain or lower abdominal pain.  You develop chills.  You have nausea or vomiting.  You have continued burning or discomfort with urination.        SECONDARY DISCHARGE DIAGNOSES  Diagnosis: Renal cyst  Assessment and Plan of Treatment: Yearly  Imaging  with Urologist DR Rosen    Diagnosis: Constipation  Assessment and Plan of Treatment: c/w  Miralax , Dulcolax

## 2024-07-28 NOTE — CONSULT NOTE ADULT - SUBJECTIVE AND OBJECTIVE BOX
[facility]    Frantz Isabell Mcginnis NP    [address] [place] [address] [phone]      Chief Complaint:  Patient is a 84y old  Male who presents with a chief complaint of Abdominal Pain (28 Jul 2024 04:31)      HPI:    83M hx of AFib on Coumadin, dementia, CHF, prostate cancer, nonambulatory who presents emergency department complaining of flank pain and abdominal pain with associated constipation. No UGI symptoms. No reported weight loss or sick contacts. On presentation  CTAP shows stool-filled colon suggesting constipation with stool impaction in the   rectum, slightly decreased compared to the recent prior CT.      Allergies:  No Known Allergies      Medications:  acetaminophen     Tablet .. 650 milliGRAM(s) Oral every 6 hours PRN  allopurinol 300 milliGRAM(s) Oral daily  aMIOdarone    Tablet 200 milliGRAM(s) Oral daily  bisacodyl Suppository 10 milliGRAM(s) Rectal daily  brimonidine 0.2% Ophthalmic Solution 1 Drop(s) Both EYES two times a day  cefTRIAXone   IVPB 1000 milliGRAM(s) IV Intermittent every 24 hours  latanoprost 0.005% Ophthalmic Solution 1 Drop(s) Both EYES at bedtime  metoprolol succinate ER 25 milliGRAM(s) Oral daily  polyethylene glycol 3350 17 Gram(s) Oral daily  spironolactone 25 milliGRAM(s) Oral daily  tamsulosin 0.4 milliGRAM(s) Oral at bedtime      PMHX/PSHX:  Afib    Prostate cancer    Heart failure    Heart failure    History of scoliosis    No significant past surgical history    No significant past surgical history    S/P anal fissurectomy        Family history:  No pertinent family history in first degree relatives    No pertinent family history in first degree relatives        Social History:     ROS:     General:  No wt loss, fevers, chills, night sweats, fatigue,   Eyes:  Good vision, no reported pain  ENT:  No sore throat, pain, runny nose, dysphagia  CV:  No pain, palpitations, hypo/hypertension  Resp:  No dyspnea, cough, tachypnea, wheezing  GI:  No pain, No nausea, No vomiting, No diarrhea, No constipation, No weight loss, No fever, No pruritis, No rectal bleeding, No tarry stools, No dysphagia,  :  No pain, bleeding, incontinence, nocturia  Muscle:  No pain, weakness  Neuro:  No weakness, tingling, memory problems  Psych:  No fatigue, insomnia, mood problems, depression  Endocrine:  No polyuria, polydipsia, cold/heat intolerance  Heme:  No petechiae, ecchymosis, easy bruisability  Skin:  No rash, tattoos, scars, edema      PHYSICAL EXAM:   Vital Signs:  Vital Signs Last 24 Hrs  T(C): 36.4 (28 Jul 2024 12:36), Max: 36.7 (27 Jul 2024 17:43)  T(F): 97.5 (28 Jul 2024 12:36), Max: 98 (27 Jul 2024 17:43)  HR: 65 (28 Jul 2024 12:36) (62 - 80)  BP: 142/81 (28 Jul 2024 12:36) (124/81 - 172/70)  BP(mean): 103 (27 Jul 2024 17:43) (103 - 103)  RR: 18 (28 Jul 2024 12:36) (17 - 18)  SpO2: 100% (28 Jul 2024 12:36) (96% - 100%)    Parameters below as of 28 Jul 2024 12:36  Patient On (Oxygen Delivery Method): nasal cannula      Daily Height in cm: 182.88 (27 Jul 2024 17:43)    Daily     GENERAL:  Appears stated age, well-groomed, well-nourished, no distress  HEENT:  NC/AT,  conjunctivae clear and pink, no thyromegaly, nodules, adenopathy, no JVD, sclera -anicteric  CHEST:  Full & symmetric excursion, no increased effort, breath sounds clear  HEART:  Regular rhythm, S1, S2, no murmur/rub/S3/S4, no abdominal bruit, no edema  ABDOMEN:  Soft, non-tender, non-distended, normoactive bowel sounds,  no masses ,no hepato-splenomegaly, no signs of chronic liver disease  EXTEREMITIES:  no cyanosis,clubbing or edema  SKIN:  No rash/erythema/ecchymoses/petechiae/wounds/abscess/warm/dry  NEURO:  Alert, oriented, no asterixis, no tremor, no encephalopathy    LABS:                        12.6   5.31  )-----------( 121      ( 28 Jul 2024 07:27 )             38.6     07-28    143  |  102  |  27<H>  ----------------------------<  91  4.4   |  29  |  1.32<H>    Ca    9.4      28 Jul 2024 07:19    TPro  6.9  /  Alb  4.0  /  TBili  1.1  /  DBili  x   /  AST  22  /  ALT  15  /  AlkPhos  84  07-27    LIVER FUNCTIONS - ( 27 Jul 2024 13:43 )  Alb: 4.0 g/dL / Pro: 6.9 g/dL / ALK PHOS: 84 U/L / ALT: 15 U/L / AST: 22 U/L / GGT: x           PT/INR - ( 28 Jul 2024 07:27 )   PT: 29.1 sec;   INR: 2.86 ratio         PTT - ( 28 Jul 2024 00:23 )  PTT:47.2 sec  Urinalysis Basic - ( 28 Jul 2024 07:19 )    Color: x / Appearance: x / SG: x / pH: x  Gluc: 91 mg/dL / Ketone: x  / Bili: x / Urobili: x   Blood: x / Protein: x / Nitrite: x   Leuk Esterase: x / RBC: x / WBC x   Sq Epi: x / Non Sq Epi: x / Bacteria: x          Imaging:    < from: CT Abdomen and Pelvis w/ Oral Cont (07.27.24 @ 16:31) >  BOWEL: Stool-filled colon is noted suggesting fecal impaction in the   cecum as well as the rectum. The rectal distention is slightly decreased   and measures 6.5 cm versus 7.5 cm on the recent prior CT no evidence for   bowel obstruction. Diverticulosis The enteric contrast is present in the   stomach and most of the small bowel loops. No evidence for bowel   perforation.. Appendix is normal.  PERITONEUM/RETROPERITONEUM: Within normal limits.  VESSELS: Within normal limits.  LYMPH NODES: No lymphadenopathy.  ABDOMINAL WALL: Within normal limits.  BONES: Degenerative changes.    IMPRESSION:  Stool-filled colon suggesting constipation with stool impaction in the   rectum, slightly decreased compared to the recent prior CT.    No evidence for bowel obstruction.    Diverticulosis.    Normal appendix.    Limited slices through the lung bases show bibasilar atelectasis however   developing/resolving pneumonia in these areas cannot be totally excluded.    --- End of Report ---    < end of copied text >  < from: CT Abdomen and Pelvis w/ Oral Cont (07.27.24 @ 16:31) >  BOWEL: Stool-filled colon is noted suggesting fecal impaction in the   cecum as well as the rectum. The rectal distention is slightly decreased   and measures 6.5 cm versus 7.5 cm on the recent prior CT no evidence for   bowel obstruction. Diverticulosis The enteric contrast is present in the   stomach and most of the small bowel loops. No evidence for bowel   perforation.. Appendix is normal.  PERITONEUM/RETROPERITONEUM: Within normal limits.  VESSELS: Within normal limits.  LYMPH NODES: No lymphadenopathy.  ABDOMINAL WALL: Within normal limits.  BONES: Degenerative changes.    IMPRESSION:  Stool-filled colon suggesting constipation with stool impaction in the   rectum, slightly decreased compared to the recent prior CT.    No evidence for bowel obstruction.    Diverticulosis.    Normal appendix.    Limited slices through the lung bases show bibasilar atelectasis however   developing/resolving pneumonia in these areas cannot be totally excluded.    --- End of Report ---    < end of copied text >

## 2024-07-29 LAB
APTT BLD: 42.9 SEC — HIGH (ref 24.5–35.6)
INR BLD: 2.54 RATIO — HIGH (ref 0.85–1.18)
PROTHROM AB SERPL-ACNC: 26 SEC — HIGH (ref 9.5–13)

## 2024-07-29 RX ORDER — WARFARIN SODIUM 4 MG/1
2 TABLET ORAL ONCE
Refills: 0 | Status: COMPLETED | OUTPATIENT
Start: 2024-07-29 | End: 2024-07-29

## 2024-07-29 RX ADMIN — BRIMONIDINE TARTRATE 1 DROP(S): 1 SOLUTION/ DROPS OPHTHALMIC at 05:41

## 2024-07-29 RX ADMIN — ALLOPURINOL 300 MILLIGRAM(S): 100 TABLET ORAL at 13:06

## 2024-07-29 RX ADMIN — Medication 0.4 MILLIGRAM(S): at 22:09

## 2024-07-29 RX ADMIN — Medication 10 MILLIGRAM(S): at 11:02

## 2024-07-29 RX ADMIN — Medication 650 MILLIGRAM(S): at 06:13

## 2024-07-29 RX ADMIN — Medication 650 MILLIGRAM(S): at 13:06

## 2024-07-29 RX ADMIN — Medication 100 MILLIGRAM(S): at 14:26

## 2024-07-29 RX ADMIN — SENNOSIDES 2 TABLET(S): 8.6 TABLET ORAL at 22:09

## 2024-07-29 RX ADMIN — AMIODARONE HYDROCHLORIDE 200 MILLIGRAM(S): 50 INJECTION, SOLUTION INTRAVENOUS at 05:40

## 2024-07-29 RX ADMIN — SPIRONOLACTONE 25 MILLIGRAM(S): 50 TABLET, FILM COATED ORAL at 05:40

## 2024-07-29 RX ADMIN — Medication 650 MILLIGRAM(S): at 14:04

## 2024-07-29 RX ADMIN — WARFARIN SODIUM 2 MILLIGRAM(S): 4 TABLET ORAL at 22:08

## 2024-07-29 RX ADMIN — BRIMONIDINE TARTRATE 1 DROP(S): 1 SOLUTION/ DROPS OPHTHALMIC at 18:12

## 2024-07-29 RX ADMIN — Medication 1 DROP(S): at 22:09

## 2024-07-29 RX ADMIN — Medication 25 MILLIGRAM(S): at 05:40

## 2024-07-29 RX ADMIN — Medication 650 MILLIGRAM(S): at 06:55

## 2024-07-29 RX ADMIN — Medication 17 GRAM(S): at 05:40

## 2024-07-29 RX ADMIN — Medication 17 GRAM(S): at 18:13

## 2024-07-29 NOTE — PHYSICAL THERAPY INITIAL EVALUATION ADULT - PERTINENT HX OF CURRENT PROBLEM, REHAB EVAL
83-year-old male with a past medical history of A-fib on Coumadin, dementia, CHF, prostate cancer, nonambulatory who presents emergency department complaining of flank pain and abdominal pain.  Pain abdomen diffuse associated with Constipation, Flatulance. Hospital course: (7/27) CXR: Low lung volumes with bibasilar patchy opacities, likely atelectasis. No free air under the diaphragm. (7/27) CT Abd/Pelvis: Stool-filled colon suggesting constipation with stool impaction in the rectum, slightly decreased compared to the recent prior CT. No evidence for bowel obstruction. Diverticulosis.

## 2024-07-29 NOTE — PHYSICAL THERAPY INITIAL EVALUATION ADULT - ADDITIONAL COMMENTS
Patient is A&Ox1, previous level of functional & social hx rec' d from A present at the bedside; Pt. lives with spouse in an house with no stairs to negotiate. Pt. is dependent on transfer & ADLs, has [employer],  & hospital bed at home; also has [facility] 24 x 7 days support. As per [facility], pt. was also receiving Pt services at home.

## 2024-07-29 NOTE — PHYSICAL THERAPY INITIAL EVALUATION ADULT - IMPAIRMENTS FOUND, PT EVAL
aerobic capacity/endurance/arousal, attention, and cognition/cognitive impairment/gait, locomotion, and balance/joint integrity and mobility/muscle strength/poor safety awareness

## 2024-07-29 NOTE — PHYSICAL THERAPY INITIAL EVALUATION ADULT - ACTIVE RANGE OF MOTION EXAMINATION, REHAB EVAL
AROM BLE grossly 30% of the total ROM/bilateral upper extremity Active ROM was WFL (within functional limits)

## 2024-07-30 LAB
APTT BLD: 37.5 SEC — HIGH (ref 24.5–35.6)
INR BLD: 2.16 RATIO — HIGH (ref 0.85–1.18)
PROTHROM AB SERPL-ACNC: 23.2 SEC — HIGH (ref 9.5–13)

## 2024-07-30 PROCEDURE — 76770 US EXAM ABDO BACK WALL COMP: CPT | Mod: 26

## 2024-07-30 RX ORDER — WARFARIN SODIUM 4 MG/1
3 TABLET ORAL ONCE
Refills: 0 | Status: COMPLETED | OUTPATIENT
Start: 2024-07-30 | End: 2024-07-30

## 2024-07-30 RX ADMIN — Medication 650 MILLIGRAM(S): at 03:00

## 2024-07-30 RX ADMIN — Medication 1 DROP(S): at 21:12

## 2024-07-30 RX ADMIN — Medication 25 MILLIGRAM(S): at 06:21

## 2024-07-30 RX ADMIN — BRIMONIDINE TARTRATE 1 DROP(S): 1 SOLUTION/ DROPS OPHTHALMIC at 16:42

## 2024-07-30 RX ADMIN — Medication 17 GRAM(S): at 16:42

## 2024-07-30 RX ADMIN — ALLOPURINOL 300 MILLIGRAM(S): 100 TABLET ORAL at 13:39

## 2024-07-30 RX ADMIN — BRIMONIDINE TARTRATE 1 DROP(S): 1 SOLUTION/ DROPS OPHTHALMIC at 06:21

## 2024-07-30 RX ADMIN — Medication 650 MILLIGRAM(S): at 13:38

## 2024-07-30 RX ADMIN — SPIRONOLACTONE 25 MILLIGRAM(S): 50 TABLET, FILM COATED ORAL at 06:21

## 2024-07-30 RX ADMIN — AMIODARONE HYDROCHLORIDE 200 MILLIGRAM(S): 50 INJECTION, SOLUTION INTRAVENOUS at 06:21

## 2024-07-30 RX ADMIN — Medication 650 MILLIGRAM(S): at 15:22

## 2024-07-30 RX ADMIN — Medication 0.4 MILLIGRAM(S): at 21:12

## 2024-07-30 RX ADMIN — Medication 17 GRAM(S): at 06:21

## 2024-07-30 RX ADMIN — Medication 100 MILLIGRAM(S): at 15:14

## 2024-07-30 RX ADMIN — Medication 10 MILLIGRAM(S): at 13:39

## 2024-07-30 RX ADMIN — Medication 650 MILLIGRAM(S): at 04:00

## 2024-07-30 RX ADMIN — WARFARIN SODIUM 3 MILLIGRAM(S): 4 TABLET ORAL at 21:11

## 2024-07-30 RX ADMIN — SENNOSIDES 2 TABLET(S): 8.6 TABLET ORAL at 21:11

## 2024-07-31 LAB
-  AMPICILLIN: SIGNIFICANT CHANGE UP
-  CIPROFLOXACIN: SIGNIFICANT CHANGE UP
-  LEVOFLOXACIN: SIGNIFICANT CHANGE UP
-  NITROFURANTOIN: SIGNIFICANT CHANGE UP
-  TETRACYCLINE: SIGNIFICANT CHANGE UP
-  VANCOMYCIN: SIGNIFICANT CHANGE UP
ANION GAP SERPL CALC-SCNC: 10 MMOL/L — SIGNIFICANT CHANGE UP (ref 5–17)
APTT BLD: 36.3 SEC — HIGH (ref 24.5–35.6)
BUN SERPL-MCNC: 26 MG/DL — HIGH (ref 7–23)
CALCIUM SERPL-MCNC: 9.3 MG/DL — SIGNIFICANT CHANGE UP (ref 8.4–10.5)
CHLORIDE SERPL-SCNC: 99 MMOL/L — SIGNIFICANT CHANGE UP (ref 96–108)
CO2 SERPL-SCNC: 30 MMOL/L — SIGNIFICANT CHANGE UP (ref 22–31)
CREAT SERPL-MCNC: 1.32 MG/DL — HIGH (ref 0.5–1.3)
CULTURE RESULTS: ABNORMAL
EGFR: 54 ML/MIN/1.73M2 — LOW
GLUCOSE SERPL-MCNC: 84 MG/DL — SIGNIFICANT CHANGE UP (ref 70–99)
INR BLD: 1.8 RATIO — HIGH (ref 0.85–1.18)
METHOD TYPE: SIGNIFICANT CHANGE UP
ORGANISM # SPEC MICROSCOPIC CNT: ABNORMAL
ORGANISM # SPEC MICROSCOPIC CNT: ABNORMAL
POTASSIUM SERPL-MCNC: 4.4 MMOL/L — SIGNIFICANT CHANGE UP (ref 3.5–5.3)
POTASSIUM SERPL-SCNC: 4.4 MMOL/L — SIGNIFICANT CHANGE UP (ref 3.5–5.3)
PROTHROM AB SERPL-ACNC: 18.6 SEC — HIGH (ref 9.5–13)
SODIUM SERPL-SCNC: 139 MMOL/L — SIGNIFICANT CHANGE UP (ref 135–145)
SPECIMEN SOURCE: SIGNIFICANT CHANGE UP

## 2024-07-31 PROCEDURE — 74181 MRI ABDOMEN W/O CONTRAST: CPT | Mod: 26

## 2024-07-31 RX ORDER — WARFARIN SODIUM 4 MG/1
3 TABLET ORAL ONCE
Refills: 0 | Status: COMPLETED | OUTPATIENT
Start: 2024-07-31 | End: 2024-07-31

## 2024-07-31 RX ORDER — LORAZEPAM 1 MG/1
1 TABLET ORAL ONCE
Refills: 0 | Status: DISCONTINUED | OUTPATIENT
Start: 2024-07-31 | End: 2024-07-31

## 2024-07-31 RX ADMIN — ALLOPURINOL 300 MILLIGRAM(S): 100 TABLET ORAL at 12:31

## 2024-07-31 RX ADMIN — LORAZEPAM 1 MILLIGRAM(S): 1 TABLET ORAL at 19:59

## 2024-07-31 RX ADMIN — Medication 17 GRAM(S): at 05:04

## 2024-07-31 RX ADMIN — WARFARIN SODIUM 3 MILLIGRAM(S): 4 TABLET ORAL at 21:50

## 2024-07-31 RX ADMIN — AMIODARONE HYDROCHLORIDE 200 MILLIGRAM(S): 50 INJECTION, SOLUTION INTRAVENOUS at 05:04

## 2024-07-31 RX ADMIN — Medication 1 DROP(S): at 21:50

## 2024-07-31 RX ADMIN — Medication 25 MILLIGRAM(S): at 05:04

## 2024-07-31 RX ADMIN — Medication 0.4 MILLIGRAM(S): at 21:50

## 2024-07-31 RX ADMIN — SPIRONOLACTONE 25 MILLIGRAM(S): 50 TABLET, FILM COATED ORAL at 05:04

## 2024-07-31 RX ADMIN — Medication 17 GRAM(S): at 21:52

## 2024-07-31 RX ADMIN — BRIMONIDINE TARTRATE 1 DROP(S): 1 SOLUTION/ DROPS OPHTHALMIC at 17:34

## 2024-07-31 RX ADMIN — SENNOSIDES 2 TABLET(S): 8.6 TABLET ORAL at 21:50

## 2024-07-31 RX ADMIN — Medication 10 MILLIGRAM(S): at 12:31

## 2024-07-31 RX ADMIN — BRIMONIDINE TARTRATE 1 DROP(S): 1 SOLUTION/ DROPS OPHTHALMIC at 05:04

## 2024-08-01 LAB
ANION GAP SERPL CALC-SCNC: 10 MMOL/L — SIGNIFICANT CHANGE UP (ref 5–17)
BUN SERPL-MCNC: 28 MG/DL — HIGH (ref 7–23)
CALCIUM SERPL-MCNC: 9.4 MG/DL — SIGNIFICANT CHANGE UP (ref 8.4–10.5)
CHLORIDE SERPL-SCNC: 98 MMOL/L — SIGNIFICANT CHANGE UP (ref 96–108)
CO2 SERPL-SCNC: 31 MMOL/L — SIGNIFICANT CHANGE UP (ref 22–31)
CREAT SERPL-MCNC: 1.47 MG/DL — HIGH (ref 0.5–1.3)
EGFR: 47 ML/MIN/1.73M2 — LOW
GLUCOSE SERPL-MCNC: 88 MG/DL — SIGNIFICANT CHANGE UP (ref 70–99)
HCT VFR BLD CALC: 40.1 % — SIGNIFICANT CHANGE UP (ref 39–50)
HGB BLD-MCNC: 12.8 G/DL — LOW (ref 13–17)
INR BLD: 1.96 RATIO — HIGH (ref 0.85–1.18)
MCHC RBC-ENTMCNC: 31.8 PG — SIGNIFICANT CHANGE UP (ref 27–34)
MCHC RBC-ENTMCNC: 31.9 GM/DL — LOW (ref 32–36)
MCV RBC AUTO: 99.8 FL — SIGNIFICANT CHANGE UP (ref 80–100)
NRBC # BLD: 0 /100 WBCS — SIGNIFICANT CHANGE UP (ref 0–0)
PLATELET # BLD AUTO: 120 K/UL — LOW (ref 150–400)
POTASSIUM SERPL-MCNC: 4.4 MMOL/L — SIGNIFICANT CHANGE UP (ref 3.5–5.3)
POTASSIUM SERPL-SCNC: 4.4 MMOL/L — SIGNIFICANT CHANGE UP (ref 3.5–5.3)
PROTHROM AB SERPL-ACNC: 20.2 SEC — HIGH (ref 9.5–13)
RBC # BLD: 4.02 M/UL — LOW (ref 4.2–5.8)
RBC # FLD: 15.9 % — HIGH (ref 10.3–14.5)
SODIUM SERPL-SCNC: 139 MMOL/L — SIGNIFICANT CHANGE UP (ref 135–145)
WBC # BLD: 4.33 K/UL — SIGNIFICANT CHANGE UP (ref 3.8–10.5)
WBC # FLD AUTO: 4.33 K/UL — SIGNIFICANT CHANGE UP (ref 3.8–10.5)

## 2024-08-01 RX ORDER — CIPROFLOXACIN 500 MG/1
400 TABLET, FILM COATED ORAL EVERY 12 HOURS
Refills: 0 | Status: DISCONTINUED | OUTPATIENT
Start: 2024-08-01 | End: 2024-08-02

## 2024-08-01 RX ORDER — WARFARIN SODIUM 4 MG/1
2 TABLET ORAL ONCE
Refills: 0 | Status: COMPLETED | OUTPATIENT
Start: 2024-08-01 | End: 2024-08-01

## 2024-08-01 RX ADMIN — SENNOSIDES 2 TABLET(S): 8.6 TABLET ORAL at 21:42

## 2024-08-01 RX ADMIN — Medication 650 MILLIGRAM(S): at 14:03

## 2024-08-01 RX ADMIN — Medication 17 GRAM(S): at 06:30

## 2024-08-01 RX ADMIN — Medication 10 MILLIGRAM(S): at 12:33

## 2024-08-01 RX ADMIN — Medication 1 DROP(S): at 21:42

## 2024-08-01 RX ADMIN — BRIMONIDINE TARTRATE 1 DROP(S): 1 SOLUTION/ DROPS OPHTHALMIC at 17:44

## 2024-08-01 RX ADMIN — CIPROFLOXACIN 200 MILLIGRAM(S): 500 TABLET, FILM COATED ORAL at 18:18

## 2024-08-01 RX ADMIN — WARFARIN SODIUM 2 MILLIGRAM(S): 4 TABLET ORAL at 21:42

## 2024-08-01 RX ADMIN — SPIRONOLACTONE 25 MILLIGRAM(S): 50 TABLET, FILM COATED ORAL at 06:25

## 2024-08-01 RX ADMIN — AMIODARONE HYDROCHLORIDE 200 MILLIGRAM(S): 50 INJECTION, SOLUTION INTRAVENOUS at 06:25

## 2024-08-01 RX ADMIN — Medication 25 MILLIGRAM(S): at 06:25

## 2024-08-01 RX ADMIN — Medication 17 GRAM(S): at 17:45

## 2024-08-01 RX ADMIN — ALLOPURINOL 300 MILLIGRAM(S): 100 TABLET ORAL at 12:33

## 2024-08-01 RX ADMIN — Medication 0.4 MILLIGRAM(S): at 21:42

## 2024-08-01 RX ADMIN — BRIMONIDINE TARTRATE 1 DROP(S): 1 SOLUTION/ DROPS OPHTHALMIC at 06:25

## 2024-08-01 RX ADMIN — Medication 650 MILLIGRAM(S): at 12:46

## 2024-08-01 NOTE — CONSULT NOTE ADULT - ATTENDING COMMENTS
Family is interested in obtaining imaging while admitted, recommend CT abdomen with and without contrast renal mass protocol

## 2024-08-01 NOTE — CONSULT NOTE ADULT - ASSESSMENT
Edmund Luke has called the [facility] office in regards to [employer] Candance Risser wished to know if any additional lab work was added for Lucien Navarrete to complete  Informed Candance Risser a BMP was added on 05/05/2022 to be done  Informed [place] the BMP required fasting for 8 hours or longer  Candance Risser was thankful for the information and ended the call  85y/o male with PMHx of Prostate cancer treated with radiation over 10 yrs ago with Dr. Ty Rosen, but has not followed with urologist for many years due to bedbound status, admitted with abdominal pain and flank pain, being treated for UTI and fecal impaction, found on to have b/l renal cysts, left side has 6.9x4.4x5.6cm cyst which has grown since u/s in 2018 where it was 2.6x2.9x2.3cm, unable to tolerate MRI     -Obtain CT A/P with contrast renal mass protocol as inpatient  -Given pts bedbound status, his wife reports difficulty of transportation to doctors   -Continue treatement for UTI based off of sensitivities   -Bowel regimen per GI recs   -Continue tamsulosin 0.4mg      ***FINAL PLAN PENDING DISCUSSION WITH ATTENDING     [facility] [place] [facility]  [address] [place] [address] [id number] [phone]  85y/o male with PMHx of Prostate cancer treated with radiation over 10 yrs ago with Dr. Ty Rosen, but has not followed with urologist for many years due to bedbound status, admitted with abdominal pain and flank pain, being treated for UTI and fecal impaction, found on to have b/l renal cysts, left side has 6.9x4.4x5.6cm cyst which has grown since u/s in 2018 where it was 2.6x2.9x2.3cm, unable to tolerate MRI     -Obtain CT A/P with contrast renal mass protocol as inpatient  -Given pts bedbound status, his wife reports difficulty of transportation to doctors   -Continue treatement for UTI based off of sensitivities   -Bowel regimen per GI recs   -Continue tamsulosin 0.4mg    - Discussed with Dr. Alonso [facility]  [address] [place] [phone]

## 2024-08-01 NOTE — CONSULT NOTE ADULT - SUBJECTIVE AND OBJECTIVE BOX
HPI: 83y/o male with PMHx of Prostate cancer treated with radiation over 10 yrs ago with Dr. Ty Rosen, but has not followed with urologist for many years due to bedbound status, admitted with abdominal pain and flank pain, being treated for UTI. Urology consulted for evaluation of b/l renal cysts, left side has 6.9x4.4x5.6cm cyst which has grown since u/s in 2018 where it was 2.6x2.9x2.3cm. CT done to further characterize and showed b/l lesions likely benign, pt had f/u noncon MRI today but was unable to tolerate it,  Kidneys partially viewed did not show findings of malignancy. Pts wife at bedside reports no gross hematuria or other urinary symptoms.     PAST MEDICAL & SURGICAL HISTORY:  Afib    Prostate cancer    Heart failure    History of scoliosis    S/P anal fissurectomy    FAMILY HISTORY:  No known  malignancy     SOCIAL HISTORY  Denies alcohol and drug abuse, nonsmoker     MEDICATIONS  (STANDING):  allopurinol 300 milliGRAM(s) Oral daily  aMIOdarone    Tablet 200 milliGRAM(s) Oral daily  bisacodyl Suppository 10 milliGRAM(s) Rectal daily  brimonidine 0.2% Ophthalmic Solution 1 Drop(s) Both EYES two times a day  ciprofloxacin   IVPB 400 milliGRAM(s) IV Intermittent every 12 hours  latanoprost 0.005% Ophthalmic Solution 1 Drop(s) Both EYES at bedtime  metoprolol succinate ER 25 milliGRAM(s) Oral daily  polyethylene glycol 3350 17 Gram(s) Oral two times a day  senna 2 Tablet(s) Oral at bedtime  spironolactone 25 milliGRAM(s) Oral daily  tamsulosin 0.4 milliGRAM(s) Oral at bedtime  warfarin 2 milliGRAM(s) Oral once    MEDICATIONS  (PRN):  acetaminophen     Tablet .. 650 milliGRAM(s) Oral every 6 hours PRN Moderate Pain (4 - 6)    Allergies    No Known Allergies    Intolerances    REVIEW OF SYSTEMS: Pertinent positives and negatives as stated in HPI, otherwise negative    Vital signs  T(C): 36.8 (08-01-24 @ 11:33), Max: 36.8 (08-01-24 @ 11:33)  HR: 54 (08-01-24 @ 11:33)  BP: 133/65 (08-01-24 @ 11:33)  SpO2: 98% (08-01-24 @ 11:33)  Wt(kg): --    Physical Exam  Gen: NAD  HEENT: normocephalic, atraumatic, no scleral icterus  Pulm: No respiratory distress, no subcostal retractions, no accessory muscle use   CV:  no JVD  Abd: Soft, NT, ND, no rebound tenderness or guarding  :  Circumcised, no lesions.  No discharge or blood at urethral meatus.  Testes descended bilaterally.     MSK:  No CVAT. No edema present  NEURO: A&Ox3, no focal neurological deficits, CN 2-12 grossly intact  SKIN: warm, dry, no rash.    LABS:  CBC                       12.8   4.33  )-----------( 120      ( 01 Aug 2024 07:16 )             40.1     BMP   08-01    139  |  98  |  28<H>  ----------------------------<  88  4.4   |  31  |  1.47<H>    Ca    9.4      01 Aug 2024 07:17    PT/INR - ( 01 Aug 2024 07:16 )   PT: 20.2 sec;   INR: 1.96 ratio      PTT - ( 31 Jul 2024 06:24 )  PTT:36.3 sec    Urinalysis Basic - ( 01 Aug 2024 07:17 )    Color: x / Appearance: x / SG: x / pH: x  Gluc: 88 mg/dL / Ketone: x  / Bili: x / Urobili: x   Blood: x / Protein: x / Nitrite: x   Leuk Esterase: x / RBC: x / WBC x   Sq Epi: x / Non Sq Epi: x / Bacteria: x  Urine Cx: Culture - Urine (07.27.24 @ 13:43)   - Ampicillin: S <=2 Predicts results to ampicillin/sulbactam, amoxacillin-clavulanate and piperacillin-tazobactam.  - Ciprofloxacin: S <=1  - Levofloxacin: S 1  - Nitrofurantoin: S <=32 Should not be used to treat pyelonephritis.  - Tetracycline: R >8  - Vancomycin: S 1  Specimen Source: Clean Catch Clean Catch (Midstream)  Culture Results:   >100,000 CFU/ml Enterococcus faecalis  Organism Identification: Enterococcus faecalis  Organism: Enterococcus faecalis  Method Type: LEONORA    Radiology  < from: US Kidney and Bladder (07.30.24 @ 14:54) >  IMPRESSION:  No hydronephrosis.    Bilateral renal cortical thinning and increased cortical echogenicity   compatible with renal parenchymal disease.    Bilateral renal cysts. These include a bilobed, lobulated in contour   complex peripelvic cyst in the mid to lower pole of the left kidney which   measures 6.9 x 4.4 x 5.6 cm, increased in size compared to prior   ultrasound. This contains several thickened internal septations and   thickened, irregular in contour wall. A cystic renal mass/neoplasm cannot   be excluded. In the absence of contraindication, further evaluation with   renal mass protocol MRI is recommended. Contrast-enhanced ultrasound   could also be performed for further evaluation.    Diffuse thickening of the wall of the urinary bladder. Suggest   correlation with urinalysis to exclude infection/cystitis.  --- End of Report ---      from: US Kidney and Bladder   (05.18.18 @ 12:04) >  PROCEDURE DATE:  05/18/2018    Right kidney:  10.7 cm. No solid renal mass, hydronephrosis or calculi.   Lower pole simple cortical cyst measuring 2.2 cm.    Left kidney:  10.2 cm. No solid renal mass, hydronephrosis or calculi.   Lower pole parapelvic cyst measuring 2.6 cm. Mid pole cortical cyst   measuring 2 cm.  < end of copied text >      from: CT Abdomen and Pelvis w/ Oral Cont (07.27.24)  KIDNEYS/URETERS: Bilateral low-attenuation renal lesions are noted which   are probably benign however remains indeterminate on this noncontrast CT.   No evidence for nephrolithiasis or hydronephrosis..  < end of copied text >    from: CT Abdomen and Pelvis w/ Oral Cont and w/ IV Cont  (04.29.22)   PROCEDURE DATE:  04/29/2022    KIDNEYS/URETERS: No hydronephrosis. Renal cysts. Additional hypodensities   of the kidneys are too small to characterize.

## 2024-08-02 LAB
ANION GAP SERPL CALC-SCNC: 10 MMOL/L — SIGNIFICANT CHANGE UP (ref 5–17)
BUN SERPL-MCNC: 27 MG/DL — HIGH (ref 7–23)
CALCIUM SERPL-MCNC: 9.1 MG/DL — SIGNIFICANT CHANGE UP (ref 8.4–10.5)
CHLORIDE SERPL-SCNC: 101 MMOL/L — SIGNIFICANT CHANGE UP (ref 96–108)
CO2 SERPL-SCNC: 29 MMOL/L — SIGNIFICANT CHANGE UP (ref 22–31)
CREAT SERPL-MCNC: 1.28 MG/DL — SIGNIFICANT CHANGE UP (ref 0.5–1.3)
EGFR: 56 ML/MIN/1.73M2 — LOW
GLUCOSE SERPL-MCNC: 95 MG/DL — SIGNIFICANT CHANGE UP (ref 70–99)
HCT VFR BLD CALC: 39.3 % — SIGNIFICANT CHANGE UP (ref 39–50)
HGB BLD-MCNC: 12.3 G/DL — LOW (ref 13–17)
INR BLD: 2.06 RATIO — HIGH (ref 0.85–1.18)
MCHC RBC-ENTMCNC: 31.3 GM/DL — LOW (ref 32–36)
MCHC RBC-ENTMCNC: 31.9 PG — SIGNIFICANT CHANGE UP (ref 27–34)
MCV RBC AUTO: 101.8 FL — HIGH (ref 80–100)
NRBC # BLD: 0 /100 WBCS — SIGNIFICANT CHANGE UP (ref 0–0)
PLATELET # BLD AUTO: 119 K/UL — LOW (ref 150–400)
POTASSIUM SERPL-MCNC: 4.3 MMOL/L — SIGNIFICANT CHANGE UP (ref 3.5–5.3)
POTASSIUM SERPL-SCNC: 4.3 MMOL/L — SIGNIFICANT CHANGE UP (ref 3.5–5.3)
PROTHROM AB SERPL-ACNC: 21.2 SEC — HIGH (ref 9.5–13)
RBC # BLD: 3.86 M/UL — LOW (ref 4.2–5.8)
RBC # FLD: 15.8 % — HIGH (ref 10.3–14.5)
SODIUM SERPL-SCNC: 140 MMOL/L — SIGNIFICANT CHANGE UP (ref 135–145)
WBC # BLD: 4.47 K/UL — SIGNIFICANT CHANGE UP (ref 3.8–10.5)
WBC # FLD AUTO: 4.47 K/UL — SIGNIFICANT CHANGE UP (ref 3.8–10.5)

## 2024-08-02 PROCEDURE — 99222 1ST HOSP IP/OBS MODERATE 55: CPT | Mod: FS

## 2024-08-02 RX ORDER — WARFARIN SODIUM 4 MG/1
2 TABLET ORAL ONCE
Refills: 0 | Status: COMPLETED | OUTPATIENT
Start: 2024-08-02 | End: 2024-08-02

## 2024-08-02 RX ORDER — AMOXICILLIN 500 MG
875 CAPSULE ORAL
Refills: 0 | Status: DISCONTINUED | OUTPATIENT
Start: 2024-08-02 | End: 2024-08-06

## 2024-08-02 RX ADMIN — Medication 17 GRAM(S): at 17:27

## 2024-08-02 RX ADMIN — Medication 0.4 MILLIGRAM(S): at 22:29

## 2024-08-02 RX ADMIN — CIPROFLOXACIN 200 MILLIGRAM(S): 500 TABLET, FILM COATED ORAL at 05:51

## 2024-08-02 RX ADMIN — SENNOSIDES 2 TABLET(S): 8.6 TABLET ORAL at 22:29

## 2024-08-02 RX ADMIN — BRIMONIDINE TARTRATE 1 DROP(S): 1 SOLUTION/ DROPS OPHTHALMIC at 05:52

## 2024-08-02 RX ADMIN — Medication 1 DROP(S): at 22:29

## 2024-08-02 RX ADMIN — Medication 875 MILLIGRAM(S): at 17:27

## 2024-08-02 RX ADMIN — AMIODARONE HYDROCHLORIDE 200 MILLIGRAM(S): 50 INJECTION, SOLUTION INTRAVENOUS at 05:52

## 2024-08-02 RX ADMIN — ALLOPURINOL 300 MILLIGRAM(S): 100 TABLET ORAL at 12:06

## 2024-08-02 RX ADMIN — SPIRONOLACTONE 25 MILLIGRAM(S): 50 TABLET, FILM COATED ORAL at 05:52

## 2024-08-02 RX ADMIN — WARFARIN SODIUM 2 MILLIGRAM(S): 4 TABLET ORAL at 22:29

## 2024-08-02 RX ADMIN — Medication 25 MILLIGRAM(S): at 05:52

## 2024-08-02 RX ADMIN — Medication 17 GRAM(S): at 05:52

## 2024-08-02 RX ADMIN — Medication 10 MILLIGRAM(S): at 12:06

## 2024-08-02 NOTE — CONSULT NOTE ADULT - SUBJECTIVE AND OBJECTIVE BOX
[facility] DISE[facility]ES  BYRON Baker S. Shah, Y. Patel, G. Julian  [phone]  ([phone] - weekdays after 5pm and weekends)    LEBRON TERRY  84y, Male  [id number]    HPI:  Patient is a 83 year old male with PMH of A-fib on Coumadin, dementia, CHF, prostate cancer, nonambulatory who presents emergency department complaining of flank pain and abdominal pain. Pain abdomen diffuse associated with Constipation, flatulance. No nausea/vomiting/ diarrhea. No hx sick contacts.  CT abdomen shows Stool-filled colon suggesting constipation with stool impaction in the rectum, slightly decreased compared to the recent prior CT. Diverticulosis.Normal appendix.  (27 Jul 2024 17:43)  ROS: 14 point review of systems completed, pertinent positives and negatives as per HPI.    Allergies: No Known Allergies    PMH -- Afib  Prostate cancer  Heart failure  Heart failure  History of scoliosis    PSH -- S/P anal fissurectomy  FH -- No pertinent family history in first degree relatives  Social History -- denies tobacco, alcohol or illicit drug use    Physical Exam--  Vital Signs Last 24 Hrs  T(F): 98.3 (02 Aug 2024 04:42), Max: 98.5 (01 Aug 2024 19:58)  HR: 65 (02 Aug 2024 04:42) (54 - 65)  BP: 142/89 (02 Aug 2024 04:42) (113/61 - 142/89)  RR: 18 (02 Aug 2024 04:42) (18 - 18)  SpO2: 97% (02 Aug 2024 04:42) (97% - 98%)    Laboratory & Imaging Data--  CBC:                       12.8   4.33  )-----------( 120      ( 01 Aug 2024 07:16 )             40.1     WBC Count: 4.33 K/uL (08-01-24 @ 07:16)  WBC Count: 5.31 K/uL (07-28-24 @ 07:27)  WBC Count: 7.65 K/uL (07-27-24 @ 13:43)    CMP: 08-01    139  |  98  |  28<H>  ----------------------------<  88  4.4   |  31  |  1.47<H>    Ca    9.4      01 Aug 2024 07:17    Urinalysis (07.27.24 @ 13:43)    Blood, Urine: Moderate   Glucose Qualitative, Urine: Negative mg/dL   pH Urine: 7.5   Color: Yellow   Urine Appearance: Cloudy   Bilirubin: Negative   Ketone - Urine: Negative mg/dL   Specific Gravity: 1.022   Protein, Urine: 30 mg/dL   Urobilinogen: 1.0 mg/dL   Nitrite: Negative   Leukocyte Esterase Concentration: Large  Urine Microscopic-Add On (NC) (07.27.24 @ 13:43)    Cast: 1 /LPF   Epithelial Cells: 3 /HPF   Red Blood Cell - Urine: 55 /HPF   White Blood Cell - Urine: 66 /HPF   Bacteria: Many /HPF    Microbiology: reviewed  Culture - Urine (collected 07-27-24 @ 13:43)  Source: Clean Catch Clean Catch (Midstream)  Final Report (07-31-24 @ 14:24):    >100,000 CFU/ml Enterococcus faecalis  Organism: Enterococcus faecalis (07-31-24 @ 14:24)  Organism: Enterococcus faecalis (07-31-24 @ 14:24)      -  Levofloxacin: S 1      -  Nitrofurantoin: S <=32 Should not be used to treat pyelonephritis.      -  Vancomycin: S 1      -  Ciprofloxacin: S <=1      -  Ampicillin: S <=2 Predicts results to ampicillin/sulbactam, amoxacillin-clavulanate and  piperacillin-tazobactam.      -  Tetracycline: R >8      Method Type: LEONORA    Culture - Urine (collected 07-24-24 @ 16:54)  Source: Clean Catch Clean Catch (Midstream)  Final Report (07-26-24 @ 08:56):    No growth    Radiology--reviewed    Active Medications--  acetaminophen     Tablet .. 650 milliGRAM(s) Oral every 6 hours PRN  allopurinol 300 milliGRAM(s) Oral daily  aMIOdarone    Tablet 200 milliGRAM(s) Oral daily  bisacodyl Suppository 10 milliGRAM(s) Rectal daily  brimonidine 0.2% Ophthalmic Solution 1 Drop(s) Both EYES two times a day  ciprofloxacin   IVPB 400 milliGRAM(s) IV Intermittent every 12 hours  latanoprost 0.005% Ophthalmic Solution 1 Drop(s) Both EYES at bedtime  metoprolol succinate ER 25 milliGRAM(s) Oral daily  polyethylene glycol 3350 17 Gram(s) Oral two times a day  senna 2 Tablet(s) Oral at bedtime  spironolactone 25 milliGRAM(s) Oral daily  tamsulosin 0.4 milliGRAM(s) Oral at bedtime    Current Antimicrobials:   ciprofloxacin   IVPB 400 milliGRAM(s) IV Intermittent every 12 hours    Prior/Completed Antimicrobials:  cefTRIAXone   IVPB  cefTRIAXone   IVPB     [facility]  BYRON Baker S. Shah, Y. Patel, G. [facility]  [phone]  ([phone] - weekdays after 5pm and weekends)    LEBRON TERRY  84y, Male  [id number]    HPI:  Patient is a 83 year old male with PMH of A-fib on Coumadin, dementia, CHF, prostate cancer, nonambulatory who presents emergency department complaining of flank pain and abdominal pain. Pain abdomen diffuse associated with constipation, flatulence with no nausea, vomiting, diarrhea. No hx sick contacts.  CT abdomen shows stool-filled colon suggesting constipation with stool impaction in the rectum, slightly decreased compared to the recent prior CT; diverticulosis; normal appendix.  (27 Jul 2024 17:43)  Patient seen and examined at bedside this morning, aid at bedside. Patient reports he has no pain, denies fever or chills, denies dysuria, abdominal pain or any other new complaints.   ROS: 14 point review of systems completed, pertinent positives and negatives as per HPI.    Allergies: No Known Allergies    PMH -- Afib  Prostate cancer  Heart failure  Heart failure  History of scoliosis    PSH -- S/P anal fissurectomy  FH -- No pertinent family history in first degree relatives  Social History -- denies tobacco, alcohol or illicit drug use    Physical Exam--  Vital Signs Last 24 Hrs  T(F): 98.3 (02 Aug 2024 04:42), Max: 98.5 (01 Aug 2024 19:58)  HR: 65 (02 Aug 2024 04:42) (54 - 65)  BP: 142/89 (02 Aug 2024 04:42) (113/61 - 142/89)  RR: 18 (02 Aug 2024 04:42) (18 - 18)  SpO2: 97% (02 Aug 2024 04:42) (97% - 98%)    Laboratory & Imaging Data--  CBC:                       12.8   4.33  )-----------( 120      ( 01 Aug 2024 07:16 )             40.1     WBC Count: 4.33 K/uL (08-01-24 @ 07:16)  WBC Count: 5.31 K/uL (07-28-24 @ 07:27)  WBC Count: 7.65 K/uL (07-27-24 @ 13:43)    CMP: 08-01    139  |  98  |  28<H>  ----------------------------<  88  4.4   |  31  |  1.47<H>    Ca    9.4      01 Aug 2024 07:17    Urinalysis (07.27.24 @ 13:43)    Blood, Urine: Moderate   Glucose Qualitative, Urine: Negative mg/dL   pH Urine: 7.5   Color: Yellow   Urine Appearance: Cloudy   Bilirubin: Negative   Ketone - Urine: Negative mg/dL   Specific Gravity: 1.022   Protein, Urine: 30 mg/dL   Urobilinogen: 1.0 mg/dL   Nitrite: Negative   Leukocyte Esterase Concentration: Large  Urine Microscopic-Add On (NC) (07.27.24 @ 13:43)    Cast: 1 /LPF   Epithelial Cells: 3 /HPF   Red Blood Cell - Urine: 55 /HPF   White Blood Cell - Urine: 66 /HPF   Bacteria: Many /HPF    Microbiology: reviewed  Culture - Urine (collected 07-27-24 @ 13:43)  Source: Clean Catch Clean Catch (Midstream)  Final Report (07-31-24 @ 14:24):    >100,000 CFU/ml Enterococcus faecalis  Organism: Enterococcus faecalis (07-31-24 @ 14:24)  Organism: Enterococcus faecalis (07-31-24 @ 14:24)      -  Levofloxacin: S 1      -  Nitrofurantoin: S <=32 Should not be used to treat pyelonephritis.      -  Vancomycin: S 1      -  Ciprofloxacin: S <=1      -  Ampicillin: S <=2 Predicts results to ampicillin/sulbactam, amoxacillin-clavulanate and  piperacillin-tazobactam.      -  Tetracycline: R >8      Method Type: LEONORA    Culture - Urine (collected 07-24-24 @ 16:54)  Source: Clean Catch Clean Catch (Midstream)  Final Report (07-26-24 @ 08:56):    No growth    Radiology--reviewed    Active Medications--  acetaminophen     Tablet .. 650 milliGRAM(s) Oral every 6 hours PRN  allopurinol 300 milliGRAM(s) Oral daily  aMIOdarone    Tablet 200 milliGRAM(s) Oral daily  bisacodyl Suppository 10 milliGRAM(s) Rectal daily  brimonidine 0.2% Ophthalmic Solution 1 Drop(s) Both EYES two times a day  ciprofloxacin   IVPB 400 milliGRAM(s) IV Intermittent every 12 hours  latanoprost 0.005% Ophthalmic Solution 1 Drop(s) Both EYES at bedtime  metoprolol succinate ER 25 milliGRAM(s) Oral daily  polyethylene glycol 3350 17 Gram(s) Oral two times a day  senna 2 Tablet(s) Oral at bedtime  spironolactone 25 milliGRAM(s) Oral daily  tamsulosin 0.4 milliGRAM(s) Oral at bedtime    Current Antimicrobials:   ciprofloxacin   IVPB 400 milliGRAM(s) IV Intermittent every 12 hours    Prior/Completed Antimicrobials:  cefTRIAXone   IVPB  cefTRIAXone   IVPB     [facility]  BYRON Baker S. Shah, Y. Patel, G. Julian  [phone]  ([phone] - weekdays after 5pm and weekends)    LEBRON TERRY  84y, Male  [id number]    HPI:  Patient is a 83 year old male with PMH of A-fib on Coumadin, dementia, CHF, prostate cancer, nonambulatory who presents emergency department complaining of flank pain and abdominal pain. Pain abdomen diffuse associated with constipation, flatulence with no nausea, vomiting, diarrhea. No hx sick contacts.  CT abdomen shows stool-filled colon suggesting constipation with stool impaction in the rectum, slightly decreased compared to the recent prior CT; diverticulosis; normal appendix.  (27 Jul 2024 17:43)  Patient seen and examined at bedside this morning, aid at bedside. Patient reports he has no pain, denies fever or chills, denies dysuria, abdominal pain or any other new complaints.   ROS: 14 point review of systems completed, pertinent positives and negatives as per HPI.    Allergies: No Known Allergies    PMH -- Afib  Prostate cancer  Heart failure  Heart failure  History of scoliosis    PSH -- S/P anal fissurectomy  FH -- No pertinent family history in first degree relatives  Social History -- denies tobacco, alcohol or illicit drug use    Physical Exam--  Vital Signs Last 24 Hrs  T(F): 98.3 (02 Aug 2024 04:42), Max: 98.5 (01 Aug 2024 19:58)  HR: 65 (02 Aug 2024 04:42) (54 - 65)  BP: 142/89 (02 Aug 2024 04:42) (113/61 - 142/89)  RR: 18 (02 Aug 2024 04:42) (18 - 18)  SpO2: 97% (02 Aug 2024 04:42) (97% - 98%)  General: no acute distress  HEENT: NC/AT, EOMI, anicteric  Lungs: clear to auscultation bilaterally  Heart: S1, S2 present, normal rate/rhythm  Abdomen: Soft. ND. NT. BS present.   Neuro: awake, alert, no obvious focal deficits   Extremities: No cyanosis. No edema.   Skin: Warm. Dry. No visible rash.   Lines: PIV     Laboratory & Imaging Data--  CBC:                       12.8   4.33  )-----------( 120      ( 01 Aug 2024 07:16 )             40.1     WBC Count: 4.33 K/uL (08-01-24 @ 07:16)  WBC Count: 5.31 K/uL (07-28-24 @ 07:27)  WBC Count: 7.65 K/uL (07-27-24 @ 13:43)    CMP: 08-01    139  |  98  |  28<H>  ----------------------------<  88  4.4   |  31  |  1.47<H>    Ca    9.4      01 Aug 2024 07:17    Urinalysis (07.27.24 @ 13:43)    Blood, Urine: Moderate   Glucose Qualitative, Urine: Negative mg/dL   pH Urine: 7.5   Color: Yellow   Urine Appearance: Cloudy   Bilirubin: Negative   Ketone - Urine: Negative mg/dL   Specific Gravity: 1.022   Protein, Urine: 30 mg/dL   Urobilinogen: 1.0 mg/dL   Nitrite: Negative   Leukocyte Esterase Concentration: Large  Urine Microscopic-Add On (NC) (07.27.24 @ 13:43)    Cast: 1 /LPF   Epithelial Cells: 3 /HPF   Red Blood Cell - Urine: 55 /HPF   White Blood Cell - Urine: 66 /HPF   Bacteria: Many /HPF    Microbiology: reviewed  Culture - Urine (collected 07-27-24 @ 13:43)  Source: Clean Catch Clean Catch (Midstream)  Final Report (07-31-24 @ 14:24):    >100,000 CFU/ml Enterococcus faecalis  Organism: Enterococcus faecalis (07-31-24 @ 14:24)  Organism: Enterococcus faecalis (07-31-24 @ 14:24)      -  Levofloxacin: S 1      -  Nitrofurantoin: S <=32 Should not be used to treat pyelonephritis.      -  Vancomycin: S 1      -  Ciprofloxacin: S <=1      -  Ampicillin: S <=2 Predicts results to ampicillin/sulbactam, amoxacillin-clavulanate and  piperacillin-tazobactam.      -  Tetracycline: R >8      Method Type: LEONORA    Culture - Urine (collected 07-24-24 @ 16:54)  Source: Clean Catch Clean Catch (Midstream)  Final Report (07-26-24 @ 08:56):    No growth    Radiology--reviewed    Active Medications--  acetaminophen     Tablet .. 650 milliGRAM(s) Oral every 6 hours PRN  allopurinol 300 milliGRAM(s) Oral daily  aMIOdarone    Tablet 200 milliGRAM(s) Oral daily  bisacodyl Suppository 10 milliGRAM(s) Rectal daily  brimonidine 0.2% Ophthalmic Solution 1 Drop(s) Both EYES two times a day  ciprofloxacin   IVPB 400 milliGRAM(s) IV Intermittent every 12 hours  latanoprost 0.005% Ophthalmic Solution 1 Drop(s) Both EYES at bedtime  metoprolol succinate ER 25 milliGRAM(s) Oral daily  polyethylene glycol 3350 17 Gram(s) Oral two times a day  senna 2 Tablet(s) Oral at bedtime  spironolactone 25 milliGRAM(s) Oral daily  tamsulosin 0.4 milliGRAM(s) Oral at bedtime    Current Antimicrobials:   ciprofloxacin   IVPB 400 milliGRAM(s) IV Intermittent every 12 hours    Prior/Completed Antimicrobials:  cefTRIAXone   IVPB  cefTRIAXone   IVPB

## 2024-08-02 NOTE — CONSULT NOTE ADULT - ASSESSMENT
Patient is a 83 year old male with PMH of A-fib on Coumadin, dementia, CHF, prostate cancer treated with radiation over 10 years ago with no follow up in years d/t nonambulatory status who presents emergency department complaining of flank pain and abdominal pain.  CT abdomen shows stool-filled colon suggesting constipation with stool impaction in the rectum, slightly decreased compared to the recent prior CT; diverticulosis; was also found to have b/l renal cysts, left side has 6.9x4.4x5.6cm cyst which has grown since u/s in 2018 where it was 2.6x2.9x2.3cm, and unable to tolerate MRI.    E. faecalis UTI  Renal cysts/mass  Fecal impaction/constipation    Positive UA with flank pain  U/S with diffuse thickening of urinary bladder wall, no hydronephrosis  Ucx with amp/vanc sensitive E. faecalis   s/p ceftriaxone 7/  currently on ciprofloxacin since 8/1  prior EKGs reviewed, noted with prolonged Qtc  afebrile, no leukocytosis     Recommendations:   Discontinued ciprofloxacin   Started on amoxicillin 875mg PO BID for 7d course until 8/7  Urology following, rec CTAP with contrast renal mass protocol  GI following, on bowel regimen  Continue rest of care per primary team     Over the weekend Dr. Case Jain will be covering for our group. If you have any questions, concerns or new micro data, please reach out to them at [phone].     Jean Claude Yarbrough M.D.  [facility], Division of Infectious Diseases  [phone]  After 5pm on weekdays and all day on weekends - please call [phone]  Available on [employer] TEAMS

## 2024-08-03 LAB
ANION GAP SERPL CALC-SCNC: 13 MMOL/L — SIGNIFICANT CHANGE UP (ref 5–17)
BASOPHILS # BLD AUTO: 0.01 K/UL — SIGNIFICANT CHANGE UP (ref 0–0.2)
BASOPHILS NFR BLD AUTO: 0.2 % — SIGNIFICANT CHANGE UP (ref 0–2)
BUN SERPL-MCNC: 26 MG/DL — HIGH (ref 7–23)
CALCIUM SERPL-MCNC: 9.5 MG/DL — SIGNIFICANT CHANGE UP (ref 8.4–10.5)
CHLORIDE SERPL-SCNC: 101 MMOL/L — SIGNIFICANT CHANGE UP (ref 96–108)
CO2 SERPL-SCNC: 29 MMOL/L — SIGNIFICANT CHANGE UP (ref 22–31)
CREAT SERPL-MCNC: 1.27 MG/DL — SIGNIFICANT CHANGE UP (ref 0.5–1.3)
EGFR: 56 ML/MIN/1.73M2 — LOW
EOSINOPHIL # BLD AUTO: 0.07 K/UL — SIGNIFICANT CHANGE UP (ref 0–0.5)
EOSINOPHIL NFR BLD AUTO: 1.5 % — SIGNIFICANT CHANGE UP (ref 0–6)
GLUCOSE SERPL-MCNC: 92 MG/DL — SIGNIFICANT CHANGE UP (ref 70–99)
HCT VFR BLD CALC: 41.6 % — SIGNIFICANT CHANGE UP (ref 39–50)
HGB BLD-MCNC: 12.7 G/DL — LOW (ref 13–17)
IMM GRANULOCYTES NFR BLD AUTO: 0.8 % — SIGNIFICANT CHANGE UP (ref 0–0.9)
INR BLD: 2.07 RATIO — HIGH (ref 0.85–1.18)
LYMPHOCYTES # BLD AUTO: 0.58 K/UL — LOW (ref 1–3.3)
LYMPHOCYTES # BLD AUTO: 12.2 % — LOW (ref 13–44)
MCHC RBC-ENTMCNC: 30.5 GM/DL — LOW (ref 32–36)
MCHC RBC-ENTMCNC: 30.8 PG — SIGNIFICANT CHANGE UP (ref 27–34)
MCV RBC AUTO: 100.7 FL — HIGH (ref 80–100)
MONOCYTES # BLD AUTO: 0.5 K/UL — SIGNIFICANT CHANGE UP (ref 0–0.9)
MONOCYTES NFR BLD AUTO: 10.5 % — SIGNIFICANT CHANGE UP (ref 2–14)
NEUTROPHILS # BLD AUTO: 3.57 K/UL — SIGNIFICANT CHANGE UP (ref 1.8–7.4)
NEUTROPHILS NFR BLD AUTO: 74.8 % — SIGNIFICANT CHANGE UP (ref 43–77)
NRBC # BLD: 0 /100 WBCS — SIGNIFICANT CHANGE UP (ref 0–0)
PLATELET # BLD AUTO: 118 K/UL — LOW (ref 150–400)
POTASSIUM SERPL-MCNC: 4.5 MMOL/L — SIGNIFICANT CHANGE UP (ref 3.5–5.3)
POTASSIUM SERPL-SCNC: 4.5 MMOL/L — SIGNIFICANT CHANGE UP (ref 3.5–5.3)
PROTHROM AB SERPL-ACNC: 22.3 SEC — HIGH (ref 9.5–13)
RBC # BLD: 4.13 M/UL — LOW (ref 4.2–5.8)
RBC # FLD: 15.7 % — HIGH (ref 10.3–14.5)
SODIUM SERPL-SCNC: 143 MMOL/L — SIGNIFICANT CHANGE UP (ref 135–145)
WBC # BLD: 4.77 K/UL — SIGNIFICANT CHANGE UP (ref 3.8–10.5)
WBC # FLD AUTO: 4.77 K/UL — SIGNIFICANT CHANGE UP (ref 3.8–10.5)

## 2024-08-03 RX ORDER — WARFARIN SODIUM 4 MG/1
2 TABLET ORAL ONCE
Refills: 0 | Status: COMPLETED | OUTPATIENT
Start: 2024-08-03 | End: 2024-08-03

## 2024-08-03 RX ORDER — OXYCODONE AND ACETAMINOPHEN 10; 325 MG/1; MG/1
1 TABLET ORAL EVERY 6 HOURS
Refills: 0 | Status: DISCONTINUED | OUTPATIENT
Start: 2024-08-03 | End: 2024-08-06

## 2024-08-03 RX ADMIN — Medication 875 MILLIGRAM(S): at 17:32

## 2024-08-03 RX ADMIN — SENNOSIDES 2 TABLET(S): 8.6 TABLET ORAL at 21:34

## 2024-08-03 RX ADMIN — Medication 25 MILLIGRAM(S): at 05:33

## 2024-08-03 RX ADMIN — Medication 17 GRAM(S): at 17:33

## 2024-08-03 RX ADMIN — Medication 10 MILLIGRAM(S): at 17:32

## 2024-08-03 RX ADMIN — Medication 875 MILLIGRAM(S): at 05:33

## 2024-08-03 RX ADMIN — Medication 650 MILLIGRAM(S): at 10:23

## 2024-08-03 RX ADMIN — BRIMONIDINE TARTRATE 1 DROP(S): 1 SOLUTION/ DROPS OPHTHALMIC at 05:33

## 2024-08-03 RX ADMIN — AMIODARONE HYDROCHLORIDE 200 MILLIGRAM(S): 50 INJECTION, SOLUTION INTRAVENOUS at 05:32

## 2024-08-03 RX ADMIN — Medication 650 MILLIGRAM(S): at 10:53

## 2024-08-03 RX ADMIN — Medication 0.4 MILLIGRAM(S): at 21:34

## 2024-08-03 RX ADMIN — ALLOPURINOL 300 MILLIGRAM(S): 100 TABLET ORAL at 10:23

## 2024-08-03 RX ADMIN — Medication 17 GRAM(S): at 05:33

## 2024-08-03 RX ADMIN — SPIRONOLACTONE 25 MILLIGRAM(S): 50 TABLET, FILM COATED ORAL at 05:33

## 2024-08-03 RX ADMIN — Medication 1 DROP(S): at 21:34

## 2024-08-03 RX ADMIN — BRIMONIDINE TARTRATE 1 DROP(S): 1 SOLUTION/ DROPS OPHTHALMIC at 17:32

## 2024-08-03 RX ADMIN — WARFARIN SODIUM 2 MILLIGRAM(S): 4 TABLET ORAL at 21:34

## 2024-08-04 LAB
ANION GAP SERPL CALC-SCNC: 10 MMOL/L — SIGNIFICANT CHANGE UP (ref 5–17)
BASOPHILS # BLD AUTO: 0.03 K/UL — SIGNIFICANT CHANGE UP (ref 0–0.2)
BASOPHILS NFR BLD AUTO: 0.7 % — SIGNIFICANT CHANGE UP (ref 0–2)
BUN SERPL-MCNC: 26 MG/DL — HIGH (ref 7–23)
CALCIUM SERPL-MCNC: 9.5 MG/DL — SIGNIFICANT CHANGE UP (ref 8.4–10.5)
CHLORIDE SERPL-SCNC: 101 MMOL/L — SIGNIFICANT CHANGE UP (ref 96–108)
CO2 SERPL-SCNC: 30 MMOL/L — SIGNIFICANT CHANGE UP (ref 22–31)
CREAT SERPL-MCNC: 1.31 MG/DL — HIGH (ref 0.5–1.3)
EGFR: 54 ML/MIN/1.73M2 — LOW
EOSINOPHIL # BLD AUTO: 0.07 K/UL — SIGNIFICANT CHANGE UP (ref 0–0.5)
EOSINOPHIL NFR BLD AUTO: 1.7 % — SIGNIFICANT CHANGE UP (ref 0–6)
GLUCOSE SERPL-MCNC: 83 MG/DL — SIGNIFICANT CHANGE UP (ref 70–99)
HCT VFR BLD CALC: 40 % — SIGNIFICANT CHANGE UP (ref 39–50)
HGB BLD-MCNC: 12.4 G/DL — LOW (ref 13–17)
IMM GRANULOCYTES NFR BLD AUTO: 1.2 % — HIGH (ref 0–0.9)
INR BLD: 2.21 RATIO — HIGH (ref 0.85–1.18)
LYMPHOCYTES # BLD AUTO: 0.78 K/UL — LOW (ref 1–3.3)
LYMPHOCYTES # BLD AUTO: 19.1 % — SIGNIFICANT CHANGE UP (ref 13–44)
MCHC RBC-ENTMCNC: 31 GM/DL — LOW (ref 32–36)
MCHC RBC-ENTMCNC: 31.7 PG — SIGNIFICANT CHANGE UP (ref 27–34)
MCV RBC AUTO: 102.3 FL — HIGH (ref 80–100)
MONOCYTES # BLD AUTO: 0.41 K/UL — SIGNIFICANT CHANGE UP (ref 0–0.9)
MONOCYTES NFR BLD AUTO: 10 % — SIGNIFICANT CHANGE UP (ref 2–14)
NEUTROPHILS # BLD AUTO: 2.75 K/UL — SIGNIFICANT CHANGE UP (ref 1.8–7.4)
NEUTROPHILS NFR BLD AUTO: 67.3 % — SIGNIFICANT CHANGE UP (ref 43–77)
NRBC # BLD: 0 /100 WBCS — SIGNIFICANT CHANGE UP (ref 0–0)
PLATELET # BLD AUTO: 111 K/UL — LOW (ref 150–400)
POTASSIUM SERPL-MCNC: 4.3 MMOL/L — SIGNIFICANT CHANGE UP (ref 3.5–5.3)
POTASSIUM SERPL-SCNC: 4.3 MMOL/L — SIGNIFICANT CHANGE UP (ref 3.5–5.3)
PROTHROM AB SERPL-ACNC: 22.7 SEC — HIGH (ref 9.5–13)
RBC # BLD: 3.91 M/UL — LOW (ref 4.2–5.8)
RBC # FLD: 15.6 % — HIGH (ref 10.3–14.5)
SODIUM SERPL-SCNC: 141 MMOL/L — SIGNIFICANT CHANGE UP (ref 135–145)
WBC # BLD: 4.09 K/UL — SIGNIFICANT CHANGE UP (ref 3.8–10.5)
WBC # FLD AUTO: 4.09 K/UL — SIGNIFICANT CHANGE UP (ref 3.8–10.5)

## 2024-08-04 RX ORDER — WARFARIN SODIUM 4 MG/1
2 TABLET ORAL ONCE
Refills: 0 | Status: COMPLETED | OUTPATIENT
Start: 2024-08-04 | End: 2024-08-04

## 2024-08-04 RX ADMIN — Medication 1 DROP(S): at 21:06

## 2024-08-04 RX ADMIN — Medication 17 GRAM(S): at 06:20

## 2024-08-04 RX ADMIN — Medication 25 MILLIGRAM(S): at 06:20

## 2024-08-04 RX ADMIN — BRIMONIDINE TARTRATE 1 DROP(S): 1 SOLUTION/ DROPS OPHTHALMIC at 06:21

## 2024-08-04 RX ADMIN — AMIODARONE HYDROCHLORIDE 200 MILLIGRAM(S): 50 INJECTION, SOLUTION INTRAVENOUS at 06:20

## 2024-08-04 RX ADMIN — Medication 0.4 MILLIGRAM(S): at 21:06

## 2024-08-04 RX ADMIN — WARFARIN SODIUM 2 MILLIGRAM(S): 4 TABLET ORAL at 21:06

## 2024-08-04 RX ADMIN — SPIRONOLACTONE 25 MILLIGRAM(S): 50 TABLET, FILM COATED ORAL at 06:20

## 2024-08-04 RX ADMIN — Medication 875 MILLIGRAM(S): at 17:18

## 2024-08-04 RX ADMIN — ALLOPURINOL 300 MILLIGRAM(S): 100 TABLET ORAL at 09:15

## 2024-08-04 RX ADMIN — BRIMONIDINE TARTRATE 1 DROP(S): 1 SOLUTION/ DROPS OPHTHALMIC at 17:18

## 2024-08-04 RX ADMIN — Medication 875 MILLIGRAM(S): at 06:20

## 2024-08-05 LAB
ANION GAP SERPL CALC-SCNC: 12 MMOL/L — SIGNIFICANT CHANGE UP (ref 5–17)
BASOPHILS # BLD AUTO: 0.01 K/UL — SIGNIFICANT CHANGE UP (ref 0–0.2)
BASOPHILS NFR BLD AUTO: 0.2 % — SIGNIFICANT CHANGE UP (ref 0–2)
BUN SERPL-MCNC: 24 MG/DL — HIGH (ref 7–23)
CALCIUM SERPL-MCNC: 9.4 MG/DL — SIGNIFICANT CHANGE UP (ref 8.4–10.5)
CHLORIDE SERPL-SCNC: 100 MMOL/L — SIGNIFICANT CHANGE UP (ref 96–108)
CO2 SERPL-SCNC: 28 MMOL/L — SIGNIFICANT CHANGE UP (ref 22–31)
CREAT SERPL-MCNC: 1.2 MG/DL — SIGNIFICANT CHANGE UP (ref 0.5–1.3)
EGFR: 60 ML/MIN/1.73M2 — SIGNIFICANT CHANGE UP
EOSINOPHIL # BLD AUTO: 0.09 K/UL — SIGNIFICANT CHANGE UP (ref 0–0.5)
EOSINOPHIL NFR BLD AUTO: 2 % — SIGNIFICANT CHANGE UP (ref 0–6)
GLUCOSE BLDC GLUCOMTR-MCNC: 125 MG/DL — HIGH (ref 70–99)
GLUCOSE SERPL-MCNC: 87 MG/DL — SIGNIFICANT CHANGE UP (ref 70–99)
HCT VFR BLD CALC: 40.4 % — SIGNIFICANT CHANGE UP (ref 39–50)
HGB BLD-MCNC: 13 G/DL — SIGNIFICANT CHANGE UP (ref 13–17)
IMM GRANULOCYTES NFR BLD AUTO: 0.9 % — SIGNIFICANT CHANGE UP (ref 0–0.9)
INR BLD: 2.08 RATIO — HIGH (ref 0.85–1.18)
LYMPHOCYTES # BLD AUTO: 0.73 K/UL — LOW (ref 1–3.3)
LYMPHOCYTES # BLD AUTO: 16.2 % — SIGNIFICANT CHANGE UP (ref 13–44)
MCHC RBC-ENTMCNC: 32.2 GM/DL — SIGNIFICANT CHANGE UP (ref 32–36)
MCHC RBC-ENTMCNC: 32.2 PG — SIGNIFICANT CHANGE UP (ref 27–34)
MCV RBC AUTO: 100 FL — SIGNIFICANT CHANGE UP (ref 80–100)
MONOCYTES # BLD AUTO: 0.48 K/UL — SIGNIFICANT CHANGE UP (ref 0–0.9)
MONOCYTES NFR BLD AUTO: 10.7 % — SIGNIFICANT CHANGE UP (ref 2–14)
NEUTROPHILS # BLD AUTO: 3.15 K/UL — SIGNIFICANT CHANGE UP (ref 1.8–7.4)
NEUTROPHILS NFR BLD AUTO: 70 % — SIGNIFICANT CHANGE UP (ref 43–77)
NRBC # BLD: 0 /100 WBCS — SIGNIFICANT CHANGE UP (ref 0–0)
PLATELET # BLD AUTO: 111 K/UL — LOW (ref 150–400)
POTASSIUM SERPL-MCNC: 4.3 MMOL/L — SIGNIFICANT CHANGE UP (ref 3.5–5.3)
POTASSIUM SERPL-SCNC: 4.3 MMOL/L — SIGNIFICANT CHANGE UP (ref 3.5–5.3)
PROTHROM AB SERPL-ACNC: 21.4 SEC — HIGH (ref 9.5–13)
RBC # BLD: 4.04 M/UL — LOW (ref 4.2–5.8)
RBC # FLD: 15.6 % — HIGH (ref 10.3–14.5)
SODIUM SERPL-SCNC: 140 MMOL/L — SIGNIFICANT CHANGE UP (ref 135–145)
WBC # BLD: 4.5 K/UL — SIGNIFICANT CHANGE UP (ref 3.8–10.5)
WBC # FLD AUTO: 4.5 K/UL — SIGNIFICANT CHANGE UP (ref 3.8–10.5)

## 2024-08-05 PROCEDURE — 74177 CT ABD & PELVIS W/CONTRAST: CPT | Mod: 26

## 2024-08-05 RX ORDER — WARFARIN SODIUM 4 MG/1
2 TABLET ORAL ONCE
Refills: 0 | Status: COMPLETED | OUTPATIENT
Start: 2024-08-05 | End: 2024-08-05

## 2024-08-05 RX ADMIN — Medication 875 MILLIGRAM(S): at 17:01

## 2024-08-05 RX ADMIN — BRIMONIDINE TARTRATE 1 DROP(S): 1 SOLUTION/ DROPS OPHTHALMIC at 17:00

## 2024-08-05 RX ADMIN — Medication 875 MILLIGRAM(S): at 05:41

## 2024-08-05 RX ADMIN — SPIRONOLACTONE 25 MILLIGRAM(S): 50 TABLET, FILM COATED ORAL at 05:42

## 2024-08-05 RX ADMIN — Medication 17 GRAM(S): at 17:00

## 2024-08-05 RX ADMIN — WARFARIN SODIUM 2 MILLIGRAM(S): 4 TABLET ORAL at 21:12

## 2024-08-05 RX ADMIN — AMIODARONE HYDROCHLORIDE 200 MILLIGRAM(S): 50 INJECTION, SOLUTION INTRAVENOUS at 05:41

## 2024-08-05 RX ADMIN — Medication 10 MILLIGRAM(S): at 11:14

## 2024-08-05 RX ADMIN — Medication 0.4 MILLIGRAM(S): at 21:11

## 2024-08-05 RX ADMIN — SENNOSIDES 2 TABLET(S): 8.6 TABLET ORAL at 21:11

## 2024-08-05 RX ADMIN — BRIMONIDINE TARTRATE 1 DROP(S): 1 SOLUTION/ DROPS OPHTHALMIC at 05:42

## 2024-08-05 RX ADMIN — Medication 1 DROP(S): at 21:25

## 2024-08-05 RX ADMIN — Medication 25 MILLIGRAM(S): at 05:41

## 2024-08-05 RX ADMIN — ALLOPURINOL 300 MILLIGRAM(S): 100 TABLET ORAL at 11:15

## 2024-08-06 ENCOUNTER — TRANSCRIPTION ENCOUNTER (OUTPATIENT)
Age: 84
End: 2024-08-06

## 2024-08-06 VITALS
DIASTOLIC BLOOD PRESSURE: 78 MMHG | SYSTOLIC BLOOD PRESSURE: 136 MMHG | RESPIRATION RATE: 18 BRPM | TEMPERATURE: 98 F | OXYGEN SATURATION: 93 % | HEART RATE: 67 BPM

## 2024-08-06 LAB
ANION GAP SERPL CALC-SCNC: 10 MMOL/L — SIGNIFICANT CHANGE UP (ref 5–17)
BUN SERPL-MCNC: 24 MG/DL — HIGH (ref 7–23)
CALCIUM SERPL-MCNC: 9 MG/DL — SIGNIFICANT CHANGE UP (ref 8.4–10.5)
CHLORIDE SERPL-SCNC: 102 MMOL/L — SIGNIFICANT CHANGE UP (ref 96–108)
CO2 SERPL-SCNC: 29 MMOL/L — SIGNIFICANT CHANGE UP (ref 22–31)
CREAT SERPL-MCNC: 1.25 MG/DL — SIGNIFICANT CHANGE UP (ref 0.5–1.3)
EGFR: 57 ML/MIN/1.73M2 — LOW
GLUCOSE SERPL-MCNC: 85 MG/DL — SIGNIFICANT CHANGE UP (ref 70–99)
HCT VFR BLD CALC: 39.6 % — SIGNIFICANT CHANGE UP (ref 39–50)
HGB BLD-MCNC: 12.4 G/DL — LOW (ref 13–17)
INR BLD: 2.06 RATIO — HIGH (ref 0.85–1.18)
MCHC RBC-ENTMCNC: 31.3 GM/DL — LOW (ref 32–36)
MCHC RBC-ENTMCNC: 31.9 PG — SIGNIFICANT CHANGE UP (ref 27–34)
MCV RBC AUTO: 101.8 FL — HIGH (ref 80–100)
NRBC # BLD: 0 /100 WBCS — SIGNIFICANT CHANGE UP (ref 0–0)
PLATELET # BLD AUTO: 112 K/UL — LOW (ref 150–400)
POTASSIUM SERPL-MCNC: 4.3 MMOL/L — SIGNIFICANT CHANGE UP (ref 3.5–5.3)
POTASSIUM SERPL-SCNC: 4.3 MMOL/L — SIGNIFICANT CHANGE UP (ref 3.5–5.3)
PROTHROM AB SERPL-ACNC: 21.2 SEC — HIGH (ref 9.5–13)
RBC # BLD: 3.89 M/UL — LOW (ref 4.2–5.8)
RBC # FLD: 15.6 % — HIGH (ref 10.3–14.5)
SODIUM SERPL-SCNC: 141 MMOL/L — SIGNIFICANT CHANGE UP (ref 135–145)
WBC # BLD: 4.41 K/UL — SIGNIFICANT CHANGE UP (ref 3.8–10.5)
WBC # FLD AUTO: 4.41 K/UL — SIGNIFICANT CHANGE UP (ref 3.8–10.5)

## 2024-08-06 PROCEDURE — 85014 HEMATOCRIT: CPT

## 2024-08-06 PROCEDURE — 85730 THROMBOPLASTIN TIME PARTIAL: CPT

## 2024-08-06 PROCEDURE — 85018 HEMOGLOBIN: CPT

## 2024-08-06 PROCEDURE — 87077 CULTURE AEROBIC IDENTIFY: CPT

## 2024-08-06 PROCEDURE — 87186 SC STD MICRODIL/AGAR DIL: CPT

## 2024-08-06 PROCEDURE — 82272 OCCULT BLD FECES 1-3 TESTS: CPT

## 2024-08-06 PROCEDURE — 82435 ASSAY OF BLOOD CHLORIDE: CPT

## 2024-08-06 PROCEDURE — 85025 COMPLETE CBC W/AUTO DIFF WBC: CPT

## 2024-08-06 PROCEDURE — 74176 CT ABD & PELVIS W/O CONTRAST: CPT | Mod: MC

## 2024-08-06 PROCEDURE — 36415 COLL VENOUS BLD VENIPUNCTURE: CPT

## 2024-08-06 PROCEDURE — 82947 ASSAY GLUCOSE BLOOD QUANT: CPT

## 2024-08-06 PROCEDURE — 80048 BASIC METABOLIC PNL TOTAL CA: CPT

## 2024-08-06 PROCEDURE — 71045 X-RAY EXAM CHEST 1 VIEW: CPT

## 2024-08-06 PROCEDURE — 82330 ASSAY OF CALCIUM: CPT

## 2024-08-06 PROCEDURE — 97161 PT EVAL LOW COMPLEX 20 MIN: CPT

## 2024-08-06 PROCEDURE — 82962 GLUCOSE BLOOD TEST: CPT

## 2024-08-06 PROCEDURE — 74177 CT ABD & PELVIS W/CONTRAST: CPT | Mod: MC

## 2024-08-06 PROCEDURE — 99285 EMERGENCY DEPT VISIT HI MDM: CPT | Mod: 25

## 2024-08-06 PROCEDURE — 76770 US EXAM ABDO BACK WALL COMP: CPT

## 2024-08-06 PROCEDURE — 80053 COMPREHEN METABOLIC PANEL: CPT

## 2024-08-06 PROCEDURE — 82803 BLOOD GASES ANY COMBINATION: CPT

## 2024-08-06 PROCEDURE — 85027 COMPLETE CBC AUTOMATED: CPT

## 2024-08-06 PROCEDURE — 83605 ASSAY OF LACTIC ACID: CPT

## 2024-08-06 PROCEDURE — 85610 PROTHROMBIN TIME: CPT

## 2024-08-06 PROCEDURE — 74181 MRI ABDOMEN W/O CONTRAST: CPT | Mod: MC

## 2024-08-06 PROCEDURE — 97530 THERAPEUTIC ACTIVITIES: CPT

## 2024-08-06 PROCEDURE — 84295 ASSAY OF SERUM SODIUM: CPT

## 2024-08-06 PROCEDURE — 81001 URINALYSIS AUTO W/SCOPE: CPT

## 2024-08-06 PROCEDURE — 84132 ASSAY OF SERUM POTASSIUM: CPT

## 2024-08-06 PROCEDURE — 96374 THER/PROPH/DIAG INJ IV PUSH: CPT

## 2024-08-06 PROCEDURE — 87086 URINE CULTURE/COLONY COUNT: CPT

## 2024-08-06 PROCEDURE — 99284 EMERGENCY DEPT VISIT MOD MDM: CPT | Mod: 25

## 2024-08-06 PROCEDURE — 83690 ASSAY OF LIPASE: CPT

## 2024-08-06 RX ORDER — BRIMONIDINE TARTRATE 1 MG/ML
1 SOLUTION/ DROPS OPHTHALMIC
Qty: 0 | Refills: 0 | DISCHARGE
Start: 2024-08-06

## 2024-08-06 RX ORDER — SENNOSIDES 8.6 MG/1
2 TABLET ORAL
Qty: 28 | Refills: 0
Start: 2024-08-06 | End: 2024-08-19

## 2024-08-06 RX ORDER — AMOXICILLIN 500 MG
1 CAPSULE ORAL
Qty: 4 | Refills: 0
Start: 2024-08-06 | End: 2024-08-07

## 2024-08-06 RX ORDER — AMIODARONE HYDROCHLORIDE 50 MG/ML
1 INJECTION, SOLUTION INTRAVENOUS
Qty: 0 | Refills: 0 | DISCHARGE
Start: 2024-08-06

## 2024-08-06 RX ORDER — SPIRONOLACTONE 50 MG/1
1 TABLET, FILM COATED ORAL
Refills: 0 | DISCHARGE

## 2024-08-06 RX ORDER — LATANOPROST 0.005 %
1 DROPS OPHTHALMIC (EYE)
Qty: 0 | Refills: 0 | DISCHARGE
Start: 2024-08-06

## 2024-08-06 RX ORDER — LORATADINE 10 MG
17 TABLET,DISINTEGRATING ORAL
Qty: 238 | Refills: 0
Start: 2024-08-06 | End: 2024-08-12

## 2024-08-06 RX ORDER — METOPROLOL TARTRATE 100 MG
1 TABLET ORAL
Qty: 0 | Refills: 0 | DISCHARGE
Start: 2024-08-06

## 2024-08-06 RX ORDER — ALLOPURINOL 100 MG/1
1 TABLET ORAL
Qty: 0 | Refills: 0 | DISCHARGE
Start: 2024-08-06

## 2024-08-06 RX ORDER — SPIRONOLACTONE 50 MG/1
1 TABLET, FILM COATED ORAL
Qty: 0 | Refills: 0 | DISCHARGE
Start: 2024-08-06

## 2024-08-06 RX ORDER — ASPIRIN 325 MG
1 TABLET ORAL
Qty: 14 | Refills: 0
Start: 2024-08-06 | End: 2024-08-19

## 2024-08-06 RX ADMIN — BRIMONIDINE TARTRATE 1 DROP(S): 1 SOLUTION/ DROPS OPHTHALMIC at 17:05

## 2024-08-06 RX ADMIN — Medication 25 MILLIGRAM(S): at 05:41

## 2024-08-06 RX ADMIN — Medication 0.4 MILLIGRAM(S): at 21:07

## 2024-08-06 RX ADMIN — SPIRONOLACTONE 25 MILLIGRAM(S): 50 TABLET, FILM COATED ORAL at 05:41

## 2024-08-06 RX ADMIN — Medication 1 DROP(S): at 21:07

## 2024-08-06 RX ADMIN — Medication 17 GRAM(S): at 05:41

## 2024-08-06 RX ADMIN — Medication 875 MILLIGRAM(S): at 05:41

## 2024-08-06 RX ADMIN — Medication 17 GRAM(S): at 17:05

## 2024-08-06 RX ADMIN — ALLOPURINOL 300 MILLIGRAM(S): 100 TABLET ORAL at 11:36

## 2024-08-06 RX ADMIN — BRIMONIDINE TARTRATE 1 DROP(S): 1 SOLUTION/ DROPS OPHTHALMIC at 06:12

## 2024-08-06 RX ADMIN — AMIODARONE HYDROCHLORIDE 200 MILLIGRAM(S): 50 INJECTION, SOLUTION INTRAVENOUS at 05:41

## 2024-08-06 RX ADMIN — Medication 875 MILLIGRAM(S): at 17:06

## 2024-08-06 RX ADMIN — SENNOSIDES 2 TABLET(S): 8.6 TABLET ORAL at 21:07

## 2024-08-06 NOTE — PROGRESS NOTE ADULT - PROVIDER SPECIALTY LIST ADULT
Gastroenterology
Hospitalist
Infectious Disease
Gastroenterology
Hospitalist
Gastroenterology
Hospitalist
Gastroenterology
Hospitalist
Infectious Disease

## 2024-08-06 NOTE — DISCHARGE NOTE NURSING/CASE MANAGEMENT/SOCIAL WORK - NSDCFUADDAPPT_GEN_ALL_CORE_FT
APPTS ARE READY TO BE MADE: [x ] YES    Best Family or Patient Contact (if needed):    Additional Information about above appointments (if needed):    1: Please follow up with PCP  with in a week  2: Routine  follow up  with Urologist   DR Rosen   3:     Other comments or requests:

## 2024-08-06 NOTE — DISCHARGE NOTE NURSING/CASE MANAGEMENT/SOCIAL WORK - PATIENT PORTAL LINK FT
You can access the FollowMyHealth Patient Portal offered by [facility] by registering at the following website: [email or web address]. By joining [facility]’s FollowMyHealth portal, you will also be able to view your health information using other applications (apps) compatible with our system.

## 2024-08-06 NOTE — DISCHARGE NOTE NURSING/CASE MANAGEMENT/SOCIAL WORK - NSDCPEPT PROEDMA_GEN_ALL_CORE
2/22/2023      Chief Complaint   Patient presents with   • Follow-up     Assessment and Plan    1  Obstructing small R UVJ calculus  - CT from 12/5/22 showing - There is a 2 to 3 mm calculus at the right ureterovesical junction with associated mild right hydronephrosis  - Passed stone  - Stone analysis in process  - KUB from 2/8/23 - Punctate calculus in the bladder near the right UV junction seen previously is not appreciated  - US from 2/8/23 - No hydronephrosis  Bilateral ureteral jets detected  No shadowing calculi  Simple cyst right kidney    - Recommend dietary modifications and proper hydration to prevent future kidney stone formation  2  Urinary hesitancy/difficulty urinating  - x6 months  - Trialed on Flomax with some benefit  -Discussed further work-up with cystoscopy and transrectal ultrasound to rule out stricture or obstructive process which he would like to proceed with at this time  - JASPER benign at recent visit, PSA from 2/8/23 was 1 2      History of Present Illness  Annie Parks is a 38 y o  male here for follow up evaluation of kidney stone and lower urinary tract symptoms  Patient was seen in the ER in December 2022 with small right ureteral stone  He was able to pass stone spontaneously  Follow-up imaging confirmed passage of stone  He denies any flank pain or dysuria  No hematuria  At last visit he complained of urinary hesitancy and nocturia  He was started on Flomax  He notes some benefit with this medication however ongoing slow stream, urinary hesitancy, and difficulty urinating  Patient previously had been seen by [facility] urology for paratesticular mass and underwent scrotal exploration and excision of mass in June 2021  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 3   How often have you had to urinate again less than two hours after you finished urinating? 3   How often have you found you stopped and started again several times when you urinate? 3   How often have you found it difficult to postpone urination? 0   How often have you had a weak urinary stream? 2   How often have you had to push or strain to begin urination? 4   How many times did you most typically get up to urinate from the time you went to bed at night until the time you got up in the morning? 1   Quality of Life: If you were to spend the rest of your life with your urinary condition just the way it is now, how would you feel about that? 2   AUA SYMPTOM SCORE 16            Review of Systems   Constitutional: Negative for chills and fever  Respiratory: Negative for shortness of breath  Cardiovascular: Negative for chest pain  Gastrointestinal: Negative for abdominal pain  Genitourinary: Positive for difficulty urinating  Negative for dysuria, flank pain, frequency, hematuria and urgency  Neurological: Negative for dizziness                    Past Medical History  Past Medical History:   Diagnosis Date   • Erectile dysfunction About  6 months   • Kidney stone 23       Past Social History  Past Surgical History:   Procedure Laterality Date   • KNEE SURGERY       Social History     Tobacco Use   Smoking Status Never   • Passive exposure: Past   Smokeless Tobacco Never       Past Family History  Family History   Problem Relation Age of Onset   • Cancer Mother         Ovarian cancer   • Diabetes Father          in 36       Past Social history  Social History     Socioeconomic History   • Marital status: /Civil Union     Spouse name: Not on file   • Number of children: Not on file   • Years of education: Not on file   • Highest education level: Not on file   Occupational History   • Not on file   Tobacco Use   • Smoking status: Never     Passive exposure: Past   • Smokeless tobacco: Never   Vaping Use   • Vaping Use: Never used   Substance and Sexual Activity   • Alcohol use: No   • Drug use: No   • Sexual activity: Yes     Partners: Female     Birth control/protection: None     Comment: Wife   Other Topics Concern   • Not on file   Social History Narrative   • Not on file     Social Determinants of Health     Financial Resource Strain: Not on file   Food Insecurity: Not on file   Transportation Needs: Not on file   Physical Activity: Not on file   Stress: Not on file   Social Connections: Not on file   Intimate Partner Violence: Not on file   Housing Stability: Not on file       Current Medications  Current Outpatient Medications   Medication Sig Dispense Refill   • rosuvastatin (CRESTOR) 20 MG tablet Take 20 mg by mouth every morning     • sertraline (ZOLOFT) 100 mg tablet Take 100 mg by mouth every morning     • tamsulosin (FLOMAX) 0 4 mg Take 1 capsule (0 4 mg total) by mouth daily with dinner 30 capsule 2   • ibuprofen (MOTRIN) 600 mg tablet Take 1 tablet (600 mg total) by mouth every 6 (six) hours as needed for mild pain or moderate pain (Patient not taking: Reported on 2/22/2023) 30 tablet 0   • naproxen (NAPROSYN) 500 mg tablet Take 1 tablet (500 mg total) by mouth 2 (two) times a day as needed for mild pain (take with food) for up to 20 doses (Patient not taking: Reported on 2/22/2023) 20 tablet 0   • ondansetron (Zofran ODT) 4 mg disintegrating tablet Take 1 tablet (4 mg total) by mouth every 8 (eight) hours as needed for nausea or vomiting (Patient not taking: Reported on 2/22/2023) 20 tablet 0     No current facility-administered medications for this visit         Allergies  Allergies   Allergen Reactions   • Oxycodone-Acetaminophen Hives         The following portions of the patient's history were reviewed and updated as appropriate: allergies, current medications, past medical history, past social history, past surgical history and problem list       Vitals  Vitals:    02/22/23 0812   BP: 118/76   Pulse: 74   SpO2: 94%   Weight: 106 kg (234 lb)   Height: 6' (1 829 m)           Physical Exam  Physical Exam  Constitutional:       Appearance: Normal appearance  HENT:      Head: Normocephalic and atraumatic  Right Ear: External ear normal       Left Ear: External ear normal       Nose: Nose normal    Eyes:      General: No scleral icterus  Conjunctiva/sclera: Conjunctivae normal    Cardiovascular:      Pulses: Normal pulses  Pulmonary:      Effort: Pulmonary effort is normal    Musculoskeletal:         General: Normal range of motion  Cervical back: Normal range of motion  Neurological:      General: No focal deficit present  Mental Status: He is alert and oriented to person, place, and time  Psychiatric:         Mood and Affect: Mood normal          Behavior: Behavior normal          Thought Content:  Thought content normal          Judgment: Judgment normal            Results  Recent Results (from the past 1 hour(s))   POCT Measure PVR    Collection Time: 02/22/23  8:16 AM   Result Value Ref Range    POST-VOID RESIDUAL VOLUME, ML POC 28 mL   ]  Lab Results   Component Value Date    PSA 1 2 02/08/2023     Lab Results   Component Value Date    CALCIUM 8 9 12/05/2022    K 4 2 12/05/2022    CO2 25 12/05/2022     12/05/2022    BUN 29 (H) 12/05/2022    CREATININE 1 25 12/05/2022     Lab Results   Component Value Date    WBC 7 64 12/05/2022    HGB 14 8 12/05/2022    HCT 44 5 12/05/2022    MCV 88 12/05/2022     12/05/2022           Orders  Orders Placed This Encounter   Procedures   • POCT Measure PVR       [facility] [place] Yes

## 2024-08-06 NOTE — CHART NOTE - NSCHARTNOTEFT_GEN_A_CORE
Contacted patient's outpatient urologist Dr. Ty Rosen per daughter (Nadine William) and wife's (Ronna William) request. Labs reviewed with provider and is in agreement with plan for CT A/P w/ IV contrast scheduled for today. Daughter and wife updated on plan of care.
MEDICINE NP    LEBRON TERRY  84y Male    Patient is a 84y old  Male who presents with a chief complaint of Abdominal Pain (27 Jul 2024 17:43)         > Event Summary:   Notified by RN, Patient INR results 3.03 due for Coumadin 2mg for h/o AFib.   Will hold Coumadin tonight  F/u rpt INR in  AM  Bleeding precautions  -Will endorse to Day Provider in AM and Attending to follow    -Vital Signs Last 24 Hrs  T(C): 36.4 (28 Jul 2024 01:21), Max: 37 (27 Jul 2024 15:48)  T(F): 97.5 (28 Jul 2024 01:21), Max: 98.6 (27 Jul 2024 15:48)  HR: 64 (28 Jul 2024 01:21) (62 - 76)  BP: 172/70 (28 Jul 2024 01:21) (152/88 - 172/70)  RR: 17 (28 Jul 2024 01:21) (17 - 20)  SpO2: 96% (28 Jul 2024 01:21) (95% - 99%)    Parameters below as of 28 Jul 2024 01:21  Patient On (Oxygen Delivery Method): nasal cannula  O2 Flow (L/min): 2        MADHAV Norman-BC  Medicine Department  #[id number]
Patient has received CT renal mass protocol which shows simple cysts with thin internal septations. 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 Patient can be seen outpatient for yearly imaging but otherwise no interventions are required. Discussed with Dr. Brower.     No further recommendations from urologic perspective, urology to sign off please reconsult as needed. For reconsults page [phone].     [facility]  [address] [place] [address]  [phone]

## 2024-08-06 NOTE — PROGRESS NOTE ADULT - SUBJECTIVE AND OBJECTIVE BOX
[facility]      Frantz Mcginnis NP    [address] [place] [address]  [phone]      INTERVAL HPI/OVERNIGHT EVENTS:    patient s/e    no new events      MEDICATIONS  (STANDING):  allopurinol 300 milliGRAM(s) Oral daily  aMIOdarone    Tablet 200 milliGRAM(s) Oral daily  bisacodyl Suppository 10 milliGRAM(s) Rectal daily  brimonidine 0.2% Ophthalmic Solution 1 Drop(s) Both EYES two times a day  cefTRIAXone   IVPB 1000 milliGRAM(s) IV Intermittent every 24 hours  latanoprost 0.005% Ophthalmic Solution 1 Drop(s) Both EYES at bedtime  metoprolol succinate ER 25 milliGRAM(s) Oral daily  polyethylene glycol 3350 17 Gram(s) Oral two times a day  senna 2 Tablet(s) Oral at bedtime  spironolactone 25 milliGRAM(s) Oral daily  tamsulosin 0.4 milliGRAM(s) Oral at bedtime  warfarin 2 milliGRAM(s) Oral once    MEDICATIONS  (PRN):  acetaminophen     Tablet .. 650 milliGRAM(s) Oral every 6 hours PRN Moderate Pain (4 - 6)      Allergies    No Known Allergies    Intolerances        ROS:   General:  No  fevers, chills, night sweats, fatigue,   Eyes:  Good vision, no reported pain  ENT:  No sore throat, pain, runny nose, dysphagia  CV:  No pain, palpitations, hypo/hypertension  Resp:  No dyspnea, cough, tachypnea, wheezing  GI:  No pain, No nausea, No vomiting, No diarrhea, No constipation, No weight loss, No fever, No pruritis, No rectal bleeding, No tarry stools, No dysphagia,  :  No pain, bleeding, incontinence, nocturia  Muscle:  No pain, weakness  Neuro:  No weakness, tingling, memory problems  Psych:  No fatigue, insomnia, mood problems, depression  Endocrine:  No polyuria, polydipsia, cold/heat intolerance  Heme:  No petechiae, ecchymosis, easy bruisability  Skin:  No rash, tattoos, scars, edema      PHYSICAL EXAM:   Vital Signs:  Vital Signs Last 24 Hrs  T(C): 36.9 (29 Jul 2024 11:57), Max: 36.9 (29 Jul 2024 11:57)  T(F): 98.5 (29 Jul 2024 11:57), Max: 98.5 (29 Jul 2024 11:57)  HR: 57 (29 Jul 2024 11:57) (57 - 63)  BP: 114/58 (29 Jul 2024 11:57) (114/58 - 147/68)  BP(mean): 94 (28 Jul 2024 20:56) (94 - 94)  RR: 18 (29 Jul 2024 11:57) (18 - 18)  SpO2: 95% (29 Jul 2024 11:57) (95% - 97%)    Parameters below as of 29 Jul 2024 11:57  Patient On (Oxygen Delivery Method): nasal cannula      Daily     Daily     GENERAL:  Appears stated age,   HEENT:  NC/AT,    CHEST:  Full & symmetric excursion,   HEART:  Regular rhythm,  ABDOMEN:  Soft, non-tender, non-distended,  EXTEREMITIES:  no cyanosis  SKIN:  No rash  NEURO:  Alert,       LABS:                        12.6   5.31  )-----------( 121      ( 28 Jul 2024 07:27 )             38.6     07-28    143  |  102  |  27<H>  ----------------------------<  91  4.4   |  29  |  1.32<H>    Ca    9.4      28 Jul 2024 07:19      PT/INR - ( 29 Jul 2024 06:35 )   PT: 26.0 sec;   INR: 2.54 ratio         PTT - ( 29 Jul 2024 06:35 )  PTT:42.9 sec  Urinalysis Basic - ( 28 Jul 2024 07:19 )    Color: x / Appearance: x / SG: x / pH: x  Gluc: 91 mg/dL / Ketone: x  / Bili: x / Urobili: x   Blood: x / Protein: x / Nitrite: x   Leuk Esterase: x / RBC: x / WBC x   Sq Epi: x / Non Sq Epi: x / Bacteria: x        RADIOLOGY & ADDITIONAL TESTS:  
Patient is a 84y old  Male who presents with a chief complaint of Abdominal Pain (30 Jul 2024 11:58)      SUBJECTIVE / OVERNIGHT EVENTS: No events         T(C): 36.6 (08-04-24 @ 11:46), Max: 36.8 (08-04-24 @ 05:36)  HR: 53 (08-04-24 @ 11:46) (53 - 68)  BP: 119/72 (08-04-24 @ 11:46) (119/72 - 135/74)  RR: 18 (08-04-24 @ 11:46) (18 - 18)  SpO2: 92% (08-04-24 @ 11:46) (92% - 97%)    MEDICATIONS  (STANDING):  allopurinol 300 milliGRAM(s) Oral daily  aMIOdarone    Tablet 200 milliGRAM(s) Oral daily  amoxicillin 875 milliGRAM(s) Oral two times a day  bisacodyl Suppository 10 milliGRAM(s) Rectal daily  brimonidine 0.2% Ophthalmic Solution 1 Drop(s) Both EYES two times a day  latanoprost 0.005% Ophthalmic Solution 1 Drop(s) Both EYES at bedtime  metoprolol succinate ER 25 milliGRAM(s) Oral daily  polyethylene glycol 3350 17 Gram(s) Oral two times a day  senna 2 Tablet(s) Oral at bedtime  spironolactone 25 milliGRAM(s) Oral daily  tamsulosin 0.4 milliGRAM(s) Oral at bedtime  warfarin 2 milliGRAM(s) Oral once    MEDICATIONS  (PRN):  oxycodone    5 mG/acetaminophen 325 mG 1 Tablet(s) Oral every 6 hours PRN Moderate Pain (4 - 6)      PHYSICAL EXAM:  GENERAL: NAD, well-developed  HEAD:  Atraumatic, Normocephalic  EYES: EOMI, conjunctiva and sclera clear  NECK: Supple, No JVD  CHEST/LUNG: Clear to auscultation bilaterally; No wheeze  HEART: Regular rate and rhythm; No murmurs, rubs, or gallops  ABDOMEN: Soft, Nontender, Nondistended; Bowel sounds present  EXTREMITIES:  2+ Peripheral Pulses, No clubbing, cyanosis, or edema  PSYCH: AAOx2   NEUROLOGY: non-focal  SKIN: No rashes or lesions                                    12.4   4.09  )-----------( 111      ( 04 Aug 2024 07:03 )             40.0             PT/INR - ( 04 Aug 2024 07:04 )   PT: 22.7 sec;   INR: 2.21 ratio           141|101|26<83  4.3|30|1.31  9.5,--,--  08-04 @ 07:03  
Patient is a 84y old  Male who presents with a chief complaint of Abdominal Pain (30 Jul 2024 11:58)      SUBJECTIVE / OVERNIGHT EVENTS: No events     AID bed side     T(C): 37.2 (07-30-24 @ 20:29), Max: 37.2 (07-30-24 @ 20:29)  HR: 53 (07-30-24 @ 20:29) (52 - 53)  BP: 132/60 (07-30-24 @ 20:29) (132/60 - 143/72)  RR: 18 (07-30-24 @ 20:29) (18 - 18)  SpO2: 93% (07-30-24 @ 20:29) (93% - 96%)      MEDICATIONS  (STANDING):  allopurinol 300 milliGRAM(s) Oral daily  aMIOdarone    Tablet 200 milliGRAM(s) Oral daily  bisacodyl Suppository 10 milliGRAM(s) Rectal daily  brimonidine 0.2% Ophthalmic Solution 1 Drop(s) Both EYES two times a day  latanoprost 0.005% Ophthalmic Solution 1 Drop(s) Both EYES at bedtime  metoprolol succinate ER 25 milliGRAM(s) Oral daily  polyethylene glycol 3350 17 Gram(s) Oral two times a day  senna 2 Tablet(s) Oral at bedtime  spironolactone 25 milliGRAM(s) Oral daily  tamsulosin 0.4 milliGRAM(s) Oral at bedtime    MEDICATIONS  (PRN):  acetaminophen     Tablet .. 650 milliGRAM(s) Oral every 6 hours PRN Moderate Pain (4 - 6)                    PHYSICAL EXAM:  GENERAL: NAD, well-developed  HEAD:  Atraumatic, Normocephalic  EYES: EOMI, PERRLA, conjunctiva and sclera clear  NECK: Supple, No JVD  CHEST/LUNG: Clear to auscultation bilaterally; No wheeze  HEART: Regular rate and rhythm; No murmurs, rubs, or gallops  ABDOMEN: Soft, Nontender, Nondistended; Bowel sounds present  EXTREMITIES:  2+ Peripheral Pulses, No clubbing, cyanosis, or edema  PSYCH: AAOx3  NEUROLOGY: non-focal  SKIN: No rashes or lesions              PT/INR - ( 30 Jul 2024 07:14 )   PT: 23.2 sec;   INR: 2.16 ratio         PTT - ( 30 Jul 2024 07:14 )  PTT:37.5 sec    Imaging Personally Reviewed:    Consultant(s) Notes Reviewed:      Care Discussed with Consultants/Other Providers:  
[facility]      Frantz Mcginnis NP    [address] [place] [address]  [phone]      INTERVAL HPI/OVERNIGHT EVENTS:    patient s/e    no new events      MEDICATIONS  (STANDING):  allopurinol 300 milliGRAM(s) Oral daily  aMIOdarone    Tablet 200 milliGRAM(s) Oral daily  bisacodyl Suppository 10 milliGRAM(s) Rectal daily  brimonidine 0.2% Ophthalmic Solution 1 Drop(s) Both EYES two times a day  cefTRIAXone   IVPB 1000 milliGRAM(s) IV Intermittent every 24 hours  latanoprost 0.005% Ophthalmic Solution 1 Drop(s) Both EYES at bedtime  metoprolol succinate ER 25 milliGRAM(s) Oral daily  polyethylene glycol 3350 17 Gram(s) Oral two times a day  senna 2 Tablet(s) Oral at bedtime  spironolactone 25 milliGRAM(s) Oral daily  tamsulosin 0.4 milliGRAM(s) Oral at bedtime  warfarin 2 milliGRAM(s) Oral once    MEDICATIONS  (PRN):  acetaminophen     Tablet .. 650 milliGRAM(s) Oral every 6 hours PRN Moderate Pain (4 - 6)      Allergies    No Known Allergies    Intolerances        ROS:   General:  No  fevers, chills, night sweats, fatigue,   Eyes:  Good vision, no reported pain  ENT:  No sore throat, pain, runny nose, dysphagia  CV:  No pain, palpitations, hypo/hypertension  Resp:  No dyspnea, cough, tachypnea, wheezing  GI:  No pain, No nausea, No vomiting, No diarrhea, No constipation, No weight loss, No fever, No pruritis, No rectal bleeding, No tarry stools, No dysphagia,  :  No pain, bleeding, incontinence, nocturia  Muscle:  No pain, weakness  Neuro:  No weakness, tingling, memory problems  Psych:  No fatigue, insomnia, mood problems, depression  Endocrine:  No polyuria, polydipsia, cold/heat intolerance  Heme:  No petechiae, ecchymosis, easy bruisability  Skin:  No rash, tattoos, scars, edema      PHYSICAL EXAM:   Vital Signs:  Vital Signs Last 24 Hrs  T(C): 36.9 (29 Jul 2024 11:57), Max: 36.9 (29 Jul 2024 11:57)  T(F): 98.5 (29 Jul 2024 11:57), Max: 98.5 (29 Jul 2024 11:57)  HR: 57 (29 Jul 2024 11:57) (57 - 63)  BP: 114/58 (29 Jul 2024 11:57) (114/58 - 147/68)  BP(mean): 94 (28 Jul 2024 20:56) (94 - 94)  RR: 18 (29 Jul 2024 11:57) (18 - 18)  SpO2: 95% (29 Jul 2024 11:57) (95% - 97%)    Parameters below as of 29 Jul 2024 11:57  Patient On (Oxygen Delivery Method): nasal cannula      Daily     Daily     GENERAL:  Appears stated age,   HEENT:  NC/AT,    CHEST:  Full & symmetric excursion,   HEART:  Regular rhythm,  ABDOMEN:  Soft, non-tender, non-distended,  EXTEREMITIES:  no cyanosis  SKIN:  No rash  NEURO:  Alert,       LABS:                        12.6   5.31  )-----------( 121      ( 28 Jul 2024 07:27 )             38.6     07-28    143  |  102  |  27<H>  ----------------------------<  91  4.4   |  29  |  1.32<H>    Ca    9.4      28 Jul 2024 07:19      PT/INR - ( 29 Jul 2024 06:35 )   PT: 26.0 sec;   INR: 2.54 ratio         PTT - ( 29 Jul 2024 06:35 )  PTT:42.9 sec  Urinalysis Basic - ( 28 Jul 2024 07:19 )    Color: x / Appearance: x / SG: x / pH: x  Gluc: 91 mg/dL / Ketone: x  / Bili: x / Urobili: x   Blood: x / Protein: x / Nitrite: x   Leuk Esterase: x / RBC: x / WBC x   Sq Epi: x / Non Sq Epi: x / Bacteria: x        RADIOLOGY & ADDITIONAL TESTS:  
[facility]      Frantz Mcginnis NP    [address] [place] [address]  [phone]      INTERVAL HPI/OVERNIGHT EVENTS:    patient s/e  having bms      MEDICATIONS  (STANDING):  allopurinol 300 milliGRAM(s) Oral daily  aMIOdarone    Tablet 200 milliGRAM(s) Oral daily  bisacodyl Suppository 10 milliGRAM(s) Rectal daily  brimonidine 0.2% Ophthalmic Solution 1 Drop(s) Both EYES two times a day  cefTRIAXone   IVPB 1000 milliGRAM(s) IV Intermittent every 24 hours  latanoprost 0.005% Ophthalmic Solution 1 Drop(s) Both EYES at bedtime  metoprolol succinate ER 25 milliGRAM(s) Oral daily  polyethylene glycol 3350 17 Gram(s) Oral two times a day  senna 2 Tablet(s) Oral at bedtime  spironolactone 25 milliGRAM(s) Oral daily  tamsulosin 0.4 milliGRAM(s) Oral at bedtime  warfarin 2 milliGRAM(s) Oral once    MEDICATIONS  (PRN):  acetaminophen     Tablet .. 650 milliGRAM(s) Oral every 6 hours PRN Moderate Pain (4 - 6)      Allergies    No Known Allergies    Intolerances        ROS:   General:  No  fevers, chills, night sweats, fatigue,   Eyes:  Good vision, no reported pain  ENT:  No sore throat, pain, runny nose, dysphagia  CV:  No pain, palpitations, hypo/hypertension  Resp:  No dyspnea, cough, tachypnea, wheezing  GI:  No pain, No nausea, No vomiting, No diarrhea, No constipation, No weight loss, No fever, No pruritis, No rectal bleeding, No tarry stools, No dysphagia,  :  No pain, bleeding, incontinence, nocturia  Muscle:  No pain, weakness  Neuro:  No weakness, tingling, memory problems  Psych:  No fatigue, insomnia, mood problems, depression  Endocrine:  No polyuria, polydipsia, cold/heat intolerance  Heme:  No petechiae, ecchymosis, easy bruisability  Skin:  No rash, tattoos, scars, edema      PHYSICAL EXAM:   Vital Signs:  Vital Signs Last 24 Hrs  T(C): 36.9 (29 Jul 2024 11:57), Max: 36.9 (29 Jul 2024 11:57)  T(F): 98.5 (29 Jul 2024 11:57), Max: 98.5 (29 Jul 2024 11:57)  HR: 57 (29 Jul 2024 11:57) (57 - 63)  BP: 114/58 (29 Jul 2024 11:57) (114/58 - 147/68)  BP(mean): 94 (28 Jul 2024 20:56) (94 - 94)  RR: 18 (29 Jul 2024 11:57) (18 - 18)  SpO2: 95% (29 Jul 2024 11:57) (95% - 97%)    Parameters below as of 29 Jul 2024 11:57  Patient On (Oxygen Delivery Method): nasal cannula      Daily     Daily     GENERAL:  Appears stated age,   HEENT:  NC/AT,    CHEST:  Full & symmetric excursion,   HEART:  Regular rhythm,  ABDOMEN:  Soft, non-tender, non-distended,  EXTEREMITIES:  no cyanosis  SKIN:  No rash  NEURO:  Alert,       LABS:                        12.6   5.31  )-----------( 121      ( 28 Jul 2024 07:27 )             38.6     07-28    143  |  102  |  27<H>  ----------------------------<  91  4.4   |  29  |  1.32<H>    Ca    9.4      28 Jul 2024 07:19      PT/INR - ( 29 Jul 2024 06:35 )   PT: 26.0 sec;   INR: 2.54 ratio         PTT - ( 29 Jul 2024 06:35 )  PTT:42.9 sec  Urinalysis Basic - ( 28 Jul 2024 07:19 )    Color: x / Appearance: x / SG: x / pH: x  Gluc: 91 mg/dL / Ketone: x  / Bili: x / Urobili: x   Blood: x / Protein: x / Nitrite: x   Leuk Esterase: x / RBC: x / WBC x   Sq Epi: x / Non Sq Epi: x / Bacteria: x        RADIOLOGY & ADDITIONAL TESTS:  
[facility]  [facility] [address]el, [place] [facility], [place]. [place]  [phone]  ([phone] - weekdays after 5pm and weekends)    Name: LEBRON TERRY  Age/Gender: 84y Male  MRN: [id number]    Interval History:  Patient seen and examined this morning.   Denies pain or any new complaints.   Notes reviewed  No concerning overnight events  Afebrile   Allergies: No Known Allergies      Objective:  Vitals:   T(F): 98.7 (08-06-24 @ 04:11), Max: 98.8 (08-05-24 @ 17:06)  HR: 68 (08-06-24 @ 04:11) (55 - 68)  BP: 135/78 (08-06-24 @ 04:11) (124/73 - 136/72)  RR: 18 (08-06-24 @ 04:11) (18 - 18)  SpO2: 96% (08-06-24 @ 04:11) (96% - 97%)  Physical Examination:  General: no acute distress, nontoxic appearing   HEENT: NC/AT, anicteric, EOMI  Respiratory: no acc muscle use, breathing comfortably  Cardiovascular: S1 and S2 present  Gastrointestinal: normal appearing, nondistended  Extremities: +edema, no cyanosis  Skin: no visible rash    Laboratory Studies:  CBC:                       12.4   4.41  )-----------( 112      ( 06 Aug 2024 07:32 )             39.6     WBC Trend:  4.41 08-06-24 @ 07:32  4.50 08-05-24 @ 07:39  4.09 08-04-24 @ 07:03  4.77 08-03-24 @ 07:36  4.47 08-02-24 @ 06:54  4.33 08-01-24 @ 07:16    CMP: 08-06    141  |  102  |  24<H>  ----------------------------<  85  4.3   |  29  |  1.25    Ca    9.0      06 Aug 2024 07:17      Creatinine: 1.25 mg/dL (08-06-24 @ 07:17)  Creatinine: 1.20 mg/dL (08-05-24 @ 07:39)  Creatinine: 1.31 mg/dL (08-04-24 @ 07:03)  Creatinine: 1.27 mg/dL (08-03-24 @ 07:36)  Creatinine: 1.28 mg/dL (08-02-24 @ 07:16)  Creatinine: 1.47 mg/dL (08-01-24 @ 07:17)  Creatinine: 1.32 mg/dL (07-31-24 @ 06:24)    Microbiology: reviewed   Culture - Urine (collected 07-27-24 @ 13:43)  Source: Clean Catch Clean Catch (Midstream)  Final Report (07-31-24 @ 14:24):    >100,000 CFU/ml Enterococcus faecalis  Organism: Enterococcus faecalis (07-31-24 @ 14:24)  Organism: Enterococcus faecalis (07-31-24 @ 14:24)      Method Type: LEONORA      -  Ampicillin: S <=2 Predicts results to ampicillin/sulbactam, amoxacillin-clavulanate and  piperacillin-tazobactam.      -  Ciprofloxacin: S <=1      -  Levofloxacin: S 1      -  Nitrofurantoin: S <=32 Should not be used to treat pyelonephritis.      -  Tetracycline: R >8      -  Vancomycin: S 1    Culture - Urine (collected 07-24-24 @ 16:54)  Source: Clean Catch Clean Catch (Midstream)  Final Report (07-26-24 @ 08:56):    No growth    Radiology: reviewed   < from: CT Abdomen and Pelvis w/ IV Cont (08.05.24 @ 20:47) >  IMPRESSION:    Small areas of atelectasis bilaterally. Small right pleural effusion.    No emergent findings in the abdomen or pelvis.    Follow-up final report.        ******PRELIMINARY REPORT******      < end of copied text >    Medications:  allopurinol 300 milliGRAM(s) Oral daily  aMIOdarone    Tablet 200 milliGRAM(s) Oral daily  amoxicillin 875 milliGRAM(s) Oral two times a day  bisacodyl Suppository 10 milliGRAM(s) Rectal daily  brimonidine 0.2% Ophthalmic Solution 1 Drop(s) Both EYES two times a day  latanoprost 0.005% Ophthalmic Solution 1 Drop(s) Both EYES at bedtime  metoprolol succinate ER 25 milliGRAM(s) Oral daily  oxycodone    5 mG/acetaminophen 325 mG 1 Tablet(s) Oral every 6 hours PRN  polyethylene glycol 3350 17 Gram(s) Oral two times a day  senna 2 Tablet(s) Oral at bedtime  spironolactone 25 milliGRAM(s) Oral daily  tamsulosin 0.4 milliGRAM(s) Oral at bedtime    Current Antimicrobials:  amoxicillin 875 milliGRAM(s) Oral two times a day    Prior/Completed Antimicrobials:  cefTRIAXone   IVPB  cefTRIAXone   IVPB  
[facility]  [facility]el, [facility], [place] [place]  [phone]  ([phone] - weekdays after 5pm and weekends)    Name: LEBRON TERRY  Age/Gender: 84y Male  MRN: [id number]    Interval History:  Patient seen and examined this morning.   No new complaints noted.  Notes reviewed  No concerning overnight events  Afebrile   Allergies: No Known Allergies      Objective:  Vitals:   T(F): 98.4 (08-05-24 @ 04:08), Max: 98.4 (08-05-24 @ 04:08)  HR: 75 (08-05-24 @ 04:08) (53 - 75)  BP: 148/87 (08-05-24 @ 04:08) (119/72 - 148/87)  RR: 18 (08-05-24 @ 04:08) (18 - 18)  SpO2: 96% (08-05-24 @ 04:08) (92% - 96%)  Physical Examination:  General: no acute distress, nontoxic appearing   HEENT: NC/AT, anicteric, EOMI  Respiratory: no acc muscle use, breathing comfortably  Cardiovascular: S1 and S2 present  Gastrointestinal: normal appearing, nondistended  Extremities: no edema, no cyanosis  Skin: no visible rash    Laboratory Studies:  CBC:                       13.0   4.50  )-----------( 111      ( 05 Aug 2024 07:39 )             40.4     WBC Trend:  4.50 08-05-24 @ 07:39  4.09 08-04-24 @ 07:03  4.77 08-03-24 @ 07:36  4.47 08-02-24 @ 06:54  4.33 08-01-24 @ 07:16    CMP: 08-05    140  |  100  |  24<H>  ----------------------------<  87  4.3   |  28  |  1.20    Ca    9.4      05 Aug 2024 07:39    Creatinine: 1.20 mg/dL (08-05-24 @ 07:39)  Creatinine: 1.31 mg/dL (08-04-24 @ 07:03)  Creatinine: 1.27 mg/dL (08-03-24 @ 07:36)  Creatinine: 1.28 mg/dL (08-02-24 @ 07:16)  Creatinine: 1.47 mg/dL (08-01-24 @ 07:17)  Creatinine: 1.32 mg/dL (07-31-24 @ 06:24)    Microbiology: reviewed   Culture - Urine (collected 07-27-24 @ 13:43)  Source: Clean Catch Clean Catch (Midstream)  Final Report (07-31-24 @ 14:24):    >100,000 CFU/ml Enterococcus faecalis  Organism: Enterococcus faecalis (07-31-24 @ 14:24)  Organism: Enterococcus faecalis (07-31-24 @ 14:24)      Method Type: LEONORA      -  Ampicillin: S <=2 Predicts results to ampicillin/sulbactam, amoxacillin-clavulanate and  piperacillin-tazobactam.      -  Ciprofloxacin: S <=1      -  Levofloxacin: S 1      -  Nitrofurantoin: S <=32 Should not be used to treat pyelonephritis.      -  Tetracycline: R >8      -  Vancomycin: S 1    Culture - Urine (collected 07-24-24 @ 16:54)  Source: Clean Catch Clean Catch (Midstream)  Final Report (07-26-24 @ 08:56):    No growth    Radiology: reviewed     Medications:  allopurinol 300 milliGRAM(s) Oral daily  aMIOdarone    Tablet 200 milliGRAM(s) Oral daily  amoxicillin 875 milliGRAM(s) Oral two times a day  bisacodyl Suppository 10 milliGRAM(s) Rectal daily  brimonidine 0.2% Ophthalmic Solution 1 Drop(s) Both EYES two times a day  latanoprost 0.005% Ophthalmic Solution 1 Drop(s) Both EYES at bedtime  metoprolol succinate ER 25 milliGRAM(s) Oral daily  oxycodone    5 mG/acetaminophen 325 mG 1 Tablet(s) Oral every 6 hours PRN  polyethylene glycol 3350 17 Gram(s) Oral two times a day  senna 2 Tablet(s) Oral at bedtime  spironolactone 25 milliGRAM(s) Oral daily  tamsulosin 0.4 milliGRAM(s) Oral at bedtime    Current Antimicrobials:  amoxicillin 875 milliGRAM(s) Oral two times a day    Prior/Completed Antimicrobials:  cefTRIAXone   IVPB  cefTRIAXone   IVPB  
Patient is a 84y old  Male who presents with a chief complaint of Abdominal Pain (28 Jul 2024 16:01)      SUBJECTIVE / OVERNIGHT EVENTS: no events     MEDICATIONS  (STANDING):  allopurinol 300 milliGRAM(s) Oral daily  aMIOdarone    Tablet 200 milliGRAM(s) Oral daily  bisacodyl Suppository 10 milliGRAM(s) Rectal daily  brimonidine 0.2% Ophthalmic Solution 1 Drop(s) Both EYES two times a day  cefTRIAXone   IVPB 1000 milliGRAM(s) IV Intermittent every 24 hours  latanoprost 0.005% Ophthalmic Solution 1 Drop(s) Both EYES at bedtime  metoprolol succinate ER 25 milliGRAM(s) Oral daily  polyethylene glycol 3350 17 Gram(s) Oral two times a day  senna 2 Tablet(s) Oral at bedtime  spironolactone 25 milliGRAM(s) Oral daily  tamsulosin 0.4 milliGRAM(s) Oral at bedtime    MEDICATIONS  (PRN):  acetaminophen     Tablet .. 650 milliGRAM(s) Oral every 6 hours PRN Moderate Pain (4 - 6)      CAPILLARY BLOOD GLUCOSE        I&O's Summary    28 Jul 2024 07:01  -  28 Jul 2024 18:38  --------------------------------------------------------  IN: 360 mL / OUT: 0 mL / NET: 360 mL      T(C): 36.4 (07-28-24 @ 12:36), Max: 36.4 (07-28-24 @ 12:36)  HR: 65 (07-28-24 @ 12:36) (65 - 75)  BP: 142/81 (07-28-24 @ 12:36) (124/81 - 142/81)  RR: 18 (07-28-24 @ 12:36) (18 - 18)  SpO2: 100% (07-28-24 @ 12:36) (100% - 100%)    PHYSICAL EXAM:  GENERAL: NAD, well-developed  HEAD:  Atraumatic, Normocephalic  EYES: EOMI, PERRLA, conjunctiva and sclera clear  NECK: Supple, No JVD  CHEST/LUNG: Clear to auscultation bilaterally; No wheeze  HEART: Regular rate and rhythm; No murmurs, rubs, or gallops  ABDOMEN: Soft, Nontender, Nondistended; Bowel sounds present  EXTREMITIES:  2+ Peripheral Pulses, No clubbing, cyanosis, or edema  PSYCH: AAOx3  NEUROLOGY: non-focal  SKIN: No rashes or lesions    LABS:                        12.6   5.31  )-----------( 121      ( 28 Jul 2024 07:27 )             38.6     07-28    143  |  102  |  27<H>  ----------------------------<  91  4.4   |  29  |  1.32<H>    Ca    9.4      28 Jul 2024 07:19    TPro  6.9  /  Alb  4.0  /  TBili  1.1  /  DBili  x   /  AST  22  /  ALT  15  /  AlkPhos  84  07-27    PT/INR - ( 28 Jul 2024 07:27 )   PT: 29.1 sec;   INR: 2.86 ratio         PTT - ( 28 Jul 2024 00:23 )  PTT:47.2 sec      Urinalysis Basic - ( 28 Jul 2024 07:19 )    Color: x / Appearance: x / SG: x / pH: x  Gluc: 91 mg/dL / Ketone: x  / Bili: x / Urobili: x   Blood: x / Protein: x / Nitrite: x   Leuk Esterase: x / RBC: x / WBC x   Sq Epi: x / Non Sq Epi: x / Bacteria: x        RADIOLOGY & ADDITIONAL TESTS:    Imaging Personally Reviewed:    Consultant(s) Notes Reviewed:      Care Discussed with Consultants/Other Providers:  
Patient is a 84y old  Male who presents with a chief complaint of Abdominal Pain (30 Jul 2024 11:58)      SUBJECTIVE / OVERNIGHT EVENTS: No events     T(C): 37.1 (08-05-24 @ 17:06), Max: 37.1 (08-05-24 @ 17:06)  HR: 55 (08-05-24 @ 17:06) (55 - 55)  BP: 136/72 (08-05-24 @ 17:06) (136/72 - 136/72)  RR: 18 (08-05-24 @ 17:06) (18 - 18)  SpO2: 96% (08-05-24 @ 17:06) (96% - 96%)      MEDICATIONS  (STANDING):  allopurinol 300 milliGRAM(s) Oral daily  aMIOdarone    Tablet 200 milliGRAM(s) Oral daily  amoxicillin 875 milliGRAM(s) Oral two times a day  bisacodyl Suppository 10 milliGRAM(s) Rectal daily  brimonidine 0.2% Ophthalmic Solution 1 Drop(s) Both EYES two times a day  latanoprost 0.005% Ophthalmic Solution 1 Drop(s) Both EYES at bedtime  metoprolol succinate ER 25 milliGRAM(s) Oral daily  polyethylene glycol 3350 17 Gram(s) Oral two times a day  senna 2 Tablet(s) Oral at bedtime  spironolactone 25 milliGRAM(s) Oral daily  tamsulosin 0.4 milliGRAM(s) Oral at bedtime    MEDICATIONS  (PRN):  oxycodone    5 mG/acetaminophen 325 mG 1 Tablet(s) Oral every 6 hours PRN Moderate Pain (4 - 6)        PHYSICAL EXAM:  GENERAL: NAD, well-developed  HEAD:  Atraumatic, Normocephalic  EYES: EOMI, conjunctiva and sclera clear  NECK: Supple, No JVD  CHEST/LUNG: Clear to auscultation bilaterally; No wheeze  HEART: Regular rate and rhythm; No murmurs, rubs, or gallops  ABDOMEN: Soft, Nontender, Nondistended; Bowel sounds present  EXTREMITIES:  2+ Peripheral Pulses, No clubbing, cyanosis, or edema  PSYCH: AAOx2   NEUROLOGY: non-focal  SKIN: No rashes or lesions                                    12.4   4.09  )-----------( 111      ( 04 Aug 2024 07:03 )             40.0             PT/INR - ( 04 Aug 2024 07:04 )   PT: 22.7 sec;   INR: 2.21 ratio           141|101|26<83  4.3|30|1.31  9.5,--,--  08-04 @ 07:03  
[facility]      Frantz Mcginnis NP    [address] [place] [address]  [phone]      INTERVAL HPI/OVERNIGHT EVENTS:    patient s/e  2 bms yesterday      MEDICATIONS  (STANDING):  allopurinol 300 milliGRAM(s) Oral daily  aMIOdarone    Tablet 200 milliGRAM(s) Oral daily  bisacodyl Suppository 10 milliGRAM(s) Rectal daily  brimonidine 0.2% Ophthalmic Solution 1 Drop(s) Both EYES two times a day  cefTRIAXone   IVPB 1000 milliGRAM(s) IV Intermittent every 24 hours  latanoprost 0.005% Ophthalmic Solution 1 Drop(s) Both EYES at bedtime  metoprolol succinate ER 25 milliGRAM(s) Oral daily  polyethylene glycol 3350 17 Gram(s) Oral two times a day  senna 2 Tablet(s) Oral at bedtime  spironolactone 25 milliGRAM(s) Oral daily  tamsulosin 0.4 milliGRAM(s) Oral at bedtime  warfarin 2 milliGRAM(s) Oral once    MEDICATIONS  (PRN):  acetaminophen     Tablet .. 650 milliGRAM(s) Oral every 6 hours PRN Moderate Pain (4 - 6)      Allergies    No Known Allergies    Intolerances        ROS:   General:  No  fevers, chills, night sweats, fatigue,   Eyes:  Good vision, no reported pain  ENT:  No sore throat, pain, runny nose, dysphagia  CV:  No pain, palpitations, hypo/hypertension  Resp:  No dyspnea, cough, tachypnea, wheezing  GI:  No pain, No nausea, No vomiting, No diarrhea, No constipation, No weight loss, No fever, No pruritis, No rectal bleeding, No tarry stools, No dysphagia,  :  No pain, bleeding, incontinence, nocturia  Muscle:  No pain, weakness  Neuro:  No weakness, tingling, memory problems  Psych:  No fatigue, insomnia, mood problems, depression  Endocrine:  No polyuria, polydipsia, cold/heat intolerance  Heme:  No petechiae, ecchymosis, easy bruisability  Skin:  No rash, tattoos, scars, edema      PHYSICAL EXAM:   Vital Signs:  Vital Signs Last 24 Hrs  T(C): 36.9 (29 Jul 2024 11:57), Max: 36.9 (29 Jul 2024 11:57)  T(F): 98.5 (29 Jul 2024 11:57), Max: 98.5 (29 Jul 2024 11:57)  HR: 57 (29 Jul 2024 11:57) (57 - 63)  BP: 114/58 (29 Jul 2024 11:57) (114/58 - 147/68)  BP(mean): 94 (28 Jul 2024 20:56) (94 - 94)  RR: 18 (29 Jul 2024 11:57) (18 - 18)  SpO2: 95% (29 Jul 2024 11:57) (95% - 97%)    Parameters below as of 29 Jul 2024 11:57  Patient On (Oxygen Delivery Method): nasal cannula      Daily     Daily     GENERAL:  Appears stated age,   HEENT:  NC/AT,    CHEST:  Full & symmetric excursion,   HEART:  Regular rhythm,  ABDOMEN:  Soft, non-tender, non-distended,  EXTEREMITIES:  no cyanosis  SKIN:  No rash  NEURO:  Alert,       LABS:                        12.6   5.31  )-----------( 121      ( 28 Jul 2024 07:27 )             38.6     07-28    143  |  102  |  27<H>  ----------------------------<  91  4.4   |  29  |  1.32<H>    Ca    9.4      28 Jul 2024 07:19      PT/INR - ( 29 Jul 2024 06:35 )   PT: 26.0 sec;   INR: 2.54 ratio         PTT - ( 29 Jul 2024 06:35 )  PTT:42.9 sec  Urinalysis Basic - ( 28 Jul 2024 07:19 )    Color: x / Appearance: x / SG: x / pH: x  Gluc: 91 mg/dL / Ketone: x  / Bili: x / Urobili: x   Blood: x / Protein: x / Nitrite: x   Leuk Esterase: x / RBC: x / WBC x   Sq Epi: x / Non Sq Epi: x / Bacteria: x        RADIOLOGY & ADDITIONAL TESTS:  
[facility]      Frantz Mcginnis NP    [address] [place] [address]  [phone]      INTERVAL HPI/OVERNIGHT EVENTS:    patient s/e    no new events      MEDICATIONS  (STANDING):  allopurinol 300 milliGRAM(s) Oral daily  aMIOdarone    Tablet 200 milliGRAM(s) Oral daily  bisacodyl Suppository 10 milliGRAM(s) Rectal daily  brimonidine 0.2% Ophthalmic Solution 1 Drop(s) Both EYES two times a day  cefTRIAXone   IVPB 1000 milliGRAM(s) IV Intermittent every 24 hours  latanoprost 0.005% Ophthalmic Solution 1 Drop(s) Both EYES at bedtime  metoprolol succinate ER 25 milliGRAM(s) Oral daily  polyethylene glycol 3350 17 Gram(s) Oral two times a day  senna 2 Tablet(s) Oral at bedtime  spironolactone 25 milliGRAM(s) Oral daily  tamsulosin 0.4 milliGRAM(s) Oral at bedtime  warfarin 2 milliGRAM(s) Oral once    MEDICATIONS  (PRN):  acetaminophen     Tablet .. 650 milliGRAM(s) Oral every 6 hours PRN Moderate Pain (4 - 6)      Allergies    No Known Allergies    Intolerances        ROS:   General:  No  fevers, chills, night sweats, fatigue,   Eyes:  Good vision, no reported pain  ENT:  No sore throat, pain, runny nose, dysphagia  CV:  No pain, palpitations, hypo/hypertension  Resp:  No dyspnea, cough, tachypnea, wheezing  GI:  No pain, No nausea, No vomiting, No diarrhea, No constipation, No weight loss, No fever, No pruritis, No rectal bleeding, No tarry stools, No dysphagia,  :  No pain, bleeding, incontinence, nocturia  Muscle:  No pain, weakness  Neuro:  No weakness, tingling, memory problems  Psych:  No fatigue, insomnia, mood problems, depression  Endocrine:  No polyuria, polydipsia, cold/heat intolerance  Heme:  No petechiae, ecchymosis, easy bruisability  Skin:  No rash, tattoos, scars, edema      PHYSICAL EXAM:   Vital Signs:  Vital Signs Last 24 Hrs  T(C): 36.9 (29 Jul 2024 11:57), Max: 36.9 (29 Jul 2024 11:57)  T(F): 98.5 (29 Jul 2024 11:57), Max: 98.5 (29 Jul 2024 11:57)  HR: 57 (29 Jul 2024 11:57) (57 - 63)  BP: 114/58 (29 Jul 2024 11:57) (114/58 - 147/68)  BP(mean): 94 (28 Jul 2024 20:56) (94 - 94)  RR: 18 (29 Jul 2024 11:57) (18 - 18)  SpO2: 95% (29 Jul 2024 11:57) (95% - 97%)    Parameters below as of 29 Jul 2024 11:57  Patient On (Oxygen Delivery Method): nasal cannula      Daily     Daily     GENERAL:  Appears stated age,   HEENT:  NC/AT,    CHEST:  Full & symmetric excursion,   HEART:  Regular rhythm,  ABDOMEN:  Soft, non-tender, non-distended,  EXTEREMITIES:  no cyanosis  SKIN:  No rash  NEURO:  Alert,       LABS:                        12.6   5.31  )-----------( 121      ( 28 Jul 2024 07:27 )             38.6     07-28    143  |  102  |  27<H>  ----------------------------<  91  4.4   |  29  |  1.32<H>    Ca    9.4      28 Jul 2024 07:19      PT/INR - ( 29 Jul 2024 06:35 )   PT: 26.0 sec;   INR: 2.54 ratio         PTT - ( 29 Jul 2024 06:35 )  PTT:42.9 sec  Urinalysis Basic - ( 28 Jul 2024 07:19 )    Color: x / Appearance: x / SG: x / pH: x  Gluc: 91 mg/dL / Ketone: x  / Bili: x / Urobili: x   Blood: x / Protein: x / Nitrite: x   Leuk Esterase: x / RBC: x / WBC x   Sq Epi: x / Non Sq Epi: x / Bacteria: x        RADIOLOGY & ADDITIONAL TESTS:  
[facility]      Frantz Mcginnis NP    [address] [place] [address]  [phone]      INTERVAL HPI/OVERNIGHT EVENTS:    patient s/e    no new events      MEDICATIONS  (STANDING):  allopurinol 300 milliGRAM(s) Oral daily  aMIOdarone    Tablet 200 milliGRAM(s) Oral daily  bisacodyl Suppository 10 milliGRAM(s) Rectal daily  brimonidine 0.2% Ophthalmic Solution 1 Drop(s) Both EYES two times a day  cefTRIAXone   IVPB 1000 milliGRAM(s) IV Intermittent every 24 hours  latanoprost 0.005% Ophthalmic Solution 1 Drop(s) Both EYES at bedtime  metoprolol succinate ER 25 milliGRAM(s) Oral daily  polyethylene glycol 3350 17 Gram(s) Oral two times a day  senna 2 Tablet(s) Oral at bedtime  spironolactone 25 milliGRAM(s) Oral daily  tamsulosin 0.4 milliGRAM(s) Oral at bedtime  warfarin 2 milliGRAM(s) Oral once    MEDICATIONS  (PRN):  acetaminophen     Tablet .. 650 milliGRAM(s) Oral every 6 hours PRN Moderate Pain (4 - 6)      Allergies    No Known Allergies    Intolerances        ROS:   General:  No  fevers, chills, night sweats, fatigue,   Eyes:  Good vision, no reported pain  ENT:  No sore throat, pain, runny nose, dysphagia  CV:  No pain, palpitations, hypo/hypertension  Resp:  No dyspnea, cough, tachypnea, wheezing  GI:  No pain, No nausea, No vomiting, No diarrhea, No constipation, No weight loss, No fever, No pruritis, No rectal bleeding, No tarry stools, No dysphagia,  :  No pain, bleeding, incontinence, nocturia  Muscle:  No pain, weakness  Neuro:  No weakness, tingling, memory problems  Psych:  No fatigue, insomnia, mood problems, depression  Endocrine:  No polyuria, polydipsia, cold/heat intolerance  Heme:  No petechiae, ecchymosis, easy bruisability  Skin:  No rash, tattoos, scars, edema      PHYSICAL EXAM:   Vital Signs:  Vital Signs Last 24 Hrs  T(C): 36.9 (29 Jul 2024 11:57), Max: 36.9 (29 Jul 2024 11:57)  T(F): 98.5 (29 Jul 2024 11:57), Max: 98.5 (29 Jul 2024 11:57)  HR: 57 (29 Jul 2024 11:57) (57 - 63)  BP: 114/58 (29 Jul 2024 11:57) (114/58 - 147/68)  BP(mean): 94 (28 Jul 2024 20:56) (94 - 94)  RR: 18 (29 Jul 2024 11:57) (18 - 18)  SpO2: 95% (29 Jul 2024 11:57) (95% - 97%)    Parameters below as of 29 Jul 2024 11:57  Patient On (Oxygen Delivery Method): nasal cannula      Daily     Daily     GENERAL:  Appears stated age,   HEENT:  NC/AT,    CHEST:  Full & symmetric excursion,   HEART:  Regular rhythm,  ABDOMEN:  Soft, non-tender, non-distended,  EXTEREMITIES:  no cyanosis  SKIN:  No rash  NEURO:  Alert,       LABS:                        12.6   5.31  )-----------( 121      ( 28 Jul 2024 07:27 )             38.6     07-28    143  |  102  |  27<H>  ----------------------------<  91  4.4   |  29  |  1.32<H>    Ca    9.4      28 Jul 2024 07:19      PT/INR - ( 29 Jul 2024 06:35 )   PT: 26.0 sec;   INR: 2.54 ratio         PTT - ( 29 Jul 2024 06:35 )  PTT:42.9 sec  Urinalysis Basic - ( 28 Jul 2024 07:19 )    Color: x / Appearance: x / SG: x / pH: x  Gluc: 91 mg/dL / Ketone: x  / Bili: x / Urobili: x   Blood: x / Protein: x / Nitrite: x   Leuk Esterase: x / RBC: x / WBC x   Sq Epi: x / Non Sq Epi: x / Bacteria: x        RADIOLOGY & ADDITIONAL TESTS:  
[facility]      Frantz Mcginnis NP    [address] [place] [address]  [phone]      INTERVAL HPI/OVERNIGHT EVENTS:    patient s/e    no new events      MEDICATIONS  (STANDING):  allopurinol 300 milliGRAM(s) Oral daily  aMIOdarone    Tablet 200 milliGRAM(s) Oral daily  bisacodyl Suppository 10 milliGRAM(s) Rectal daily  brimonidine 0.2% Ophthalmic Solution 1 Drop(s) Both EYES two times a day  cefTRIAXone   IVPB 1000 milliGRAM(s) IV Intermittent every 24 hours  latanoprost 0.005% Ophthalmic Solution 1 Drop(s) Both EYES at bedtime  metoprolol succinate ER 25 milliGRAM(s) Oral daily  polyethylene glycol 3350 17 Gram(s) Oral two times a day  senna 2 Tablet(s) Oral at bedtime  spironolactone 25 milliGRAM(s) Oral daily  tamsulosin 0.4 milliGRAM(s) Oral at bedtime  warfarin 2 milliGRAM(s) Oral once    MEDICATIONS  (PRN):  acetaminophen     Tablet .. 650 milliGRAM(s) Oral every 6 hours PRN Moderate Pain (4 - 6)      Allergies    No Known Allergies    Intolerances        ROS:   General:  No  fevers, chills, night sweats, fatigue,   Eyes:  Good vision, no reported pain  ENT:  No sore throat, pain, runny nose, dysphagia  CV:  No pain, palpitations, hypo/hypertension  Resp:  No dyspnea, cough, tachypnea, wheezing  GI:  No pain, No nausea, No vomiting, No diarrhea, No constipation, No weight loss, No fever, No pruritis, No rectal bleeding, No tarry stools, No dysphagia,  :  No pain, bleeding, incontinence, nocturia  Muscle:  No pain, weakness  Neuro:  No weakness, tingling, memory problems  Psych:  No fatigue, insomnia, mood problems, depression  Endocrine:  No polyuria, polydipsia, cold/heat intolerance  Heme:  No petechiae, ecchymosis, easy bruisability  Skin:  No rash, tattoos, scars, edema      PHYSICAL EXAM:   Vital Signs:  Vital Signs Last 24 Hrs  T(C): 36.9 (29 Jul 2024 11:57), Max: 36.9 (29 Jul 2024 11:57)  T(F): 98.5 (29 Jul 2024 11:57), Max: 98.5 (29 Jul 2024 11:57)  HR: 57 (29 Jul 2024 11:57) (57 - 63)  BP: 114/58 (29 Jul 2024 11:57) (114/58 - 147/68)  BP(mean): 94 (28 Jul 2024 20:56) (94 - 94)  RR: 18 (29 Jul 2024 11:57) (18 - 18)  SpO2: 95% (29 Jul 2024 11:57) (95% - 97%)    Parameters below as of 29 Jul 2024 11:57  Patient On (Oxygen Delivery Method): nasal cannula      Daily     Daily     GENERAL:  Appears stated age,   HEENT:  NC/AT,    CHEST:  Full & symmetric excursion,   HEART:  Regular rhythm,  ABDOMEN:  Soft, non-tender, non-distended,  EXTEREMITIES:  no cyanosis  SKIN:  No rash  NEURO:  Alert,       LABS:                        12.6   5.31  )-----------( 121      ( 28 Jul 2024 07:27 )             38.6     07-28    143  |  102  |  27<H>  ----------------------------<  91  4.4   |  29  |  1.32<H>    Ca    9.4      28 Jul 2024 07:19      PT/INR - ( 29 Jul 2024 06:35 )   PT: 26.0 sec;   INR: 2.54 ratio         PTT - ( 29 Jul 2024 06:35 )  PTT:42.9 sec  Urinalysis Basic - ( 28 Jul 2024 07:19 )    Color: x / Appearance: x / SG: x / pH: x  Gluc: 91 mg/dL / Ketone: x  / Bili: x / Urobili: x   Blood: x / Protein: x / Nitrite: x   Leuk Esterase: x / RBC: x / WBC x   Sq Epi: x / Non Sq Epi: x / Bacteria: x        RADIOLOGY & ADDITIONAL TESTS:  
Patient is a 84y old  Male who presents with a chief complaint of Abdominal Pain (30 Jul 2024 11:58)      SUBJECTIVE / OVERNIGHT EVENTS: No events     AID bed side     MEDICATIONS  (STANDING):  allopurinol 300 milliGRAM(s) Oral daily  aMIOdarone    Tablet 200 milliGRAM(s) Oral daily  bisacodyl Suppository 10 milliGRAM(s) Rectal daily  brimonidine 0.2% Ophthalmic Solution 1 Drop(s) Both EYES two times a day  latanoprost 0.005% Ophthalmic Solution 1 Drop(s) Both EYES at bedtime  metoprolol succinate ER 25 milliGRAM(s) Oral daily  polyethylene glycol 3350 17 Gram(s) Oral two times a day  senna 2 Tablet(s) Oral at bedtime  spironolactone 25 milliGRAM(s) Oral daily  tamsulosin 0.4 milliGRAM(s) Oral at bedtime  warfarin 3 milliGRAM(s) Oral once    MEDICATIONS  (PRN):  acetaminophen     Tablet .. 650 milliGRAM(s) Oral every 6 hours PRN Moderate Pain (4 - 6)      CAPILLARY BLOOD GLUCOSE        I&O's Summary    29 Jul 2024 07:01  -  30 Jul 2024 07:00  --------------------------------------------------------  IN: 0 mL / OUT: 250 mL / NET: -250 mL    30 Jul 2024 07:01  -  30 Jul 2024 17:07  --------------------------------------------------------  IN: 400 mL / OUT: 200 mL / NET: 200 mL      T(C): 36.8 (07-30-24 @ 12:16), Max: 36.8 (07-30-24 @ 12:16)  HR: 52 (07-30-24 @ 12:16) (52 - 52)  BP: 143/72 (07-30-24 @ 12:16) (143/72 - 143/72)  RR: 18 (07-30-24 @ 12:16) (18 - 18)  SpO2: 96% (07-30-24 @ 12:16) (96% - 96%)    PHYSICAL EXAM:  GENERAL: NAD, well-developed  HEAD:  Atraumatic, Normocephalic  EYES: EOMI, PERRLA, conjunctiva and sclera clear  NECK: Supple, No JVD  CHEST/LUNG: Clear to auscultation bilaterally; No wheeze  HEART: Regular rate and rhythm; No murmurs, rubs, or gallops  ABDOMEN: Soft, Nontender, Nondistended; Bowel sounds present  EXTREMITIES:  2+ Peripheral Pulses, No clubbing, cyanosis, or edema  PSYCH: AAOx3  NEUROLOGY: non-focal  SKIN: No rashes or lesions    LABS:          PT/INR - ( 30 Jul 2024 07:14 )   PT: 23.2 sec;   INR: 2.16 ratio         PTT - ( 30 Jul 2024 07:14 )  PTT:37.5 sec          RADIOLOGY & ADDITIONAL TESTS:    Imaging Personally Reviewed:    Consultant(s) Notes Reviewed:      Care Discussed with Consultants/Other Providers:  
Patient is a 84y old  Male who presents with a chief complaint of Abdominal Pain (30 Jul 2024 11:58)      SUBJECTIVE / OVERNIGHT EVENTS: No events     AID bed side     T(C): 36.4 (08-02-24 @ 20:04), Max: 36.4 (08-02-24 @ 20:04)  HR: 65 (08-02-24 @ 20:04) (61 - 65)  BP: 145/88 (08-02-24 @ 20:04) (139/75 - 145/88)  RR: 17 (08-02-24 @ 20:04) (17 - 17)  SpO2: 96% (08-02-24 @ 20:04) (96% - 98%)      MEDICATIONS  (STANDING):  allopurinol 300 milliGRAM(s) Oral daily  aMIOdarone    Tablet 200 milliGRAM(s) Oral daily  amoxicillin 875 milliGRAM(s) Oral two times a day  bisacodyl Suppository 10 milliGRAM(s) Rectal daily  brimonidine 0.2% Ophthalmic Solution 1 Drop(s) Both EYES two times a day  latanoprost 0.005% Ophthalmic Solution 1 Drop(s) Both EYES at bedtime  metoprolol succinate ER 25 milliGRAM(s) Oral daily  polyethylene glycol 3350 17 Gram(s) Oral two times a day  senna 2 Tablet(s) Oral at bedtime  spironolactone 25 milliGRAM(s) Oral daily  tamsulosin 0.4 milliGRAM(s) Oral at bedtime    MEDICATIONS  (PRN):  acetaminophen     Tablet .. 650 milliGRAM(s) Oral every 6 hours PRN Moderate Pain (4 - 6)            PHYSICAL EXAM:  GENERAL: NAD, well-developed  HEAD:  Atraumatic, Normocephalic  EYES: EOMI, conjunctiva and sclera clear  NECK: Supple, No JVD  CHEST/LUNG: Clear to auscultation bilaterally; No wheeze  HEART: Regular rate and rhythm; No murmurs, rubs, or gallops  ABDOMEN: Soft, Nontender, Nondistended; Bowel sounds present  EXTREMITIES:  2+ Peripheral Pulses, No clubbing, cyanosis, or edema  PSYCH: AAOx2   NEUROLOGY: non-focal  SKIN: No rashes or lesions                                     12.3   4.47  )-----------( 119      ( 02 Aug 2024 06:54 )             39.3             PT/INR - ( 02 Aug 2024 06:55 )   PT: 21.2 sec;   INR: 2.06 ratio           140|101|27<95  4.3|29|1.28  9.1,--,--  08-02 @ 07:16  
Patient is a 84y old  Male who presents with a chief complaint of Abdominal Pain (30 Jul 2024 11:58)      SUBJECTIVE / OVERNIGHT EVENTS: No events     AID bed side     T(C): 36.5 (08-03-24 @ 21:59), Max: 36.5 (08-03-24 @ 21:59)  HR: 56 (08-03-24 @ 21:59) (56 - 56)  BP: 137/77 (08-03-24 @ 21:59) (137/77 - 137/77)  RR: 18 (08-03-24 @ 21:59) (18 - 18)  SpO2: 97% (08-03-24 @ 21:59) (97% - 97%)      MEDICATIONS  (STANDING):  allopurinol 300 milliGRAM(s) Oral daily  aMIOdarone    Tablet 200 milliGRAM(s) Oral daily  amoxicillin 875 milliGRAM(s) Oral two times a day  bisacodyl Suppository 10 milliGRAM(s) Rectal daily  brimonidine 0.2% Ophthalmic Solution 1 Drop(s) Both EYES two times a day  latanoprost 0.005% Ophthalmic Solution 1 Drop(s) Both EYES at bedtime  metoprolol succinate ER 25 milliGRAM(s) Oral daily  polyethylene glycol 3350 17 Gram(s) Oral two times a day  senna 2 Tablet(s) Oral at bedtime  spironolactone 25 milliGRAM(s) Oral daily  tamsulosin 0.4 milliGRAM(s) Oral at bedtime    MEDICATIONS  (PRN):  oxycodone    5 mG/acetaminophen 325 mG 1 Tablet(s) Oral every 6 hours PRN Moderate Pain (4 - 6)          PHYSICAL EXAM:  GENERAL: NAD, well-developed  HEAD:  Atraumatic, Normocephalic  EYES: EOMI, conjunctiva and sclera clear  NECK: Supple, No JVD  CHEST/LUNG: Clear to auscultation bilaterally; No wheeze  HEART: Regular rate and rhythm; No murmurs, rubs, or gallops  ABDOMEN: Soft, Nontender, Nondistended; Bowel sounds present  EXTREMITIES:  2+ Peripheral Pulses, No clubbing, cyanosis, or edema  PSYCH: AAOx2   NEUROLOGY: non-focal  SKIN: No rashes or lesions                          12.7   4.77  )-----------( 118      ( 03 Aug 2024 07:36 )             41.6             PT/INR - ( 03 Aug 2024 07:36 )   PT: 22.3 sec;   INR: 2.07 ratio           143|101|26<92  4.5|29|1.27  9.5,--,--  08-03 @ 07:36  
Patient is a 84y old  Male who presents with a chief complaint of Abdominal Pain (30 Jul 2024 11:58)      SUBJECTIVE / OVERNIGHT EVENTS: No events     AID bed side     T(C): 36.8 (08-01-24 @ 11:33), Max: 36.8 (08-01-24 @ 11:33)  HR: 54 (08-01-24 @ 11:33) (54 - 54)  BP: 133/65 (08-01-24 @ 11:33) (133/65 - 133/65)  RR: 18 (08-01-24 @ 11:33) (18 - 18)  SpO2: 98% (08-01-24 @ 11:33) (98% - 98%)    MEDICATIONS  (STANDING):  allopurinol 300 milliGRAM(s) Oral daily  aMIOdarone    Tablet 200 milliGRAM(s) Oral daily  bisacodyl Suppository 10 milliGRAM(s) Rectal daily  brimonidine 0.2% Ophthalmic Solution 1 Drop(s) Both EYES two times a day  ciprofloxacin   IVPB 400 milliGRAM(s) IV Intermittent every 12 hours  latanoprost 0.005% Ophthalmic Solution 1 Drop(s) Both EYES at bedtime  metoprolol succinate ER 25 milliGRAM(s) Oral daily  polyethylene glycol 3350 17 Gram(s) Oral two times a day  senna 2 Tablet(s) Oral at bedtime  spironolactone 25 milliGRAM(s) Oral daily  tamsulosin 0.4 milliGRAM(s) Oral at bedtime  warfarin 2 milliGRAM(s) Oral once    MEDICATIONS  (PRN):  acetaminophen     Tablet .. 650 milliGRAM(s) Oral every 6 hours PRN Moderate Pain (4 - 6)          PHYSICAL EXAM:  GENERAL: NAD, well-developed  HEAD:  Atraumatic, Normocephalic  EYES: EOMI, conjunctiva and sclera clear  NECK: Supple, No JVD  CHEST/LUNG: Clear to auscultation bilaterally; No wheeze  HEART: Regular rate and rhythm; No murmurs, rubs, or gallops  ABDOMEN: Soft, Nontender, Nondistended; Bowel sounds present  EXTREMITIES:  2+ Peripheral Pulses, No clubbing, cyanosis, or edema  PSYCH: AAOx2   NEUROLOGY: non-focal  SKIN: No rashes or lesions                            12.8   4.33  )-----------( 120      ( 01 Aug 2024 07:16 )             40.1             PT/INR - ( 01 Aug 2024 07:16 )   PT: 20.2 sec;   INR: 1.96 ratio         PTT - ( 31 Jul 2024 06:24 )  PTT:36.3 sec  139|98|28<88  4.4|31|1.47  9.4,--,--  08-01 @ 07:17  
Patient is a 84y old  Male who presents with a chief complaint of Abdominal Pain (30 Jul 2024 11:58)      SUBJECTIVE / OVERNIGHT EVENTS: No events     T(C): 36.4 (08-06-24 @ 20:46), Max: 36.6 (08-06-24 @ 11:52)  HR: 67 (08-06-24 @ 20:46) (55 - 67)  BP: 136/78 (08-06-24 @ 20:46) (130/77 - 136/78)  RR: 18 (08-06-24 @ 20:46) (18 - 18)  SpO2: 93% (08-06-24 @ 20:46) (93% - 95%)        MEDICATIONS  (STANDING):  allopurinol 300 milliGRAM(s) Oral daily  aMIOdarone    Tablet 200 milliGRAM(s) Oral daily  amoxicillin 875 milliGRAM(s) Oral two times a day  bisacodyl Suppository 10 milliGRAM(s) Rectal daily  brimonidine 0.2% Ophthalmic Solution 1 Drop(s) Both EYES two times a day  latanoprost 0.005% Ophthalmic Solution 1 Drop(s) Both EYES at bedtime  metoprolol succinate ER 25 milliGRAM(s) Oral daily  polyethylene glycol 3350 17 Gram(s) Oral two times a day  senna 2 Tablet(s) Oral at bedtime  spironolactone 25 milliGRAM(s) Oral daily  tamsulosin 0.4 milliGRAM(s) Oral at bedtime    MEDICATIONS  (PRN):  oxycodone    5 mG/acetaminophen 325 mG 1 Tablet(s) Oral every 6 hours PRN Moderate Pain (4 - 6)        PHYSICAL EXAM:  GENERAL: NAD, well-developed  HEAD:  Atraumatic, Normocephalic  EYES: EOMI, conjunctiva and sclera clear  NECK: Supple, No JVD  CHEST/LUNG: Clear to auscultation bilaterally; No wheeze  HEART: Regular rate and rhythm; No murmurs, rubs, or gallops  ABDOMEN: Soft, Nontender, Nondistended; Bowel sounds present  EXTREMITIES:  2+ Peripheral Pulses, No clubbing, cyanosis, or edema  PSYCH: AAOx2   NEUROLOGY: non-focal  SKIN: No rashes or lesions                                          12.4   4.41  )-----------( 112      ( 06 Aug 2024 07:32 )             39.6             PT/INR - ( 06 Aug 2024 07:24 )   PT: 21.2 sec;   INR: 2.06 ratio           141|102|24<85  4.3|29|1.25  9.0,--,--  08-06 @ 07:17  
[facility]      Frantz Mcginnis NP    [address] [place] [address]  [phone]      INTERVAL HPI/OVERNIGHT EVENTS:    patient s/e    no new events      MEDICATIONS  (STANDING):  allopurinol 300 milliGRAM(s) Oral daily  aMIOdarone    Tablet 200 milliGRAM(s) Oral daily  bisacodyl Suppository 10 milliGRAM(s) Rectal daily  brimonidine 0.2% Ophthalmic Solution 1 Drop(s) Both EYES two times a day  cefTRIAXone   IVPB 1000 milliGRAM(s) IV Intermittent every 24 hours  latanoprost 0.005% Ophthalmic Solution 1 Drop(s) Both EYES at bedtime  metoprolol succinate ER 25 milliGRAM(s) Oral daily  polyethylene glycol 3350 17 Gram(s) Oral two times a day  senna 2 Tablet(s) Oral at bedtime  spironolactone 25 milliGRAM(s) Oral daily  tamsulosin 0.4 milliGRAM(s) Oral at bedtime  warfarin 2 milliGRAM(s) Oral once    MEDICATIONS  (PRN):  acetaminophen     Tablet .. 650 milliGRAM(s) Oral every 6 hours PRN Moderate Pain (4 - 6)      Allergies    No Known Allergies    Intolerances        ROS:   General:  No  fevers, chills, night sweats, fatigue,   Eyes:  Good vision, no reported pain  ENT:  No sore throat, pain, runny nose, dysphagia  CV:  No pain, palpitations, hypo/hypertension  Resp:  No dyspnea, cough, tachypnea, wheezing  GI:  No pain, No nausea, No vomiting, No diarrhea, No constipation, No weight loss, No fever, No pruritis, No rectal bleeding, No tarry stools, No dysphagia,  :  No pain, bleeding, incontinence, nocturia  Muscle:  No pain, weakness  Neuro:  No weakness, tingling, memory problems  Psych:  No fatigue, insomnia, mood problems, depression  Endocrine:  No polyuria, polydipsia, cold/heat intolerance  Heme:  No petechiae, ecchymosis, easy bruisability  Skin:  No rash, tattoos, scars, edema      PHYSICAL EXAM:   Vital Signs:  Vital Signs Last 24 Hrs  T(C): 36.9 (29 Jul 2024 11:57), Max: 36.9 (29 Jul 2024 11:57)  T(F): 98.5 (29 Jul 2024 11:57), Max: 98.5 (29 Jul 2024 11:57)  HR: 57 (29 Jul 2024 11:57) (57 - 63)  BP: 114/58 (29 Jul 2024 11:57) (114/58 - 147/68)  BP(mean): 94 (28 Jul 2024 20:56) (94 - 94)  RR: 18 (29 Jul 2024 11:57) (18 - 18)  SpO2: 95% (29 Jul 2024 11:57) (95% - 97%)    Parameters below as of 29 Jul 2024 11:57  Patient On (Oxygen Delivery Method): nasal cannula      Daily     Daily     GENERAL:  Appears stated age,   HEENT:  NC/AT,    CHEST:  Full & symmetric excursion,   HEART:  Regular rhythm,  ABDOMEN:  Soft, non-tender, non-distended,  EXTEREMITIES:  no cyanosis  SKIN:  No rash  NEURO:  Alert,       LABS:                        12.6   5.31  )-----------( 121      ( 28 Jul 2024 07:27 )             38.6     07-28    143  |  102  |  27<H>  ----------------------------<  91  4.4   |  29  |  1.32<H>    Ca    9.4      28 Jul 2024 07:19      PT/INR - ( 29 Jul 2024 06:35 )   PT: 26.0 sec;   INR: 2.54 ratio         PTT - ( 29 Jul 2024 06:35 )  PTT:42.9 sec  Urinalysis Basic - ( 28 Jul 2024 07:19 )    Color: x / Appearance: x / SG: x / pH: x  Gluc: 91 mg/dL / Ketone: x  / Bili: x / Urobili: x   Blood: x / Protein: x / Nitrite: x   Leuk Esterase: x / RBC: x / WBC x   Sq Epi: x / Non Sq Epi: x / Bacteria: x        RADIOLOGY & ADDITIONAL TESTS:  
Patient is a 84y old  Male who presents with a chief complaint of Abdominal Pain (30 Jul 2024 11:58)      SUBJECTIVE / OVERNIGHT EVENTS: No events     AID bed side     T(C): 36.3 (07-31-24 @ 20:49), Max: 36.3 (07-31-24 @ 11:18)  HR: 66 (07-31-24 @ 20:49) (61 - 66)  BP: 144/84 (07-31-24 @ 20:49) (127/66 - 144/84)  RR: 18 (07-31-24 @ 20:49) (18 - 18)  SpO2: 97% (07-31-24 @ 20:49) (93% - 97%)    MEDICATIONS  (STANDING):  allopurinol 300 milliGRAM(s) Oral daily  aMIOdarone    Tablet 200 milliGRAM(s) Oral daily  bisacodyl Suppository 10 milliGRAM(s) Rectal daily  brimonidine 0.2% Ophthalmic Solution 1 Drop(s) Both EYES two times a day  latanoprost 0.005% Ophthalmic Solution 1 Drop(s) Both EYES at bedtime  metoprolol succinate ER 25 milliGRAM(s) Oral daily  polyethylene glycol 3350 17 Gram(s) Oral two times a day  senna 2 Tablet(s) Oral at bedtime  spironolactone 25 milliGRAM(s) Oral daily  tamsulosin 0.4 milliGRAM(s) Oral at bedtime  warfarin 3 milliGRAM(s) Oral once    MEDICATIONS  (PRN):  acetaminophen     Tablet .. 650 milliGRAM(s) Oral every 6 hours PRN Moderate Pain (4 - 6)            PHYSICAL EXAM:  GENERAL: NAD, well-developed  HEAD:  Atraumatic, Normocephalic  EYES: EOMI, PERRLA, conjunctiva and sclera clear  NECK: Supple, No JVD  CHEST/LUNG: Clear to auscultation bilaterally; No wheeze  HEART: Regular rate and rhythm; No murmurs, rubs, or gallops  ABDOMEN: Soft, Nontender, Nondistended; Bowel sounds present  EXTREMITIES:  2+ Peripheral Pulses, No clubbing, cyanosis, or edema  PSYCH: AAOx3  NEUROLOGY: non-focal  SKIN: No rashes or lesions                PT/INR - ( 31 Jul 2024 06:24 )   PT: 18.6 sec;   INR: 1.80 ratio         PTT - ( 31 Jul 2024 06:24 )  PTT:36.3 sec  139|99|26<84  4.4|30|1.32  9.3,--,--  07-31 @ 06:24  
[facility]      Frantz Mcginnis NP    [address] [place] [address]  [phone]      INTERVAL HPI/OVERNIGHT EVENTS:    patient s/e  having bms      MEDICATIONS  (STANDING):  allopurinol 300 milliGRAM(s) Oral daily  aMIOdarone    Tablet 200 milliGRAM(s) Oral daily  bisacodyl Suppository 10 milliGRAM(s) Rectal daily  brimonidine 0.2% Ophthalmic Solution 1 Drop(s) Both EYES two times a day  cefTRIAXone   IVPB 1000 milliGRAM(s) IV Intermittent every 24 hours  latanoprost 0.005% Ophthalmic Solution 1 Drop(s) Both EYES at bedtime  metoprolol succinate ER 25 milliGRAM(s) Oral daily  polyethylene glycol 3350 17 Gram(s) Oral two times a day  senna 2 Tablet(s) Oral at bedtime  spironolactone 25 milliGRAM(s) Oral daily  tamsulosin 0.4 milliGRAM(s) Oral at bedtime  warfarin 2 milliGRAM(s) Oral once    MEDICATIONS  (PRN):  acetaminophen     Tablet .. 650 milliGRAM(s) Oral every 6 hours PRN Moderate Pain (4 - 6)      Allergies    No Known Allergies    Intolerances        ROS:   General:  No  fevers, chills, night sweats, fatigue,   Eyes:  Good vision, no reported pain  ENT:  No sore throat, pain, runny nose, dysphagia  CV:  No pain, palpitations, hypo/hypertension  Resp:  No dyspnea, cough, tachypnea, wheezing  GI:  No pain, No nausea, No vomiting, No diarrhea, No constipation, No weight loss, No fever, No pruritis, No rectal bleeding, No tarry stools, No dysphagia,  :  No pain, bleeding, incontinence, nocturia  Muscle:  No pain, weakness  Neuro:  No weakness, tingling, memory problems  Psych:  No fatigue, insomnia, mood problems, depression  Endocrine:  No polyuria, polydipsia, cold/heat intolerance  Heme:  No petechiae, ecchymosis, easy bruisability  Skin:  No rash, tattoos, scars, edema      PHYSICAL EXAM:   Vital Signs:  Vital Signs Last 24 Hrs  T(C): 36.9 (29 Jul 2024 11:57), Max: 36.9 (29 Jul 2024 11:57)  T(F): 98.5 (29 Jul 2024 11:57), Max: 98.5 (29 Jul 2024 11:57)  HR: 57 (29 Jul 2024 11:57) (57 - 63)  BP: 114/58 (29 Jul 2024 11:57) (114/58 - 147/68)  BP(mean): 94 (28 Jul 2024 20:56) (94 - 94)  RR: 18 (29 Jul 2024 11:57) (18 - 18)  SpO2: 95% (29 Jul 2024 11:57) (95% - 97%)    Parameters below as of 29 Jul 2024 11:57  Patient On (Oxygen Delivery Method): nasal cannula      Daily     Daily     GENERAL:  Appears stated age,   HEENT:  NC/AT,    CHEST:  Full & symmetric excursion,   HEART:  Regular rhythm,  ABDOMEN:  Soft, non-tender, non-distended,  EXTEREMITIES:  no cyanosis  SKIN:  No rash  NEURO:  Alert,       LABS:                        12.6   5.31  )-----------( 121      ( 28 Jul 2024 07:27 )             38.6     07-28    143  |  102  |  27<H>  ----------------------------<  91  4.4   |  29  |  1.32<H>    Ca    9.4      28 Jul 2024 07:19      PT/INR - ( 29 Jul 2024 06:35 )   PT: 26.0 sec;   INR: 2.54 ratio         PTT - ( 29 Jul 2024 06:35 )  PTT:42.9 sec  Urinalysis Basic - ( 28 Jul 2024 07:19 )    Color: x / Appearance: x / SG: x / pH: x  Gluc: 91 mg/dL / Ketone: x  / Bili: x / Urobili: x   Blood: x / Protein: x / Nitrite: x   Leuk Esterase: x / RBC: x / WBC x   Sq Epi: x / Non Sq Epi: x / Bacteria: x        RADIOLOGY & ADDITIONAL TESTS:

## 2024-08-06 NOTE — PROGRESS NOTE ADULT - REASON FOR ADMISSION
Abdominal Pain

## 2024-08-06 NOTE — PROGRESS NOTE ADULT - ASSESSMENT
83-year-old male with a past medical history of A-fib on Coumadin, dementia, CHF, prostate cancer, nonambulatory who presents emergency department complaining of flank pain and abdominal pain.      Renal Mass Renal Ultrasound shows renal mass   MRI abdomen Patient unable to tolerate   MRI as out patient ??    Urology consult  Spoke to Urology team who agreed to see patient     Fecal  Impaction s/p Enema   UTI     Bowel regimen   IV abx   Follow up urine cx Enterococci   Change to Cipro   Add enema as per GI   GI consulted     Afib on AC AC as per INR     Plan of care discussed with patients daughter over the phone     
83-year-old male with a past medical history of A-fib on Coumadin, dementia, CHF, prostate cancer, nonambulatory who presents emergency department complaining of flank pain and abdominal pain.      Renal MassRenal Ultrasound shows renal mass   MRI abdomen   Urology consult         Fecal  Impaction   UTI     Bowel regimen   IV abx   Follow up urine cx Enterococci   Change to Cipro   Add enema as per GI   GI consulted     Afib on AC AC as per INR     Plan of care discussed with patients wife and daughter bed side     
83-year-old male with a past medical history of A-fib on Coumadin, dementia, CHF, prostate cancer, nonambulatory who presents emergency department complaining of flank pain and abdominal pain.  Renal mass     CT abd with iv contrast   reviewed   renal cysts with no mass   Urology recommend repeat imaging in 1 year pending     Fecal  Impaction s/p Enema   UTI completed abx   ID consulted   Bowel regimen   Afib on AC AC as per INR     Plan of care discussed with patients daughter over the phone     
constipation    Advance diet as tolerated  cont bowel regimen  no objection to a/c  monitor gi fn  dc planning  will follow    I reviewed the overnight course of events on the unit, re-confirming the patient history. I discussed the care with the patient and their family  The plan of care was discussed with the physician assistant and modifications were made to the notation where appropriate.   Differential diagnosis and plan of care discussed with patient after the evaluation  35 minutes spent on total encounter of which more than fifty percent of the encounter was spent counseling and/or coordinating care by the attending physician.  Advanced care planning was discussed with patient and family.  Advanced care planning forms were reviewed and discussed.  Risks, benefits and alternatives of gastroenterologic procedures were discussed in detail and all questions were answered.  
constipation    Advance diet as tolerated  cont bowel regimen  no objection to a/c  monitor gi fn  dc planning  will follow    I reviewed the overnight course of events on the unit, re-confirming the patient history. I discussed the care with the patient and their family  The plan of care was discussed with the physician assistant and modifications were made to the notation where appropriate.   Differential diagnosis and plan of care discussed with patient after the evaluation  35 minutes spent on total encounter of which more than fifty percent of the encounter was spent counseling and/or coordinating care by the attending physician.  Advanced care planning was discussed with patient and family.  Advanced care planning forms were reviewed and discussed.  Risks, benefits and alternatives of gastroenterologic procedures were discussed in detail and all questions were answered.  
constipation    Advance diet as tolerated  cont bowel regimen  no objection to a/c  monitor gi fn  dc planning per primary  will follow    I reviewed the overnight course of events on the unit, re-confirming the patient history. I discussed the care with the patient and their family  The plan of care was discussed with the physician assistant and modifications were made to the notation where appropriate.   Differential diagnosis and plan of care discussed with patient after the evaluation  35 minutes spent on total encounter of which more than fifty percent of the encounter was spent counseling and/or coordinating care by the attending physician.  Advanced care planning was discussed with patient and family.  Advanced care planning forms were reviewed and discussed.  Risks, benefits and alternatives of gastroenterologic procedures were discussed in detail and all questions were answered.  
constipation    Advance diet as tolerated  cont bowel regimen  no objection to a/c  monitor gi fn  will follow    I reviewed the overnight course of events on the unit, re-confirming the patient history. I discussed the care with the patient and their family  The plan of care was discussed with the physician assistant and modifications were made to the notation where appropriate.   Differential diagnosis and plan of care discussed with patient after the evaluation  35 minutes spent on total encounter of which more than fifty percent of the encounter was spent counseling and/or coordinating care by the attending physician.  Advanced care planning was discussed with patient and family.  Advanced care planning forms were reviewed and discussed.  Risks, benefits and alternatives of gastroenterologic procedures were discussed in detail and all questions were answered.  
83-year-old male with a past medical history of A-fib on Coumadin, dementia, CHF, prostate cancer, nonambulatory who presents emergency department complaining of flank pain and abdominal pain.      Renal Mass Renal Ultrasound shows renal mass   MRI abdomen Patient unable to tolerate   MRI as out patient ??    Urology consulted     CT abd with iv contrast       Fecal  Impaction s/p Enema   UTI     Bowel regimen   IV abx   Follow up urine cx Enterococci   Change to Augmentin as per ID    Add enema as per GI   GI consulted     Afib on AC AC as per INR     Plan of care discussed with patients daughter over the phone     
83-year-old male with a past medical history of A-fib on Coumadin, dementia, CHF, prostate cancer, nonambulatory who presents emergency department complaining of flank pain and abdominal pain.      Fecal  Impaction   UTI     Bowel regimen   IV abx   Follow up urine cx  Add enema  GI consulted     Afib on AC AC as per INR     Plan of care discussed with patients wife bed side     
83-year-old male with a past medical history of A-fib on Coumadin, dementia, CHF, prostate cancer, nonambulatory who presents emergency department complaining of flank pain and abdominal pain.      Fecal  Impaction   UTI     Bowel regimen   IV abx   Follow up urine cx  Add enema as per GI   GI consulted     Afib on AC AC as per INR     Plan of care discussed with patients wife bed side     
83-year-old male with a past medical history of A-fib on Coumadin, dementia, CHF, prostate cancer, nonambulatory who presents emergency department complaining of flank pain and abdominal pain.      Renal Mass Renal Ultrasound shows renal mass   MRI abdomen Patient unable to tolerate   MRI as out patient ??    Urology consulted     CT abd with iv contrast   pending     Fecal  Impaction s/p Enema   UTI     Bowel regimen   IV abx   Follow up urine cx Enterococci   Change to Augmentin as per ID    Add enema as per GI   GI consulted     Afib on AC AC as per INR     Plan of care discussed with patients daughter over the phone     
Patient is a 83 year old male with PMH of A-fib on Coumadin, dementia, CHF, prostate cancer treated with radiation over 10 years ago with no follow up in years d/t nonambulatory status who presents emergency department complaining of flank pain and abdominal pain.  CT abdomen shows stool-filled colon suggesting constipation with stool impaction in the rectum, slightly decreased compared to the recent prior CT; diverticulosis; was also found to have b/l renal cysts, left side has 6.9x4.4x5.6cm cyst which has grown since u/s in 2018 where it was 2.6x2.9x2.3cm, and unable to tolerate MRI.    E. faecalis UTI  Renal cysts/mass  Fecal impaction/constipation    Positive UA with flank pain  U/S with diffuse thickening of urinary bladder wall, no hydronephrosis  Ucx with amp/vanc sensitive E. faecalis   CTAP prelim report with no emergent findings in abd/pelvis   s/p ceftriaxone 7/  s/p ciprofloxacin 8/1-8/2  prior EKGs reviewed, noted with prolonged Qtc  remains afebrile, no leukocytosis     Recommendations:   Continue amoxicillin 875mg PO BID for 7d course until tomorrow 8/7  Urology following, f/u CTAP official report   GI following, on bowel regimen  Continue rest of care per primary team   Stable from ID standpoint at this time.       Jean Claude Yarbrough M.D.  [facility], Division of Infectious Diseases  [phone]  After 5pm on weekdays and all day on weekends - please call [phone]  Available on [employer] TEAMS     
83-year-old male with a past medical history of A-fib on Coumadin, dementia, CHF, prostate cancer, nonambulatory who presents emergency department complaining of flank pain and abdominal pain.      Fecal  Impaction   UTI     Bowel regimen   IV abx   Follow up urine cx  Add enema as per GI   GI consulted     Afib on AC AC as per INR     Plan of care discussed with patients wife bed side     
83-year-old male with a past medical history of A-fib on Coumadin, dementia, CHF, prostate cancer, nonambulatory who presents emergency department complaining of flank pain and abdominal pain.      Renal Mass Renal Ultrasound shows renal mass   MRI abdomen Patient unable to tolerate   MRI as out patient ??    Urology consulted     CT abd with iv contrast   pending     Fecal  Impaction s/p Enema   UTI     Bowel regimen   IV abx   Follow up urine cx Enterococci   Change to Augmentin as per ID    Add enema as per GI   GI consulted     Afib on AC AC as per INR     Plan of care discussed with patients daughter over the phone     
83-year-old male with a past medical history of A-fib on Coumadin, dementia, CHF, prostate cancer, nonambulatory who presents emergency department complaining of flank pain and abdominal pain.      Renal Mass Renal Ultrasound shows renal mass   MRI abdomen Patient unable to tolerate   MRI as out patient ??    Urology consulted     CT abd with iv contrast   pending     Fecal  Impaction s/p Enema   UTI     Bowel regimen   IV abx   Follow up urine cx Enterococci   Change to Augmentin as per ID    Add enema as per GI   GI consulted     Afib on AC AC as per INR     Plan of care discussed with patients daughter over the phone     
Patient is a 83 year old male with PMH of A-fib on Coumadin, dementia, CHF, prostate cancer treated with radiation over 10 years ago with no follow up in years d/t nonambulatory status who presents emergency department complaining of flank pain and abdominal pain.  CT abdomen shows stool-filled colon suggesting constipation with stool impaction in the rectum, slightly decreased compared to the recent prior CT; diverticulosis; was also found to have b/l renal cysts, left side has 6.9x4.4x5.6cm cyst which has grown since u/s in 2018 where it was 2.6x2.9x2.3cm, and unable to tolerate MRI.    E. faecalis UTI  Renal cysts/mass  Fecal impaction/constipation    Positive UA with flank pain  U/S with diffuse thickening of urinary bladder wall, no hydronephrosis  Ucx with amp/vanc sensitive E. faecalis   s/p ceftriaxone 7/  s/p ciprofloxacin 8/1-8/2  prior EKGs reviewed, noted with prolonged Qtc  afebrile, no leukocytosis     Recommendations:   Continue amoxicillin 875mg PO BID for 7d course until 8/7  Urology following, rec CTAP with contrast renal mass protocol  GI following, on bowel regimen  Continue rest of care per primary team   Stable from ID standpoint at this time.       Jean Claude Yarbrough M.D.  [facility], Division of Infectious Diseases  [phone]  After 5pm on weekdays and all day on weekends - please call [phone]  Available on [employer] TEAMS     
constipation    Advance diet as tolerated  cont bowel regimen  no objection to a/c  monitor gi fn  dc planning  will follow    I reviewed the overnight course of events on the unit, re-confirming the patient history. I discussed the care with the patient and their family  The plan of care was discussed with the physician assistant and modifications were made to the notation where appropriate.   Differential diagnosis and plan of care discussed with patient after the evaluation  35 minutes spent on total encounter of which more than fifty percent of the encounter was spent counseling and/or coordinating care by the attending physician.  Advanced care planning was discussed with patient and family.  Advanced care planning forms were reviewed and discussed.  Risks, benefits and alternatives of gastroenterologic procedures were discussed in detail and all questions were answered.  
constipation    Advance diet as tolerated  cont bowel regimen  no objection to a/c  monitor gi fn  dc planning per primary  will follow    I reviewed the overnight course of events on the unit, re-confirming the patient history. I discussed the care with the patient and their family  The plan of care was discussed with the physician assistant and modifications were made to the notation where appropriate.   Differential diagnosis and plan of care discussed with patient after the evaluation  35 minutes spent on total encounter of which more than fifty percent of the encounter was spent counseling and/or coordinating care by the attending physician.  Advanced care planning was discussed with patient and family.  Advanced care planning forms were reviewed and discussed.  Risks, benefits and alternatives of gastroenterologic procedures were discussed in detail and all questions were answered.  
constipation    Advance diet as tolerated  cont bowel regimen  no objection to a/c  monitor gi fn  will follow    I reviewed the overnight course of events on the unit, re-confirming the patient history. I discussed the care with the patient and their family  The plan of care was discussed with the physician assistant and modifications were made to the notation where appropriate.   Differential diagnosis and plan of care discussed with patient after the evaluation  35 minutes spent on total encounter of which more than fifty percent of the encounter was spent counseling and/or coordinating care by the attending physician.  Advanced care planning was discussed with patient and family.  Advanced care planning forms were reviewed and discussed.  Risks, benefits and alternatives of gastroenterologic procedures were discussed in detail and all questions were answered.

## 2024-10-31 NOTE — PRE-OP CHECKLIST - AICD PRESENT
I am off duty today.      To summarize events of this admission:    Patient had a symptomatic reaction following a mixed meal on the night of admission.  Symptoms were documented at glucose 82 mg/dl, c-peptide 7.6 ng/ml, insulin 12 mcIU/ml.    She did have hypoglycemia per DexCom below 60 mg/dl a few hours after that reaction but she did not request attention and no labs were drawn.     Based on her statement that she had symptomatic reactions after a glucose load for SIBO testing, we attempted 100 gm OGTT yesterday, with no hypoglycemic reaction.  Also no glucose >200 mg/dl.     We have therefore proceeded with a prolonged fast, which is the ultimate rule-out for insulinoma.  She has had not spikes or hypoglycemic reactions at 21 hours' fasting.  Insulinoma will be evident within 24 hours' fasting about 2/3 of the time.      Without documentation of hypoglycemia and concurrent insulinoma, we have no diagnosis of insulinoma or non-insulinoma pancreatogenic hypoglycemia syndrome.      I spoke to her on the telephone at 12:40 with Dr. Santacruz and other hospital staff in attendance.  Her glucose variability and reactions are clearly real and by history are postprandial.  She does not wish to proceed with further fasting and I agree the value is limited.      She does agree to another mixed meal challenge and anticipates a reaction 45 min after eating.  If we can capture hypoglycemia, we need to draw glucose, insulin, c-peptide, proinsulin, beta-hydroxybutyrate.    I informed the patient that rigorous diagnosis of insulinoma requires serum glucose under 55 mg/dl with evidence of inappropriate insulin production.    She requested to proceed to insulinoma-specific PET scan.   I advised imaging specific for insulinoma is not indicated until there is a biochemical diagnosis of insulinoma.      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    I suspect her erratic glycemic variability is due to her gastric surgery status rather than the tumor.      I suggested a trial of acarbose to blunt carbohydrate absorption, and GI side effects.  She is aware that is a treatment for \"dumping syndrome\" and she does not want it.     Patient would like to be discharged after mixed-meal challenge this evening.      I would not recommend more insulin-directed therapies such as diazoxide or somatostatin analogs without biochemical evidence of insulinoma.   I would not recommend off-label GLP1-RA, which she has previously rejected anyway when recommended by a nephrologist.       " no

## 2024-11-21 NOTE — ED ADULT TRIAGE NOTE - GLASGOW COMA SCALE: SCORE, MLM
Post Acute Facility/Agency List     Provided child with the following list, the list includes the overall star ratings obtained from CMS per the Medicare Web site (www.Medicare.gov):     [] Long Term Acute Care Facilities  [] Acute Inpatient Rehabilitation Facilities  [] Skilled Nursing Facilities  [] Hospice Facilities  [x] Home Care    Provided verbal instructions on how to utilize the QR Code to obtain additional detailed star ratings from www.Medicare.gov     offered to print and provide the detailed list:    []Accepted   [x]Declined    Patient/Family/Responsible party discussed discharge planning, provided with list of SNF/Home health agencies. Chose [facility]. Information called and faxed.             Melissa COLEMAN states ok to pull hemovac drain and apply dry dressing.    at discharge: Durable Medical Equipment, Home Care            Potential DME: Walker  Patient expects to discharge to: House  Plan for transportation at discharge:      Financial    Payor: [employer] [address] / Plan: [employer] [place] / Product Type: *No Product type* /     Does insurance require precert for SNF: Yes    Potential assistance Purchasing Medications:    Meds-to-Beds request: Yes      [id number] - [place], [place] - [address] - P [phone] - F [phone]  [address]  [place] [address]  Phone: [phone] Fax: [phone]    [id number] - [place], [place] - [address] - P [phone] - F [phone]  [facility]  [facility] [address]  Phone: [phone] Fax: [phone]      Notes:    Factors facilitating achievement of predicted outcomes: Family support, Cooperative, Pleasant, and Has needed Durable Medical Equipment at home    Barriers to discharge: Pain, Communication deficit, and Decreased endurance    Additional Case Management Notes: Patient lives at home with son. Case management assessment complete when Krystal HUNT on tablet with  [place] [id number]. Pt's son states he would like her to receive home health services.    The Plan for Transition of Care is related to the following treatment goals of Primary osteoarthritis of right knee [M17.11]  Localized osteoarthritis of right knee [M17.11]    IF APPLICABLE: The Patient and/or patient representative Sp and her family were provided with a choice of provider and agrees with the discharge plan. Freedom of choice list with basic dialogue that supports the patient's individualized plan of care/goals and shares the quality data associated with the providers was provided to:     Patient Representative Name:       The Patient and/or Patient Representative Agree with the Discharge Plan?      Pati Richardson RN  Case Management  15

## 2024-12-02 NOTE — DISCHARGE NOTE NURSING/CASE MANAGEMENT/SOCIAL WORK - NSDCPEFALRISK_GEN_ALL_CORE
Left message regarding medication refill.    For information on Fall & Injury Prevention, visit: [email or web address] OR  [email or web address] OR  https://www.cdc.gov/steadi/patient.html

## 2024-12-10 NOTE — PHYSICAL THERAPY INITIAL EVALUATION ADULT - BED MOBILITY LIMITATIONS, REHAB EVAL
Completion note & Special Treatment Procedure note    decreased ability to use arms for pushing/pulling/decreased ability to use legs for bridging/pushing/impaired ability to control trunk for mobility

## 2025-01-21 NOTE — PRE-OP CHECKLIST - DENTURES
Prep Survey      Flowsheet Row Responses   [facility] facility patient discharged from? Non-BH   Is LACE score < 7 ? Non-BH Discharge   Eligibility [facility]   [facility]   Date of Discharge 01/21/25   Discharge diagnosis unknown   Does the patient have one of the following disease processes/diagnoses(primary or secondary)? Other   Prep survey completed? Yes            Jesica Palencia Registered Nurse          
removed/no

## 2025-01-28 ENCOUNTER — APPOINTMENT (OUTPATIENT)
Dept: PULMONOLOGY | Facility: CLINIC | Age: 85
End: 2025-01-28
Payer: MEDICARE

## 2025-01-28 VITALS
BODY MASS INDEX: 31.6 KG/M2 | HEART RATE: 65 BPM | OXYGEN SATURATION: 95 % | WEIGHT: 233 LBS | DIASTOLIC BLOOD PRESSURE: 74 MMHG | SYSTOLIC BLOOD PRESSURE: 122 MMHG

## 2025-01-28 VITALS — OXYGEN SATURATION: 86 %

## 2025-01-28 DIAGNOSIS — Z79.899 OTHER LONG TERM (CURRENT) DRUG THERAPY: ICD-10-CM

## 2025-01-28 DIAGNOSIS — I50.9 HEART FAILURE, UNSPECIFIED: ICD-10-CM

## 2025-01-28 DIAGNOSIS — R09.02 HYPOXEMIA: ICD-10-CM

## 2025-01-28 DIAGNOSIS — R06.3 PERIODIC BREATHING: ICD-10-CM

## 2025-01-28 PROCEDURE — G2211 COMPLEX E/M VISIT ADD ON: CPT

## 2025-01-28 PROCEDURE — 99215 OFFICE O/P EST HI 40 MIN: CPT

## 2025-01-28 PROCEDURE — 71045 X-RAY EXAM CHEST 1 VIEW: CPT

## 2025-01-29 LAB
ALBUMIN SERPL ELPH-MCNC: 4 G/DL
ALP BLD-CCNC: 73 U/L
ALT SERPL-CCNC: 8 U/L
ANION GAP SERPL CALC-SCNC: 10 MMOL/L
AST SERPL-CCNC: 14 U/L
BASOPHILS # BLD AUTO: 0.02 K/UL
BASOPHILS NFR BLD AUTO: 0.5 %
BILIRUB SERPL-MCNC: 1.2 MG/DL
BUN SERPL-MCNC: 15 MG/DL
CALCIUM SERPL-MCNC: 9.1 MG/DL
CHLORIDE SERPL-SCNC: 96 MMOL/L
CO2 SERPL-SCNC: 35 MMOL/L
CREAT SERPL-MCNC: 1.13 MG/DL
EGFR: 64 ML/MIN/1.73M2
EOSINOPHIL # BLD AUTO: 0.07 K/UL
EOSINOPHIL NFR BLD AUTO: 1.6 %
GLUCOSE SERPL-MCNC: 138 MG/DL
HCT VFR BLD CALC: 42.9 %
HGB BLD-MCNC: 13 G/DL
IMM GRANULOCYTES NFR BLD AUTO: 0.7 %
LYMPHOCYTES # BLD AUTO: 0.63 K/UL
LYMPHOCYTES NFR BLD AUTO: 14.7 %
MAN DIFF?: NORMAL
MCHC RBC-ENTMCNC: 30.3 G/DL
MCHC RBC-ENTMCNC: 31.7 PG
MCV RBC AUTO: 104.6 FL
MONOCYTES # BLD AUTO: 0.4 K/UL
MONOCYTES NFR BLD AUTO: 9.3 %
NEUTROPHILS # BLD AUTO: 3.15 K/UL
NEUTROPHILS NFR BLD AUTO: 73.2 %
PLATELET # BLD AUTO: 119 K/UL
POTASSIUM SERPL-SCNC: 4.2 MMOL/L
PROT SERPL-MCNC: 5.8 G/DL
RBC # BLD: 4.1 M/UL
RBC # FLD: 15 %
SODIUM SERPL-SCNC: 141 MMOL/L
WBC # FLD AUTO: 4.3 K/UL

## 2025-02-11 NOTE — PROGRESS NOTE ADULT - PROBLEM SELECTOR PLAN 7
"Behavioral Health Psychotherapy Progress Note    Psychotherapy Provided: Individual Psychotherapy     1. Generalized anxiety disorder        2. Mild episode of recurrent major depressive disorder (HCC)            Goals addressed in session: Goal 1     DATA: This therapist met with Jackson for an individual therapy session. They had a conversation with Jackson about how Cali should come over to their house more then she goes to his now. She has been having a lot of mood swings hard time focusing, very fidgeting. Dicussed that last psychiatry appointment was cancelled and encouraged her to r/s it. Stressed because school and hard to balance work she feels it is overwhelming to balance everything and her parents wont schedule things for her and she needs them to because of their schedules.  She also said its hard to focus at work  She reports that her brain is a hamster on a wheel and it doesn't stop. She reports frustrated because doctor's keep telling her that it is her depression. She feels like parents and doctors are not listening to her. Jackson asked her dad to call and schedule a follow up appointment, her dad will call today during lunch. Jackson saw Yisel yesterday it was well.    Josr has been giving Jackson \"death stares\" and also to Cali. Cali said something rude to him we discussed how this likely will not help the situation.         During this session, this clinician used the following therapeutic modalities: Engagement Strategies, Client-centered Therapy, Cognitive Behavioral Therapy, Mindfulness-based Strategies, Motivational Interviewing, Solution-Focused Therapy, and Supportive Psychotherapy     Substance Abuse was not addressed during this session. If the client is diagnosed with a co-occurring substance use disorder, please indicate any changes in the frequency or amount of use: n/a. Stage of change for addressing substance use diagnoses: No substance use/Not applicable     ASSESSMENT:  Jackson " "Naun presents with a Euthymic/ normal mood.      her affect is Normal range and intensity, which is congruent, with her mood and the content of the session. The client has made progress on their goals.      Jackson Magallon presents with a none risk of suicide, none risk of self-harm, and none risk of harm to others.     For any risk assessment that surpasses a \"low\" rating, a safety plan must be developed.     A safety plan was indicated: no  If yes, describe in detail n/a     PLAN: Between sessions, Jackson Magallon will work on utilizing learned coping skills to manage her emotions in a healthy and positive manner. At the next session, the therapist will use Engagement Strategies, Client-centered Therapy, Cognitive Behavioral Therapy, Mindfulness-based Strategies, Motivational Interviewing, Solution-Focused Therapy, and Supportive Psychotherapy to address mood management, building trust and rapport with Jackson.        Behavioral Health Treatment Plan and Discharge Planning: Jackson Magallon is aware of and agrees to continue to work on their treatment plan. They have identified and are working toward their discharge goals. Yes     Depression Follow-up Plan Completed: Not applicable  Visit start and stop times:    02/11/25  Start Time: 1005  Stop Time: 1049  Total Visit Time: 44 minutes  " - resume home glaucoma eye drops  - tolerating regular diet    -med rec completed - called [facility]

## 2025-03-14 ENCOUNTER — INPATIENT (INPATIENT)
Facility: HOSPITAL | Age: 85
LOS: 1 days | Discharge: HOME | DRG: 310 | End: 2025-03-16
Attending: STUDENT IN AN ORGANIZED HEALTH CARE EDUCATION/TRAINING PROGRAM | Admitting: STUDENT IN AN ORGANIZED HEALTH CARE EDUCATION/TRAINING PROGRAM
Payer: MEDICARE

## 2025-03-14 VITALS
HEART RATE: 55 BPM | OXYGEN SATURATION: 96 % | RESPIRATION RATE: 18 BRPM | HEIGHT: 73 IN | DIASTOLIC BLOOD PRESSURE: 93 MMHG | WEIGHT: 225.09 LBS | SYSTOLIC BLOOD PRESSURE: 153 MMHG | TEMPERATURE: 97 F

## 2025-03-14 DIAGNOSIS — R00.1 BRADYCARDIA, UNSPECIFIED: ICD-10-CM

## 2025-03-14 DIAGNOSIS — Z29.9 ENCOUNTER FOR PROPHYLACTIC MEASURES, UNSPECIFIED: ICD-10-CM

## 2025-03-14 DIAGNOSIS — I48.0 PAROXYSMAL ATRIAL FIBRILLATION: ICD-10-CM

## 2025-03-14 DIAGNOSIS — I49.9 CARDIAC ARRHYTHMIA, UNSPECIFIED: ICD-10-CM

## 2025-03-14 DIAGNOSIS — R07.81 PLEURODYNIA: ICD-10-CM

## 2025-03-14 DIAGNOSIS — R79.81 ABNORMAL BLOOD-GAS LEVEL: ICD-10-CM

## 2025-03-14 DIAGNOSIS — Z98.890 OTHER SPECIFIED POSTPROCEDURAL STATES: Chronic | ICD-10-CM

## 2025-03-14 DIAGNOSIS — H40.059 OCULAR HYPERTENSION, UNSPECIFIED EYE: ICD-10-CM

## 2025-03-14 DIAGNOSIS — I50.9 HEART FAILURE, UNSPECIFIED: ICD-10-CM

## 2025-03-14 DIAGNOSIS — C61 MALIGNANT NEOPLASM OF PROSTATE: ICD-10-CM

## 2025-03-14 DIAGNOSIS — M10.9 GOUT, UNSPECIFIED: ICD-10-CM

## 2025-03-14 LAB
ALBUMIN SERPL ELPH-MCNC: 3.1 G/DL — LOW (ref 3.3–5)
ALP SERPL-CCNC: 69 U/L — SIGNIFICANT CHANGE UP (ref 40–120)
ALT FLD-CCNC: 15 U/L — SIGNIFICANT CHANGE UP (ref 12–78)
ANION GAP SERPL CALC-SCNC: 7 MMOL/L — SIGNIFICANT CHANGE UP (ref 5–17)
APPEARANCE UR: CLEAR — SIGNIFICANT CHANGE UP
APTT BLD: 40.6 SEC — HIGH (ref 24.5–35.6)
AST SERPL-CCNC: 28 U/L — SIGNIFICANT CHANGE UP (ref 15–37)
BASE EXCESS BLDA CALC-SCNC: 20.4 MMOL/L — HIGH (ref -2–3)
BASOPHILS # BLD AUTO: 0.01 K/UL — SIGNIFICANT CHANGE UP (ref 0–0.2)
BASOPHILS NFR BLD AUTO: 0.2 % — SIGNIFICANT CHANGE UP (ref 0–2)
BILIRUB SERPL-MCNC: 1.2 MG/DL — SIGNIFICANT CHANGE UP (ref 0.2–1.2)
BILIRUB UR-MCNC: NEGATIVE — SIGNIFICANT CHANGE UP
BLOOD GAS COMMENTS ARTERIAL: SIGNIFICANT CHANGE UP
BUN SERPL-MCNC: 14 MG/DL — SIGNIFICANT CHANGE UP (ref 7–23)
CALCIUM SERPL-MCNC: 8.6 MG/DL — SIGNIFICANT CHANGE UP (ref 8.5–10.1)
CHLORIDE SERPL-SCNC: 95 MMOL/L — LOW (ref 96–108)
CO2 SERPL-SCNC: 37 MMOL/L — HIGH (ref 22–31)
COLOR SPEC: SIGNIFICANT CHANGE UP
CREAT SERPL-MCNC: 1.1 MG/DL — SIGNIFICANT CHANGE UP (ref 0.5–1.3)
DIFF PNL FLD: NEGATIVE — SIGNIFICANT CHANGE UP
EGFR: 66 ML/MIN/1.73M2 — SIGNIFICANT CHANGE UP
EGFR: 66 ML/MIN/1.73M2 — SIGNIFICANT CHANGE UP
EOSINOPHIL # BLD AUTO: 0.09 K/UL — SIGNIFICANT CHANGE UP (ref 0–0.5)
EOSINOPHIL NFR BLD AUTO: 2 % — SIGNIFICANT CHANGE UP (ref 0–6)
GAS PNL BLDA: SIGNIFICANT CHANGE UP
GLUCOSE SERPL-MCNC: 112 MG/DL — HIGH (ref 70–99)
GLUCOSE UR QL: NEGATIVE MG/DL — SIGNIFICANT CHANGE UP
HCO3 BLDA-SCNC: 45 MMOL/L — CRITICAL HIGH (ref 21–28)
HCT VFR BLD CALC: 40.3 % — SIGNIFICANT CHANGE UP (ref 39–50)
HGB BLD-MCNC: 12.9 G/DL — LOW (ref 13–17)
INR BLD: 1.96 RATIO — HIGH (ref 0.85–1.16)
KETONES UR-MCNC: ABNORMAL MG/DL
LEUKOCYTE ESTERASE UR-ACNC: ABNORMAL
LIDOCAIN IGE QN: 85 U/L — HIGH (ref 13–75)
LYMPHOCYTES # BLD AUTO: 0.74 K/UL — LOW (ref 1–3.3)
LYMPHOCYTES # BLD AUTO: 16.8 % — SIGNIFICANT CHANGE UP (ref 13–44)
MCHC RBC-ENTMCNC: 32 G/DL — SIGNIFICANT CHANGE UP (ref 32–36)
MCHC RBC-ENTMCNC: 32.2 PG — SIGNIFICANT CHANGE UP (ref 27–34)
MCV RBC AUTO: 100.5 FL — HIGH (ref 80–100)
MONOCYTES # BLD AUTO: 0.5 K/UL — SIGNIFICANT CHANGE UP (ref 0–0.9)
MONOCYTES NFR BLD AUTO: 11.4 % — SIGNIFICANT CHANGE UP (ref 2–14)
NEUTROPHILS # BLD AUTO: 3.04 K/UL — SIGNIFICANT CHANGE UP (ref 1.8–7.4)
NEUTROPHILS NFR BLD AUTO: 69.1 % — SIGNIFICANT CHANGE UP (ref 43–77)
NITRITE UR-MCNC: NEGATIVE — SIGNIFICANT CHANGE UP
NRBC BLD AUTO-RTO: 0 /100 WBCS — SIGNIFICANT CHANGE UP (ref 0–0)
PCO2 BLDA: 73 MMHG — CRITICAL HIGH (ref 35–48)
PH BLDA: 7.4 — SIGNIFICANT CHANGE UP (ref 7.35–7.45)
PH UR: 7 — SIGNIFICANT CHANGE UP (ref 5–8)
PLATELET # BLD AUTO: 135 K/UL — LOW (ref 150–400)
PO2 BLDA: 80 MMHG — LOW (ref 83–108)
POTASSIUM SERPL-MCNC: 4.3 MMOL/L — SIGNIFICANT CHANGE UP (ref 3.5–5.3)
POTASSIUM SERPL-SCNC: 4.3 MMOL/L — SIGNIFICANT CHANGE UP (ref 3.5–5.3)
PROT SERPL-MCNC: 6.3 G/DL — SIGNIFICANT CHANGE UP (ref 6–8.3)
PROT UR-MCNC: SIGNIFICANT CHANGE UP MG/DL
PROTHROM AB SERPL-ACNC: 23 SEC — HIGH (ref 9.9–13.4)
SAO2 % BLDA: 98.5 % — HIGH (ref 94–98)
SODIUM SERPL-SCNC: 139 MMOL/L — SIGNIFICANT CHANGE UP (ref 135–145)
SP GR SPEC: 1.02 — SIGNIFICANT CHANGE UP (ref 1–1.03)
TROPONIN I, HIGH SENSITIVITY RESULT: 20.7 NG/L — SIGNIFICANT CHANGE UP
TROPONIN I, HIGH SENSITIVITY RESULT: 23.6 NG/L — SIGNIFICANT CHANGE UP
UROBILINOGEN FLD QL: 1 MG/DL — SIGNIFICANT CHANGE UP (ref 0.2–1)
WBC # BLD: 4.4 K/UL — SIGNIFICANT CHANGE UP (ref 3.8–10.5)
WBC # FLD AUTO: 4.4 K/UL — SIGNIFICANT CHANGE UP (ref 3.8–10.5)

## 2025-03-14 PROCEDURE — 99285 EMERGENCY DEPT VISIT HI MDM: CPT

## 2025-03-14 PROCEDURE — 93010 ELECTROCARDIOGRAM REPORT: CPT

## 2025-03-14 PROCEDURE — 74177 CT ABD & PELVIS W/CONTRAST: CPT | Mod: 26

## 2025-03-14 PROCEDURE — 71275 CT ANGIOGRAPHY CHEST: CPT | Mod: 26

## 2025-03-14 PROCEDURE — 99222 1ST HOSP IP/OBS MODERATE 55: CPT | Mod: GC

## 2025-03-14 RX ORDER — BRIMONIDINE TARTRATE 1.5 MG/ML
1 SOLUTION/ DROPS OPHTHALMIC
Refills: 0 | Status: DISCONTINUED | OUTPATIENT
Start: 2025-03-14 | End: 2025-03-16

## 2025-03-14 RX ORDER — TAMSULOSIN HYDROCHLORIDE 0.4 MG/1
0.4 CAPSULE ORAL AT BEDTIME
Refills: 0 | Status: DISCONTINUED | OUTPATIENT
Start: 2025-03-14 | End: 2025-03-16

## 2025-03-14 RX ORDER — ACETAMINOPHEN 500 MG/5ML
1000 LIQUID (ML) ORAL ONCE
Refills: 0 | Status: COMPLETED | OUTPATIENT
Start: 2025-03-14 | End: 2025-03-14

## 2025-03-14 RX ORDER — LATANOPROST PF 0.05 MG/ML
1 SOLUTION/ DROPS OPHTHALMIC AT BEDTIME
Refills: 0 | Status: DISCONTINUED | OUTPATIENT
Start: 2025-03-14 | End: 2025-03-16

## 2025-03-14 RX ORDER — FUROSEMIDE 10 MG/ML
20 INJECTION INTRAMUSCULAR; INTRAVENOUS
Refills: 0 | DISCHARGE

## 2025-03-14 RX ORDER — AMIODARONE HYDROCHLORIDE 50 MG/ML
200 INJECTION, SOLUTION INTRAVENOUS DAILY
Refills: 0 | Status: DISCONTINUED | OUTPATIENT
Start: 2025-03-15 | End: 2025-03-16

## 2025-03-14 RX ORDER — FUROSEMIDE 10 MG/ML
20 INJECTION INTRAMUSCULAR; INTRAVENOUS DAILY
Refills: 0 | Status: DISCONTINUED | OUTPATIENT
Start: 2025-03-14 | End: 2025-03-16

## 2025-03-14 RX ORDER — KETOROLAC TROMETHAMINE 30 MG/ML
30 INJECTION, SOLUTION INTRAMUSCULAR; INTRAVENOUS EVERY 6 HOURS
Refills: 0 | Status: DISCONTINUED | OUTPATIENT
Start: 2025-03-14 | End: 2025-03-16

## 2025-03-14 RX ORDER — LIDOCAINE HYDROCHLORIDE 20 MG/ML
1 JELLY TOPICAL DAILY
Refills: 0 | Status: DISCONTINUED | OUTPATIENT
Start: 2025-03-14 | End: 2025-03-16

## 2025-03-14 RX ORDER — ACETAMINOPHEN 500 MG/5ML
1000 LIQUID (ML) ORAL EVERY 8 HOURS
Refills: 0 | Status: DISCONTINUED | OUTPATIENT
Start: 2025-03-14 | End: 2025-03-16

## 2025-03-14 RX ORDER — CYST/ALA/Q10/PHOS.SER/DHA/BROC 100-20-50
2 POWDER (GRAM) ORAL
Refills: 0 | DISCHARGE

## 2025-03-14 RX ORDER — KETOROLAC TROMETHAMINE 30 MG/ML
15 INJECTION, SOLUTION INTRAMUSCULAR; INTRAVENOUS EVERY 6 HOURS
Refills: 0 | Status: DISCONTINUED | OUTPATIENT
Start: 2025-03-14 | End: 2025-03-16

## 2025-03-14 RX ORDER — BRINZOLAMIDE 10 MG/ML
1 SUSPENSION/ DROPS OPHTHALMIC
Refills: 0 | Status: DISCONTINUED | OUTPATIENT
Start: 2025-03-14 | End: 2025-03-16

## 2025-03-14 RX ADMIN — LATANOPROST PF 1 DROP(S): 0.05 SOLUTION/ DROPS OPHTHALMIC at 23:02

## 2025-03-14 RX ADMIN — Medication 6 MILLIGRAM(S): at 23:02

## 2025-03-14 RX ADMIN — Medication 500 MILLILITER(S): at 13:24

## 2025-03-14 RX ADMIN — TAMSULOSIN HYDROCHLORIDE 0.4 MILLIGRAM(S): 0.4 CAPSULE ORAL at 22:06

## 2025-03-14 RX ADMIN — Medication 400 MILLIGRAM(S): at 13:24

## 2025-03-14 NOTE — CONSULT NOTE ADULT - ATTENDING COMMENTS
agree with above  bradycardic to 50s, normal BP, pt awake and alert  ABG consistent with respiratory acidosis and metabolic acidosis  no need for ICU admission at this time  consider Urine Cl for workup of metabolic alkalosis

## 2025-03-14 NOTE — ED PROVIDER NOTE - NS ED ATTENDING STATEMENT MOD
Last OV pertinent to medication: 1/11/17 awv  Last refill date: 1/11/17     #/refills: 90 day/1refill  When pt was asked to return for OV: 6 months  Upcoming appt/reason: 7/13/17 awv    Lab Results  Component Value Date    (H) 01/06/2017   BUN 17 01 Attending Only

## 2025-03-14 NOTE — H&P ADULT - NSICDXPASTSURGICALHX_GEN_ALL_CORE_FT
Duration Of Freeze Thaw-Cycle (Seconds): 10 Show Applicator Variable?: Yes Detail Level: Detailed Consent: The patient's consent was obtained including but not limited to risks of crusting, scabbing, blistering, scarring, darker or lighter pigmentary change, recurrence, incomplete removal and infection. Post-Care Instructions: I reviewed with the patient in detail post-care instructions. Patient is to wear sunprotection, and avoid picking at any of the treated lesions. Pt may apply Vaseline to crusted or scabbing areas. Render Post-Care Instructions In Note?: no Number Of Freeze-Thaw Cycles: 2 freeze-thaw cycles PAST SURGICAL HISTORY:  S/P anal fissurectomy

## 2025-03-14 NOTE — H&P ADULT - PROBLEM SELECTOR PLAN 1
Right flank pain/rib pain  -CT notes old right-sided rib fracture, family endorses hasn't had a fracture in the past-- may be new  -Ordered lidocaine patch daily   -Does not complain of pain at baseline, ordered ofirmev 1g q8h PRN for mild pain (family endorses patient had elevated LFTs in the past due to tylenol so would avoid full dosing, and monitor LFTs daily), toradol 15mg IVP for moderate pain, and toradol 30mg IVP for severe  -As patient is also having fluctuating heart rate (jose and then tachy up to 180s), patient may have some spinal injury. as the hyperextension sounds like it possibly could have some damage. Patient denied spinal tenderness. Right flank pain/rib pain  -CT notes old right-sided rib fracture, family endorses he hasn't had a fracture in the past-- may be new as per hyperextention story   -Ordered lidocaine patch daily   -Does not complain of pain at baseline, ordered ofirmev 1g q8h PRN for mild pain (family endorses patient had elevated LFTs in the past due to tylenol so would avoid full dosing, and monitor LFTs daily), toradol 15mg IVP for moderate pain, and toradol 30mg IVP for severe

## 2025-03-14 NOTE — H&P ADULT - NSHPSOCIALHISTORY_GEN_ALL_CORE
Never tobacco user, no alcohol use, no drugs  Lives at home with wife  Requires help for all ADLs (has 5 aides at home, changes by day). He is bedbound (s/p 2 falls, and has drop foot)

## 2025-03-14 NOTE — H&P ADULT - PROBLEM SELECTOR PLAN 7
chronic  -continue home simbrinza BID and lantaprost once a day but not together -family denies radiation or chemotherapy, but receives injections outpatient twice a month   -continue home prosteon 2 tablets in the morning and 2 tablets in the evening, asked family to bring in from home -family denies radiation or chemotherapy, but receives injections outpatient twice a month   -continue home prosteon 2 tablets in the morning and 2 tablets in the evening, asked family to bring in from home  -continue home tamsulosin

## 2025-03-14 NOTE — CONSULT NOTE ADULT - SUBJECTIVE AND OBJECTIVE BOX
Patient is a 85y old  Male who presents with a chief complaint of     BRIEF HOSPITAL COURSE:  85M PMHx Afib n Coumadin dementia CHF prostate cancer nonambulatory at baseline.  Was last admitted to the hospital  through 2024 with abdominal pain was found to be significantly constipated also found to have a UTI there was concern for possible renal mass CT renal mass protocol revealed simple cysts seen by urology no acute intervention urine was positive for Enterococcus discharged with antibiotics patient ultimately improved and was discharged home.  Patient presents today from home with a chief complaint of right flank pain.  Patient is unable to find any history.  Wife and aide at bedside reports it appears that patient has been having this discomfort for about a week.  Around the time it initially started he was in his hospital bed the head of the bed was up he went to put it down it malfunctioned and the bed came crashing down.  They are unsure if this is what caused the injury.  Denies any fevers vomiting or diarrhea.  At baseline patient is on home O2 at 3 L currently on this with no hypoxia.      PAST MEDICAL & SURGICAL HISTORY:  Afib      Prostate cancer      Heart failure      History of scoliosis      S/P anal fissurectomy          Review of Systems:  CONSTITUTIONAL: No fever, chills, or fatigue  EYES: No eye pain, visual disturbances, or discharge  ENMT:  No difficulty hearing, tinnitus, vertigo; No sinus or throat pain  NECK: No pain or stiffness  RESPIRATORY: No cough, wheezing, chills or hemoptysis; No shortness of breath  CARDIOVASCULAR: No chest pain, palpitations, dizziness, or leg swelling  GASTROINTESTINAL: No abdominal or epigastric pain. No nausea, vomiting, or hematemesis; No diarrhea or constipation. No melena or hematochezia.  GENITOURINARY: No dysuria, frequency, hematuria, or incontinence  NEUROLOGICAL: No headaches, memory loss, loss of strength, numbness, or tremors  SKIN: No itching, burning, rashes, or lesions   MUSCULOSKELETAL: No joint pain or swelling; No muscle, back, or extremity pain  PSYCHIATRIC: No depression, anxiety, mood swings, or difficulty sleeping      Medications:                                  ICU Vital Signs Last 24 Hrs  T(C): 36.2 (14 Mar 2025 11:57), Max: 36.2 (14 Mar 2025 11:57)  T(F): 97.1 (14 Mar 2025 11:57), Max: 97.1 (14 Mar 2025 11:57)  HR: 55 (14 Mar 2025 11:57) (55 - 55)  BP: 153/93 (14 Mar 2025 11:57) (153/93 - 153/93)  BP(mean): --  ABP: --  ABP(mean): --  RR: 18 (14 Mar 2025 11:57) (18 - 18)  SpO2: 96% (14 Mar 2025 11:57) (96% - 96%)    O2 Parameters below as of 14 Mar 2025 11:57  Patient On (Oxygen Delivery Method): nasal cannula  O2 Flow (L/min): 3          ABG - ( 14 Mar 2025 16:25 )  pH, Arterial: 7.40  pH, Blood: x     /  pCO2: 73    /  pO2: 80    / HCO3: 45    / Base Excess: 20.4  /  SaO2: 98.5                I&O's Detail        LABS:                        12.9   4.40  )-----------( 135      ( 14 Mar 2025 13:15 )             40.3     03-14    139  |  95[L]  |  14  ----------------------------<  112[H]  4.3   |  37[H]  |  1.10    Ca    8.6      14 Mar 2025 13:15  Mg     2.1     03-14    TPro  6.3  /  Alb  3.1[L]  /  TBili  1.2  /  DBili  x   /  AST  28  /  ALT  15  /  AlkPhos  69  03-14          CAPILLARY BLOOD GLUCOSE        PT/INR - ( 14 Mar 2025 13:15 )   PT: 23.0 sec;   INR: 1.96 ratio         PTT - ( 14 Mar 2025 13:15 )  PTT:40.6 sec  Urinalysis Basic - ( 14 Mar 2025 14:10 )    Color: Dark Yellow / Appearance: Clear / S.020 / pH: x  Gluc: x / Ketone: Trace mg/dL  / Bili: Negative / Urobili: 1.0 mg/dL   Blood: x / Protein: Trace mg/dL / Nitrite: Negative   Leuk Esterase: Trace / RBC: 1 /HPF / WBC 2 /HPF   Sq Epi: x / Non Sq Epi: x / Bacteria: Negative /HPF      CULTURES:      Physical Examination:    General: No acute distress.  Alert, oriented, interactive, nonfocal    HEENT: Pupils equal, reactive to light.  Symmetric.    PULM: Clear to auscultation bilaterally, no significant sputum production    CVS: Regular rate and rhythm, no murmurs, rubs, or gallops    ABD: Soft, nondistended, nontender, normoactive bowel sounds, no masses    EXT: No edema, nontender    SKIN: Warm and well perfused, no rashes noted.    NEURO: A&Ox3, strength 5/5 all extremities, cranial nerves grossly intact, no focal deficits      RADIOLOGY: ***      CRITICAL CARE TIME SPENT: ***  Evaluating/treating patient, reviewing data/labs/imaging, discussing case with multidisciplinary team, discussing plan/goals of care with patient/family. Non-inclusive of procedure time.   Patient is a 85y old  Male who presents with a chief complaint of     BRIEF HOSPITAL COURSE:  85M PMHx Afib (on Coumadin), dementia, CHF (on 3L home O2), prostate cancer, nonambulatory at baseline presented to ER with abdominal pain.   In ER, labs significant for compensated respiratory acidosis (pH 7.40 / CO2 73 / HCO3 45).   Bradycardic to 40-50s. EKG nonischemic, Troponin negative x1.  CTA C/A/P negative for PE and without acute intra-abdominal pathology.    MICU consulted for above.     Of note, takes Toprol 25mg QD and Amiodarone 200mg QD.     PAST MEDICAL & SURGICAL HISTORY:  Afib      Prostate cancer      Heart failure      History of scoliosis      S/P anal fissurectomy          Review of Systems:  CONSTITUTIONAL: No fever, chills, or fatigue  EYES: No eye pain, visual disturbances, or discharge  ENMT:  No difficulty hearing, tinnitus, vertigo; No sinus or throat pain  NECK: No pain or stiffness  RESPIRATORY: No cough, wheezing, chills or hemoptysis; No shortness of breath  CARDIOVASCULAR: No chest pain, palpitations, dizziness, or leg swelling  GASTROINTESTINAL: No abdominal or epigastric pain. No nausea, vomiting, or hematemesis; No diarrhea or constipation. No melena or hematochezia.  GENITOURINARY: No dysuria, frequency, hematuria, or incontinence  NEUROLOGICAL: No headaches, memory loss, loss of strength, numbness, or tremors  SKIN: No itching, burning, rashes, or lesions   MUSCULOSKELETAL: No joint pain or swelling; No muscle, back, or extremity pain  PSYCHIATRIC: No depression, anxiety, mood swings, or difficulty sleeping      Medications:                                  ICU Vital Signs Last 24 Hrs  T(C): 36.2 (14 Mar 2025 11:57), Max: 36.2 (14 Mar 2025 11:57)  T(F): 97.1 (14 Mar 2025 11:57), Max: 97.1 (14 Mar 2025 11:57)  HR: 55 (14 Mar 2025 11:57) (55 - 55)  BP: 153/93 (14 Mar 2025 11:57) (153/93 - 153/93)  BP(mean): --  ABP: --  ABP(mean): --  RR: 18 (14 Mar 2025 11:57) (18 - 18)  SpO2: 96% (14 Mar 2025 11:57) (96% - 96%)    O2 Parameters below as of 14 Mar 2025 11:57  Patient On (Oxygen Delivery Method): nasal cannula  O2 Flow (L/min): 3          ABG - ( 14 Mar 2025 16:25 )  pH, Arterial: 7.40  pH, Blood: x     /  pCO2: 73    /  pO2: 80    / HCO3: 45    / Base Excess: 20.4  /  SaO2: 98.5                I&O's Detail        LABS:                        12.9   4.40  )-----------( 135      ( 14 Mar 2025 13:15 )             40.3     03-14    139  |  95[L]  |  14  ----------------------------<  112[H]  4.3   |  37[H]  |  1.10    Ca    8.6      14 Mar 2025 13:15  Mg     2.1     03-14    TPro  6.3  /  Alb  3.1[L]  /  TBili  1.2  /  DBili  x   /  AST  28  /  ALT  15  /  AlkPhos  69  03-14          CAPILLARY BLOOD GLUCOSE        PT/INR - ( 14 Mar 2025 13:15 )   PT: 23.0 sec;   INR: 1.96 ratio         PTT - ( 14 Mar 2025 13:15 )  PTT:40.6 sec  Urinalysis Basic - ( 14 Mar 2025 14:10 )    Color: Dark Yellow / Appearance: Clear / S.020 / pH: x  Gluc: x / Ketone: Trace mg/dL  / Bili: Negative / Urobili: 1.0 mg/dL   Blood: x / Protein: Trace mg/dL / Nitrite: Negative   Leuk Esterase: Trace / RBC: 1 /HPF / WBC 2 /HPF   Sq Epi: x / Non Sq Epi: x / Bacteria: Negative /HPF      CULTURES:    Physical Examination:  General: Alert, oriented, interactive, nonfocal.  HEENT: PERRL.  NECK: Supple.   PULM: Clear to auscultation bilaterally.  CVS: s1/s2.  ABD: Soft, nondistended, nontender, normoactive bowel sounds.  EXT: Bilateral LE edema, nontender.  SKIN: Warm.    RADIOLOGY:   < from: CT Abdomen and Pelvis w/ IV Cont (25 @ 15:46) >    ACC: [id number] EXAM:  CT ABDOMEN AND PELVIS IC   ORDERED BY: NIDHI CONTE     ACC: [id number] EXAM:  CT ANGIO CHEST PULM ART WAWIC   ORDERED BY: NIDHI CONTE     PROCEDURE DATE:  2025      IMPRESSION:    Flow artifact right lower lobe pulmonary artery.  No acute pulmonary embolism.    Mild bilateral septal thickening, could reflect interstitial edema.  Trace bilateral pleural effusions with subpleural atelectasis   costophrenic angles.    No acute intra-abdominal pathology.    < end of copied text >

## 2025-03-14 NOTE — H&P ADULT - PROBLEM SELECTOR PLAN 2
Bradycardic on arrival, and intermittently spikes up to 170s-180s  -EKBPM, sinus jose LBBB, QRs: 457  -baseline HR around 60s-70s, family at bedside endorses this is new   -TTE ordered, f/u results   -holding home AV sangita blocker (toprol), will continue amio (?)  -monitor electrolytes   -monitor on tele  -cardio consulted AB.4, pCO2: 73, pO2: 80, HCO3: 45, O2 sat: 98.5   -respiratory acidosis and metabolic alkalosis, appears compensated  -ordered urine cloride  -consulted nephro (Dr. Olivier added as provider)
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
## 2025-03-14 NOTE — H&P ADULT - PROBLEM SELECTOR PLAN 3
-ONLY FOR TONIGHT will order 6mg coumadin as per patient's home morning INR labs. Patient is on 2mg every day other than Tues& Thurs (when he's on 4mg). Primary team to order each night.   -Continue home amio  -monitor electrolytes   -Would hold home toprol as per ICU Bradycardic on arrival, and intermittently spikes up to 170s-180s  -EKBPM, sinus joes LBBB, QRs: 457  -baseline HR around 60s-70s, family at bedside endorses this is new   -TTE ordered, f/u results   -holding home AV sangita blocker (toprol), will continue home amio  -monitor electrolytes   -monitor on tele  -cardio consulted ([facility])

## 2025-03-14 NOTE — H&P ADULT - PROBLEM SELECTOR PLAN 4
-on home lasix 20mg IF patient's systolic BP is between 110 or 140  -TTE ordered, f/u results   -fluid restriction diet -ONLY FOR TONIGHT will order 6mg coumadin as per patient's home morning INR labs. Wife's paper clearly states for 6mg tonight. Patient is on 2mg coumadin every day other than Tues& Thurs (when he's on 4mg). Primary team to order each night.   -Continue home amio  -monitor electrolytes   -Would hold home toprol as per ICU
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
## 2025-03-14 NOTE — ED PROVIDER NOTE - DIFFERENTIAL DIAGNOSIS
Patient is resenting to the emergency room with abdominal pain differential includes not limited to UTI pyelonephritis renal stone versus additional intra-abdominal pathology.  Given that his pneumothorax was also consider chest pathology.  Will obtain screening labs CT imaging of the chest abdomen pelvis hydrate medicate for symptoms check UA urine culture and monitor.  Ultimate clinical disposition will be pending results. Differential Diagnosis

## 2025-03-14 NOTE — H&P ADULT - PROBLEM SELECTOR PLAN 8
DVT prophylaxis  -on coumadin chronic  -continue home simbrinza BID (therapeutic interchange) and lantaprost once a day (please make sure they are not given together)

## 2025-03-14 NOTE — ED ADULT NURSE NOTE - NSFALLHARMRISKINTERV_ED_ALL_ED

## 2025-03-14 NOTE — H&P ADULT - PROBLEM SELECTOR PLAN 6
-family denies radiation or chemotherapy, but receives injections outpatient twice a month   -continue home prosteon 2 tablets in the morning and 2 tablets in the evening chronic  -continue home allopurinol

## 2025-03-14 NOTE — H&P ADULT - NSHPPHYSICALEXAM_GEN_ALL_CORE
PHYSICAL EXAM:  General: Lying in bed comfortably. No acute distress. Pleasant.  Head: Atraumatic, normocephalic.  ENT: Moist mucous membranes. No exudates.   Heart: Irregular rhythm.   Lungs: CTA b/l.   Abdomen: Soft, nontender, nondistended. Normoactive bowel sounds present. No palpable masses.  Back: CVA tenderness on the right and lower right rib pain.   Extremities: 2+ Peripheral pulses bilaterally. Left lower leg extremity appears larger than right at baseline. Mild edema (+1) present on b/l legs.   Nervous System: A&Ox2 (oriented to self and knows he's at the hospital). Dementia, unreliable narrator. Follow commands. Able to move all 4 extremities.

## 2025-03-14 NOTE — H&P ADULT - ATTENDING COMMENTS
pt was seen and examined at bedside.   This is a 83-year-old male with a past medical history of A-fib on coumadin and amio, dementia, CHF, prostate cancer, on home O2 3L since last year, levoscoliosis, nonambulatory presents to the ED with a chief complaint of right flank/rib pain. Admitted for irregular heart rate monitoring, compensated acid- base abnormality and possible right rib fracture( healed)  on PE,  General: Lying in bed comfortably. No acute distress.  pt has dementia at baseline  Head: Atraumatic, normocephalic.  ENT: Moist mucous membranes. No exudates.   Heart: Irregular rhythm.   Lungs: CTA b/l. no rales or rhonchi  Abdomen: Soft, nontender, nondistended. Normoactive bowel sounds present. No palpable masses.  Back: CVA tenderness on the right and lower right rib pain.   Extremities: 2+ Peripheral pulses bilaterally. Left lower leg extremity appears larger than right at baseline. Mild edema (+1) present on b/l legs.   Nervous System: A&Ox2 (oriented to self and knows he's at the hospital). Dementia, unreliable narrator. Follow commands.     Assessment and Plan  1) acid-base abnormality- compensated well.   compensated Respiratory acidosis-   ICU consulted  will get Nephrology consult  urine studies  2) Bradycardia- possibly due to AV sangita blocking agents  will hold OFF  cardiology consult  Telemetry monitoring  3) CHF  continue diuretics   tele   TTE  cardio consult.   4) Afib  continue rhythm control with AMio  and Coumadin with INR monitoring.     Case discussed with resident.

## 2025-03-14 NOTE — ED ADULT TRIAGE NOTE - CHIEF COMPLAINT QUOTE
bibems from home c/o right flank pain radiating to front of lower abdomen x1 week. Patient aid states last time this was a UTI w similar pain. O2 ATC,

## 2025-03-14 NOTE — H&P ADULT - ASSESSMENT
83-year-old male with a past medical history of A-fib on coumadin and amio, dementia, CHF, prostate cancer, on home O2 3L since last year, levoscoliosis, nonambulatory presents to the ED with a chief complaint of right flank/rib pain. Admitted for irregular heart rate monitoring, and possible right rib fracture.   83-year-old male with a past medical history of A-fib on coumadin and amio, dementia, CHF, prostate cancer, on home O2 3L since last year, levoscoliosis, nonambulatory presents to the ED with a chief complaint of right flank/rib pain. Admitted for irregular heart rate monitoring, compensated respiratory acidosis, and possible right rib fracture.
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
## 2025-03-14 NOTE — H&P ADULT - PROBLEM SELECTOR PLAN 5
chronic  -continue home allopurinol -on home lasix 20mg only IF patient's systolic BP is between 110 or 140  -TTE ordered, f/u results   -fluid restriction diet  -cardio consulted

## 2025-03-14 NOTE — CONSULT NOTE ADULT - ASSESSMENT
Assessment:  1. Compensated Respiratory Acidosis  2. Bradycardia    Plan:  Patient's blood gas appears chronically compensated at baseline as HCO3 45. No indication for NIV at this time.   Troponin negative x1. EKG nonischemic. Electrolytes, renal indices wnl. Serial EKG/Luis Armando. Recommend holding AV sangita blockers.   Recommend Cards  / Nephrology consultation.     Hemodynamically stable. At baseline mentation and baseline home O2 requirements.    Case and plan discussed in detail with MICU attending, Dr. Kinney. At this time, patient is unlikely to gain additional benefit from ICU level of care. Please reconsult should clinical condition change.

## 2025-03-14 NOTE — ED PROVIDER NOTE - NS ED MD TWO NIGHTS YN
Last office visit 02/03/17. Lorazepam last Rx'd 03/15/17 #60, no refills. PDMP report printed for review.    Yes

## 2025-03-15 LAB
ALBUMIN SERPL ELPH-MCNC: 2.9 G/DL — LOW (ref 3.3–5)
ALP SERPL-CCNC: 65 U/L — SIGNIFICANT CHANGE UP (ref 40–120)
ALT FLD-CCNC: 16 U/L — SIGNIFICANT CHANGE UP (ref 12–78)
ANION GAP SERPL CALC-SCNC: 2 MMOL/L — LOW (ref 5–17)
APTT BLD: 41 SEC — HIGH (ref 24.5–35.6)
AST SERPL-CCNC: 15 U/L — SIGNIFICANT CHANGE UP (ref 15–37)
BASOPHILS # BLD AUTO: 0.01 K/UL — SIGNIFICANT CHANGE UP (ref 0–0.2)
BASOPHILS NFR BLD AUTO: 0.3 % — SIGNIFICANT CHANGE UP (ref 0–2)
BILIRUB SERPL-MCNC: 1.2 MG/DL — SIGNIFICANT CHANGE UP (ref 0.2–1.2)
BUN SERPL-MCNC: 15 MG/DL — SIGNIFICANT CHANGE UP (ref 7–23)
CALCIUM SERPL-MCNC: 8.8 MG/DL — SIGNIFICANT CHANGE UP (ref 8.5–10.1)
CHLORIDE SERPL-SCNC: 98 MMOL/L — SIGNIFICANT CHANGE UP (ref 96–108)
CO2 SERPL-SCNC: 38 MMOL/L — HIGH (ref 22–31)
CREAT SERPL-MCNC: 0.97 MG/DL — SIGNIFICANT CHANGE UP (ref 0.5–1.3)
EGFR: 77 ML/MIN/1.73M2 — SIGNIFICANT CHANGE UP
EGFR: 77 ML/MIN/1.73M2 — SIGNIFICANT CHANGE UP
EOSINOPHIL # BLD AUTO: 0.08 K/UL — SIGNIFICANT CHANGE UP (ref 0–0.5)
EOSINOPHIL NFR BLD AUTO: 2.6 % — SIGNIFICANT CHANGE UP (ref 0–6)
GLUCOSE SERPL-MCNC: 89 MG/DL — SIGNIFICANT CHANGE UP (ref 70–99)
HCT VFR BLD CALC: 37.2 % — LOW (ref 39–50)
HGB BLD-MCNC: 11.9 G/DL — LOW (ref 13–17)
IMM GRANULOCYTES NFR BLD AUTO: 0.6 % — SIGNIFICANT CHANGE UP (ref 0–0.9)
INR BLD: 2.08 RATIO — HIGH (ref 0.85–1.16)
LYMPHOCYTES # BLD AUTO: 0.72 K/UL — LOW (ref 1–3.3)
LYMPHOCYTES # BLD AUTO: 23.1 % — SIGNIFICANT CHANGE UP (ref 13–44)
MAGNESIUM SERPL-MCNC: 2.3 MG/DL — SIGNIFICANT CHANGE UP (ref 1.6–2.6)
MCHC RBC-ENTMCNC: 32 G/DL — SIGNIFICANT CHANGE UP (ref 32–36)
MCHC RBC-ENTMCNC: 32.2 PG — SIGNIFICANT CHANGE UP (ref 27–34)
MCV RBC AUTO: 100.5 FL — HIGH (ref 80–100)
MONOCYTES # BLD AUTO: 0.35 K/UL — SIGNIFICANT CHANGE UP (ref 0–0.9)
MONOCYTES NFR BLD AUTO: 11.2 % — SIGNIFICANT CHANGE UP (ref 2–14)
NEUTROPHILS # BLD AUTO: 1.94 K/UL — SIGNIFICANT CHANGE UP (ref 1.8–7.4)
NEUTROPHILS NFR BLD AUTO: 62.2 % — SIGNIFICANT CHANGE UP (ref 43–77)
NRBC BLD AUTO-RTO: 0 /100 WBCS — SIGNIFICANT CHANGE UP (ref 0–0)
PHOSPHATE SERPL-MCNC: 3.8 MG/DL — SIGNIFICANT CHANGE UP (ref 2.5–4.5)
PLATELET # BLD AUTO: 90 K/UL — LOW (ref 150–400)
POTASSIUM SERPL-MCNC: 4.1 MMOL/L — SIGNIFICANT CHANGE UP (ref 3.5–5.3)
POTASSIUM SERPL-SCNC: 4.1 MMOL/L — SIGNIFICANT CHANGE UP (ref 3.5–5.3)
PROT SERPL-MCNC: 5.4 G/DL — LOW (ref 6–8.3)
PROTHROM AB SERPL-ACNC: 24.2 SEC — HIGH (ref 9.9–13.4)
RBC # BLD: 3.7 M/UL — LOW (ref 4.2–5.8)
RBC # FLD: 14.6 % — HIGH (ref 10.3–14.5)
SODIUM SERPL-SCNC: 138 MMOL/L — SIGNIFICANT CHANGE UP (ref 135–145)
WBC # BLD: 3.12 K/UL — LOW (ref 3.8–10.5)
WBC # FLD AUTO: 3.12 K/UL — LOW (ref 3.8–10.5)

## 2025-03-15 PROCEDURE — 99233 SBSQ HOSP IP/OBS HIGH 50: CPT

## 2025-03-15 PROCEDURE — 99223 1ST HOSP IP/OBS HIGH 75: CPT

## 2025-03-15 RX ORDER — ACETAZOLAMIDE 250 MG/1
250 TABLET ORAL EVERY 12 HOURS
Refills: 0 | Status: COMPLETED | OUTPATIENT
Start: 2025-03-15 | End: 2025-03-16

## 2025-03-15 RX ADMIN — LATANOPROST PF 1 DROP(S): 0.05 SOLUTION/ DROPS OPHTHALMIC at 21:51

## 2025-03-15 RX ADMIN — BRINZOLAMIDE 1 DROP(S): 10 SUSPENSION/ DROPS OPHTHALMIC at 17:28

## 2025-03-15 RX ADMIN — Medication 2 MILLIGRAM(S): at 21:51

## 2025-03-15 RX ADMIN — Medication 300 MILLIGRAM(S): at 11:27

## 2025-03-15 RX ADMIN — FUROSEMIDE 20 MILLIGRAM(S): 10 INJECTION INTRAMUSCULAR; INTRAVENOUS at 05:37

## 2025-03-15 RX ADMIN — ACETAZOLAMIDE 250 MILLIGRAM(S): 250 TABLET ORAL at 15:37

## 2025-03-15 RX ADMIN — BRIMONIDINE TARTRATE 1 DROP(S): 1.5 SOLUTION/ DROPS OPHTHALMIC at 17:28

## 2025-03-15 RX ADMIN — BRINZOLAMIDE 1 DROP(S): 10 SUSPENSION/ DROPS OPHTHALMIC at 05:37

## 2025-03-15 RX ADMIN — TAMSULOSIN HYDROCHLORIDE 0.4 MILLIGRAM(S): 0.4 CAPSULE ORAL at 21:50

## 2025-03-15 RX ADMIN — BRIMONIDINE TARTRATE 1 DROP(S): 1.5 SOLUTION/ DROPS OPHTHALMIC at 05:37

## 2025-03-15 RX ADMIN — AMIODARONE HYDROCHLORIDE 200 MILLIGRAM(S): 50 INJECTION, SOLUTION INTRAVENOUS at 05:37

## 2025-03-15 NOTE — PATIENT PROFILE ADULT - FALL HARM RISK - HARM RISK INTERVENTIONS
Assistance with ambulation/Assistance OOB with selected safe patient handling equipment/Communicate Risk of Fall with Harm to all staff/Discuss with provider need for PT consult/Monitor for mental status changes/Monitor gait and stability/Reinforce activity limits and safety measures with patient and family/Reorient to person, place and time as needed/Tailored Fall Risk Interventions/Use of alarms - bed, chair and/or voice tab/Visual Cue: Yellow wristband and red socks/Bed in lowest position, wheels locked, appropriate side rails in place/Call bell, personal items and telephone in reach/Instruct patient to call for assistance before getting out of bed or chair/Non-slip footwear when patient is out of bed/Kite to call system/Physically safe environment - no spills, clutter or unnecessary equipment/Purposeful Proactive Rounding/Room/bathroom lighting operational, light cord in reach

## 2025-03-15 NOTE — PROGRESS NOTE ADULT - SUBJECTIVE AND OBJECTIVE BOX
Patient is a 85y old  Male who presents with a chief complaint of Right flank/rib pain (15 Mar 2025 08:23)      Subjective:  INTERVAL HPI/OVERNIGHT EVENTS: Patient seen and examined at bedside. No significant overnight events occurred. Patient reports that his rib pain is controlled. States that he continues to feel tired. On baseline 3L NC, satting well. Denies chest pain, palpitations, SOB, lightheadedness, dizziness, n/v/d/c.     MEDICATIONS  (STANDING):  allopurinol 300 milliGRAM(s) Oral daily  aMIOdarone    Tablet 200 milliGRAM(s) Oral daily  brimonidine 0.2% Ophthalmic Solution 1 Drop(s) Both EYES two times a day  brinzolamide 1% Ophthalmic Suspension 1 Drop(s) Both EYES two times a day  furosemide    Tablet 20 milliGRAM(s) Oral daily  latanoprost 0.005% Ophthalmic Solution 1 Drop(s) Both EYES at bedtime  lidocaine   4% Patch 1 Patch Transdermal daily  tamsulosin 0.4 milliGRAM(s) Oral at bedtime  warfarin 2 milliGRAM(s) Oral once    MEDICATIONS  (PRN):  acetaminophen   IVPB .. 1000 milliGRAM(s) IV Intermittent every 8 hours PRN Mild Pain (1 - 3)  ketorolac   Injectable 15 milliGRAM(s) IV Push every 6 hours PRN Moderate Pain (4 - 6)  ketorolac   Injectable 30 milliGRAM(s) IV Push every 6 hours PRN Severe Pain (7 - 10)      Allergies    No Known Allergies    Intolerances    REVIEW OF SYSTEMS: negative, except as noted in HPI     Objective:  Vital Signs Last 24 Hrs  T(C): 36.8 (15 Mar 2025 04:38), Max: 36.9 (14 Mar 2025 22:00)  T(F): 98.3 (15 Mar 2025 04:38), Max: 98.4 (14 Mar 2025 22:00)  HR: 61 (15 Mar 2025 04:38) (53 - 61)  BP: 147/77 (15 Mar 2025 04:38) (136/94 - 158/70)  BP(mean): --  RR: 18 (15 Mar 2025 04:38) (18 - 18)  SpO2: 96% (15 Mar 2025 04:38) (95% - 98%)    Parameters below as of 15 Mar 2025 04:38  Patient On (Oxygen Delivery Method): nasal cannula  O2 Flow (L/min): 3      GENERAL: NAD, lying in bed comfortably  HEAD:  Atraumatic, Normocephalic  EYES: conjunctiva and sclera clear  ENT: Moist mucous membranes  NECK: Supple, No JVD  CHEST/LUNG: No rales, rhonchi, wheezing, or rubs. Unlabored respirations  HEART: irregular, bradycardic, No murmurs, rubs, or gallops  ABDOMEN: Bowel sounds present; Soft, Nontender, Nondistended. No hepatomegaly  EXTREMITIES:  2+ Peripheral Pulses, brisk capillary refill. No clubbing, cyanosis, or edema  NERVOUS SYSTEM:  Alert & Oriented X3, speech clear. No deficits   MSK: FROM all 4 extremities, full and equal strength  SKIN: No rashes or lesions    LABS:                        11.9   3.12  )-----------( 90       ( 15 Mar 2025 07:26 )             37.2     CBC Full  -  ( 15 Mar 2025 07:26 )  WBC Count : 3.12 K/uL  Hemoglobin : 11.9 g/dL  Hematocrit : 37.2 %  Platelet Count - Automated : 90 K/uL  Mean Cell Volume : 100.5 fl  Mean Cell Hemoglobin : 32.2 pg  Mean Cell Hemoglobin Concentration : 32.0 g/dL  Auto Neutrophil # : x  Auto Lymphocyte # : x  Auto Monocyte # : x  Auto Eosinophil # : x  Auto Basophil # : x  Auto Neutrophil % : x  Auto Lymphocyte % : x  Auto Monocyte % : x  Auto Eosinophil % : x  Auto Basophil % : x    15 Mar 2025 07:26    138    |  98     |  15     ----------------------------<  89     4.1     |  38     |  0.97     Ca    8.8        15 Mar 2025 07:26  Phos  3.8       15 Mar 2025 07:26  Mg     2.3       15 Mar 2025 07:26    TPro  5.4    /  Alb  2.9    /  TBili  1.2    /  DBili  x      /  AST  15     /  ALT  16     /  AlkPhos  65     15 Mar 2025 07:26    PT/INR - ( 15 Mar 2025 07:26 )   PT: 24.2 sec;   INR: 2.08 ratio         PTT - ( 15 Mar 2025 07:26 )  PTT:41.0 sec  Urinalysis Basic - ( 15 Mar 2025 07:26 )    Color: x / Appearance: x / SG: x / pH: x  Gluc: 89 mg/dL / Ketone: x  / Bili: x / Urobili: x   Blood: x / Protein: x / Nitrite: x   Leuk Esterase: x / RBC: x / WBC x   Sq Epi: x / Non Sq Epi: x / Bacteria: x      CAPILLARY BLOOD GLUCOSE            Urinalysis with Rflx Culture (collected 03-14-25 @ 14:10)        RADIOLOGY & ADDITIONAL TESTS:    Personally reviewed.     Consultant(s) Notes Reviewed:  [x] YES  [ ] NO     Patient is a 85y old  Male who presents with a chief complaint of Right flank/rib pain (15 Mar 2025 08:23)      Subjective:  INTERVAL HPI/OVERNIGHT EVENTS: Patient seen and examined at bedside. No significant overnight events occurred. Patient reports that his rib pain is controlled. States that he continues to feel tired. On baseline 3L NC, satting well. Denies chest pain, palpitations, SOB, lightheadedness, dizziness, n/v/d/c.     MEDICATIONS  (STANDING):  allopurinol 300 milliGRAM(s) Oral daily  aMIOdarone    Tablet 200 milliGRAM(s) Oral daily  brimonidine 0.2% Ophthalmic Solution 1 Drop(s) Both EYES two times a day  brinzolamide 1% Ophthalmic Suspension 1 Drop(s) Both EYES two times a day  furosemide    Tablet 20 milliGRAM(s) Oral daily  latanoprost 0.005% Ophthalmic Solution 1 Drop(s) Both EYES at bedtime  lidocaine   4% Patch 1 Patch Transdermal daily  tamsulosin 0.4 milliGRAM(s) Oral at bedtime  warfarin 2 milliGRAM(s) Oral once    MEDICATIONS  (PRN):  acetaminophen   IVPB .. 1000 milliGRAM(s) IV Intermittent every 8 hours PRN Mild Pain (1 - 3)  ketorolac   Injectable 15 milliGRAM(s) IV Push every 6 hours PRN Moderate Pain (4 - 6)  ketorolac   Injectable 30 milliGRAM(s) IV Push every 6 hours PRN Severe Pain (7 - 10)      Allergies    No Known Allergies    Intolerances    REVIEW OF SYSTEMS: negative, except as noted in HPI     Objective:  Vital Signs Last 24 Hrs  T(C): 36.8 (15 Mar 2025 04:38), Max: 36.9 (14 Mar 2025 22:00)  T(F): 98.3 (15 Mar 2025 04:38), Max: 98.4 (14 Mar 2025 22:00)  HR: 61 (15 Mar 2025 04:38) (53 - 61)  BP: 147/77 (15 Mar 2025 04:38) (136/94 - 158/70)  BP(mean): --  RR: 18 (15 Mar 2025 04:38) (18 - 18)  SpO2: 96% (15 Mar 2025 04:38) (95% - 98%)    Parameters below as of 15 Mar 2025 04:38  Patient On (Oxygen Delivery Method): nasal cannula  O2 Flow (L/min): 3      GENERAL: NAD, lying in bed comfortably  HEAD:  Atraumatic, Normocephalic  EYES: conjunctiva and sclera clear  ENT: Moist mucous membranes  NECK: Supple  CHEST/LUNG: No rales, rhonchi, wheezing, or rubs. Unlabored respirations  HEART: irregular, bradycardic, No murmurs, rubs, or gallops; 1+ B/L LE edema L>R   ABDOMEN: Soft, Nontender, Nondistended.   EXTREMITIES: No clubbing or cyanosis   NERVOUS SYSTEM:  awake, answering questions and following commands   MSK: Grossly moving all extremities without difficulty   SKIN: No obvious rashes or lesions    LABS:                        11.9   3.12  )-----------( 90       ( 15 Mar 2025 07:26 )             37.2     CBC Full  -  ( 15 Mar 2025 07:26 )  WBC Count : 3.12 K/uL  Hemoglobin : 11.9 g/dL  Hematocrit : 37.2 %  Platelet Count - Automated : 90 K/uL  Mean Cell Volume : 100.5 fl  Mean Cell Hemoglobin : 32.2 pg  Mean Cell Hemoglobin Concentration : 32.0 g/dL  Auto Neutrophil # : x  Auto Lymphocyte # : x  Auto Monocyte # : x  Auto Eosinophil # : x  Auto Basophil # : x  Auto Neutrophil % : x  Auto Lymphocyte % : x  Auto Monocyte % : x  Auto Eosinophil % : x  Auto Basophil % : x    15 Mar 2025 07:26    138    |  98     |  15     ----------------------------<  89     4.1     |  38     |  0.97     Ca    8.8        15 Mar 2025 07:26  Phos  3.8       15 Mar 2025 07:26  Mg     2.3       15 Mar 2025 07:26    TPro  5.4    /  Alb  2.9    /  TBili  1.2    /  DBili  x      /  AST  15     /  ALT  16     /  AlkPhos  65     15 Mar 2025 07:26    PT/INR - ( 15 Mar 2025 07:26 )   PT: 24.2 sec;   INR: 2.08 ratio         PTT - ( 15 Mar 2025 07:26 )  PTT:41.0 sec  Urinalysis Basic - ( 15 Mar 2025 07:26 )    Color: x / Appearance: x / SG: x / pH: x  Gluc: 89 mg/dL / Ketone: x  / Bili: x / Urobili: x   Blood: x / Protein: x / Nitrite: x   Leuk Esterase: x / RBC: x / WBC x   Sq Epi: x / Non Sq Epi: x / Bacteria: x      CAPILLARY BLOOD GLUCOSE            Urinalysis with Rflx Culture (collected 03-14-25 @ 14:10)        RADIOLOGY & ADDITIONAL TESTS:    Personally reviewed.     Consultant(s) Notes Reviewed:  [x] YES  [ ] NO

## 2025-03-15 NOTE — PROGRESS NOTE ADULT - PROBLEM SELECTOR PLAN 7
-family denies radiation or chemotherapy, but receives injections outpatient twice a month   -continue home prosteon 2 tablets in the morning and 2 tablets in the evening, asked family to bring in from home  -continue home tamsulosin

## 2025-03-15 NOTE — CONSULT NOTE ADULT - ATTENDING COMMENTS
I have personally seen and examined the patient in detail.  I have spoken to the provider regarding the assessment and plan of care.  I have made changes to the note accordingly. I have personally seen and examined the patient in detail.  I have spoken to the provider regarding the assessment and plan of care.  I have made changes to the note accordingly.    83-year-old male with a past medical history of A-fib on coumadin and amio, dementia, CHF, prostate cancer, on home O2 3L since last year, levoscoliosis, nonambulatory presents to the ED with a chief complaint of right flank/rib pain. Admitted for irregular heart rate monitoring, compensated respiratory acidosis, and possible right rib fracture. Cardiology consulted for bradycardia, history of CHF (unspecified)    CHF (Unspecified)  - Pt with hx CHF (unspecified, states HFrEF per chart review at [facility] in 2023) on lasix and toprol  - Pt with 1+ pitting edema on exam, L>R   - f/u duplex to r/o DVT  - TTE 3/2022: EF 40%, mod MR, mild-mod AR  - TTE ordered, follow up results   - c/w home lasix 20mg qd, as per admission notes only as BP tolerates (-140)  - Hold home metoprolol given bradycardia on admission  - On home supplemental O2 (3L), currently at baseline  - Trend renal indices   - Continue Fluid restriction  - Strict I/Os, daily weights    Bradycardia/pAF  - Pt bradycardic on admission, per H&P also with intermittent spikes to the 110s-170s  - EKG: Sinus bradycardia, first degree AVB rate 49bpm. LBBB. QTc 457ms.   - Reviewed w/ telemetry, NSR 50s-60s, no events  - metoprolol held  - c/w amio  - c/w coumadin dosing based on INR (per H&P, patient is on 4mg on Tu/Th, 2mg coumadin other days)

## 2025-03-15 NOTE — PROGRESS NOTE ADULT - PROBLEM SELECTOR PLAN 8
chronic  -continue home simbrinza BID (therapeutic interchange) and lantaprost once a day (please make sure they are not given together)

## 2025-03-15 NOTE — CHART NOTE - NSCHARTNOTEFT_GEN_A_CORE
Diet changed to low-fat per patient's daughter request. Patient's daughter states patient has Hx of pancreatitis.     Family requesting daughter Nadine William ([phone]) to be contacted with updates. Diet changed to low-fat per patient's family request. Family states patient has Hx of pancreatitis.     Family requesting daughter Nadine William ([phone]) to be contacted with updates.

## 2025-03-15 NOTE — PROGRESS NOTE ADULT - PROBLEM SELECTOR PLAN 3
Bradycardic on arrival, and intermittently spikes up to 170s-180s  -EKBPM, sinus jose LBBB, QRs: 457  -baseline HR around 60s-70s, family at bedside endorses this is new   -TTE ordered, f/u results   -holding home AV sangita blocker (toprol), will continue home amio  -monitor electrolytes   -monitor on tele  -cardio consulted ([facility])

## 2025-03-15 NOTE — PROGRESS NOTE ADULT - PROBLEM SELECTOR PLAN 2
AB.4, pCO2: 73, pO2: 80, HCO3: 45, O2 sat: 98.5   -respiratory acidosis and metabolic alkalosis, appears compensated  -ordered urine cloride  -consulted nephro (Dr. Olivier added as provider) AB.4, pCO2: 73, pO2: 80, HCO3: 45, O2 sat: 98.5   -respiratory acidosis and metabolic alkalosis, appears compensated  -ordered urine chloride  -consulted nephro (Dr. Olivier added as provider)

## 2025-03-15 NOTE — PROGRESS NOTE ADULT - PROBLEM SELECTOR PLAN 4
-ONLY FOR TONIGHT will order 6mg coumadin as per patient's home morning INR labs. Wife's paper clearly states for 6mg tonight. Patient is on 2mg coumadin every day other than Tues& Thurs (when he's on 4mg). Primary team to order each night.   -Continue home amio  -monitor electrolytes   -Would hold home toprol as per ICU Patient is on 2mg coumadin every day other than Tues& Thurs (when he's on 4mg).   -INR 2.08 this morning, ordered Coumadin 2mg for today   -Continue home amio  -monitor electrolytes   -Continue to hold home Toprol due to bradycardia

## 2025-03-15 NOTE — CONSULT NOTE ADULT - SUBJECTIVE AND OBJECTIVE BOX
Patient is a 85y old  Male who presents with a chief complaint of Right flank/rib pain (14 Mar 2025 18:30)      HPI:   83-year-old male with a past medical history of A-fib on coumadin and amio, dementia, CHF, prostate cancer, on home O2 3L since last year, nonambulatory presents to the ED with a chief complaint of right flank/rib pain. Patient's wife and one of his 5 aides are at bedside providing history. Family endorses patient has been complaining of pain since Monday, gradually getting worse. With more prompting, they admit patient has a hospital bed at home and one of the aides last Saturday pressed one of the buttons too quickly and the top part of the bed hyperextended very fast. Otherwise does not remember any other trauma. They are unsure if this is the cause of the injury. Patient has dementia at baseline and unable to provide much of the subjective other than that he is not in pain when he is not moving. Family denies any fevers, chills, sick contacts.     Of note, patient was last admitted to the hospital  through 2024 with UTI,  urine was positive for Enterococcus, and was discharged with antibiotics.     vitals: 97.1F, 55HR, 153/93BP, 18RR, 96% O2 on 3L, 18RR  AB.4, pCO2: 73, pO2: 80, HCO3: 45, O2 sat: 98.5   labs: bicarb: 37, lipase: 85, hgb: 12.9, platelet: 135  trop negative 2x (20.7, 23.6)  UA: trace ketone, trace leuk esterase, present hyaline casts and squamous   CT Angio with IV Chest, Abd/Pelvis: Flow artifact right lower lobe pulmonary artery.  No acute pulmonary embolism. Mild bilateral septal thickening, could reflect interstitial edema. Trace bilateral pleural effusions with subpleural atelectasis costophrenic angles. No acute intra-abdominal pathology. Small fat-containing umbilical hernia. Bilateral fat-containing inguinal hernias. Old right-sided rib fracture.    EKBPM, sinus jose LBBB, QRs: 457  received in the ED: ofrimev 1g 1x, and NaCL 0.9% 500ml 1x    (14 Mar 2025 18:30)      PAST MEDICAL & SURGICAL HISTORY:  Afib      Prostate cancer      Heart failure      History of scoliosis      S/P anal fissurectomy                ECHO  FINDINGS:      MEDICATIONS  (STANDING):  allopurinol 300 milliGRAM(s) Oral daily  aMIOdarone    Tablet 200 milliGRAM(s) Oral daily  brimonidine 0.2% Ophthalmic Solution 1 Drop(s) Both EYES two times a day  brinzolamide 1% Ophthalmic Suspension 1 Drop(s) Both EYES two times a day  furosemide    Tablet 20 milliGRAM(s) Oral daily  latanoprost 0.005% Ophthalmic Solution 1 Drop(s) Both EYES at bedtime  lidocaine   4% Patch 1 Patch Transdermal daily  tamsulosin 0.4 milliGRAM(s) Oral at bedtime    MEDICATIONS  (PRN):  acetaminophen   IVPB .. 1000 milliGRAM(s) IV Intermittent every 8 hours PRN Mild Pain (1 - 3)  ketorolac   Injectable 15 milliGRAM(s) IV Push every 6 hours PRN Moderate Pain (4 - 6)  ketorolac   Injectable 30 milliGRAM(s) IV Push every 6 hours PRN Severe Pain (7 - 10)      FAMILY HISTORY:    Denies Family history of CAD or early MI      Constitutional: denies fever, chills  HEENT: denies blurry vision, difficulty hearing  Respiratory: denies SOB, WEBB, cough  Cardiovascular: denies CP, palpitations, orthopnea, PND, LE edema  Gastrointestinal: denies nausea, vomiting, abdominal pain  Genitourinary: denies urinary changes  Skin: Denies rashes, itching  Neurologic: denies headache, weakness, dizziness  Hematology/Oncology: denies bleeding, easy bruising  ROS negative except as noted above      SOCIAL HISTORY:    No tobacco, Alcohol or Ddrug use    Vital Signs Last 24 Hrs  T(C): 36.8 (15 Mar 2025 04:38), Max: 36.9 (14 Mar 2025 22:00)  T(F): 98.3 (15 Mar 2025 04:38), Max: 98.4 (14 Mar 2025 22:00)  HR: 61 (15 Mar 2025 04:38) (53 - 61)  BP: 147/77 (15 Mar 2025 04:38) (136/94 - 158/70)  BP(mean): --  RR: 18 (15 Mar 2025 04:38) (18 - 18)  SpO2: 96% (15 Mar 2025 04:38) (95% - 98%)    Parameters below as of 15 Mar 2025 04:38  Patient On (Oxygen Delivery Method): nasal cannula  O2 Flow (L/min): 3      Physical Exam:  General: Well developed, well nourished, NAD  HEENT: NCAT, PERRLA, EOMI bl, moist mucous membranes   Neck: Supple, nontender, no mass  Neurology: A&Ox3, nonfocal, sensation intact   Respiratory: CTA B/L, No W/R/R  CV: RRR, +S1/S2, no murmurs, rubs or gallops  Abdominal: Soft, NT, ND +BSx4, no palpable masses  Extremities: No C/C/E, + peripheral pulses  MSK: Normal ROM, no joint erythema or warmth, no joint swelling   Heme: No obvious ecchymosis or petechiae   Skin: warm, dry, normal color      ECG:    I&O's Detail    14 Mar 2025 07:01  -  15 Mar 2025 07:00  --------------------------------------------------------  IN:  Total IN: 0 mL    OUT:    Voided (mL): 400 mL  Total OUT: 400 mL    Total NET: -400 mL          LABS:                        11.9   3.12  )-----------( x        ( 15 Mar 2025 07:26 )             37.2     03-14    139  |  95[L]  |  14  ----------------------------<  112[H]  4.3   |  37[H]  |  1.10    Ca    8.6      14 Mar 2025 13:15  Mg     2.1     03-14    TPro  6.3  /  Alb  3.1[L]  /  TBili  1.2  /  DBili  x   /  AST  28  /  ALT  15  /  AlkPhos  69  03-14        PT/INR - ( 15 Mar 2025 07:26 )   PT: 24.2 sec;   INR: 2.08 ratio         PTT - ( 15 Mar 2025 07:26 )  PTT:41.0 sec  Urinalysis Basic - ( 14 Mar 2025 14:10 )    Color: Dark Yellow / Appearance: Clear / S.020 / pH: x  Gluc: x / Ketone: Trace mg/dL  / Bili: Negative / Urobili: 1.0 mg/dL   Blood: x / Protein: Trace mg/dL / Nitrite: Negative   Leuk Esterase: Trace / RBC: 1 /HPF / WBC 2 /HPF   Sq Epi: x / Non Sq Epi: x / Bacteria: Negative /HPF      I&O's Summary    14 Mar 2025 07:01  -  15 Mar 2025 07:00  --------------------------------------------------------  IN: 0 mL / OUT: 400 mL / NET: -400 mL      BNP  RADIOLOGY & ADDITIONAL STUDIES:  < from: CT Abdomen and Pelvis w/ IV Cont (25 @ 15:46) >  IMPRESSION:    Flow artifact right lower lobe pulmonary artery.  No acute pulmonary embolism.    Mild bilateral septal thickening, could reflect interstitial edema.  Trace bilateral pleural effusions with subpleural atelectasis   costophrenic angles.    No acute intra-abdominal pathology.    Other findings as discussed above.    < end of copied text >   Patient is a 85y old  Male who presents with a chief complaint of Right flank/rib pain (14 Mar 2025 18:30)      HPI:   83-year-old male with a past medical history of A-fib on coumadin and amio, dementia, CHF, prostate cancer, on home O2 3L since last year, nonambulatory presents to the ED with a chief complaint of right flank/rib pain. Patient's wife and one of his 5 aides are at bedside providing history. Family endorses patient has been complaining of pain since Monday, gradually getting worse. With more prompting, they admit patient has a hospital bed at home and one of the aides last Saturday pressed one of the buttons too quickly and the top part of the bed hyperextended very fast. Otherwise does not remember any other trauma. They are unsure if this is the cause of the injury. Patient has dementia at baseline and unable to provide much of the subjective other than that he is not in pain when he is not moving. Family denies any fevers, chills, sick contacts.     Of note, patient was last admitted to the hospital  through 2024 with UTI,  urine was positive for Enterococcus, and was discharged with antibiotics.     vitals: 97.1F, 55HR, 153/93BP, 18RR, 96% O2 on 3L, 18RR  AB.4, pCO2: 73, pO2: 80, HCO3: 45, O2 sat: 98.5   labs: bicarb: 37, lipase: 85, hgb: 12.9, platelet: 135  trop negative 2x (20.7, 23.6)  UA: trace ketone, trace leuk esterase, present hyaline casts and squamous   CT Angio with IV Chest, Abd/Pelvis: Flow artifact right lower lobe pulmonary artery.  No acute pulmonary embolism. Mild bilateral septal thickening, could reflect interstitial edema. Trace bilateral pleural effusions with subpleural atelectasis costophrenic angles. No acute intra-abdominal pathology. Small fat-containing umbilical hernia. Bilateral fat-containing inguinal hernias. Old right-sided rib fracture.    EKBPM, sinus jose LBBB, QRs: 457  received in the ED: ofrimev 1g 1x, and NaCL 0.9% 500ml 1x    (14 Mar 2025 18:30)      PAST MEDICAL & SURGICAL HISTORY:  Afib      Prostate cancer      Heart failure      History of scoliosis      S/P anal fissurectomy                ECHO  FINDINGS:      MEDICATIONS  (STANDING):  allopurinol 300 milliGRAM(s) Oral daily  aMIOdarone    Tablet 200 milliGRAM(s) Oral daily  brimonidine 0.2% Ophthalmic Solution 1 Drop(s) Both EYES two times a day  brinzolamide 1% Ophthalmic Suspension 1 Drop(s) Both EYES two times a day  furosemide    Tablet 20 milliGRAM(s) Oral daily  latanoprost 0.005% Ophthalmic Solution 1 Drop(s) Both EYES at bedtime  lidocaine   4% Patch 1 Patch Transdermal daily  tamsulosin 0.4 milliGRAM(s) Oral at bedtime    MEDICATIONS  (PRN):  acetaminophen   IVPB .. 1000 milliGRAM(s) IV Intermittent every 8 hours PRN Mild Pain (1 - 3)  ketorolac   Injectable 15 milliGRAM(s) IV Push every 6 hours PRN Moderate Pain (4 - 6)  ketorolac   Injectable 30 milliGRAM(s) IV Push every 6 hours PRN Severe Pain (7 - 10)      FAMILY HISTORY:    Denies Family history of CAD or early MI      Constitutional: denies fever, chills  HEENT: denies blurry vision, difficulty hearing  Respiratory: denies SOB, WEBB, cough  Cardiovascular: denies CP, palpitations, orthopnea, PND, LE edema  Gastrointestinal: denies nausea, vomiting, abdominal pain  Genitourinary: denies urinary changes  Skin: Denies rashes, itching  Neurologic: denies headache, weakness, dizziness  Hematology/Oncology: denies bleeding, easy bruising  ROS negative except as noted above      SOCIAL HISTORY:    No tobacco, Alcohol or Ddrug use    Vital Signs Last 24 Hrs  T(C): 36.8 (15 Mar 2025 04:38), Max: 36.9 (14 Mar 2025 22:00)  T(F): 98.3 (15 Mar 2025 04:38), Max: 98.4 (14 Mar 2025 22:00)  HR: 61 (15 Mar 2025 04:38) (53 - 61)  BP: 147/77 (15 Mar 2025 04:38) (136/94 - 158/70)  BP(mean): --  RR: 18 (15 Mar 2025 04:38) (18 - 18)  SpO2: 96% (15 Mar 2025 04:38) (95% - 98%)    Parameters below as of 15 Mar 2025 04:38  Patient On (Oxygen Delivery Method): nasal cannula  O2 Flow (L/min): 3      Physical Exam:  General: Well developed, well nourished, NAD  HEENT: NCAT   Neurology: A&Ox2 to person and setting, nonfocal  Respiratory: CTA B/L, No wheezes, rales or crackles  CV: RRR, +S1/S2, no murmurs  Abdominal: Soft, NT, ND   Extremities: 1+ pitting edema bilaterally, L>R  Skin: warm, dry, normal color      ECG:    I&O's Detail    14 Mar 2025 07:01  -  15 Mar 2025 07:00  --------------------------------------------------------  IN:  Total IN: 0 mL    OUT:    Voided (mL): 400 mL  Total OUT: 400 mL    Total NET: -400 mL          LABS:                        11.9   3.12  )-----------( x        ( 15 Mar 2025 07:26 )             37.2     03-14    139  |  95[L]  |  14  ----------------------------<  112[H]  4.3   |  37[H]  |  1.10    Ca    8.6      14 Mar 2025 13:15  Mg     2.1     03-14    TPro  6.3  /  Alb  3.1[L]  /  TBili  1.2  /  DBili  x   /  AST  28  /  ALT  15  /  AlkPhos  69  03-14        PT/INR - ( 15 Mar 2025 07:26 )   PT: 24.2 sec;   INR: 2.08 ratio         PTT - ( 15 Mar 2025 07:26 )  PTT:41.0 sec  Urinalysis Basic - ( 14 Mar 2025 14:10 )    Color: Dark Yellow / Appearance: Clear / S.020 / pH: x  Gluc: x / Ketone: Trace mg/dL  / Bili: Negative / Urobili: 1.0 mg/dL   Blood: x / Protein: Trace mg/dL / Nitrite: Negative   Leuk Esterase: Trace / RBC: 1 /HPF / WBC 2 /HPF   Sq Epi: x / Non Sq Epi: x / Bacteria: Negative /HPF      I&O's Summary    14 Mar 2025 07:01  -  15 Mar 2025 07:00  --------------------------------------------------------  IN: 0 mL / OUT: 400 mL / NET: -400 mL      BNP  RADIOLOGY & ADDITIONAL STUDIES:  < from: CT Abdomen and Pelvis w/ IV Cont (25 @ 15:46) >  IMPRESSION:    Flow artifact right lower lobe pulmonary artery.  No acute pulmonary embolism.    Mild bilateral septal thickening, could reflect interstitial edema.  Trace bilateral pleural effusions with subpleural atelectasis   costophrenic angles.    No acute intra-abdominal pathology.    Other findings as discussed above.    < end of copied text >   Patient is a 85y old  Male who presents with a chief complaint of Right flank/rib pain (14 Mar 2025 18:30)    Interval HPI: Pt seen and examined at bedside. Pt poor historian 2/2 baseline dementia. Pt has no acute complaints, denies chest pain, dizziness, n/v. Per chart review, pt follows Dr. Loredo for cardiology, and has had a DCCV for pAF in .     HPI:   83-year-old male with a past medical history of A-fib on coumadin and amio, dementia, CHF, prostate cancer, on home O2 3L since last year, nonambulatory presents to the ED with a chief complaint of right flank/rib pain. Patient's wife and one of his 5 aides are at bedside providing history. Family endorses patient has been complaining of pain since Monday, gradually getting worse. With more prompting, they admit patient has a hospital bed at home and one of the aides last Saturday pressed one of the buttons too quickly and the top part of the bed hyperextended very fast. Otherwise does not remember any other trauma. They are unsure if this is the cause of the injury. Patient has dementia at baseline and unable to provide much of the subjective other than that he is not in pain when he is not moving. Family denies any fevers, chills, sick contacts.     Of note, patient was last admitted to the hospital  through 2024 with UTI,  urine was positive for Enterococcus, and was discharged with antibiotics.     vitals: 97.1F, 55HR, 153/93BP, 18RR, 96% O2 on 3L, 18RR  AB.4, pCO2: 73, pO2: 80, HCO3: 45, O2 sat: 98.5   labs: bicarb: 37, lipase: 85, hgb: 12.9, platelet: 135  trop negative 2x (20.7, 23.6)  UA: trace ketone, trace leuk esterase, present hyaline casts and squamous   CT Angio with IV Chest, Abd/Pelvis: Flow artifact right lower lobe pulmonary artery.  No acute pulmonary embolism. Mild bilateral septal thickening, could reflect interstitial edema. Trace bilateral pleural effusions with subpleural atelectasis costophrenic angles. No acute intra-abdominal pathology. Small fat-containing umbilical hernia. Bilateral fat-containing inguinal hernias. Old right-sided rib fracture.    EKBPM, sinus jose LBBB, QRs: 457  received in the ED: ofrimev 1g 1x, and NaCL 0.9% 500ml 1x    (14 Mar 2025 18:30)      PAST MEDICAL & SURGICAL HISTORY:  Afib      Prostate cancer      Heart failure      History of scoliosis      S/P anal fissurectomy                ECHO  FINDINGS:      MEDICATIONS  (STANDING):  allopurinol 300 milliGRAM(s) Oral daily  aMIOdarone    Tablet 200 milliGRAM(s) Oral daily  brimonidine 0.2% Ophthalmic Solution 1 Drop(s) Both EYES two times a day  brinzolamide 1% Ophthalmic Suspension 1 Drop(s) Both EYES two times a day  furosemide    Tablet 20 milliGRAM(s) Oral daily  latanoprost 0.005% Ophthalmic Solution 1 Drop(s) Both EYES at bedtime  lidocaine   4% Patch 1 Patch Transdermal daily  tamsulosin 0.4 milliGRAM(s) Oral at bedtime    MEDICATIONS  (PRN):  acetaminophen   IVPB .. 1000 milliGRAM(s) IV Intermittent every 8 hours PRN Mild Pain (1 - 3)  ketorolac   Injectable 15 milliGRAM(s) IV Push every 6 hours PRN Moderate Pain (4 - 6)  ketorolac   Injectable 30 milliGRAM(s) IV Push every 6 hours PRN Severe Pain (7 - 10)      FAMILY HISTORY:    Denies Family history of CAD or early MI      Constitutional: denies fever, chills  HEENT: denies blurry vision, difficulty hearing  Respiratory: denies SOB, WEBB, cough  Cardiovascular: denies CP, palpitations, orthopnea, PND, LE edema  Gastrointestinal: denies nausea, vomiting, abdominal pain  Genitourinary: denies urinary changes  Skin: Denies rashes, itching  Neurologic: denies headache, weakness, dizziness  Hematology/Oncology: denies bleeding, easy bruising  ROS negative except as noted above      SOCIAL HISTORY:    No tobacco, Alcohol or Ddrug use    Vital Signs Last 24 Hrs  T(C): 36.8 (15 Mar 2025 04:38), Max: 36.9 (14 Mar 2025 22:00)  T(F): 98.3 (15 Mar 2025 04:38), Max: 98.4 (14 Mar 2025 22:00)  HR: 61 (15 Mar 2025 04:38) (53 - 61)  BP: 147/77 (15 Mar 2025 04:38) (136/94 - 158/70)  BP(mean): --  RR: 18 (15 Mar 2025 04:38) (18 - 18)  SpO2: 96% (15 Mar 2025 04:38) (95% - 98%)    Parameters below as of 15 Mar 2025 04:38  Patient On (Oxygen Delivery Method): nasal cannula  O2 Flow (L/min): 3      Physical Exam:  General: Well developed, well nourished, NAD  HEENT: NCAT   Neurology: A&Ox2 to person and setting, nonfocal  Respiratory: CTA B/L, No wheezes, rales or crackles  CV: RRR, +S1/S2, no murmurs  Abdominal: Soft, NT, ND   Extremities: 1+ pitting edema bilaterally, L>R  Skin: warm, dry, normal color      ECG:    I&O's Detail    14 Mar 2025 07:01  -  15 Mar 2025 07:00  --------------------------------------------------------  IN:  Total IN: 0 mL    OUT:    Voided (mL): 400 mL  Total OUT: 400 mL    Total NET: -400 mL          LABS:                        11.9   3.12  )-----------( x        ( 15 Mar 2025 07:26 )             37.2     03-14    139  |  95[L]  |  14  ----------------------------<  112[H]  4.3   |  37[H]  |  1.10    Ca    8.6      14 Mar 2025 13:15  Mg     2.1     03-14    TPro  6.3  /  Alb  3.1[L]  /  TBili  1.2  /  DBili  x   /  AST  28  /  ALT  15  /  AlkPhos  69  03-14        PT/INR - ( 15 Mar 2025 07:26 )   PT: 24.2 sec;   INR: 2.08 ratio         PTT - ( 15 Mar 2025 07:26 )  PTT:41.0 sec  Urinalysis Basic - ( 14 Mar 2025 14:10 )    Color: Dark Yellow / Appearance: Clear / S.020 / pH: x  Gluc: x / Ketone: Trace mg/dL  / Bili: Negative / Urobili: 1.0 mg/dL   Blood: x / Protein: Trace mg/dL / Nitrite: Negative   Leuk Esterase: Trace / RBC: 1 /HPF / WBC 2 /HPF   Sq Epi: x / Non Sq Epi: x / Bacteria: Negative /HPF      I&O's Summary    14 Mar 2025 07:01  -  15 Mar 2025 07:00  --------------------------------------------------------  IN: 0 mL / OUT: 400 mL / NET: -400 mL      BNP  RADIOLOGY & ADDITIONAL STUDIES:  < from: CT Abdomen and Pelvis w/ IV Cont (25 @ 15:46) >  IMPRESSION:    Flow artifact right lower lobe pulmonary artery.  No acute pulmonary embolism.    Mild bilateral septal thickening, could reflect interstitial edema.  Trace bilateral pleural effusions with subpleural atelectasis   costophrenic angles.    No acute intra-abdominal pathology.    Other findings as discussed above.    < end of copied text >

## 2025-03-15 NOTE — CONSULT NOTE ADULT - ASSESSMENT
83-year-old male with a past medical history of A-fib on coumadin and amio, dementia, CHF, prostate cancer, on home O2 3L since last year, levoscoliosis, nonambulatory presents to the ED with a chief complaint of right flank/rib pain. Admitted for irregular heart rate monitoring, compensated respiratory acidosis, and possible right rib fracture. Cardiology consulted for bradycardia, history of CHF (unspecified)    #CHF (Unspecified)  - Pt with hx CHF (unspecified) on lasix and toprol  - Pt with 1+ pitting edema on exam, L>R   - f/u duplex to r/o DVT  - No recent TTEs in EMR, last was 2008  - TTE ordered, follow up results   - c/w home lasix 20mg qd, as per admission notes only as BP tolerates (-140)  - Hold home metoprolol given bradycardia on admission  - On home supplemental O2 (3L), currently at baseline  - Trend renal indices   - Continue Fluid restriction  - Strict I/Os, daily weights    #Ischemia   - No clear evidence of acute ischemia, denies anginal complaints at present  - Troponin negative x 2  - EKG: Sinus bradycardia rate 49bpm. LBBB. QTc 457ms. No ST/T changes  - Continue to monitor for signs or symptoms of ischemia     #Bradycardia/pAF  - Pt bradycardic on admission, per H&P also with intermittent spikes to the 110s-170s  - EKG: Sinus bradycardia rate 49bpm. LBBB. QTc 457ms.   - Reviewed w/ telemetry, NSR 50s-60s, no events  - Avoid AV sangita blockers  - Hx pAF; c/w home amio and coumadin dosing based on INR (per H&P, patient is on 4mg on Tu/Th, 2mg coumadin other days)  - Monitor and replete lytes, keep K>4, Mg>2.    #HTN  - Pt with no documented history of HTN, however BPs mildly elevated on admission  - c/w home lasix and toprol with hold parameters  - Continue to monitor hemodynamics     - Other cardiovascular workup will depend on clinical course.  - All other workup per primary team.  - Will continue to follow.       83-year-old male with a past medical history of A-fib on coumadin and amio, dementia, CHF, prostate cancer, on home O2 3L since last year, levoscoliosis, nonambulatory presents to the ED with a chief complaint of right flank/rib pain. Admitted for irregular heart rate monitoring, compensated respiratory acidosis, and possible right rib fracture. Cardiology consulted for bradycardia, history of CHF (unspecified)    #CHF (Unspecified)  - Pt with hx CHF (unspecified) on lasix and toprol  - Pt with 1+ pitting edema on exam, L>R   - f/u duplex to r/o DVT  - No recent TTEs in EMR, last was 2008  - TTE ordered, follow up results   - c/w home lasix 20mg qd, as per admission notes only as BP tolerates (-140)  - Hold home metoprolol given bradycardia on admission  - On home supplemental O2 (3L), currently at baseline  - Trend renal indices   - Continue Fluid restriction  - Strict I/Os, daily weights    #Ischemia   - No clear evidence of acute ischemia, denies anginal complaints at present  - Troponin negative x 2  - EKG: Sinus bradycardia rate 49bpm. LBBB. QTc 457ms. No ST/T changes  - Continue to monitor for signs or symptoms of ischemia     #Bradycardia/pAF  - Pt bradycardic on admission, per H&P also with intermittent spikes to the 110s-170s  - EKG: Sinus bradycardia rate 49bpm. LBBB. QTc 457ms.   - Reviewed w/ telemetry, NSR 50s-60s, no events  - Avoid AV sangita blockers  - Hx pAF; c/w home amio and coumadin dosing based on INR (per H&P, patient is on 4mg on Tu/Th, 2mg coumadin other days)  - Monitor and replete lytes, keep K>4, Mg>2.    #HTN  - Pt with no documented history of HTN, however BPs mildly elevated on admission  - c/w home lasix with hold parameters  - Continue to monitor hemodynamics     - Other cardiovascular workup will depend on clinical course.  - All other workup per primary team.  - Will continue to follow.       83-year-old male with a past medical history of A-fib on coumadin and amio, dementia, CHF, prostate cancer, on home O2 3L since last year, levoscoliosis, nonambulatory presents to the ED with a chief complaint of right flank/rib pain. Admitted for irregular heart rate monitoring, compensated respiratory acidosis, and possible right rib fracture. Cardiology consulted for bradycardia, history of CHF (unspecified)    #CHF (Unspecified)  - Pt with hx CHF (unspecified, states HFrEF per chart review at [facility] in 2023) on lasix and toprol  - Pt with 1+ pitting edema on exam, L>R   - f/u duplex to r/o DVT  - No recent TTEs in EMR, last was 2008-- per chart review at [facility] in 2023, TTE 3/2022: EF 40%, mod MR, mild-mod AR  - TTE ordered, follow up results   - c/w home lasix 20mg qd, as per admission notes only as BP tolerates (-140)  - Hold home metoprolol given bradycardia on admission  - On home supplemental O2 (3L), currently at baseline  - Trend renal indices   - Continue Fluid restriction  - Strict I/Os, daily weights    #Ischemia   - No clear evidence of acute ischemia, denies anginal complaints at present  - Troponin negative x 2  - EKG: Sinus bradycardia rate 49bpm. LBBB. QTc 457ms. No ST/T changes  - Continue to monitor for signs or symptoms of ischemia     #Bradycardia/pAF  - Pt bradycardic on admission, per H&P also with intermittent spikes to the 110s-170s  - EKG: Sinus bradycardia rate 49bpm. LBBB. QTc 457ms.   - Reviewed w/ telemetry, NSR 50s-60s, no events  - Avoid AV sangita blockers  - Hx pAF (s/p DCCV in 2022); c/w home amio and coumadin dosing based on INR (per H&P, patient is on 4mg on Tu/Th, 2mg coumadin other days)  - Monitor and replete lytes, keep K>4, Mg>2.    #HTN  - Pt with no documented history of HTN, however BPs mildly elevated on admission  - c/w home lasix with hold parameters  - Continue to monitor hemodynamics     - Other cardiovascular workup will depend on clinical course.  - All other workup per primary team.  - Will continue to follow.       83-year-old male with a past medical history of A-fib on coumadin and amio, dementia, CHF, prostate cancer, on home O2 3L since last year, levoscoliosis, nonambulatory presents to the ED with a chief complaint of right flank/rib pain. Admitted for irregular heart rate monitoring, compensated respiratory acidosis, and possible right rib fracture. Cardiology consulted for bradycardia, history of CHF (unspecified)    #CHF (Unspecified)  - Pt with hx CHF (unspecified, states HFrEF per chart review at [facility] in 2023) on lasix and toprol  - Pt with 1+ pitting edema on exam, L>R   - f/u duplex to r/o DVT  - No recent TTEs in EMR, last was 2008-- per chart review at [facility] in 2023, TTE 3/2022: EF 40%, mod MR, mild-mod AR  - TTE ordered, follow up results   - c/w home lasix 20mg qd, as per admission notes only as BP tolerates (-140)  - Hold home metoprolol given bradycardia on admission  - On home supplemental O2 (3L), currently at baseline  - Trend renal indices   - Continue Fluid restriction  - Strict I/Os, daily weights    #Ischemia   - No clear evidence of acute ischemia, denies anginal complaints at present  - Troponin negative x 2  - EKG: Sinus bradycardia rate 49bpm. LBBB. QTc 457ms. No ST/T changes  - Continue to monitor for signs or symptoms of ischemia     #Bradycardia/pAF  - Pt bradycardic on admission, per H&P also with intermittent spikes to the 110s-170s  - EKG: Sinus bradycardia rate 49bpm. LBBB. QTc 457ms.   - Reviewed w/ telemetry, NSR 50s-60s, no events  - metoprolol stopped  - Hx pAF (s/p DCCV in 2022); c/w home amio and coumadin dosing based on INR (per H&P, patient is on 4mg on Tu/Th, 2mg coumadin other days)  - Monitor and replete lytes, keep K>4, Mg>2.    #HTN  - Pt with no documented history of HTN, however BPs mildly elevated on admission  - c/w home lasix with hold parameters  - Continue to monitor hemodynamics     - Other cardiovascular workup will depend on clinical course.  - All other workup per primary team.  - Will continue to follow.

## 2025-03-15 NOTE — PROGRESS NOTE ADULT - PROBLEM SELECTOR PLAN 1
Right flank pain/rib pain  -CT notes old right-sided rib fracture, family endorses he hasn't had a fracture in the past-- may be new as per hyperextention story   -Ordered lidocaine patch daily   -Does not complain of pain at baseline, ordered ofirmev 1g q8h PRN for mild pain (family endorses patient had elevated LFTs in the past due to tylenol so would avoid full dosing, and monitor LFTs daily), toradol 15mg IVP for moderate pain, and toradol 30mg IVP for severe Right flank pain/rib pain - pain well controlled this morning   -CT notes old right-sided rib fracture, family endorses he hasn't had a fracture in the past-- may be new as per hyperextention story   -lidocaine patch daily   -Does not complain of pain at baseline, ordered ofirmev 1g q8h PRN for mild pain (family endorses patient had elevated LFTs in the past due to tylenol so would avoid full dosing, and monitor LFTs daily), toradol 15mg IVP for moderate pain, and toradol 30mg IVP for severe

## 2025-03-15 NOTE — CONSULT NOTE ADULT - SUBJECTIVE AND OBJECTIVE BOX
[facility]  Dr. Olivier, Dr. Wade, Dr. Osorio, Dr. Mckeon, Dr. Forde, Dr. Marcus                                        [facility], [facility], [address]                                                 [address], [place] [id number] [place] [id number]  Ph: [phone]  Fax: [phone]                                         Ph: [phone]  Fax: [phone]      Patient is a 85y old  Male who presents with a chief complaint of Right flank/rib pain (15 Mar 2025 11:39)    HPI:   83-year-old male with a past medical history of A-fib on coumadin and amio, dementia, CHF, prostate cancer, on home O2 3L since last year, nonambulatory presents to the ED with a chief complaint of right flank/rib pain. Patient's wife and one of his 5 aides are at bedside providing history. Family endorses patient has been complaining of pain since Monday, gradually getting worse. With more prompting, they admit patient has a hospital bed at home and one of the aides last Saturday pressed one of the buttons too quickly and the top part of the bed hyperextended very fast. Otherwise does not remember any other trauma. They are unsure if this is the cause of the injury. Patient has dementia at baseline and unable to provide much of the subjective other than that he is not in pain when he is not moving. Family denies any fevers, chills, sick contacts.     Of note, patient was last admitted to the hospital  through 2024 with UTI,  urine was positive for Enterococcus, and was discharged with antibiotics.     vitals: 97.1F, 55HR, 153/93BP, 18RR, 96% O2 on 3L, 18RR  AB.4, pCO2: 73, pO2: 80, HCO3: 45, O2 sat: 98.5   labs: bicarb: 37, lipase: 85, hgb: 12.9, platelet: 135  trop negative 2x (20.7, 23.6)  UA: trace ketone, trace leuk esterase, present hyaline casts and squamous   CT Angio with IV Chest, Abd/Pelvis: Flow artifact right lower lobe pulmonary artery.  No acute pulmonary embolism. Mild bilateral septal thickening, could reflect interstitial edema. Trace bilateral pleural effusions with subpleural atelectasis costophrenic angles. No acute intra-abdominal pathology. Small fat-containing umbilical hernia. Bilateral fat-containing inguinal hernias. Old right-sided rib fracture.    EKBPM, sinus jose LBBB, QRs: 457  received in the ED: ofrimev 1g 1x, and NaCL 0.9% 500ml 1x    (14 Mar 2025 18:30)    Renal consult called for metabolic alkalosis. History obtained from chart.       PAST MEDICAL HISTORY:  Afib    Prostate cancer    Heart failure    Heart failure    History of scoliosis        PAST SURGICAL HISTORY:  No significant past surgical history    No significant past surgical history    S/P anal fissurectomy        FAMILY HISTORY:      SOCIAL HISTORY: No smoking or alcohol use     Allergies    No Known Allergies    Intolerances      Home Medications:  allopurinol 300 mg oral tablet: 1 tab(s) orally once a day (14 Mar 2025 18:56)  amiodarone 200 mg oral tablet: 1 tab(s) orally once a day (14 Mar 2025 18:56)  furosemide: 20 milligram(s) orally once a day if systolic blood pressure is greater than 110 and less than 140. Also gets K supplementation with lasix. (14 Mar 2025 18:56)  latanoprost 0.005% ophthalmic solution: 1 drop(s) to each affected eye once a day (at bedtime) (14 Mar 2025 18:56)  metoprolol succinate 25 mg oral tablet, extended release: 1 tab(s) orally once a day (14 Mar 2025 18:56)  Prosteon oral tablet: 2 tab(s) orally 2 times a day (14 Mar 2025 18:54)  Simbrinza 1%- 0.2% ophthalmic suspension: 1 drop(s) to each affected eye 2 times a day (14 Mar 2025 18:56)  tamsulosin 0.4 mg oral capsule: 1 cap(s) orally once a day (at bedtime) (14 Mar 2025 18:56)  Vit B12: 1000mcg daily in AM (14 Mar 2025 18:54)  warfarin 2 mg oral tablet: 1 tab(s) orally once a day on  and  the dosing is 4mg instead (14 Mar 2025 18:52)    MEDICATIONS  (STANDING):  allopurinol 300 milliGRAM(s) Oral daily  aMIOdarone    Tablet 200 milliGRAM(s) Oral daily  brimonidine 0.2% Ophthalmic Solution 1 Drop(s) Both EYES two times a day  brinzolamide 1% Ophthalmic Suspension 1 Drop(s) Both EYES two times a day  furosemide    Tablet 20 milliGRAM(s) Oral daily  latanoprost 0.005% Ophthalmic Solution 1 Drop(s) Both EYES at bedtime  lidocaine   4% Patch 1 Patch Transdermal daily  tamsulosin 0.4 milliGRAM(s) Oral at bedtime  warfarin 2 milliGRAM(s) Oral once    MEDICATIONS  (PRN):  acetaminophen   IVPB .. 1000 milliGRAM(s) IV Intermittent every 8 hours PRN Mild Pain (1 - 3)  ketorolac   Injectable 15 milliGRAM(s) IV Push every 6 hours PRN Moderate Pain (4 - 6)  ketorolac   Injectable 30 milliGRAM(s) IV Push every 6 hours PRN Severe Pain (7 - 10)      REVIEW OF SYSTEMS:  General: no distress  Respiratory: No cough, SOB  Cardiovascular: No CP or Palpitations	  Gastrointestinal: No nausea, Vomiting. No diarrhea  Genitourinary: No urinary complaints	  Musculoskeletal:  No new rash or lesions	  all other systems negative    T(F): 97.7 (03-15-25 @ 12:48), Max: 98.4 (25 @ 22:00)  HR: 61 (03-15-25 @ 12:48) (53 - 61)  BP: 121/64 (03-15-25 @ 12:48) (121/64 - 158/70)  RR: 18 (03-15-25 @ 12:48) (18 - 18)  SpO2: 96% (03-15-25 @ 12:48) (95% - 98%)  Wt(kg): --    PHYSICAL EXAM:  General: NAD  Respiratory: b/l air entry  Cardiovascular: S1 S2  Gastrointestinal: soft  Extremities: edema        03-15    138  |  98  |  15  ----------------------------<  89  4.1   |  38[H]  |  0.97    Ca    8.8      15 Mar 2025 07:26  Phos  3.8     03-15  Mg     2.3     03-15    TPro  5.4[L]  /  Alb  2.9[L]  /  TBili  1.2  /  DBili  x   /  AST  15  /  ALT  16  /  AlkPhos  65  03-15                          11.9   3.12  )-----------( 90       ( 15 Mar 2025 07:26 )             37.2       Blood Urea Nitrogen: 15 mg/dL (03-15 @ 07:26)  Calcium: 8.8 mg/dL (03-15 @ 07:26)  Potassium: 4.1 mmol/L (03-15 @ 07:26)  Hemoglobin: 11.9 g/dL (03-15 @ 07:26)      Creatinine, Serum: 0.97 (03-15 @ 07:26)  Creatinine, Serum: 1.10 ( @ 13:15)      Urinalysis Basic - ( 15 Mar 2025 07:26 )    Color: x / Appearance: x / SG: x / pH: x  Gluc: 89 mg/dL / Ketone: x  / Bili: x / Urobili: x   Blood: x / Protein: x / Nitrite: x   Leuk Esterase: x / RBC: x / WBC x   Sq Epi: x / Non Sq Epi: x / Bacteria: x      LIVER FUNCTIONS - ( 15 Mar 2025 07:26 )  Alb: 2.9 g/dL / Pro: 5.4 g/dL / ALK PHOS: 65 U/L / ALT: 16 U/L / AST: 15 U/L / GGT: x                     ABG - ( 14 Mar 2025 16:25 )  pH, Arterial: 7.40  pH, Blood: x     /  pCO2: 73    /  pO2: 80    / HCO3: 45    / Base Excess: 20.4  /  SaO2: 98.5              I&O's Detail    14 Mar 2025 07:01  -  15 Mar 2025 07:00  --------------------------------------------------------  IN:  Total IN: 0 mL    OUT:    Voided (mL): 400 mL  Total OUT: 400 mL    Total NET: -400 mL      15 Mar 2025 07:01  -  15 Mar 2025 14:14  --------------------------------------------------------  IN:  Total IN: 0 mL    OUT:    Voided (mL): 700 mL  Total OUT: 700 mL    Total NET: -700 mL          < from: CT Abdomen and Pelvis w/ IV Cont (25 @ 15:46) >    ACC: [id number] EXAM:  CT ABDOMEN AND PELVIS IC   ORDERED BY: NIDHI CONTE     ACC: [id number] EXAM:  CT ANGIO CHEST PULM ART WAWIC   ORDERED BY: NIDHI CONTE     PROCEDURE DATE:  2025          INTERPRETATION:  CLINICAL INFORMATION: Right flank pain.  Subtherapeutic INR.    COMPARISON: CT scan chest 2023 and CT scan abdomen pelvis 2024    CONTRAST/COMPLICATIONS:  IV Contrast: Omnipaque 350  90 cc administered   10 cc discarded  Oral Contrast: NONE  .    PROCEDURE:  CT Angiography of the Chest was performed followed by portal venous phase   imaging of the Abdomen and Pelvis.  Sagittal and coronal reformats were performed as well as 3D (MIP)   reconstructions.    FINDINGS:    CHEST:    LUNGS AND LARGE AIRWAYS: PLEURA:    Mild septal thickening bilaterally, could reflect interstitial edema.    Trace pleural effusions bilateral costophrenic angles with cysts   subpleural atelectasis.    The central airways are patent.    VESSELS:    Linear filling defect within theright lower lobe pulmonary artery, which   appears to fill in on the venous phase of the abdomen is thought to   represent flow artifact.  Otherwise, no acute pulmonary embolism is noted.    Mild atherosclerotic changes thoracic aorta.    HEART:  Cardiomegaly with right ventricular and left atrial enlargement.  Mitral valve repair.  Aortic valve calcification.   No pericardial effusion.    MEDIASTINUM AND SAVANAH:  No enlarged lymphadenopathy.    CHEST WALL AND LOWER NECK:  Multinodular thyroid gland as noted on prior exam.  Bilateral gynecomastia.  Anterior right rib reconstruction plate.  Old right-sided rib fracture.    ABDOMEN AND PELVIS:    Streak artifact degrades image quality.    LIVER: Within normal limits.  BILE DUCTS: Normal caliber.  GALLBLADDER: Cholecystectomy.  SPLEEN: Within normal limits.  PANCREAS: Linear calcification along the pancreatic neck, stable.  ADRENALS: Within normal limits.  KIDNEYS/URETERS:  Bilateral renal cysts and additional subcentimeter hypodense lesions   right kidney which are too small for characterization.  No hydronephrosis.    BLADDER: Thickening, trabeculated urinary bladder.  REPRODUCTIVE ORGANS:  The prostate gland is not enlarged.    BOWEL:  Colonic fecal retention; no bowel obstruction.  Colonic diverticulosis, without CT evidence of diverticulitis.  No CT evidence for acute appendicitis.  PERITONEUM/RETROPERITONEUM: Within normal limits.    VESSELS: Within normal limits.  LYMPH NODES: No lymphadenopathy.    ABDOMINAL WALL:  Small fat-containing umbilical hernia.  Bilateral fat-containing inguinal hernias.    BONES:  Degenerative changes.    Chronic compression deformities T12 and L4.    IMPRESSION:    Flow artifact right lower lobe pulmonary artery.  No acute pulmonary embolism.    Mild bilateral septal thickening, could reflect interstitial edema.  Trace bilateral pleural effusions with subpleural atelectasis   costophrenic angles.    No acute intra-abdominal pathology.    Other findings as discussed above.    --- End of Report---            CARMINE CUETO MD; Attending Radiologist  This document has been electronically signed. Mar 14 2025  4:43PM    < end of copied text >

## 2025-03-15 NOTE — CONSULT NOTE ADULT - ASSESSMENT
Mixed acid base disorder: Metabolic alkalosis and Respiratory acidosis  CHF on PO lasix  Atrial fibrialltion  Hypertension    Acid base disturbance most likely chronic. On PO lasix. Pulmonary evaluation for respiratory acidosis.   To continue current meds. D/w pt's daughter over the phone. Add PO diamox.     Further recommendations pending clinical course. Thank you for the courtesy of this referral.

## 2025-03-15 NOTE — PROGRESS NOTE ADULT - ASSESSMENT
83-year-old male with a past medical history of A-fib on coumadin and amio, dementia, CHF, prostate cancer, on home O2 3L since last year, levoscoliosis, nonambulatory presents to the ED with a chief complaint of right flank/rib pain. Admitted for irregular heart rate monitoring, compensated respiratory acidosis, and possible right rib fracture.

## 2025-03-15 NOTE — PROGRESS NOTE ADULT - PROBLEM SELECTOR PLAN 5
-on home lasix 20mg only IF patient's systolic BP is between 110 or 140  -TTE ordered, f/u results   -fluid restriction diet  -cardio consulted -on home lasix 20mg only IF patient's systolic BP is between 110 or 140  - B/L LE edema, f/u B/L LE duplex US   -fluid restriction diet  -cardio consulted

## 2025-03-16 VITALS
HEART RATE: 75 BPM | TEMPERATURE: 97 F | DIASTOLIC BLOOD PRESSURE: 77 MMHG | OXYGEN SATURATION: 95 % | RESPIRATION RATE: 18 BRPM | SYSTOLIC BLOOD PRESSURE: 143 MMHG

## 2025-03-16 LAB
ALBUMIN SERPL ELPH-MCNC: 2.9 G/DL — LOW (ref 3.3–5)
ALP SERPL-CCNC: 67 U/L — SIGNIFICANT CHANGE UP (ref 40–120)
ALT FLD-CCNC: 15 U/L — SIGNIFICANT CHANGE UP (ref 12–78)
ANION GAP SERPL CALC-SCNC: 4 MMOL/L — LOW (ref 5–17)
APTT BLD: 40.1 SEC — HIGH (ref 24.5–35.6)
BILIRUB SERPL-MCNC: 1 MG/DL — SIGNIFICANT CHANGE UP (ref 0.2–1.2)
BUN SERPL-MCNC: 18 MG/DL — SIGNIFICANT CHANGE UP (ref 7–23)
CALCIUM SERPL-MCNC: 8.7 MG/DL — SIGNIFICANT CHANGE UP (ref 8.5–10.1)
CHLORIDE SERPL-SCNC: 98 MMOL/L — SIGNIFICANT CHANGE UP (ref 96–108)
CO2 SERPL-SCNC: 40 MMOL/L — HIGH (ref 22–31)
CREAT SERPL-MCNC: 1.2 MG/DL — SIGNIFICANT CHANGE UP (ref 0.5–1.3)
EGFR: 60 ML/MIN/1.73M2 — SIGNIFICANT CHANGE UP
EGFR: 60 ML/MIN/1.73M2 — SIGNIFICANT CHANGE UP
GLUCOSE SERPL-MCNC: 111 MG/DL — HIGH (ref 70–99)
HCT VFR BLD CALC: 38.7 % — LOW (ref 39–50)
HGB BLD-MCNC: 12.2 G/DL — LOW (ref 13–17)
MAGNESIUM SERPL-MCNC: 2 MG/DL — SIGNIFICANT CHANGE UP (ref 1.6–2.6)
MCHC RBC-ENTMCNC: 31.5 G/DL — LOW (ref 32–36)
MCHC RBC-ENTMCNC: 31.9 PG — SIGNIFICANT CHANGE UP (ref 27–34)
MCV RBC AUTO: 101 FL — HIGH (ref 80–100)
NRBC BLD AUTO-RTO: 0 /100 WBCS — SIGNIFICANT CHANGE UP (ref 0–0)
PHOSPHATE SERPL-MCNC: 3.9 MG/DL — SIGNIFICANT CHANGE UP (ref 2.5–4.5)
PLATELET # BLD AUTO: 89 K/UL — LOW (ref 150–400)
POTASSIUM SERPL-MCNC: 3.7 MMOL/L — SIGNIFICANT CHANGE UP (ref 3.5–5.3)
POTASSIUM SERPL-SCNC: 3.7 MMOL/L — SIGNIFICANT CHANGE UP (ref 3.5–5.3)
PROT SERPL-MCNC: 5.7 G/DL — LOW (ref 6–8.3)
RBC # BLD: 3.83 M/UL — LOW (ref 4.2–5.8)
RBC # FLD: 14.7 % — HIGH (ref 10.3–14.5)
SODIUM SERPL-SCNC: 142 MMOL/L — SIGNIFICANT CHANGE UP (ref 135–145)
WBC # BLD: 4.48 K/UL — SIGNIFICANT CHANGE UP (ref 3.8–10.5)
WBC # FLD AUTO: 4.48 K/UL — SIGNIFICANT CHANGE UP (ref 3.8–10.5)

## 2025-03-16 PROCEDURE — 84484 ASSAY OF TROPONIN QUANT: CPT

## 2025-03-16 PROCEDURE — 85027 COMPLETE CBC AUTOMATED: CPT

## 2025-03-16 PROCEDURE — 74177 CT ABD & PELVIS W/CONTRAST: CPT | Mod: MC

## 2025-03-16 PROCEDURE — 85730 THROMBOPLASTIN TIME PARTIAL: CPT

## 2025-03-16 PROCEDURE — 99239 HOSP IP/OBS DSCHRG MGMT >30: CPT

## 2025-03-16 PROCEDURE — 93306 TTE W/DOPPLER COMPLETE: CPT | Mod: 26

## 2025-03-16 PROCEDURE — 81001 URINALYSIS AUTO W/SCOPE: CPT

## 2025-03-16 PROCEDURE — 96374 THER/PROPH/DIAG INJ IV PUSH: CPT

## 2025-03-16 PROCEDURE — 83690 ASSAY OF LIPASE: CPT

## 2025-03-16 PROCEDURE — 36415 COLL VENOUS BLD VENIPUNCTURE: CPT

## 2025-03-16 PROCEDURE — 82803 BLOOD GASES ANY COMBINATION: CPT

## 2025-03-16 PROCEDURE — 97162 PT EVAL MOD COMPLEX 30 MIN: CPT

## 2025-03-16 PROCEDURE — 93306 TTE W/DOPPLER COMPLETE: CPT

## 2025-03-16 PROCEDURE — 36600 WITHDRAWAL OF ARTERIAL BLOOD: CPT

## 2025-03-16 PROCEDURE — 93005 ELECTROCARDIOGRAM TRACING: CPT

## 2025-03-16 PROCEDURE — 84550 ASSAY OF BLOOD/URIC ACID: CPT

## 2025-03-16 PROCEDURE — 99232 SBSQ HOSP IP/OBS MODERATE 35: CPT

## 2025-03-16 PROCEDURE — 85610 PROTHROMBIN TIME: CPT

## 2025-03-16 PROCEDURE — 99285 EMERGENCY DEPT VISIT HI MDM: CPT

## 2025-03-16 PROCEDURE — 84100 ASSAY OF PHOSPHORUS: CPT

## 2025-03-16 PROCEDURE — 80053 COMPREHEN METABOLIC PANEL: CPT

## 2025-03-16 PROCEDURE — 83735 ASSAY OF MAGNESIUM: CPT

## 2025-03-16 PROCEDURE — 85025 COMPLETE CBC W/AUTO DIFF WBC: CPT

## 2025-03-16 PROCEDURE — 71275 CT ANGIOGRAPHY CHEST: CPT | Mod: MC

## 2025-03-16 RX ORDER — LIDOCAINE HYDROCHLORIDE 20 MG/ML
1 JELLY TOPICAL
Qty: 3 | Refills: 0
Start: 2025-03-16 | End: 2025-03-30

## 2025-03-16 RX ADMIN — BRINZOLAMIDE 1 DROP(S): 10 SUSPENSION/ DROPS OPHTHALMIC at 06:11

## 2025-03-16 RX ADMIN — AMIODARONE HYDROCHLORIDE 200 MILLIGRAM(S): 50 INJECTION, SOLUTION INTRAVENOUS at 06:11

## 2025-03-16 RX ADMIN — ACETAZOLAMIDE 250 MILLIGRAM(S): 250 TABLET ORAL at 00:37

## 2025-03-16 RX ADMIN — ACETAZOLAMIDE 250 MILLIGRAM(S): 250 TABLET ORAL at 06:11

## 2025-03-16 RX ADMIN — BRIMONIDINE TARTRATE 1 DROP(S): 1.5 SOLUTION/ DROPS OPHTHALMIC at 06:11

## 2025-03-16 RX ADMIN — Medication 300 MILLIGRAM(S): at 11:21

## 2025-03-16 NOTE — DISCHARGE NOTE PROVIDER - HOSPITAL COURSE
HPI:   83-year-old male with a past medical history of A-fib on coumadin and amio, dementia, CHF, prostate cancer, on home O2 3L since last year, nonambulatory presents to the ED with a chief complaint of right flank/rib pain. Patient's wife and one of his 5 aides are at bedside providing history. Family endorses patient has been complaining of pain since Monday, gradually getting worse. With more prompting, they admit patient has a hospital bed at home and one of the aides last Saturday pressed one of the buttons too quickly and the top part of the bed hyperextended very fast. Otherwise does not remember any other trauma. They are unsure if this is the cause of the injury. Patient has dementia at baseline and unable to provide much of the subjective other than that he is not in pain when he is not moving. Family denies any fevers, chills, sick contacts.     Of note, patient was last admitted to the hospital  through 2024 with UTI,  urine was positive for Enterococcus, and was discharged with antibiotics.     vitals: 97.1F, 55HR, 153/93BP, 18RR, 96% O2 on 3L, 18RR  AB.4, pCO2: 73, pO2: 80, HCO3: 45, O2 sat: 98.5   labs: bicarb: 37, lipase: 85, hgb: 12.9, platelet: 135  trop negative 2x (20.7, 23.6)  UA: trace ketone, trace leuk esterase, present hyaline casts and squamous   CT Angio with IV Chest, Abd/Pelvis: Flow artifact right lower lobe pulmonary artery.  No acute pulmonary embolism. Mild bilateral septal thickening, could reflect interstitial edema. Trace bilateral pleural effusions with subpleural atelectasis costophrenic angles. No acute intra-abdominal pathology. Small fat-containing umbilical hernia. Bilateral fat-containing inguinal hernias. Old right-sided rib fracture.    EKBPM, sinus jose LBBB, QRs: 457  received in the ED: ofrimev 1g 1x, and NaCL 0.9% 500ml 1x    (14 Mar 2025 18:30)      ---  HOSPITAL COURSE:   Pt was admitted for monitoring of afib, compensated respiratory acidosis and a R rib fracture. He was continued on warfarin based on the INR monitoring and his amiodarone,       Patient is stable for discharge as per primary medical team and consultants.    PT consulted, recommends discharge ______    Patient showed improvement throughout hospitalization. Patient was seen and examined on day of discharge. Patient was medically optimized for discharge with close outpatient follow up.        ---  CONSULTANTS:     ---  TIME SPENT:  I, the attending physician, was physically present for the key portions of the evaluation and management (E/M) service provided. The total amount of time spent reviewing the hospital notes, laboratory values, imaging findings, assessing/counseling the patient, discussing with consultant physicians, social work, nursing staff was -- minutes    ---  Primary care provider was made aware of plan for discharge:      [  ] NO     [  ] YES   HPI:   83-year-old male with a past medical history of A-fib on coumadin and amio, dementia, CHF, prostate cancer, on home O2 3L since last year, nonambulatory presents to the ED with a chief complaint of right flank/rib pain. Patient's wife and one of his 5 aides are at bedside providing history. Family endorses patient has been complaining of pain since Monday, gradually getting worse. With more prompting, they admit patient has a hospital bed at home and one of the aides last Saturday pressed one of the buttons too quickly and the top part of the bed hyperextended very fast. Otherwise does not remember any other trauma. They are unsure if this is the cause of the injury. Patient has dementia at baseline and unable to provide much of the subjective other than that he is not in pain when he is not moving. Family denies any fevers, chills, sick contacts.     Of note, patient was last admitted to the hospital  through 2024 with UTI,  urine was positive for Enterococcus, and was discharged with antibiotics.     vitals: 97.1F, 55HR, 153/93BP, 18RR, 96% O2 on 3L, 18RR  AB.4, pCO2: 73, pO2: 80, HCO3: 45, O2 sat: 98.5   labs: bicarb: 37, lipase: 85, hgb: 12.9, platelet: 135  trop negative 2x (20.7, 23.6)  UA: trace ketone, trace leuk esterase, present hyaline casts and squamous   CT Angio with IV Chest, Abd/Pelvis: Flow artifact right lower lobe pulmonary artery.  No acute pulmonary embolism. Mild bilateral septal thickening, could reflect interstitial edema. Trace bilateral pleural effusions with subpleural atelectasis costophrenic angles. No acute intra-abdominal pathology. Small fat-containing umbilical hernia. Bilateral fat-containing inguinal hernias. Old right-sided rib fracture.    EKBPM, sinus jose LBBB, QRs: 457  received in the ED: ofrimev 1g 1x, and NaCL 0.9% 500ml 1x    (14 Mar 2025 18:30)      ---  HOSPITAL COURSE:   Pt was presented for R rib/flank pain and was admitted for monitoring of bradycardia, afib, compensated respiratory acidosis. His home metoprolol was held in the setting of bradycardia and pt was monitored on telemetry, his bradycardia resolved.He was continued on warfarin based on the INR monitoring and his amiodarone, acidosis remained compensated. For the rib pain he received analgesics and was pain free at the time of discharge. During the workup pt was found to have B/L lower extremity edema 2/2 to CHF for which he was continued his home lasix 20mg PO and an echocardiography was performed that showed __________. BLE is also likely contributed by pt's mobility status and legs being in dependent position for most of the time.       Patient is stable for discharge as per primary medical team and consultants.    Pt lives with his wife and HHA's in a private house, Pt is lifted OOB to a chair with a mechanical lift device. PT consulted, recommends to resume HCPT as per family's request for balance training; bed mobility training.       Patient showed improvement throughout hospitalization. Patient was seen and examined on day of discharge. Patient was medically optimized for discharge with close outpatient follow up.        ---  CONSULTANTS:     Cardiologist: Ivana Haile (DO)  Nephrologist: Darrion Olivier)    Pulmopnologist: Jeferson Monsalve)      ---  TIME SPENT:  I, the attending physician, was physically present for the key portions of the evaluation and management (E/M) service provided. The total amount of time spent reviewing the hospital notes, laboratory values, imaging findings, assessing/counseling the patient, discussing with consultant physicians, social work, nursing staff was -- minutes    ---  Primary care provider was made aware of plan for discharge:      [  ] NO     [  ] YES   HPI:   83-year-old male with a past medical history of A-fib on coumadin and amio, dementia, CHF, prostate cancer, on home O2 3L since last year, nonambulatory presents to the ED with a chief complaint of right flank/rib pain. Patient's wife and one of his 5 aides are at bedside providing history. Family endorses patient has been complaining of pain since Monday, gradually getting worse. With more prompting, they admit patient has a hospital bed at home and one of the aides last Saturday pressed one of the buttons too quickly and the top part of the bed hyperextended very fast. Otherwise does not remember any other trauma. They are unsure if this is the cause of the injury. Patient has dementia at baseline and unable to provide much of the subjective other than that he is not in pain when he is not moving. Family denies any fevers, chills, sick contacts.     Of note, patient was last admitted to the hospital  through 2024 with UTI,  urine was positive for Enterococcus, and was discharged with antibiotics.     vitals: 97.1F, 55HR, 153/93BP, 18RR, 96% O2 on 3L, 18RR  AB.4, pCO2: 73, pO2: 80, HCO3: 45, O2 sat: 98.5   labs: bicarb: 37, lipase: 85, hgb: 12.9, platelet: 135  trop negative 2x (20.7, 23.6)  UA: trace ketone, trace leuk esterase, present hyaline casts and squamous   CT Angio with IV Chest, Abd/Pelvis: Flow artifact right lower lobe pulmonary artery.  No acute pulmonary embolism. Mild bilateral septal thickening, could reflect interstitial edema. Trace bilateral pleural effusions with subpleural atelectasis costophrenic angles. No acute intra-abdominal pathology. Small fat-containing umbilical hernia. Bilateral fat-containing inguinal hernias. Old right-sided rib fracture.    EKBPM, sinus jose LBBB, QRs: 457  received in the ED: ofrimev 1g 1x, and NaCL 0.9% 500ml 1x    (14 Mar 2025 18:30)      ---  HOSPITAL COURSE:   Pt was presented for R rib/flank pain and was admitted for monitoring of bradycardia, afib, compensated respiratory acidosis. His home metoprolol was held in the setting of bradycardia and pt was monitored on telemetry, his bradycardia resolved.He was continued on warfarin based on the INR monitoring and his amiodarone, acidosis remained compensated. For the rib pain he received analgesics and was pain free at the time of discharge. During the workup pt was found to have B/L lower extremity edema 2/2 to CHF for which he was continued his home lasix 20mg PO and an echocardiography was performed that showed __________. BLE is also likely contributed by pt's mobility status and legs being in dependent position for most of the time.       Patient is stable for discharge as per primary medical team and consultants.    Pt lives with his wife and HHA's in a private house, Pt is lifted OOB to a chair with a mechanical lift device. PT consulted, recommends to resume HCPT as per family's request for balance training; bed mobility training.       Patient showed improvement throughout hospitalization. Patient was seen and examined on day of discharge. Patient was medically optimized for discharge with close outpatient follow up.    VITALS:   T(C): 36.3 (25 @ 11:25), Max: 36.8 (03-15-25 @ 20:47)  HR: 75 (25 @ 11:25) (61 - 75)  BP: 143/77 (16-25 @ 11:25) (121/64 - 147/74)  RR: 18 (-25 @ 11:25) (17 - 18)  SpO2: 95% (25 @ 11:25) (95% - 97%)    GENERAL: NAD, well-groomed, well-developed.  HEAD:  Atraumatic, Norm cephalic.  EYES: PERRLA, conjunctiva clear.  ENMT: no nasal discharge, MMM.   NECK: Supple, No JVD.  NERVOUS SYSTEM:  Alert & oriented X3, neurologically intact grossly, except for having BLE mobility and incontinence issues.   CHEST/LUNG: Good air entry B/L, no rales, rhonchi, or wheezing.  HEART: Normal S1 & S2, no murmurs, or extra sounds.  ABDOMEN: Soft, non-tender, non-distended; bowel sounds present, no palpable masses or organomegaly, no suprapubic or CVA tenderness.  EXTREMITIES:  tace edema BLE. No clubbing, cyanosis.  VASCULAR: 2+ radial, DPA / PTA pulses B/L.  SKIN: No rashes or lesions.  PSYCH: normal affect & behavior.    ---  CONSULTANTS:     Cardiologist: Ivana Haile ()  Nephrologist: Darrion Olivier)    Pulmopnologist: Jeferson Monsalve)      ---  TIME SPENT:  I, the attending physician, was physically present for the key portions of the evaluation and management (E/M) service provided. The total amount of time spent reviewing the hospital notes, laboratory values, imaging findings, assessing/counseling the patient, discussing with consultant physicians, social work, nursing staff was -- minutes    ---  Primary care provider was made aware of plan for discharge:      [  ] NO     [  ] YES   HPI:   83-year-old male with a past medical history of A-fib on coumadin and amio, dementia, CHF, prostate cancer, on home O2 3L since last year, nonambulatory presents to the ED with a chief complaint of right flank/rib pain. Patient's wife and one of his 5 aides are at bedside providing history. Family endorses patient has been complaining of pain since Monday, gradually getting worse. With more prompting, they admit patient has a hospital bed at home and one of the aides last Saturday pressed one of the buttons too quickly and the top part of the bed hyperextended very fast. Otherwise does not remember any other trauma. They are unsure if this is the cause of the injury. Patient has dementia at baseline and unable to provide much of the subjective other than that he is not in pain when he is not moving. Family denies any fevers, chills, sick contacts.     Of note, patient was last admitted to the hospital  through 2024 with UTI,  urine was positive for Enterococcus, and was discharged with antibiotics.     vitals: 97.1F, 55HR, 153/93BP, 18RR, 96% O2 on 3L, 18RR  AB.4, pCO2: 73, pO2: 80, HCO3: 45, O2 sat: 98.5   labs: bicarb: 37, lipase: 85, hgb: 12.9, platelet: 135  trop negative 2x (20.7, 23.6)  UA: trace ketone, trace leuk esterase, present hyaline casts and squamous   CT Angio with IV Chest, Abd/Pelvis: Flow artifact right lower lobe pulmonary artery.  No acute pulmonary embolism. Mild bilateral septal thickening, could reflect interstitial edema. Trace bilateral pleural effusions with subpleural atelectasis costophrenic angles. No acute intra-abdominal pathology. Small fat-containing umbilical hernia. Bilateral fat-containing inguinal hernias. Old right-sided rib fracture.    EKBPM, sinus jose LBBB, QRs: 457  received in the ED: ofrimev 1g 1x, and NaCL 0.9% 500ml 1x    (14 Mar 2025 18:30)      ---  HOSPITAL COURSE:   Pt was presented for R rib/flank pain and was admitted for monitoring of bradycardia, afib, compensated respiratory acidosis. His home metoprolol was held in the setting of bradycardia and pt was monitored on telemetry, his bradycardia resolved.He was continued on warfarin based on the INR monitoring and his amiodarone, acidosis remained compensated. For the rib pain he received analgesics and was pain free at the time of discharge. During the workup pt was found to have B/L lower extremity edema 2/2 to CHF for which he was continued his home lasix 20mg PO and an echocardiography was performed that showed no acute concerns. BLE is also likely contributed by pt's mobility status and legs being in dependent position for most of the time.       Patient is stable for discharge as per primary medical team and consultants.    Pt lives with his wife and HHA's in a private house, Pt is lifted OOB to a chair with a mechanical lift device. PT consulted, recommends to resume HCPT as per family's request for balance training; bed mobility training.       Patient showed improvement throughout hospitalization. Patient was seen and examined on day of discharge. Patient was medically optimized for discharge with close outpatient follow up.    VITALS:   T(C): 36.3 (25 @ 11:25), Max: 36.8 (03-15-25 @ 20:47)  HR: 75 (25 @ 11:25) (61 - 75)  BP: 143/77 (-16-25 @ 11:25) (121/64 - 147/74)  RR: 18 (-25 @ 11:25) (17 - 18)  SpO2: 95% (25 @ 11:25) (95% - 97%)    GENERAL: NAD, well-groomed, well-developed.  HEAD:  Atraumatic, Norm cephalic.  EYES: PERRLA, conjunctiva clear.  ENMT: no nasal discharge, MMM.   NECK: Supple, No JVD.  NERVOUS SYSTEM:  Alert & oriented X3, neurologically intact grossly, except for having BLE mobility and incontinence issues.   CHEST/LUNG: Good air entry B/L, no rales, rhonchi, or wheezing.  HEART: Normal S1 & S2, no murmurs, or extra sounds.  ABDOMEN: Soft, non-tender, non-distended; bowel sounds present, no palpable masses or organomegaly, no suprapubic or CVA tenderness.  EXTREMITIES:  tace edema BLE. No clubbing, cyanosis.  VASCULAR: 2+ radial, DPA / PTA pulses B/L.  SKIN: No rashes or lesions.  PSYCH: normal affect & behavior.    ---  CONSULTANTS:     Cardiologist: Ivana Haile (DO)  Nephrologist: Darrion Olivier)    Pulmopnologist: Jeferson Monsalve)      ---  TIME SPENT:  I, the attending physician, was physically present for the key portions of the evaluation and management (E/M) service provided. The total amount of time spent reviewing the hospital notes, laboratory values, imaging findings, assessing/counseling the patient, discussing with consultant physicians, social work, nursing staff was 52 minutes

## 2025-03-16 NOTE — DISCHARGE NOTE NURSING/CASE MANAGEMENT/SOCIAL WORK - CAREGIVER PHONE NUMBER
20  Mg sent  
I refilled the 40 mg fluoxetine on 4/12/19 but I resent it today  
[phone]

## 2025-03-16 NOTE — DISCHARGE NOTE PROVIDER - ATTENDING DISCHARGE PHYSICAL EXAMINATION:
T(C): 36.3 (03-16-25 @ 11:25), Max: 36.8 (03-15-25 @ 20:47)  HR: 75 (03-16-25 @ 11:25) (63 - 75)  BP: 143/77 (03-16-25 @ 11:25) (129/66 - 147/74)  RR: 18 (03-16-25 @ 11:25) (17 - 18)  SpO2: 95% (03-16-25 @ 11:25) (95% - 97%)    GENERAL: patient appears well, on 3L NC  EYES: sclera clear, no exudates  ENMT: oropharynx clear without erythema, no exudates, supple, soft, no thyromegaly noted  LUNGS: good air entry bilaterally, clear to auscultation, symmetric breath sounds, no wheezing or rhonchi appreciated  HEART: soft S1/S2, regular rate and rhythm, no murmurs noted, no lower extremity edema  GASTROINTESTINAL: abdomen is soft, nontender, nondistended, normoactive bowel sounds    Patient seen and examined at bedside. NO events overnight. Patient with controlled HR and BP off metoprolol. Echo performed and discussed with cardio, Dr. Haile- no acute concerns. Discussed with nephro- no further intervention. Discussed with patient's wife at bedside, Ronna- updated on medical condition. Patient to be discharged home today with HCPT off metoprolol, follow up with cardio outpatient.

## 2025-03-16 NOTE — PHYSICAL THERAPY INITIAL EVALUATION ADULT - ADDITIONAL COMMENTS
Pt lives with his wife and HHA's in a private house, Pt is lifted OOB to a chair with a mechanical lift device.

## 2025-03-16 NOTE — PROGRESS NOTE ADULT - SUBJECTIVE AND OBJECTIVE BOX
[facility] - Mak Hastings, Zenon Pickett Savella, Filiberto Haile  Office Number: [phone]    Follow up:  Bradycardia.    Subjective Observations: Pt seen and examined at bedside.     REVIEW OF SYSTEMS:   All other review of systems are negative unless indicated above    TELE:     PAST MEDICAL & SURGICAL HISTORY:  Afib      Prostate cancer      Heart failure      History of scoliosis      S/P anal fissurectomy          SOCIAL HISTORY:  No tobacco, ethanol, or drug abuse.    FAMILY HISTORY:    No family history of acute MI or sudden cardiac death.    MEDICATIONS  (STANDING):  allopurinol 300 milliGRAM(s) Oral daily  aMIOdarone    Tablet 200 milliGRAM(s) Oral daily  brimonidine 0.2% Ophthalmic Solution 1 Drop(s) Both EYES two times a day  brinzolamide 1% Ophthalmic Suspension 1 Drop(s) Both EYES two times a day  furosemide    Tablet 20 milliGRAM(s) Oral daily  latanoprost 0.005% Ophthalmic Solution 1 Drop(s) Both EYES at bedtime  lidocaine   4% Patch 1 Patch Transdermal daily  tamsulosin 0.4 milliGRAM(s) Oral at bedtime    MEDICATIONS  (PRN):  acetaminophen   IVPB .. 1000 milliGRAM(s) IV Intermittent every 8 hours PRN Mild Pain (1 - 3)  ketorolac   Injectable 15 milliGRAM(s) IV Push every 6 hours PRN Moderate Pain (4 - 6)  ketorolac   Injectable 30 milliGRAM(s) IV Push every 6 hours PRN Severe Pain (7 - 10)      Allergies    No Known Allergies    Intolerances        VITAL SIGNS:   Vital Signs Last 24 Hrs  T(C): 36.6 (16 Mar 2025 05:34), Max: 36.8 (15 Mar 2025 20:47)  T(F): 97.9 (16 Mar 2025 05:34), Max: 98.2 (15 Mar 2025 20:47)  HR: 69 (16 Mar 2025 05:34) (61 - 69)  BP: 147/74 (16 Mar 2025 05:34) (121/64 - 147/74)  BP(mean): --  RR: 18 (16 Mar 2025 05:34) (17 - 18)  SpO2: 96% (16 Mar 2025 05:34) (96% - 97%)    Parameters below as of 16 Mar 2025 05:34  Patient On (Oxygen Delivery Method): nasal cannula  O2 Flow (L/min): 3      I&O's Summary    15 Mar 2025 07:01  -  16 Mar 2025 07:00  --------------------------------------------------------  IN: 0 mL / OUT: 1700 mL / NET: -1700 mL        On Exam:  General: NAD  HEENT: NCAT   Neurology: A&Ox2 to person and place, no focal deficits  Respiratory: CTA B/L, No wheezes, rales or crackles  CV: RRR, +S1/S2, no murmurs  Abdominal: Soft, NT, ND   Extremities: 1+ pitting edema bilaterally, L>R  Skin: warm, dry, normal color    LABS: All Labs Reviewed:                        11.9   3.12  )-----------( 90       ( 15 Mar 2025 07:26 )             37.2                         12.9   4.40  )-----------( 135      ( 14 Mar 2025 13:15 )             40.3     15 Mar 2025 07:26    138    |  98     |  15     ----------------------------<  89     4.1     |  38     |  0.97   14 Mar 2025 13:15    139    |  95     |  14     ----------------------------<  112    4.3     |  37     |  1.10     Ca    8.8        15 Mar 2025 07:26  Ca    8.6        14 Mar 2025 13:15  Phos  3.8       15 Mar 2025 07:26  Mg     2.3       15 Mar 2025 07:26  Mg     2.1       14 Mar 2025 16:30    TPro  5.4    /  Alb  2.9    /  TBili  1.2    /  DBili  x      /  AST  15     /  ALT  16     /  AlkPhos  65     15 Mar 2025 07:26  TPro  6.3    /  Alb  3.1    /  TBili  1.2    /  DBili  x      /  AST  28     /  ALT  15     /  AlkPhos  69     14 Mar 2025 13:15    PT/INR - ( 15 Mar 2025 07:26 )   PT: 24.2 sec;   INR: 2.08 ratio         PTT - ( 15 Mar 2025 07:26 )  PTT:41.0 sec      Blood Culture:                       [facility] - Mak Hastings Pannella, Patel, Savella, Filiberto Haile  Office Number: [phone]    Follow up:  Bradycardia.    Subjective Observations: Pt seen and examined at bedside. One of the pts aides at bedside to help with interval hx, states he is at his baseline mentation. Pt also at his current baseline oxygen needs (3L). BPs have improved, still pending TTE and duplex.    REVIEW OF SYSTEMS:   All other review of systems are negative unless indicated above    TELE:     PAST MEDICAL & SURGICAL HISTORY:  Afib      Prostate cancer      Heart failure      History of scoliosis      S/P anal fissurectomy          SOCIAL HISTORY:  No tobacco, ethanol, or drug abuse.    FAMILY HISTORY:    No family history of acute MI or sudden cardiac death.    MEDICATIONS  (STANDING):  allopurinol 300 milliGRAM(s) Oral daily  aMIOdarone    Tablet 200 milliGRAM(s) Oral daily  brimonidine 0.2% Ophthalmic Solution 1 Drop(s) Both EYES two times a day  brinzolamide 1% Ophthalmic Suspension 1 Drop(s) Both EYES two times a day  furosemide    Tablet 20 milliGRAM(s) Oral daily  latanoprost 0.005% Ophthalmic Solution 1 Drop(s) Both EYES at bedtime  lidocaine   4% Patch 1 Patch Transdermal daily  tamsulosin 0.4 milliGRAM(s) Oral at bedtime    MEDICATIONS  (PRN):  acetaminophen   IVPB .. 1000 milliGRAM(s) IV Intermittent every 8 hours PRN Mild Pain (1 - 3)  ketorolac   Injectable 15 milliGRAM(s) IV Push every 6 hours PRN Moderate Pain (4 - 6)  ketorolac   Injectable 30 milliGRAM(s) IV Push every 6 hours PRN Severe Pain (7 - 10)      Allergies    No Known Allergies    Intolerances        VITAL SIGNS:   Vital Signs Last 24 Hrs  T(C): 36.6 (16 Mar 2025 05:34), Max: 36.8 (15 Mar 2025 20:47)  T(F): 97.9 (16 Mar 2025 05:34), Max: 98.2 (15 Mar 2025 20:47)  HR: 69 (16 Mar 2025 05:34) (61 - 69)  BP: 147/74 (16 Mar 2025 05:34) (121/64 - 147/74)  BP(mean): --  RR: 18 (16 Mar 2025 05:34) (17 - 18)  SpO2: 96% (16 Mar 2025 05:34) (96% - 97%)    Parameters below as of 16 Mar 2025 05:34  Patient On (Oxygen Delivery Method): nasal cannula  O2 Flow (L/min): 3      I&O's Summary    15 Mar 2025 07:01  -  16 Mar 2025 07:00  --------------------------------------------------------  IN: 0 mL / OUT: 1700 mL / NET: -1700 mL        On Exam:  General: NAD  HEENT: NCAT   Neurology: A&Ox2 to person and place, no focal deficits  Respiratory: CTA B/L, No wheezes, rales or crackles  CV: RRR, +S1/S2, no murmurs  Abdominal: Soft, NT, ND   Extremities: 1+ pitting edema bilaterally, L>R  Skin: warm, dry, normal color    LABS: All Labs Reviewed:                        11.9   3.12  )-----------( 90       ( 15 Mar 2025 07:26 )             37.2                         12.9   4.40  )-----------( 135      ( 14 Mar 2025 13:15 )             40.3     15 Mar 2025 07:26    138    |  98     |  15     ----------------------------<  89     4.1     |  38     |  0.97   14 Mar 2025 13:15    139    |  95     |  14     ----------------------------<  112    4.3     |  37     |  1.10     Ca    8.8        15 Mar 2025 07:26  Ca    8.6        14 Mar 2025 13:15  Phos  3.8       15 Mar 2025 07:26  Mg     2.3       15 Mar 2025 07:26  Mg     2.1       14 Mar 2025 16:30    TPro  5.4    /  Alb  2.9    /  TBili  1.2    /  DBili  x      /  AST  15     /  ALT  16     /  AlkPhos  65     15 Mar 2025 07:26  TPro  6.3    /  Alb  3.1    /  TBili  1.2    /  DBili  x      /  AST  28     /  ALT  15     /  AlkPhos  69     14 Mar 2025 13:15    PT/INR - ( 15 Mar 2025 07:26 )   PT: 24.2 sec;   INR: 2.08 ratio         PTT - ( 15 Mar 2025 07:26 )  PTT:41.0 sec      Blood Culture:

## 2025-03-16 NOTE — DISCHARGE NOTE PROVIDER - PROVIDER TOKENS
PROVIDER:[TOKEN:[[id number]]] PROVIDER:[TOKEN:[[id number]]],PROVIDER:[TOKEN:[[id number]]]

## 2025-03-16 NOTE — CAREGIVER ENGAGEMENT NOTE - CAREGIVER EDUCATION - TYPES DISCUSSED
Discharge plan/DME/Post-acute care agency contact/Transportation coordination/Transportation letter provided

## 2025-03-16 NOTE — PROGRESS NOTE ADULT - ASSESSMENT
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 Pulmonary note reviewed. To continue current meds. Supportive care.   Encourage PO intake as tolerated. Discussed with patients wife at the bedside. D/c planning.

## 2025-03-16 NOTE — CASE MANAGEMENT PROGRESS NOTE - NSCMPROGRESSNOTE_GEN_ALL_CORE
Ambulance arranged for 3pm transport home via [facility]; referral sent to [facility] for home PT. CM met with spouse, Ronna, at bedside to review transitional care plan; she is in agreement. CM remains available.

## 2025-03-16 NOTE — DISCHARGE NOTE NURSING/CASE MANAGEMENT/SOCIAL WORK - PATIENT PORTAL LINK FT
You can access the FollowMyHealth Patient Portal offered by [facility] by registering at the following website: [email or web address]. By joining "[employer]’s FollowMyHealth portal, you will also be able to view your health information using other applications (apps) compatible with our system.

## 2025-03-16 NOTE — DISCHARGE NOTE NURSING/CASE MANAGEMENT/SOCIAL WORK - FINANCIAL ASSISTANCE
[facility] provides services at a reduced cost to those who are determined to be eligible through [facility]’s financial assistance program. Information regarding [facility]’s financial assistance program can be found by going to [email or web address] or by calling [phone].

## 2025-03-16 NOTE — DISCHARGE NOTE PROVIDER - NSDCMRMEDTOKEN_GEN_ALL_CORE_FT
allopurinol 300 mg oral tablet: 1 tab(s) orally once a day  amiodarone 200 mg oral tablet: 1 tab(s) orally once a day  furosemide: 20 milligram(s) orally once a day if systolic blood pressure is greater than 110 and less than 140. Also gets K supplementation with lasix.  Home PT: Home PT  dx: weakness  2-3 times per week or as per PT eval  latanoprost 0.005% ophthalmic solution: 1 drop(s) to each affected eye once a day (at bedtime)  metoprolol succinate 25 mg oral tablet, extended release: 1 tab(s) orally once a day  Prosteon oral tablet: 2 tab(s) orally 2 times a day  Simbrinza 1%- 0.2% ophthalmic suspension: 1 drop(s) to each affected eye 2 times a day  tamsulosin 0.4 mg oral capsule: 1 cap(s) orally once a day (at bedtime)  Vit B12: 1000mcg daily in AM  warfarin 2 mg oral tablet: 1 tab(s) orally once a day on TUESDAYS and THURSDAYS the dosing is 4mg instead   allopurinol 300 mg oral tablet: 1 tab(s) orally once a day  amiodarone 200 mg oral tablet: 1 tab(s) orally once a day  furosemide: 20 milligram(s) orally once a day if systolic blood pressure is greater than 110 and less than 140. Also gets K supplementation with lasix.  Home PT: Home PT  dx: weakness  2-3 times per week or as per PT eval  latanoprost 0.005% ophthalmic solution: 1 drop(s) to each affected eye once a day (at bedtime)  Prosteon oral tablet: 2 tab(s) orally 2 times a day  Simbrinza 1%- 0.2% ophthalmic suspension: 1 drop(s) to each affected eye 2 times a day  tamsulosin 0.4 mg oral capsule: 1 cap(s) orally once a day (at bedtime)  Vit B12: 1000mcg daily in AM  warfarin 2 mg oral tablet: 1 tab(s) orally once a day on TUESDAYS and THURSDAYS the dosing is 4mg instead   allopurinol 300 mg oral tablet: 1 tab(s) orally once a day  amiodarone 200 mg oral tablet: 1 tab(s) orally once a day  furosemide: 20 milligram(s) orally once a day if systolic blood pressure is greater than 110 and less than 140. Also gets K supplementation with lasix.  Home PT: Home PT  dx: weakness  2-3 times per week or as per PT eval  latanoprost 0.005% ophthalmic solution: 1 drop(s) to each affected eye once a day (at bedtime)  Lidotrode 4% topical film: Apply topically to affected area once a day apply to pain in side  Prosteon oral tablet: 2 tab(s) orally 2 times a day  Simbrinza 1%- 0.2% ophthalmic suspension: 1 drop(s) to each affected eye 2 times a day  tamsulosin 0.4 mg oral capsule: 1 cap(s) orally once a day (at bedtime)  Vit B12: 1000mcg daily in AM  warfarin 2 mg oral tablet: 1 tab(s) orally once a day on TUESDAYS and THURSDAYS the dosing is 4mg instead

## 2025-03-16 NOTE — DISCHARGE NOTE NURSING/CASE MANAGEMENT/SOCIAL WORK - NSDCPEFALRISK_GEN_ALL_CORE
For information on Fall & Injury Prevention, visit: [email or web address] OR  [email or web address] OR  https://www.cdc.gov/steadi/patient.html

## 2025-03-16 NOTE — CARE COORDINATION ASSESSMENT. - NSPASTMEDSURGHISTORY_GEN_ALL_CORE_FT
PAST MEDICAL & SURGICAL HISTORY:  Heart failure      Prostate cancer      Afib      S/P anal fissurectomy      History of scoliosis

## 2025-03-16 NOTE — CARE COORDINATION ASSESSMENT. - OTHER PERTINENT DISCHARGE PLANNING INFORMATION:
Patient is A&Ox2; private aide at bedside. CM spoke with patient spouse/caregiver, Ronna William, via phone, introduced self and explained role of CM and transitional care planning. She verbalized understanding. Patient lives in private home, with 0STE, with spouse. Is wheelchair bound, with renita lift for transfers. Has hospital bed, w/c, renita lift and home O2 (stationary/POC via [facility]) at home. Has around the clock private hire aides that spouse coordinates (5 different ones). No active [facility] services. Spouse requesting referral to [facility] upon DC. He gets his INR drawn every Wednesday and labs followed by Dr. Loredo (cardiology). His PCP just retired, but spouse confirmed [facility] care [facility] [place]. When DC ready, will require ambulance transport home. Pharmacy is [facility] in [place]. CM will continue to follow.

## 2025-03-16 NOTE — CAREGIVER ENGAGEMENT NOTE - CAREGIVER EDUCATION NOTES - FREE TEXT
CM spoke with patient's spouse, Rnona, via telephone to review anticipated DC plan. Patient has round the clock private aides; spouse coordinates aides. He is wheelchair bound at baseline, has w/c, renita lift and hospital bed at home. Has home O2 (stationary/POC via [facility]). Spouse requesting referral to [facility] upon DC. She confirms patient's cardiologist, Dr. Loredo, manages INR, and patient gets home draws done every Wednesday. Reports patient's PCP retired, however, patient does have established care with [facility] services. She did express her dissatisfaction with them; CM provided her with a list of home visiting providers in DC packet left at bedside. IMM reviewed and explained to spouse, she verbalized understanding. In agreement to possible DC home today. Will require ambulance upon DC.

## 2025-03-16 NOTE — CONSULT NOTE ADULT - SUBJECTIVE AND OBJECTIVE BOX
Date/Time Patient Seen:  		  Referring MD:   Data Reviewed	       Patient is a 85y old  Male who presents with a chief complaint of Right flank/rib pain (15 Mar 2025 13:13)      Subjective/HPI   History of Present Illness:    83-year-old male with a past medical history of A-fib on coumadin and amio, dementia, CHF, prostate cancer, on home O2 3L since last year, nonambulatory presents to the ED with a chief complaint of right flank/rib pain. Patient's wife and one of his 5 aides are at bedside providing history. Family endorses patient has been complaining of pain since Monday, gradually getting worse. With more prompting, they admit patient has a hospital bed at home and one of the aides last Saturday pressed one of the buttons too quickly and the top part of the bed hyperextended very fast. Otherwise does not remember any other trauma. They are unsure if this is the cause of the injury. Patient has dementia at baseline and unable to provide much of the subjective other than that he is not in pain when he is not moving. Family denies any fevers, chills, sick contacts.     Of note, patient was last admitted to the hospital  through 2024 with UTI,  urine was positive for Enterococcus, and was discharged with antibiotics.     vitals: 97.1F, 55HR, 153/93BP, 18RR, 96% O2 on 3L, 18RR  AB.4, pCO2: 73, pO2: 80, HCO3: 45, O2 sat: 98.5   labs: bicarb: 37, lipase: 85, hgb: 12.9, platelet: 135  trop negative 2x (20.7, 23.6)  UA: trace ketone, trace leuk esterase, present hyaline casts and squamous   CT Angio with IV Chest, Abd/Pelvis: Flow artifact right lower lobe pulmonary artery.  No acute pulmonary embolism. Mild bilateral septal thickening, could reflect interstitial edema. Trace bilateral pleural effusions with subpleural atelectasis costophrenic angles. No acute intra-abdominal pathology. Small fat-containing umbilical hernia. Bilateral fat-containing inguinal hernias. Old right-sided rib fracture.    EKBPM, sinus jose LBBB, QRs: 457  received in the ED: ofrimev 1g 1x, and NaCL 0.9% 500ml 1x          Review of Systems:  Other Review of Systems: All other review of systems negative, except as noted in HPI      Allergies and Intolerances:        Allergies:  	No Known Allergies:     Home Medications:   * Patient Currently Takes Medications as of 14-Mar-2025 18:56 documented in Structured Notes  · 	latanoprost 0.005% ophthalmic solution: Last Dose Taken:  , 1 drop(s) to each affected eye once a day (at bedtime)  · 	metoprolol succinate 25 mg oral tablet, extended release: Last Dose Taken:  , 1 tab(s) orally once a day  · 	allopurinol 300 mg oral tablet: Last Dose Taken:  , 1 tab(s) orally once a day  · 	amiodarone 200 mg oral tablet: Last Dose Taken:  , 1 tab(s) orally once a day  · 	warfarin 2 mg oral tablet: Last Dose Taken:  , 1 tab(s) orally once a day on  and  the dosing is 4mg instead  · 	tamsulosin 0.4 mg oral capsule: Last Dose Taken:  , 1 cap(s) orally once a day (at bedtime)  · 	furosemide: 20 milligram(s) orally once a day if systolic blood pressure is greater than 110 and less than 140. Also gets K supplementation with lasix.  · 	Prosteon oral tablet: Last Dose Taken:  , 2 tab(s) orally 2 times a day  · 	Vit B12: Last Dose Taken:  , 1000mcg daily in AM  · 	Simbrinza 1%- 0.2% ophthalmic suspension: Last Dose Taken:  , 1 drop(s) to each affected eye 2 times a day    .    Patient History:    Past Medical, Past Surgical, and Family History:  PAST MEDICAL HISTORY:  Afib     Heart failure     History of scoliosis     Prostate cancer.     PAST SURGICAL HISTORY:  S/P anal fissurectomy.     Social History:  · Substance use	No  · Social History (marital status, living situation, occupation, and sexual history)	Never tobacco user, no alcohol use, no drugs  Lives at home with wife  Requires help for all ADLs (has 5 aides at home, changes by day). He is bedbound (s/p 2 falls, and has drop foot)     Tobacco Screening:  · Core Measure Site	Yes  · Has the patient used tobacco in the past 30 days?	No    Risk Assessment:    Present on Admission:  Deep Venous Thrombosis	no  Pulmonary Embolus	no     HIV Screening:  · In accordance with [place] State law, we offer every patient who comes to our ED an HIV test. Would you like to be tested today?	Opt out     Hepatitis C Test Questions:  · In accordance with [place] State Law, we offer every patient a Hepatitis C test. Would you like to be tested today?	Opt out    PAST MEDICAL & SURGICAL HISTORY:  Afib    Prostate cancer    Heart failure    Heart failure    History of scoliosis    No significant past surgical history    No significant past surgical history    S/P anal fissurectomy          Medication list         MEDICATIONS  (STANDING):  allopurinol 300 milliGRAM(s) Oral daily  aMIOdarone    Tablet 200 milliGRAM(s) Oral daily  brimonidine 0.2% Ophthalmic Solution 1 Drop(s) Both EYES two times a day  brinzolamide 1% Ophthalmic Suspension 1 Drop(s) Both EYES two times a day  furosemide    Tablet 20 milliGRAM(s) Oral daily  latanoprost 0.005% Ophthalmic Solution 1 Drop(s) Both EYES at bedtime  lidocaine   4% Patch 1 Patch Transdermal daily  tamsulosin 0.4 milliGRAM(s) Oral at bedtime    MEDICATIONS  (PRN):  acetaminophen   IVPB .. 1000 milliGRAM(s) IV Intermittent every 8 hours PRN Mild Pain (1 - 3)  ketorolac   Injectable 15 milliGRAM(s) IV Push every 6 hours PRN Moderate Pain (4 - 6)  ketorolac   Injectable 30 milliGRAM(s) IV Push every 6 hours PRN Severe Pain (7 - 10)         Vitals log        ICU Vital Signs Last 24 Hrs  T(C): 36.6 (16 Mar 2025 05:34), Max: 36.8 (15 Mar 2025 20:47)  T(F): 97.9 (16 Mar 2025 05:34), Max: 98.2 (15 Mar 2025 20:47)  HR: 69 (16 Mar 2025 05:34) (61 - 69)  BP: 147/74 (16 Mar 2025 05:34) (121/64 - 147/74)  BP(mean): --  ABP: --  ABP(mean): --  RR: 18 (16 Mar 2025 05:34) (17 - 18)  SpO2: 96% (16 Mar 2025 05:34) (96% - 97%)    O2 Parameters below as of 16 Mar 2025 05:34  Patient On (Oxygen Delivery Method): nasal cannula  O2 Flow (L/min): 3               Input and Output:  I&O's Detail    15 Mar 2025 07:01  -  16 Mar 2025 07:00  --------------------------------------------------------  IN:  Total IN: 0 mL    OUT:    Voided (mL): 1700 mL  Total OUT: 1700 mL    Total NET: -1700 mL          Lab Data                        11.9   3.12  )-----------( 90       ( 15 Mar 2025 07:26 )             37.2     03-15    138  |  98  |  15  ----------------------------<  89  4.1   |  38[H]  |  0.97    Ca    8.8      15 Mar 2025 07:26  Phos  3.8     03-15  Mg     2.3     03-15    TPro  5.4[L]  /  Alb  2.9[L]  /  TBili  1.2  /  DBili  x   /  AST  15  /  ALT  16  /  AlkPhos  65  03-15    ABG - ( 14 Mar 2025 16:25 )  pH, Arterial: 7.40  pH, Blood: x     /  pCO2: 73    /  pO2: 80    / HCO3: 45    / Base Excess: 20.4  /  SaO2: 98.5                    Review of Systems	    02 support  poor historian  in bed  aide at bedside    Objective     Physical Examination    heart s1s2  lung dc bS  head nc  head at  abd soft  o2 support      Pertinent Lab findings & Imaging      Nghia:  NO   Adequate UO     I&O's Detail    15 Mar 2025 07:01  -  16 Mar 2025 07:00  --------------------------------------------------------  IN:  Total IN: 0 mL    OUT:    Voided (mL): 1700 mL  Total OUT: 1700 mL    Total NET: -1700 mL               Discussed with:     Cultures:	        Radiology      ACC: [id number] EXAM:  CT ABDOMEN AND PELVIS IC   ORDERED BY: NIDHI CONTE     ACC: [id number] EXAM:  CT ANGIO CHEST PULM ART WAWI   ORDERED BY: NIDHI CONTE     PROCEDURE DATE:  2025          INTERPRETATION:  CLINICAL INFORMATION: Right flank pain.  Subtherapeutic INR.    COMPARISON: CT scan chest 2023 and CT scan abdomen pelvis 2024    CONTRAST/COMPLICATIONS:  IV Contrast: Omnipaque 350  90 cc administered   10 cc discarded  Oral Contrast: NONE  .    PROCEDURE:  CT Angiography of the Chest was performed followed by portal venous phase   imaging of the Abdomen and Pelvis.  Sagittal and coronal reformats were performed as well as 3D (MIP)   reconstructions.    FINDINGS:    CHEST:    LUNGS AND LARGE AIRWAYS: PLEURA:    Mild septal thickening bilaterally, could reflect interstitial edema.    Trace pleural effusions bilateral costophrenic angles with cysts   subpleural atelectasis.    The central airways are patent.    VESSELS:    Linear filling defect within the right lower lobe pulmonary artery, which   appears to fill in on the venous phase of the abdomen is thought to   represent flow artifact.  Otherwise, no acute pulmonary embolism is noted.    Mild atherosclerotic changes thoracic aorta.    HEART:  Cardiomegaly with right ventricular and left atrial enlargement.  Mitral valve repair.  Aortic valve calcification.   No pericardial effusion.    MEDIASTINUM AND SAVANAH:  No enlarged lymphadenopathy.    CHEST WALL AND LOWER NECK:  Multinodular thyroid gland as noted on prior exam.  Bilateral gynecomastia.  Anterior right rib reconstruction plate.  Old right-sided rib fracture.    ABDOMEN AND PELVIS:    Streak artifact degrades image quality.    LIVER: Within normal limits.  BILE DUCTS: Normal caliber.  GALLBLADDER: Cholecystectomy.  SPLEEN: Within normal limits.  PANCREAS: Linear calcification along the pancreatic neck, stable.  ADRENALS: Within normal limits.  KIDNEYS/URETERS:  Bilateral renal cysts and additional subcentimeter hypodense lesions   right kidney which are too small for characterization.  No hydronephrosis.    BLADDER: Thickening, trabeculated urinary bladder.  REPRODUCTIVE ORGANS:  The prostate gland is not enlarged.    BOWEL:  Colonic fecal retention; no bowel obstruction.  Colonic diverticulosis, without CT evidence of diverticulitis.  No CT evidence for acute appendicitis.  PERITONEUM/RETROPERITONEUM: Within normal limits.    VESSELS: Within normal limits.  LYMPH NODES: No lymphadenopathy.    ABDOMINAL WALL:  Small fat-containing umbilical hernia.  Bilateral fat-containing inguinal hernias.    BONES:  Degenerative changes.    Chronic compression deformities T12 and L4.    IMPRESSION:    Flow artifact right lower lobe pulmonary artery.  No acute pulmonary embolism.    Mild bilateral septal thickening, could reflect interstitial edema.  Trace bilateral pleural effusions with subpleural atelectasis   costophrenic angles.    No acute intra-abdominal pathology.    Other findings as discussed above.    --- End of Report ---            CARMINE CUETO MD; Attending Radiologist  This document has been electronically signed. Mar 14 2025  4:43PM

## 2025-03-16 NOTE — DISCHARGE NOTE PROVIDER - NSDCCPCAREPLAN_GEN_ALL_CORE_FT
PRINCIPAL DISCHARGE DIAGNOSIS  Diagnosis: Bradycardia  Assessment and Plan of Treatment: - You were found to have bradycardia, which is a condition with a slow heart rate.   - You take metoprolol at home, which slows down heart rate.   - Your heart rate was monitored continuously on the telemetry after holding your home metoprolol. Your heart rate responded to becoming normal.   - You have an undelying chronic heart failure, and metoprolol is commonly prescribed patients with heart failure. You need to follow up with your PCP and cardiologist within a week after discharge from the hospital, for a possible dose adjustment.      SECONDARY DISCHARGE DIAGNOSES  Diagnosis: Irregular heartbeat  Assessment and Plan of Treatment: - You have a condition with irregular heart beat called paroxysmal atrial fibrillation.   - Usually patients with this condition are prescribed on blood thinners for prevention of clot formation. You take warfarin at home. You can continue your home warfarin with consultation with your outpatiet doctor(s).   - Patients with this condition are also prescribed heart rhythm controlling medications, in your case amiodarone. You can continue your home warfarin with consultation with your outpatiet doctor(s).   - Patients with this condition are also prescribed heart rate lowering medications, in your case metoprolol. You need to follow up with your PCP and cardiologist within a week after discharge from the hospital, for a possible dose adjustment.   - You need to follow up with your PCP and cardiologist within a week after discharge from the hospital.    Diagnosis: Chronic CHF  Assessment and Plan of Treatment: - You have a chronic ocngestive heart failure.   - You are on home lasix and metoprolol for this condition.   - You have an undelying chronic heart failure, and metoprolol is commonly prescribed patients with heart failure. You need to follow up with your PCP and cardiologist within a week after discharge from the hospital, for a possible dose adjustment.    Diagnosis: Rib pain on right side  Assessment and Plan of Treatment: - You have an undelying chronic right rib fracture.   - Usually it is a self resolving condition.   - You can take OTC pain meds such as ibuprofen, and/or lidocaine patch to help with the pain.   - You need to follow up with your PCP within a week after discharge from the hospital.        PRINCIPAL DISCHARGE DIAGNOSIS  Diagnosis: Bradycardia  Assessment and Plan of Treatment: - You were found to have bradycardia, which is a condition with a slow heart rate.   - You take metoprolol at home, which slows down heart rate.   STOP TAKING METOPROLOL AT HOME  - Your heart rate was monitored continuously on the telemetry after holding your home metoprolol. Your heart rate responded to becoming normal.   - You have an undelying chronic heart failure, and metoprolol is commonly prescribed patients with heart failure. You need to follow up with your PCP and cardiologist within a week after discharge from the hospital, for a possible dose adjustment.      SECONDARY DISCHARGE DIAGNOSES  Diagnosis: Rib pain on right side  Assessment and Plan of Treatment: - You have an undelying chronic right rib fracture.   - Usually it is a self resolving condition.   - You can take OTC pain meds such as ibuprofen, and/or lidocaine patch to help with the pain.   - You need to follow up with your PCP within a week after discharge from the hospital.       Diagnosis: Irregular heartbeat  Assessment and Plan of Treatment: - You have a condition with irregular heart beat called paroxysmal atrial fibrillation.   - Usually patients with this condition are prescribed on blood thinners for prevention of clot formation. You take warfarin at home. You can continue your home warfarin with consultation with your outpatiet doctor(s).   - Patients with this condition are also prescribed heart rhythm controlling medications, in your case amiodarone. You can continue your home warfarin with consultation with your outpatiet doctor(s).   - Patients with this condition are also prescribed heart rate lowering medications, in your case metoprolol. You need to follow up with your PCP and cardiologist within a week after discharge from the hospital, for a possible dose adjustment.   - You need to follow up with your PCP and cardiologist within a week after discharge from the hospital. Continue lidocaine patch for pain    Diagnosis: Chronic CHF  Assessment and Plan of Treatment: - You have a chronic ocngestive heart failure.   - You are on home lasix and metoprolol for this condition.   - You have an undelying chronic heart failure, and metoprolol is commonly prescribed patients with heart failure. You need to follow up with your PCP and cardiologist within a week after discharge from the hospital, for a possible dose adjustment.     PRINCIPAL DISCHARGE DIAGNOSIS  Diagnosis: Bradycardia  Assessment and Plan of Treatment: - You were found to have bradycardia, which is a condition with a slow heart rate.   - You take metoprolol at home, which slows down heart rate.   STOP TAKING METOPROLOL AT HOME  - Your heart rate was monitored continuously on the telemetry after holding your home metoprolol. Your heart rate responded to becoming normal.   - Echo without acute concerns however in the past did show some issues with mitral valve, please follow up with cardiology outpatient  - You have an undelying chronic heart failure, and metoprolol is commonly prescribed patients with heart failure. You need to follow up with your PCP and cardiologist within a week after discharge from the hospital, for a possible dose adjustment.      SECONDARY DISCHARGE DIAGNOSES  Diagnosis: Rib pain on right side  Assessment and Plan of Treatment: - You have an undelying chronic right rib fracture.   - Usually it is a self resolving condition.   - You can take OTC pain meds such as ibuprofen, and/or lidocaine patch to help with the pain.   - You need to follow up with your PCP within a week after discharge from the hospital.       Diagnosis: Irregular heartbeat  Assessment and Plan of Treatment: - You have a condition with irregular heart beat called paroxysmal atrial fibrillation.   - Usually patients with this condition are prescribed on blood thinners for prevention of clot formation. You take warfarin at home. You can continue your home warfarin with consultation with your outpatiet doctor(s).   - Patients with this condition are also prescribed heart rhythm controlling medications, in your case amiodarone. You can continue your home warfarin with consultation with your outpatiet doctor(s).   - Patients with this condition are also prescribed heart rate lowering medications, in your case metoprolol. You need to follow up with your PCP and cardiologist within a week after discharge from the hospital, for a possible dose adjustment.   - You need to follow up with your PCP and cardiologist within a week after discharge from the hospital. Continue lidocaine patch for pain    Diagnosis: Chronic CHF  Assessment and Plan of Treatment: - You have a chronic ocngestive heart failure.   - You are on home lasix and metoprolol for this condition.   - You have an undelying chronic heart failure, and metoprolol is commonly prescribed patients with heart failure. You need to follow up with your PCP and cardiologist within a week after discharge from the hospital, for a possible dose adjustment.

## 2025-03-16 NOTE — CARE COORDINATION ASSESSMENT. - LIVING ARRANGEMENTS, PROFILE
I have personally performed a face to face diagnostic evaluation on this patient. I have reviewed the ACP note and agree with the history, exam and plan of care, except as noted. house

## 2025-03-16 NOTE — PROGRESS NOTE ADULT - SUBJECTIVE AND OBJECTIVE BOX
[facility]                                                       Dr. Olivier, Dr. Wade, Dr. Osorio, Dr. Mckeon, Dr. Forde, Dr. Marcus                                      [facility], [facility], [address]                                                 [address] [address]                                      Ph: [phone]  Fax: [phone]                                         Ph: [phone]  Fax: [phone]      Patient is a 85y old  Male who presents with a chief complaint of Right flank/rib pain (16 Mar 2025 11:01)    Patient seen in follow up for       PAST MEDICAL HISTORY:  Afib    Prostate cancer    Heart failure    Heart failure    History of scoliosis      MEDICATIONS  (STANDING):  allopurinol 300 milliGRAM(s) Oral daily  aMIOdarone    Tablet 200 milliGRAM(s) Oral daily  brimonidine 0.2% Ophthalmic Solution 1 Drop(s) Both EYES two times a day  brinzolamide 1% Ophthalmic Suspension 1 Drop(s) Both EYES two times a day  furosemide    Tablet 20 milliGRAM(s) Oral daily  latanoprost 0.005% Ophthalmic Solution 1 Drop(s) Both EYES at bedtime  lidocaine   4% Patch 1 Patch Transdermal daily  tamsulosin 0.4 milliGRAM(s) Oral at bedtime    MEDICATIONS  (PRN):  acetaminophen   IVPB .. 1000 milliGRAM(s) IV Intermittent every 8 hours PRN Mild Pain (1 - 3)  ketorolac   Injectable 15 milliGRAM(s) IV Push every 6 hours PRN Moderate Pain (4 - 6)  ketorolac   Injectable 30 milliGRAM(s) IV Push every 6 hours PRN Severe Pain (7 - 10)    T(C): 36.3 (03-16-25 @ 11:25), Max: 36.8 (03-15-25 @ 04:38)  HR: 75 (03-16-25 @ 11:25) (61 - 75)  BP: 143/77 (03-16-25 @ 11:25) (121/64 - 147/77)  RR: 18 (03-16-25 @ 11:25) (17 - 18)  SpO2: 95% (03-16-25 @ 11:25) (95% - 97%)  Wt(kg): --  I&O's Detail    15 Mar 2025 07:01  -  16 Mar 2025 07:00  --------------------------------------------------------  IN:  Total IN: 0 mL    OUT:    Voided (mL): 1700 mL  Total OUT: 1700 mL    Total NET: -1700 mL      16 Mar 2025 07:01  -  16 Mar 2025 12:39  --------------------------------------------------------  IN:  Total IN: 0 mL    OUT:    Voided (mL): 400 mL  Total OUT: 400 mL    Total NET: -400 mL          PHYSICAL EXAM:  General: No distress  Respiratory: b/l air entry  Cardiovascular: S1 S2  Gastrointestinal: soft  Extremities:  edema                              12.2   4.48  )-----------( 89       ( 16 Mar 2025 10:20 )             38.7     03-16    142  |  98  |  18  ----------------------------<  111[H]  3.7   |  40[H]  |  1.20    Ca    8.7      16 Mar 2025 10:20  Phos  3.8     03-15  Mg     2.0     03-16    TPro  5.7[L]  /  Alb  2.9[L]  /  TBili  1.0  /  DBili  x   /  AST  14[L]  /  ALT  15  /  AlkPhos  67  03-16        LIVER FUNCTIONS - ( 16 Mar 2025 10:20 )  Alb: 2.9 g/dL / Pro: 5.7 g/dL / ALK PHOS: 67 U/L / ALT: 15 U/L / AST: 14 U/L / GGT: x           Urinalysis Basic - ( 16 Mar 2025 10:20 )    Color: x / Appearance: x / SG: x / pH: x  Gluc: 111 mg/dL / Ketone: x  / Bili: x / Urobili: x   Blood: x / Protein: x / Nitrite: x   Leuk Esterase: x / RBC: x / WBC x   Sq Epi: x / Non Sq Epi: x / Bacteria: x      ABG - ( 14 Mar 2025 16:25 )  pH, Arterial: 7.40  pH, Blood: x     /  pCO2: 73    /  pO2: 80    / HCO3: 45    / Base Excess: 20.4  /  SaO2: 98.5              Sodium, Serum: 142 (03-16 @ 10:20)  Sodium, Serum: 138 (03-15 @ 07:26)  Sodium, Serum: 139 (03-14 @ 13:15)    Creatinine, Serum: 1.20 (03-16 @ 10:20)  Creatinine, Serum: 0.97 (03-15 @ 07:26)  Creatinine, Serum: 1.10 (03-14 @ 13:15)    Potassium, Serum: 3.7 (03-16 @ 10:20)  Potassium, Serum: 4.1 (03-15 @ 07:26)  Potassium, Serum: 4.3 (03-14 @ 13:15)    Hemoglobin: 12.2 (03-16 @ 10:20)  Hemoglobin: 11.9 (03-15 @ 07:26)  Hemoglobin: 12.9 (03-14 @ 13:15)

## 2025-03-16 NOTE — CARE COORDINATION ASSESSMENT. - NSCAREPROVIDERS_GEN_ALL_CORE_FT
CARE PROVIDERS:  Accepting Physician: Ear Govea  Administration: Renato Olivier  Administration: Toni Talbert  Admitting: Era Govea  Attending: Jumana Wick  Consultant: Darrion Olivier  Consultant: Ivana Haile  Consultant: Ana Laura Posadas  Consultant: Jeferson Monsalve  Consultant: Bianca Singh  ED Attending: Melisa Buchanan  ED Nurse: Leatha Olmedo  Nurse: Corina Bailey  Nurse: Laura Riley  Nurse: Shanel Sanders  Outpatient Provider: Darrion Olivier  Outpatient Provider: Garcia Kennedy  Override: Luis Govea  PCA/Nursing Assistant: Nara Bills  Physical Therapy: Jonnie Zavala  Primary Team: Roxanne Kinney  Primary Team: Jyoti Dial  Primary Team: Radha Lee  Primary Team: Julio Cesar Carvajal  Primary Team: George Munoz  Primary Team: Donnell Rosenberg  Primary Team: Tyree Owens  Primary Team: Jumana Wick  Registered Dietitian: Mary Moura  Student: Larry Wisdom  Team: [facility], Team

## 2025-03-16 NOTE — DISCHARGE NOTE PROVIDER - CARE PROVIDERS DIRECT ADDRESSES
,DirectAddress_Unknown ,DirectAddress_Unknown,[email or web address]

## 2025-03-16 NOTE — DISCHARGE NOTE PROVIDER - CARE PROVIDER_API CALL
Neto Marrero  Cardiovascular Disease  [address] [place] [address]  Phone: [phone]  Fax: [phone]  Follow Up Time:    Neto Marrero  Cardiovascular Disease  [address] [place] [address]  Phone: [phone]  Fax: [phone]  Follow Up Time:     Yamileth Kelly  Family Medicine  [address] [facility], [address]  [place] [address]  Phone: [phone]  Fax: [phone]  Follow Up Time:

## 2025-03-16 NOTE — PROGRESS NOTE ADULT - ATTENDING COMMENTS
I have personally seen and examined the patient in detail.  I have spoken to the provider regarding the assessment and plan of care.  I have made changes to the note accordingly.    83-year-old male with a past medical history of A-fib on coumadin and amio, dementia, CHF, prostate cancer, on home O2 3L since last year, levoscoliosis, nonambulatory presents to the ED with a chief complaint of right flank/rib pain. Admitted for irregular heart rate monitoring, compensated respiratory acidosis, and possible right rib fracture. Cardiology consulted for bradycardia, history of CHF (unspecified)    CHF (Unspecified)  - Pt with hx CHF (unspecified, states HFrEF per chart review at [facility] in 2023) on lasix and toprol  - Pt with 1+ pitting edema on exam, L>R   - f/u duplex to r/o DVT, still pending  - No recent TTEs in EMR, last was 2008-- per chart review at [facility] in 2023, TTE 3/2022: EF 40%, mod MR, mild-mod AR  - TTE ordered, still pending. follow up results   - c/w home lasix 20mg qd po, as per admission notes only as BP tolerates (-140)  - Hold home metoprolol given bradycardia on admission  - On home supplemental O2 (3L), currently at baseline  - Trend renal indices   - Continue Fluid restriction  - Strict I/Os, daily weights    Bradycardia/pAF  - Pt bradycardic on admission, per H&P also with intermittent spikes to the 110s-170s  - EKG: Sinus bradycardia rate 49bpm. LBBB. QTc 457ms.  - Home metoprolol held with improvement in rates  - Per tele, NSR 60's  - Hx pAF (s/p DCCV in 2022); c/w home amio and coumadin dosing based on INR (per H&P, patient is on 4mg on Tu/Th, 2mg coumadin other days)  - Monitor and replete lytes, keep K>4, Mg>2.    HTN  - Pt with no documented history of HTN, however BPs mildly elevated on admission-- now improved  - c/w home lasix with hold parameters  - Continue to monitor hemodynamics
83 year old M PMH A-fib on coumadin and amio, dementia, CHF, prostate cancer, on home O2 3L since last year, levoscoliosis, nonambulatory presents to the ED with a chief complaint of right flank/rib pain. Admitted for irregular heart rate monitoring, compensated respiratory acidosis, and possible right rib fracture.     #R rib fracture  - CT with old R rib fracture  - continue lidocaine patch and toradol PRN  - resp status stable and comfortable at this time    #abnormal ABG  - likely respiratory acidosis and metabolic alkalosis, appears compensated  - ICU noted appreciated  - nephro consulted, follow up recs    #bradycardia  #afib  - hold AV sangita agents, stopped home Toprol  - continue home amio and coumadin  - HR controlled at this time, monitor BP for elevation  - follow up echo  - cardio recs appreciated     #chronic CHF  - continue home lasix  - follow up LE doppler    discussed at length with wife Ronna and daughter Nadine over the phone, all questions answered. Possible dc home tomorrow, will monitor on tele today and get PT eval- patient bedbound but gets home PT to help with strength. Family does not want [facility]

## 2025-03-16 NOTE — CONSULT NOTE ADULT - ASSESSMENT
83-year-old male with a past medical history of A-fib on coumadin and amio, dementia, CHF, prostate cancer, on home O2 3L since last year, nonambulatory presents to the ED with a chief complaint of right flank/rib pain. Patient's wife and one of his 5 aides are at bedside providing history. Family endorses patient has been complaining of pain since Monday, gradually getting worse. With more prompting, they admit patient has a hospital bed at home and one of the aides last Saturday pressed one of the buttons too quickly and the top part of the bed hyperextended very fast    pulm edema  atelectasis  small eff  OP  OA  AURELIA likely  AFIB  Dementia  CHF  Prostate Ca    echo -   cardio eval and recs  I and O  Lasix  cvs rx regimen optimization  I delvin  monitor VS and HD and Sat  goal sat > 88 pct  dietary discretion  Ct chest reviewed - pulm edema - eff - atelectasis -   dementia - monitor mentation - assist with needs  Blood gas with co2 retention - suspect AURELIA and Possibly Central Sleep Apnea in pt with CHF  doubt pt will cooperate with NIV  tele monitored  optimization in progress

## 2025-03-16 NOTE — PROGRESS NOTE ADULT - ASSESSMENT
83-year-old male with a past medical history of A-fib on coumadin and amio, dementia, CHF, prostate cancer, on home O2 3L since last year, levoscoliosis, nonambulatory presents to the ED with a chief complaint of right flank/rib pain. Admitted for irregular heart rate monitoring, compensated respiratory acidosis, and possible right rib fracture. Cardiology consulted for bradycardia, history of CHF (unspecified)    #CHF (Unspecified)  - Pt with hx CHF (unspecified, states HFrEF per chart review at [facility] in 2023) on lasix and toprol  - Pt with 1+ pitting edema on exam, L>R   - f/u duplex to r/o DVT, still pending  - No recent TTEs in EMR, last was 2008-- per chart review at [facility] in 2023, TTE 3/2022: EF 40%, mod MR, mild-mod AR  - TTE ordered, still pending. follow up results   - c/w home lasix 20mg qd, as per admission notes only as BP tolerates (-140)  - Hold home metoprolol given bradycardia on admission  - On home supplemental O2 (3L), currently at baseline  - Trend renal indices   - Continue Fluid restriction  - Strict I/Os, daily weights    #Ischemia   - No clear evidence of acute ischemia, denies anginal complaints at present  - Troponin negative x 2  - EKG: Sinus bradycardia rate 49bpm. LBBB. QTc 457ms. No ST/T changes  - Continue to monitor for signs or symptoms of ischemia     #Bradycardia/pAF  - Pt bradycardic on admission, per H&P also with intermittent spikes to the 110s-170s  - EKG: Sinus bradycardia rate 49bpm. LBBB. QTc 457ms.  - Home metoprolol held with improvement in rates  - Per tele,   - Hx pAF (s/p DCCV in 2022); c/w home amio and coumadin dosing based on INR (per H&P, patient is on 4mg on Tu/Th, 2mg coumadin other days)  - Monitor and replete lytes, keep K>4, Mg>2.    #HTN  - Pt with no documented history of HTN, however BPs mildly elevated on admission-- now improved  - c/w home lasix with hold parameters  - Continue to monitor hemodynamics     - Other cardiovascular workup will depend on clinical course.  - All other workup per primary team.  - Will continue to follow.   83-year-old male with a past medical history of A-fib on coumadin and amio, dementia, CHF, prostate cancer, on home O2 3L since last year, levoscoliosis, nonambulatory presents to the ED with a chief complaint of right flank/rib pain. Admitted for irregular heart rate monitoring, compensated respiratory acidosis, and possible right rib fracture. Cardiology consulted for bradycardia, history of CHF (unspecified)    #CHF (Unspecified)  - Pt with hx CHF (unspecified, states HFrEF per chart review at [facility] in 2023) on lasix and toprol  - Pt with 1+ pitting edema on exam, L>R   - f/u duplex to r/o DVT, still pending  - No recent TTEs in EMR, last was 2008-- per chart review at [facility] in 2023, TTE 3/2022: EF 40%, mod MR, mild-mod AR  - TTE ordered, still pending. follow up results   - c/w home lasix 20mg qd, as per admission notes only as BP tolerates (-140)  - Hold home metoprolol given bradycardia on admission  - On home supplemental O2 (3L), currently at baseline  - Trend renal indices   - Continue Fluid restriction  - Strict I/Os, daily weights    #Ischemia   - No clear evidence of acute ischemia, denies anginal complaints at present  - Troponin negative x 2  - EKG: Sinus bradycardia rate 49bpm. LBBB. QTc 457ms. No ST/T changes  - Continue to monitor for signs or symptoms of ischemia     #Bradycardia/pAF  - Pt bradycardic on admission, per H&P also with intermittent spikes to the 110s-170s  - EKG: Sinus bradycardia rate 49bpm. LBBB. QTc 457ms.  - Home metoprolol held with improvement in rates  - Per tele, NSR 60's  - Hx pAF (s/p DCCV in 2022); c/w home amio and coumadin dosing based on INR (per H&P, patient is on 4mg on Tu/Th, 2mg coumadin other days)  - Monitor and replete lytes, keep K>4, Mg>2.    #HTN  - Pt with no documented history of HTN, however BPs mildly elevated on admission-- now improved  - c/w home lasix with hold parameters  - Continue to monitor hemodynamics     - Other cardiovascular workup will depend on clinical course.  - All other workup per primary team.  - Will continue to follow.

## 2025-03-16 NOTE — PHYSICAL THERAPY INITIAL EVALUATION ADULT - PERTINENT HX OF CURRENT PROBLEM, REHAB EVAL
83-year-old male with a past medical history of A-fib on coumadin and amio, dementia, CHF, prostate cancer, on home O2 3L since last year, nonambulatory presents to the ED with a chief complaint of right flank/rib pain. Patient and one of his aides are at bedside providing history. Family endorses patient has been complaining of pain since Monday, gradually getting worse. With more prompting, they admit patient has a hospital bed at home and one of the aides last Saturday pressed one of the buttons too quickly and the top part of the bed hyperextended very fast. Otherwise does not remember any other trauma. They are unsure if this is the cause of the injury.

## 2025-03-16 NOTE — CARE COORDINATION ASSESSMENT. - NSDCPLANREVIEW_GEN_ALL_CORE
"ED Provider Note    CHIEF COMPLAINT  Chief Complaint   Patient presents with   • Urinary Retention       HPI  Mame Lester is a 67 y.o. male who presents complaining of inability to urinate.  The patient was out gambling last night and drinking wine.  He is been unable to urinate since last night.  He presents here for help.  This is happened previously, he attributes it to poor quality wine.  He states typically once he gets a catheter he feels better.  He denies any interim symptoms such as urinary hesitation or delayed emptying, last having an episode of urinary retention a few months ago.  He denies any pain with urination.  Denies any fever or chills.  No chest pain or shortness of breath.  There is no other complaint.    PAST MEDICAL HISTORY  He denies, states he is healthy    FAMILY HISTORY  No family history on file.    SOCIAL HISTORY  Social History   Substance Use Topics   • Smoking status: Never Smoker   • Smokeless tobacco: Never Used   • Alcohol use Yes      Comment: occ         SURGICAL HISTORY  History reviewed. No pertinent surgical history.    CURRENT MEDICATIONS  Home Medications     Reviewed by Hyun Marin R.N. (Registered Nurse) on 04/16/19 at 1039  Med List Status: Not Addressed   Medication Last Dose Status        Patient Jovanni Taking any Medications                       I have reviewed the nurses notes and/or the list brought with the patient.    ALLERGIES  No Known Allergies    REVIEW OF SYSTEMS  See HPI for further details. Review of systems as above, otherwise all other systems are negative.     PHYSICAL EXAM  VITAL SIGNS: /75   Pulse 100   Temp 36.7 °C (98.1 °F) (Temporal)   Resp 16   Ht 1.88 m (6' 2\")   Wt 63.7 kg (140 lb 6.9 oz)   SpO2 97%   BMI 18.03 kg/m²     Constitutional: Well appearing patient in no acute distress.  Not toxic, nor ill in appearance.  HENT: Mucus membranes moist.  Oropharynx is clear.  Eyes: Pupils equally round.  No scleral icterus. "   Neck: Full nontender range of motion.  Lymphatic: No cervical lymphadenopathy noted.   Cardiovascular: Regular heart rate and rhythm.  No murmurs, rubs, nor gallop appreciated.   Thorax & Lungs: Chest is nontender.  Lungs are clear to auscultation with good air movement bilaterally.  No wheeze, rhonchi, nor rales.   Abdomen: Soft, with no tenderness, rebound nor guarding.  No mass, pulsatile mass, nor hepatosplenomegaly appreciated.  Skin: No purpura nor petechia noted.  Extremities/Musculoskeletal: No sign of trauma.  Calves are nontender with no cords nor edema.  No Aleah's sign.  Pulses are intact all around.   Neurologic: Alert & oriented.  Strength and sensation is intact all around.   Psychiatric: Normal affect appropriate for the clinical situation.      LABS  Labs Reviewed   URINALYSIS,CULTURE IF INDICATED - Abnormal; Notable for the following:        Result Value    Occult Blood Trace (*)     All other components within normal limits   URINE MICROSCOPIC (W/UA) - Abnormal; Notable for the following:     WBC 0-2 (*)     RBC 0-2 (*)     All other components within normal limits         MEDICAL RECORD  I have reviewed patient's medical record and pertinent results are listed above.    COURSE & MEDICAL DECISION MAKING  I have reviewed any medical record information, laboratory studies and radiographic results as noted above.  This patient presents with acute urinary retention.  Gillespie catheter is in place with complete resolution of his symptoms.  He does have a few cells seen on microscopy, however he has no symptoms to suggest a urinary tract infection.  My recommendation to him was to go home with a leg bag and an indwelling Gillespie for a few days.  He wants no part of this.  I also recommended considering a prostatic agent, he does not want to do this either.  I have asked our  to help coordinate a follow-up appointment with her primary care physician for him.  He is given instructions on urinary  retention, return precautions.    FINAL IMPRESSION  1. Urinary retention           This dictation was created using voice recognition software.    Electronically signed by: Maxwell Spears, 4/16/2019 11:48 AM     Caregiver

## 2025-03-20 LAB — URATE SERPL-MCNC: 2.9 MG/DL — LOW (ref 3.4–8.8)

## 2025-07-18 NOTE — H&P ADULT - HISTORY OF PRESENT ILLNESS
83 M with hx of paroxysmal Afib on coumadin, HFrEF (EF 40% in 3/2022), prostate cancer, dementia, recent admission for RLE hematoma s/p evacuation in OR with surgery presenting with R foot swelling, erythema, and blister. Pt was just discharged 10/25 with wound vac to site of RLE wound where hematoma previously was. History obtained from health aide at bedside. Per aide, prior to discharge, patient had his R foot wrapped by the surgery team. There was concern that the wrapping was too tight given that the patient was in pain. At home, patient had worsening pain and family noticed that there was developing erythema and formation of blisters around the area. Family called hospital and was advised to bring patient back to ED for evaluation. Foot is now unwrapped with a large blister over dorsal aspect of R foot with serosanguineous fluid collection. Skin overlying blister is intact. Per aide, the erythema and swelling has improved since foot was unwrapped. Patient currently not endorsing pain or discomfort. In ED, patient was given IV unasyn. Pt was evaluated by surgery and was recommended for admission for IV antibiotics.  n/a

## (undated) DEVICE — WARMING BLANKET LOWER ADULT

## (undated) DEVICE — SOL IRR BAG NS 0.9% 3000ML

## (undated) DEVICE — CANISTER W/GEL INFOVAC 1000ML

## (undated) DEVICE — DRAPE LIGHT HANDLE COVER (BLUE)

## (undated) DEVICE — DRAIN JACKSON PRATT 10MM FLAT FULL NO TROCAR

## (undated) DEVICE — DRSG XEROFORM 5 X 9"

## (undated) DEVICE — GLV 7 PROTEXIS (WHITE)

## (undated) DEVICE — GOWN TRIMAX LG

## (undated) DEVICE — DRSG XEROFORM 1 X 8"

## (undated) DEVICE — SPECIMEN CONTAINER 100ML

## (undated) DEVICE — GLV 8.5 PROTEXIS (WHITE)

## (undated) DEVICE — VISITEC 4X4

## (undated) DEVICE — CANISTER KCI 500ML GEL SENSA TRAC

## (undated) DEVICE — WARMING BLANKET UPPER ADULT

## (undated) DEVICE — DRAIN RESERVOIR FOR JACKSON PRATT 100CC CARDINAL

## (undated) DEVICE — DRAPE INSTRUMENT POUCH 6.75" X 11"

## (undated) DEVICE — DRAPE 3/4 SHEET W REINFORCEMENT 56X77"

## (undated) DEVICE — LAP PAD 18 X 18"

## (undated) DEVICE — DRAPE TOWEL BLUE 17" X 24"

## (undated) DEVICE — DRSG VAC GRANUFOAM MEDIUM (BLACK)

## (undated) DEVICE — TUBING SUCTION 20FT

## (undated) DEVICE — BLADE SCALPEL SAFETYLOCK #15

## (undated) DEVICE — GLV 7.5 PROTEXIS (WHITE)

## (undated) DEVICE — DRAPE 1/2 SHEET 40X57"

## (undated) DEVICE — POSITIONER FOAM EGG CRATE ULNAR 2PCS (PINK)

## (undated) DEVICE — DRAPE MAYO STAND 30"

## (undated) DEVICE — SOL IRR POUR NS 0.9% 500ML

## (undated) DEVICE — MEDICATION LABELS W MARKER

## (undated) DEVICE — CANISTER W/GEL INFOVAC 500ML

## (undated) DEVICE — DRSG VAC GRANUFOAM SMALL (BLACK)

## (undated) DEVICE — DRSG VAC GRANUFOAM LARGE (BLACK)

## (undated) DEVICE — VENODYNE/SCD SLEEVE CALF LARGE

## (undated) DEVICE — DRAIN JACKSON PRATT 7MM FLAT FULL NO TROCAR

## (undated) DEVICE — GLV 8 PROTEXIS (WHITE)

## (undated) DEVICE — FOLEY TRAY 16FR 5CC LTX UMETER CLOSED

## (undated) DEVICE — GLV 6.5 PROTEXIS (WHITE)

## (undated) DEVICE — SOL IRR POUR H2O 250ML

## (undated) DEVICE — PACKING STRIP PLAIN 1"

## (undated) DEVICE — PACK MAJOR ABDOMINAL SUPINE

## (undated) DEVICE — BLADE SCALPEL SAFETYLOCK #10

## (undated) DEVICE — STAPLER SKIN VISI-STAT 35 WIDE